# Patient Record
Sex: MALE | Race: ASIAN | NOT HISPANIC OR LATINO
[De-identification: names, ages, dates, MRNs, and addresses within clinical notes are randomized per-mention and may not be internally consistent; named-entity substitution may affect disease eponyms.]

---

## 2020-01-11 ENCOUNTER — TRANSCRIPTION ENCOUNTER (OUTPATIENT)
Age: 55
End: 2020-01-11

## 2024-04-02 ENCOUNTER — INPATIENT (INPATIENT)
Facility: HOSPITAL | Age: 59
LOS: 12 days | Discharge: NURSING, REHAB OR LONG-TERM CARE | DRG: 66 | End: 2024-04-15
Attending: PSYCHIATRY & NEUROLOGY | Admitting: STUDENT IN AN ORGANIZED HEALTH CARE EDUCATION/TRAINING PROGRAM
Payer: COMMERCIAL

## 2024-04-02 VITALS — SYSTOLIC BLOOD PRESSURE: 170 MMHG | WEIGHT: 170.86 LBS | DIASTOLIC BLOOD PRESSURE: 110 MMHG

## 2024-04-02 DIAGNOSIS — I61.9 NONTRAUMATIC INTRACEREBRAL HEMORRHAGE, UNSPECIFIED: ICD-10-CM

## 2024-04-02 LAB
ALBUMIN SERPL ELPH-MCNC: 4.3 G/DL — SIGNIFICANT CHANGE UP (ref 3.3–5)
ALP SERPL-CCNC: 64 U/L — SIGNIFICANT CHANGE UP (ref 40–120)
ALT FLD-CCNC: 26 U/L — SIGNIFICANT CHANGE UP (ref 10–45)
ANION GAP SERPL CALC-SCNC: 16 MMOL/L — SIGNIFICANT CHANGE UP (ref 5–17)
APTT BLD: 28.7 SEC — SIGNIFICANT CHANGE UP (ref 24.5–35.6)
AST SERPL-CCNC: 32 U/L — SIGNIFICANT CHANGE UP (ref 10–40)
BASOPHILS # BLD AUTO: 0.07 K/UL — SIGNIFICANT CHANGE UP (ref 0–0.2)
BASOPHILS NFR BLD AUTO: 0.7 % — SIGNIFICANT CHANGE UP (ref 0–2)
BILIRUB SERPL-MCNC: 0.4 MG/DL — SIGNIFICANT CHANGE UP (ref 0.2–1.2)
BLD GP AB SCN SERPL QL: NEGATIVE — SIGNIFICANT CHANGE UP
BUN SERPL-MCNC: 19 MG/DL — SIGNIFICANT CHANGE UP (ref 7–23)
CALCIUM SERPL-MCNC: 8.6 MG/DL — SIGNIFICANT CHANGE UP (ref 8.4–10.5)
CHLORIDE SERPL-SCNC: 105 MMOL/L — SIGNIFICANT CHANGE UP (ref 96–108)
CO2 SERPL-SCNC: 21 MMOL/L — LOW (ref 22–31)
CREAT SERPL-MCNC: 0.85 MG/DL — SIGNIFICANT CHANGE UP (ref 0.5–1.3)
EGFR: 102 ML/MIN/1.73M2 — SIGNIFICANT CHANGE UP
EOSINOPHIL # BLD AUTO: 0.22 K/UL — SIGNIFICANT CHANGE UP (ref 0–0.5)
EOSINOPHIL NFR BLD AUTO: 2.1 % — SIGNIFICANT CHANGE UP (ref 0–6)
ETHANOL SERPL-MCNC: <10 MG/DL — SIGNIFICANT CHANGE UP (ref 0–10)
GAS PNL BLDV: SIGNIFICANT CHANGE UP
GLUCOSE SERPL-MCNC: 145 MG/DL — HIGH (ref 70–99)
HCT VFR BLD CALC: 41.8 % — SIGNIFICANT CHANGE UP (ref 39–50)
HGB BLD-MCNC: 13.5 G/DL — SIGNIFICANT CHANGE UP (ref 13–17)
IMM GRANULOCYTES NFR BLD AUTO: 0.7 % — SIGNIFICANT CHANGE UP (ref 0–0.9)
INR BLD: 0.97 RATIO — SIGNIFICANT CHANGE UP (ref 0.85–1.18)
LYMPHOCYTES # BLD AUTO: 0.74 K/UL — LOW (ref 1–3.3)
LYMPHOCYTES # BLD AUTO: 7.1 % — LOW (ref 13–44)
MCHC RBC-ENTMCNC: 28.7 PG — SIGNIFICANT CHANGE UP (ref 27–34)
MCHC RBC-ENTMCNC: 32.3 GM/DL — SIGNIFICANT CHANGE UP (ref 32–36)
MCV RBC AUTO: 88.9 FL — SIGNIFICANT CHANGE UP (ref 80–100)
MONOCYTES # BLD AUTO: 0.58 K/UL — SIGNIFICANT CHANGE UP (ref 0–0.9)
MONOCYTES NFR BLD AUTO: 5.6 % — SIGNIFICANT CHANGE UP (ref 2–14)
NEUTROPHILS # BLD AUTO: 8.67 K/UL — HIGH (ref 1.8–7.4)
NEUTROPHILS NFR BLD AUTO: 83.8 % — HIGH (ref 43–77)
NRBC # BLD: 0 /100 WBCS — SIGNIFICANT CHANGE UP (ref 0–0)
PLATELET # BLD AUTO: 205 K/UL — SIGNIFICANT CHANGE UP (ref 150–400)
PLATELET RESPONSE ASPIRIN RESULT: 658 ARU — SIGNIFICANT CHANGE UP
POTASSIUM SERPL-MCNC: 4.3 MMOL/L — SIGNIFICANT CHANGE UP (ref 3.5–5.3)
POTASSIUM SERPL-SCNC: 4.3 MMOL/L — SIGNIFICANT CHANGE UP (ref 3.5–5.3)
PROT SERPL-MCNC: 6.9 G/DL — SIGNIFICANT CHANGE UP (ref 6–8.3)
PROTHROM AB SERPL-ACNC: 10.2 SEC — SIGNIFICANT CHANGE UP (ref 9.5–13)
RBC # BLD: 4.7 M/UL — SIGNIFICANT CHANGE UP (ref 4.2–5.8)
RBC # FLD: 12.9 % — SIGNIFICANT CHANGE UP (ref 10.3–14.5)
RH IG SCN BLD-IMP: POSITIVE — SIGNIFICANT CHANGE UP
SODIUM SERPL-SCNC: 142 MMOL/L — SIGNIFICANT CHANGE UP (ref 135–145)
TROPONIN T, HIGH SENSITIVITY RESULT: 19 NG/L — SIGNIFICANT CHANGE UP (ref 0–51)
WBC # BLD: 10.35 K/UL — SIGNIFICANT CHANGE UP (ref 3.8–10.5)
WBC # FLD AUTO: 10.35 K/UL — SIGNIFICANT CHANGE UP (ref 3.8–10.5)

## 2024-04-02 PROCEDURE — 71045 X-RAY EXAM CHEST 1 VIEW: CPT | Mod: 26

## 2024-04-02 PROCEDURE — 93010 ELECTROCARDIOGRAM REPORT: CPT

## 2024-04-02 PROCEDURE — 70496 CT ANGIOGRAPHY HEAD: CPT | Mod: 26,MC

## 2024-04-02 PROCEDURE — 70498 CT ANGIOGRAPHY NECK: CPT | Mod: 26,MC

## 2024-04-02 PROCEDURE — 70450 CT HEAD/BRAIN W/O DYE: CPT | Mod: 26,MC,59

## 2024-04-02 RX ORDER — SODIUM CHLORIDE 5 G/100ML
1000 INJECTION, SOLUTION INTRAVENOUS
Refills: 0 | Status: DISCONTINUED | OUTPATIENT
Start: 2024-04-02 | End: 2024-04-03

## 2024-04-02 RX ORDER — PROPOFOL 10 MG/ML
50 INJECTION, EMULSION INTRAVENOUS ONCE
Refills: 0 | Status: COMPLETED | OUTPATIENT
Start: 2024-04-02 | End: 2024-04-02

## 2024-04-02 RX ORDER — LEVETIRACETAM 250 MG/1
500 TABLET, FILM COATED ORAL EVERY 12 HOURS
Refills: 0 | Status: DISCONTINUED | OUTPATIENT
Start: 2024-04-02 | End: 2024-04-03

## 2024-04-02 RX ORDER — SODIUM CHLORIDE 9 MG/ML
1000 INJECTION, SOLUTION INTRAVENOUS
Refills: 0 | Status: DISCONTINUED | OUTPATIENT
Start: 2024-04-02 | End: 2024-04-03

## 2024-04-02 RX ORDER — POLYETHYLENE GLYCOL 3350 17 G/17G
17 POWDER, FOR SOLUTION ORAL DAILY
Refills: 0 | Status: DISCONTINUED | OUTPATIENT
Start: 2024-04-02 | End: 2024-04-11

## 2024-04-02 RX ORDER — ONDANSETRON 8 MG/1
4 TABLET, FILM COATED ORAL EVERY 6 HOURS
Refills: 0 | Status: DISCONTINUED | OUTPATIENT
Start: 2024-04-02 | End: 2024-04-15

## 2024-04-02 RX ORDER — SUCCINYLCHOLINE CHLORIDE 100 MG/5ML
80 SYRINGE (ML) INTRAVENOUS ONCE
Refills: 0 | Status: COMPLETED | OUTPATIENT
Start: 2024-04-02 | End: 2024-04-02

## 2024-04-02 RX ORDER — ACETAMINOPHEN 500 MG
650 TABLET ORAL EVERY 6 HOURS
Refills: 0 | Status: DISCONTINUED | OUTPATIENT
Start: 2024-04-02 | End: 2024-04-11

## 2024-04-02 RX ORDER — ETOMIDATE 2 MG/ML
20 INJECTION INTRAVENOUS ONCE
Refills: 0 | Status: COMPLETED | OUTPATIENT
Start: 2024-04-02 | End: 2024-04-02

## 2024-04-02 RX ORDER — DEXMEDETOMIDINE HYDROCHLORIDE IN 0.9% SODIUM CHLORIDE 4 UG/ML
0.2 INJECTION INTRAVENOUS
Qty: 200 | Refills: 0 | Status: DISCONTINUED | OUTPATIENT
Start: 2024-04-02 | End: 2024-04-05

## 2024-04-02 RX ORDER — LEVETIRACETAM 250 MG/1
500 TABLET, FILM COATED ORAL EVERY 12 HOURS
Refills: 0 | Status: DISCONTINUED | OUTPATIENT
Start: 2024-04-02 | End: 2024-04-02

## 2024-04-02 RX ORDER — INSULIN LISPRO 100/ML
VIAL (ML) SUBCUTANEOUS EVERY 6 HOURS
Refills: 0 | Status: DISCONTINUED | OUTPATIENT
Start: 2024-04-02 | End: 2024-04-03

## 2024-04-02 RX ORDER — CHLORHEXIDINE GLUCONATE 213 G/1000ML
15 SOLUTION TOPICAL EVERY 12 HOURS
Refills: 0 | Status: DISCONTINUED | OUTPATIENT
Start: 2024-04-02 | End: 2024-04-03

## 2024-04-02 RX ORDER — FENTANYL CITRATE 50 UG/ML
0.5 INJECTION INTRAVENOUS
Qty: 2500 | Refills: 0 | Status: DISCONTINUED | OUTPATIENT
Start: 2024-04-02 | End: 2024-04-02

## 2024-04-02 RX ORDER — DEXTROSE 50 % IN WATER 50 %
15 SYRINGE (ML) INTRAVENOUS ONCE
Refills: 0 | Status: DISCONTINUED | OUTPATIENT
Start: 2024-04-02 | End: 2024-04-03

## 2024-04-02 RX ORDER — FENTANYL CITRATE 50 UG/ML
100 INJECTION INTRAVENOUS ONCE
Refills: 0 | Status: DISCONTINUED | OUTPATIENT
Start: 2024-04-02 | End: 2024-04-02

## 2024-04-02 RX ORDER — PROPOFOL 10 MG/ML
10 INJECTION, EMULSION INTRAVENOUS
Qty: 500 | Refills: 0 | Status: DISCONTINUED | OUTPATIENT
Start: 2024-04-02 | End: 2024-04-03

## 2024-04-02 RX ORDER — DEXTROSE 50 % IN WATER 50 %
25 SYRINGE (ML) INTRAVENOUS ONCE
Refills: 0 | Status: DISCONTINUED | OUTPATIENT
Start: 2024-04-02 | End: 2024-04-03

## 2024-04-02 RX ORDER — PANTOPRAZOLE SODIUM 20 MG/1
40 TABLET, DELAYED RELEASE ORAL DAILY
Refills: 0 | Status: DISCONTINUED | OUTPATIENT
Start: 2024-04-02 | End: 2024-04-06

## 2024-04-02 RX ORDER — LEVETIRACETAM 250 MG/1
1500 TABLET, FILM COATED ORAL ONCE
Refills: 0 | Status: DISCONTINUED | OUTPATIENT
Start: 2024-04-02 | End: 2024-04-03

## 2024-04-02 RX ORDER — FENTANYL CITRATE 50 UG/ML
0.5 INJECTION INTRAVENOUS
Qty: 5000 | Refills: 0 | Status: DISCONTINUED | OUTPATIENT
Start: 2024-04-02 | End: 2024-04-03

## 2024-04-02 RX ORDER — GLUCAGON INJECTION, SOLUTION 0.5 MG/.1ML
1 INJECTION, SOLUTION SUBCUTANEOUS ONCE
Refills: 0 | Status: DISCONTINUED | OUTPATIENT
Start: 2024-04-02 | End: 2024-04-03

## 2024-04-02 RX ORDER — DEXTROSE 50 % IN WATER 50 %
12.5 SYRINGE (ML) INTRAVENOUS ONCE
Refills: 0 | Status: DISCONTINUED | OUTPATIENT
Start: 2024-04-02 | End: 2024-04-03

## 2024-04-02 RX ORDER — NICARDIPINE HYDROCHLORIDE 30 MG/1
5 CAPSULE, EXTENDED RELEASE ORAL
Qty: 40 | Refills: 0 | Status: DISCONTINUED | OUTPATIENT
Start: 2024-04-02 | End: 2024-04-03

## 2024-04-02 RX ORDER — SENNA PLUS 8.6 MG/1
2 TABLET ORAL AT BEDTIME
Refills: 0 | Status: DISCONTINUED | OUTPATIENT
Start: 2024-04-02 | End: 2024-04-11

## 2024-04-02 RX ADMIN — PROPOFOL 50 MILLIGRAM(S): 10 INJECTION, EMULSION INTRAVENOUS at 22:10

## 2024-04-02 RX ADMIN — FENTANYL CITRATE 1.93 MICROGRAM(S)/KG/HR: 50 INJECTION INTRAVENOUS at 22:25

## 2024-04-02 RX ADMIN — FENTANYL CITRATE 100 MICROGRAM(S): 50 INJECTION INTRAVENOUS at 22:04

## 2024-04-02 RX ADMIN — NICARDIPINE HYDROCHLORIDE 25 MG/HR: 30 CAPSULE, EXTENDED RELEASE ORAL at 22:21

## 2024-04-02 RX ADMIN — FENTANYL CITRATE 1.93 MICROGRAM(S)/KG/HR: 50 INJECTION INTRAVENOUS at 23:10

## 2024-04-02 RX ADMIN — PROPOFOL 4.68 MICROGRAM(S)/KG/MIN: 10 INJECTION, EMULSION INTRAVENOUS at 22:21

## 2024-04-02 RX ADMIN — Medication 80 MILLIGRAM(S): at 21:52

## 2024-04-02 RX ADMIN — ETOMIDATE 20 MILLIGRAM(S): 2 INJECTION INTRAVENOUS at 21:52

## 2024-04-02 RX ADMIN — NICARDIPINE HYDROCHLORIDE 25 MG/HR: 30 CAPSULE, EXTENDED RELEASE ORAL at 23:52

## 2024-04-02 RX ADMIN — PROPOFOL 4.68 MICROGRAM(S)/KG/MIN: 10 INJECTION, EMULSION INTRAVENOUS at 23:10

## 2024-04-02 NOTE — ED ADULT NURSE NOTE - NSFALLHARMRISKINTERV_ED_ALL_ED
Assistance OOB with selected safe patient handling equipment if applicable/Communicate risk of Fall with Harm to all staff, patient, and family/Encourage patient to sit up slowly, dangle for a short time, stand at bedside before walking/Provide visual cue: red socks, yellow wristband, yellow gown, etc/Reinforce activity limits and safety measures with patient and family/Review medications for side effects contributing to fall risk/Toileting schedule using arm’s reach rule for commode and bathroom/Bed in lowest position, wheels locked, appropriate side rails in place/Call bell, personal items and telephone in reach/Instruct patient to call for assistance before getting out of bed/chair/stretcher/Non-slip footwear applied when patient is off stretcher/[place] to call system/Physically safe environment - no spills, clutter or unnecessary equipment/Purposeful Proactive Rounding/Room/bathroom lighting operational, light cord in reach

## 2024-04-02 NOTE — ED PROVIDER NOTE - PHYSICAL EXAMINATION
Review attending attestation, progress notes, & HPI for continued care progress and disposition. GEN: intermittently awake, ill-appearing, responsive to voice/painful stimulus  SKIN: (+) warm/dry, (-) cyanosis, (-) rash  HEAD: (-) scalp swelling, (-) tenderness  EYES: (+) L-sided gaze deviation, (+) tracking across midline occasionally, (+) pupils 2mm sluggishly reactive, equal B/L; (-) conjunctival pallor, (-) scleral icterus  ENMT: (+) moist mucous membranes, (+) airway patent, (-) stridor  NECK: (-) tenderness, (-) stiffness  CV: (+) RRR, (-) murmurs/rubs/gallops  RESP: (+) CTABL, (-) increased WOB, (-) rales, (-) rhonchi, (-) wheezing  ABD: (+) soft, (-) tenderness, (-) guarding  EXT: (-) joint deformities, (-) edema, (-) tenderness, (+) grossly intact ROM, (+) equal pulses in upper & lower extremities  NEURO: responsive to voice/some commands, recognizes name; (+) dense R-sided hemiparesis, (+) 5/5 LUE/LLE, (+) withdraws to pain

## 2024-04-02 NOTE — ED ADULT NURSE NOTE - NS ED NURSE TRANSPORT WITH
transported with ED tech, ED RN, Neuro MD Fontanez, RT/Cardiac Monitor/Defib/ACLS/Rescue Kit/O2/BVM/ventilator

## 2024-04-02 NOTE — ED PROVIDER NOTE - ATTENDING CONTRIBUTION TO CARE
I was the supervising attending. I have independently seen face-to-face and examined the patient. I have reviewed the history and physical and discussed the MDM with the resident, fellow, ARACELIS and/or student. I agree with the assessment and plan as presented unless otherwise documented as follows:    58M hx eczema, gout, presenting after being found down. At baseline AOx3, fully functional. LKW approximately 730pm, went to the bathroom after having dinner w/ family. Wife went to check on him in the bathroom and found him down, able to respond but unable to move right side of body. Stroke notification activated by EMS, patient brought directly to CT. On initial brief exam, noted patient to be aphasic, purposeful movements with L arm in response to name, L-sided gaze deviation, R hemiparesis. On immediate review of CT while patient in the scanner, concern for L-sided IPH, likely L thalamic. Confirmed with patient's wife no hx of HTN, no blood thinners. Brought back to trauma bay, noted HTNsive to 170-180s systolic, nicardipine gtt started for BP control in setting of ICH. Will give Keppra for seizure ppx. Neurosurgery emergently consulted. Findings discussed with patient's wife at bedside. Will require close monitoring for change in neuro exam, interval CTH. May require intubation if active/worsening bleed, declining mental status, unable to tolerate secretions. -Bel Hugo MD (Attending)

## 2024-04-02 NOTE — ED PROVIDER NOTE - PROGRESS NOTE DETAILS
Carlos PGY3: CTH showed bleed. Neurosx aware and will see. BP elevated, nicardipine ordered. Carlos PGY3: pts mentation declining, inc secretion burden, desat to 90% on RA concern for aspiration and mental status in setting of head bleed. Intubated. Admit to NSICU

## 2024-04-02 NOTE — H&P ADULT - NSHPPHYSICALEXAM_GEN_ALL_CORE
Exam prior to intubation: eye opening apraxia, Ox1, PERRL, upgaze and right gaze palsy could cross midline, int FC, L side 5/5, LUE extensor, LLe flaccid Exam prior to intubation:   comfortable in bed  eye opening apraxia, Ox1,   PERRL, upgaze and right gaze palsy could cross midline, int FC,  L side 5/5, LUE extensor, LLe flaccid    Regular rate and rhythm  +oral secretions, normal WOB, clear lungs  abdomen soft, nondistended  scattered excoriations over b/l LEs  LE edema

## 2024-04-02 NOTE — H&P ADULT - ASSESSMENT
Siddhartha Munoz   58M Hx eczema/Gout intermittently takes ASA for pain p/f being found down by wife after hearing thud. No hx HTN. 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 CTA grossly negative. Coags/TEG/ARU pend. ED intubated for inability to manage secretions  Exam prior to intubation: eye opening apraxia, Ox1, PERRL, upgaze and right gaze palsy could cross midline, int FC, L side 5/5, LUE extensor, LLe flaccid  -adm NSCU under Judy  -SBP <140, q1h neurochecks, keppra   -preop for angio to r/o vasc malformation given age/lack of HTN  -4h interval CTH  -if ASA therapeutic will give DDAVP/plts Siddhartha Munoz   58M Hx eczema/Gout intermittently takes ASA for pain p/f being found down by wife after hearing thud. No hx HTN. 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 CTA grossly negative. Coags/TEG/ARU pend. ED intubated for inability to manage secretions  Exam prior to intubation: eye opening apraxia, Ox1, PERRL, upgaze and right gaze palsy could cross midline, int FC, L side 5/5, LUE extensor, LLe flaccid    Neuro:  neurochecks q1h  preop for angio to r/o vasc malformation given age/lack of HTN  keppra 750mg BID for ppx, stop after 7d if no seizure activity  MRI when able  ICP precautions  sedation - transition propofol to precedex, fentanyl PRN for vent synchrony  pain/fever - tylenol PRN  Mobility: ROM in bed    CV:  SBP <140  TTE pending    Pulm:   intubated for airway protection  LTVV  pulmonary toilet  CPAP as tolerated  ABG for vent titration  CXR for ETT placement    GI:  NPO  PPI while intubated  bowel regimen when PO access    Renal:  2% NS for Na goal 140-150  BMP q6h, replete electrolytes PRN  IVF while NPO  monitor UOP    ID:  monitor WBC and fever curve    Endocrine:  goal euglycemia    Heme:  SCDs, holding chemoppx post-bleed    Dispo: NSICU

## 2024-04-02 NOTE — H&P ADULT - HISTORY OF PRESENT ILLNESS
Siddhartha Munoz   58M Hx eczema/Gout intermittently takes ASA for pain p/f being found down by wife after hearing thud. No hx HTN. 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 CTA grossly negative. Coags/TEG/ARU pend. ED intubated for inability to manage secretions. ICH score 2   Exam prior to intubation: eye opening apraxia, Ox1, PERRL, upgaze and right gaze palsy could cross midline, int FC, L side 5/5, LUE extensor, LLe flaccid   58M Hx eczema/Gout intermittently takes ASA for pain, p/f being found down by wife after hearing thud. No hx HTN. 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 CTA grossly negative. Coags/TEG/ARU pend. ED intubated for inability to manage secretions. ICH score 2   Exam prior to intubation: eye opening apraxia, Ox1, PERRL, upgaze and right gaze palsy could cross midline, int FC, L side 5/5, LUE extensor, LLe flaccid

## 2024-04-02 NOTE — ED ADULT NURSE NOTE - OBJECTIVE STATEMENT
pt is a 57yo male BIBEMS complaining of weakness. per pt wife at bedside, pt had an unwitnessed fall around 1930, found down by wife, unknown length of down time, unknown head strike. per pt wife, pt found to be altered, talking low and slow for about 30 minutes before she called the ambulance. On arrival, pt noted to be altered, unable to respond to verbal commands purposefully, pt noted to have R sided weakness, and L gaze deviation. code stroke called, pt brought to CT, FS performed 122 resulted, MD Hugo, MD Gonzalez, MD Fontanez at bedside. following CT scan, pt brought to CC B, pt not protecting airway, decision to intubate made by MD Hugo. pt pre-oxygenated with NRB 15L, nc 6L, MD Gonzalez placed size 7 ET tube at 2156, 22mm at the lip line, colormetric color changed assessed following tube placement, b/l breath sounds auscultated by MD Hugo, confirmatory CXR to be ordered to confirm placement. OG tube placed by MD Gonzalez following successful intubation.

## 2024-04-02 NOTE — H&P ADULT - CRITICAL CARE ATTENDING COMMENT
I have personally provided the above noted minutes of critical care time including review of laboratory values, imaging, interdisciplinary care coordination, and frequent monitoring for decompensation.    Based on my personal evaluation, this patient has a high probability of imminent or life-threatening deterioration due to the presence of: intracranial hemorrhage, cerebral edema, respiratory failure  -  which required my direct attention, intervention, and personal management. Other billable procedures, if performed, are documented separately.

## 2024-04-02 NOTE — ED PROVIDER NOTE - OBJECTIVE STATEMENT
58-year-old male with PMH eczema, gout no other known medical issues presents for evaluation of hemiparesis.  Patient had just finished dinner with family around 730 and went into the bathroom.  Wife heard water running but did not hear him, went into the bathroom after he did not respond and found him on the floor unable to move the right side of his body but awake.  On arrival was a code stroke and taken directly to scanner patient currently aphasic and unable to participate in the ROS.  Per wife had no complaints prior to this, no head strikes or falls that she knows of prior to this, not on any thinners though occasionally takes aspirin for pain control.

## 2024-04-03 ENCOUNTER — TRANSCRIPTION ENCOUNTER (OUTPATIENT)
Age: 59
End: 2024-04-03

## 2024-04-03 ENCOUNTER — RESULT REVIEW (OUTPATIENT)
Age: 59
End: 2024-04-03

## 2024-04-03 LAB
A1C WITH ESTIMATED AVERAGE GLUCOSE RESULT: 5.8 % — HIGH (ref 4–5.6)
A1C WITH ESTIMATED AVERAGE GLUCOSE RESULT: 5.8 % — HIGH (ref 4–5.6)
ALBUMIN SERPL ELPH-MCNC: 3.6 G/DL — SIGNIFICANT CHANGE UP (ref 3.3–5)
ALP SERPL-CCNC: 62 U/L — SIGNIFICANT CHANGE UP (ref 40–120)
ALT FLD-CCNC: 25 U/L — SIGNIFICANT CHANGE UP (ref 10–45)
ANION GAP SERPL CALC-SCNC: 12 MMOL/L — SIGNIFICANT CHANGE UP (ref 5–17)
ANION GAP SERPL CALC-SCNC: 13 MMOL/L — SIGNIFICANT CHANGE UP (ref 5–17)
AST SERPL-CCNC: 31 U/L — SIGNIFICANT CHANGE UP (ref 10–40)
BILIRUB SERPL-MCNC: 0.4 MG/DL — SIGNIFICANT CHANGE UP (ref 0.2–1.2)
BUN SERPL-MCNC: 18 MG/DL — SIGNIFICANT CHANGE UP (ref 7–23)
BUN SERPL-MCNC: 19 MG/DL — SIGNIFICANT CHANGE UP (ref 7–23)
CALCIUM SERPL-MCNC: 7.9 MG/DL — LOW (ref 8.4–10.5)
CALCIUM SERPL-MCNC: 8.3 MG/DL — LOW (ref 8.4–10.5)
CHLORIDE SERPL-SCNC: 106 MMOL/L — SIGNIFICANT CHANGE UP (ref 96–108)
CHLORIDE SERPL-SCNC: 109 MMOL/L — HIGH (ref 96–108)
CHOLEST SERPL-MCNC: 186 MG/DL — SIGNIFICANT CHANGE UP
CHOLEST SERPL-MCNC: 212 MG/DL — HIGH
CO2 SERPL-SCNC: 23 MMOL/L — SIGNIFICANT CHANGE UP (ref 22–31)
CO2 SERPL-SCNC: 25 MMOL/L — SIGNIFICANT CHANGE UP (ref 22–31)
CREAT SERPL-MCNC: 0.72 MG/DL — SIGNIFICANT CHANGE UP (ref 0.5–1.3)
CREAT SERPL-MCNC: 0.78 MG/DL — SIGNIFICANT CHANGE UP (ref 0.5–1.3)
EGFR: 105 ML/MIN/1.73M2 — SIGNIFICANT CHANGE UP
EGFR: 107 ML/MIN/1.73M2 — SIGNIFICANT CHANGE UP
ESTIMATED AVERAGE GLUCOSE: 120 MG/DL — HIGH (ref 68–114)
ESTIMATED AVERAGE GLUCOSE: 120 MG/DL — HIGH (ref 68–114)
GAS PNL BLDA: SIGNIFICANT CHANGE UP
GAS PNL BLDA: SIGNIFICANT CHANGE UP
GLUCOSE BLDC GLUCOMTR-MCNC: 119 MG/DL — HIGH (ref 70–99)
GLUCOSE BLDC GLUCOMTR-MCNC: 86 MG/DL — SIGNIFICANT CHANGE UP (ref 70–99)
GLUCOSE SERPL-MCNC: 110 MG/DL — HIGH (ref 70–99)
GLUCOSE SERPL-MCNC: 97 MG/DL — SIGNIFICANT CHANGE UP (ref 70–99)
HCT VFR BLD CALC: 38.7 % — LOW (ref 39–50)
HDLC SERPL-MCNC: 67 MG/DL — SIGNIFICANT CHANGE UP
HDLC SERPL-MCNC: 77 MG/DL — SIGNIFICANT CHANGE UP
HGB BLD-MCNC: 12.6 G/DL — LOW (ref 13–17)
LACTATE SERPL-SCNC: 1.6 MMOL/L — SIGNIFICANT CHANGE UP (ref 0.5–2)
LIPID PNL WITH DIRECT LDL SERPL: 107 MG/DL — HIGH
LIPID PNL WITH DIRECT LDL SERPL: 123 MG/DL — HIGH
MAGNESIUM SERPL-MCNC: 2.2 MG/DL — SIGNIFICANT CHANGE UP (ref 1.6–2.6)
MAGNESIUM SERPL-MCNC: 2.2 MG/DL — SIGNIFICANT CHANGE UP (ref 1.6–2.6)
MCHC RBC-ENTMCNC: 28.8 PG — SIGNIFICANT CHANGE UP (ref 27–34)
MCHC RBC-ENTMCNC: 32.6 GM/DL — SIGNIFICANT CHANGE UP (ref 32–36)
MCV RBC AUTO: 88.6 FL — SIGNIFICANT CHANGE UP (ref 80–100)
NON HDL CHOLESTEROL: 119 MG/DL — SIGNIFICANT CHANGE UP
NON HDL CHOLESTEROL: 135 MG/DL — HIGH
NRBC # BLD: 0 /100 WBCS — SIGNIFICANT CHANGE UP (ref 0–0)
PHOSPHATE SERPL-MCNC: 3.7 MG/DL — SIGNIFICANT CHANGE UP (ref 2.5–4.5)
PHOSPHATE SERPL-MCNC: 3.7 MG/DL — SIGNIFICANT CHANGE UP (ref 2.5–4.5)
PLATELET # BLD AUTO: 194 K/UL — SIGNIFICANT CHANGE UP (ref 150–400)
PLATELET MAPPING ACTF MAX AMPLITUDE: 9.3 MM — SIGNIFICANT CHANGE UP (ref 2–19)
PLATELET MAPPING ADP MAXIMUM AMPLITUDE: 48.3 MM — SIGNIFICANT CHANGE UP (ref 45–69)
PLATELET MAPPING ADP PERCENT INHIBITION: 20.7 % — HIGH (ref 0–17)
PLATELET MAPPING ARACHIDONIC ACID INHIBITION: 3 % — SIGNIFICANT CHANGE UP (ref 0–11)
PLATELET MAPPING HKH MAXIMUM AMPLITUDE: 58.5 MM — SIGNIFICANT CHANGE UP (ref 53–68)
POTASSIUM SERPL-MCNC: 3.7 MMOL/L — SIGNIFICANT CHANGE UP (ref 3.5–5.3)
POTASSIUM SERPL-MCNC: 3.8 MMOL/L — SIGNIFICANT CHANGE UP (ref 3.5–5.3)
POTASSIUM SERPL-SCNC: 3.7 MMOL/L — SIGNIFICANT CHANGE UP (ref 3.5–5.3)
POTASSIUM SERPL-SCNC: 3.8 MMOL/L — SIGNIFICANT CHANGE UP (ref 3.5–5.3)
PROT SERPL-MCNC: 5.8 G/DL — LOW (ref 6–8.3)
RBC # BLD: 4.37 M/UL — SIGNIFICANT CHANGE UP (ref 4.2–5.8)
RBC # FLD: 12.8 % — SIGNIFICANT CHANGE UP (ref 10.3–14.5)
SODIUM SERPL-SCNC: 143 MMOL/L — SIGNIFICANT CHANGE UP (ref 135–145)
SODIUM SERPL-SCNC: 145 MMOL/L — SIGNIFICANT CHANGE UP (ref 135–145)
T3 SERPL-MCNC: 66 NG/DL — LOW (ref 80–200)
T3 SERPL-MCNC: 80 NG/DL — SIGNIFICANT CHANGE UP (ref 80–200)
T4 AB SER-ACNC: 5.9 UG/DL — SIGNIFICANT CHANGE UP (ref 4.6–12)
T4 AB SER-ACNC: 6.5 UG/DL — SIGNIFICANT CHANGE UP (ref 4.6–12)
T4 FREE SERPL-MCNC: 1.2 NG/DL — SIGNIFICANT CHANGE UP (ref 0.9–1.8)
TRIGL SERPL-MCNC: 64 MG/DL — SIGNIFICANT CHANGE UP
TRIGL SERPL-MCNC: 65 MG/DL — SIGNIFICANT CHANGE UP
TSH SERPL-MCNC: 0.77 UIU/ML — SIGNIFICANT CHANGE UP (ref 0.27–4.2)
TSH SERPL-MCNC: 0.98 UIU/ML — SIGNIFICANT CHANGE UP (ref 0.27–4.2)
WBC # BLD: 8.82 K/UL — SIGNIFICANT CHANGE UP (ref 3.8–10.5)
WBC # FLD AUTO: 8.82 K/UL — SIGNIFICANT CHANGE UP (ref 3.8–10.5)

## 2024-04-03 PROCEDURE — 93970 EXTREMITY STUDY: CPT | Mod: 26

## 2024-04-03 PROCEDURE — 99291 CRITICAL CARE FIRST HOUR: CPT

## 2024-04-03 PROCEDURE — 70450 CT HEAD/BRAIN W/O DYE: CPT | Mod: 26

## 2024-04-03 PROCEDURE — 93306 TTE W/DOPPLER COMPLETE: CPT | Mod: 26

## 2024-04-03 PROCEDURE — 36620 INSERTION CATHETER ARTERY: CPT

## 2024-04-03 PROCEDURE — 99221 1ST HOSP IP/OBS SF/LOW 40: CPT | Mod: 25

## 2024-04-03 RX ORDER — CHLORHEXIDINE GLUCONATE 213 G/1000ML
1 SOLUTION TOPICAL
Refills: 0 | Status: DISCONTINUED | OUTPATIENT
Start: 2024-04-03 | End: 2024-04-15

## 2024-04-03 RX ORDER — LEVETIRACETAM 250 MG/1
750 TABLET, FILM COATED ORAL
Refills: 0 | Status: DISCONTINUED | OUTPATIENT
Start: 2024-04-03 | End: 2024-04-03

## 2024-04-03 RX ORDER — CHLORHEXIDINE GLUCONATE 213 G/1000ML
15 SOLUTION TOPICAL EVERY 12 HOURS
Refills: 0 | Status: DISCONTINUED | OUTPATIENT
Start: 2024-04-03 | End: 2024-04-06

## 2024-04-03 RX ORDER — PROPOFOL 10 MG/ML
10 INJECTION, EMULSION INTRAVENOUS
Qty: 1000 | Refills: 0 | Status: DISCONTINUED | OUTPATIENT
Start: 2024-04-03 | End: 2024-04-04

## 2024-04-03 RX ORDER — VALPROIC ACID (AS SODIUM SALT) 250 MG/5ML
500 SOLUTION, ORAL ORAL EVERY 12 HOURS
Refills: 0 | Status: DISCONTINUED | OUTPATIENT
Start: 2024-04-03 | End: 2024-04-04

## 2024-04-03 RX ORDER — FENTANYL CITRATE 50 UG/ML
0.5 INJECTION INTRAVENOUS
Qty: 5000 | Refills: 0 | Status: DISCONTINUED | OUTPATIENT
Start: 2024-04-03 | End: 2024-04-04

## 2024-04-03 RX ORDER — SODIUM CHLORIDE 9 MG/ML
1000 INJECTION, SOLUTION INTRAVENOUS
Refills: 0 | Status: DISCONTINUED | OUTPATIENT
Start: 2024-04-03 | End: 2024-04-05

## 2024-04-03 RX ORDER — NICARDIPINE HYDROCHLORIDE 30 MG/1
5 CAPSULE, EXTENDED RELEASE ORAL
Qty: 40 | Refills: 0 | Status: DISCONTINUED | OUTPATIENT
Start: 2024-04-03 | End: 2024-04-03

## 2024-04-03 RX ADMIN — CHLORHEXIDINE GLUCONATE 15 MILLILITER(S): 213 SOLUTION TOPICAL at 17:03

## 2024-04-03 RX ADMIN — Medication 55 MILLIGRAM(S): at 17:03

## 2024-04-03 RX ADMIN — LEVETIRACETAM 400 MILLIGRAM(S): 250 TABLET, FILM COATED ORAL at 04:30

## 2024-04-03 RX ADMIN — SODIUM CHLORIDE 50 MILLILITER(S): 5 INJECTION, SOLUTION INTRAVENOUS at 01:58

## 2024-04-03 RX ADMIN — FENTANYL CITRATE 1.93 MICROGRAM(S)/KG/HR: 50 INJECTION INTRAVENOUS at 07:15

## 2024-04-03 RX ADMIN — CHLORHEXIDINE GLUCONATE 1 APPLICATION(S): 213 SOLUTION TOPICAL at 21:01

## 2024-04-03 RX ADMIN — CHLORHEXIDINE GLUCONATE 15 MILLILITER(S): 213 SOLUTION TOPICAL at 06:25

## 2024-04-03 RX ADMIN — Medication 55 MILLIGRAM(S): at 11:36

## 2024-04-03 RX ADMIN — SODIUM CHLORIDE 50 MILLILITER(S): 5 INJECTION, SOLUTION INTRAVENOUS at 07:15

## 2024-04-03 RX ADMIN — PANTOPRAZOLE SODIUM 40 MILLIGRAM(S): 20 TABLET, DELAYED RELEASE ORAL at 12:39

## 2024-04-03 RX ADMIN — SODIUM CHLORIDE 100 MILLILITER(S): 9 INJECTION, SOLUTION INTRAVENOUS at 11:32

## 2024-04-03 RX ADMIN — PROPOFOL 4.63 MICROGRAM(S)/KG/MIN: 10 INJECTION, EMULSION INTRAVENOUS at 07:15

## 2024-04-03 NOTE — PROCEDURE NOTE - NSTOLERANCE_GEN_A_CORE
· Patient with hypotension- now blood pressure elevated   · Continue midodrine- decreased to 2 5 mg tid by nephrology on 5/11  · Losartan was discontinued due to ALEJANDRA   · Metoprolol held due to hypotension, will continue to monitor Patient tolerated procedure well.

## 2024-04-03 NOTE — DISCHARGE NOTE NURSING/CASE MANAGEMENT/SOCIAL WORK - PATIENT PORTAL LINK FT
You can access the FollowMyHealth Patient Portal offered by [facility] by registering at the following website: [email or web address]. By joining [facility]’s FollowMyHealth portal, you will also be able to view your health information using other applications (apps) compatible with our system.

## 2024-04-03 NOTE — CHART NOTE - NSCHARTNOTEFT_GEN_A_CORE
Ultrasonography pupillary assessment     M-mode     OD       OS       INTERPRETATION   Normal pupillary reflexes to light direct and consensual bilaterally.     Gege Rodriguez     Images saved on [email or web address] Ultrasonography pupillary assessment     M-mode     OD   no change of the pupillary diameter to light    OS   0.2mm change of the pupillary diameter to light    INTERPRETATION   Decreased pupillary reflexes to light direct light OS and absent on right.    Gege Rodriguez     Images saved on PACS/[employer]

## 2024-04-03 NOTE — PROGRESS NOTE ADULT - SUBJECTIVE AND OBJECTIVE BOX
Patient seen and examined at bedside.    --Anticoagulation--    T(C): 36.2 (04-02-24 @ 23:00), Max: 36.7 (04-02-24 @ 21:24)  HR: 99 (04-02-24 @ 23:05) (83 - 110)  BP: 153/98 (04-02-24 @ 23:00) (125/83 - 182/120)  RR: 16 (04-02-24 @ 23:00) (15 - 20)  SpO2: 99% (04-02-24 @ 23:05) (94% - 100%)  Wt(kg): --    Exam: Intubated, no EO, +cough/gag/overbreathes, PERRL, LUE spont AG in FC LLE AG, RUE extensor, RLE flaccid

## 2024-04-03 NOTE — CONSULT NOTE ADULT - SUBJECTIVE AND OBJECTIVE BOX
Neurology - Consult Note    -  Spectra: [phone] ([facility]), [phone] ([facility]). For new consults, please page: [phone] ([facility]), [phone] ([facility]).  -    HPI: Patient ROSALEE JOHNSON is a 58y (1965) LEFT handed man who presents to [facility] ED on 4/2/2024, as a CODE STROKE with c/o RIGHT sided weakness.    PMH significant for: eczema, gout    Patient is accompanied by his wife. Wife reports that patient went to the bathroom at about 19:30 today (4/2). Patient was taking a long time in the bathroom. She reportedly heard a thud. Found patient on the floor - unclear if there was head strike. EMS was called. Patient unable to speak on arrival. Not moving his RIGHT side. He does make a motion with his left hand/arm as if to say he wants to write something down. Patient quickly assessed by Vascular Neurology team before being rushed to CT scan. CT scan revealed a LEFT thalamic hemorrhage without intraventricular extension. 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 Neurosurgery consulted immediately when bleed was uncovered on CT scan.    Wife says patient's only medical problems are eczema and gout. Patient has notably edematous legs, which wife says is chronic - but recently worse. Patient reportedly under significant stress x 2 years. Wife says patient sees a PCP and he has NOT been diagnosed with HTN.    When asked about AC/AP - wife thinks patient may take aspirin for pain in his feet. However, she later says she thinks he just takes acetaminophen and ibuprofen. No history of stroke/MI. Walks by himself. Can climb stairs. Is not driving at this time - takes the train to work. Wife denies knowledge of tobacco/alcohol/recreational drug use. Patient lives with his wife and 2 children. Patient works as an .    Patient unable to manage his secretions in the trauma bay. Visible secretions coming out of the mouth and SpO2 dropping. Required urgent suctioning and was subsequently intubated by the ED team.    Review of Systems:  FAITH - d/t mental status, intubation    Allergies:  No Known Allergies      PMHx/PSHx/Family Hx: As above, otherwise see below   Eczema    Gout        Social Hx:  Per HPI    Medications:  MEDICATIONS  (STANDING):  chlorhexidine 0.12% Liquid 15 milliLiter(s) Oral Mucosa every 12 hours  dexMEDEtomidine Infusion 0.2 MICROgram(s)/kG/Hr (3.86 mL/Hr) IV Continuous <Continuous>  dextrose 5%. 1000 milliLiter(s) (50 mL/Hr) IV Continuous <Continuous>  dextrose 5%. 1000 milliLiter(s) (100 mL/Hr) IV Continuous <Continuous>  dextrose 50% Injectable 25 Gram(s) IV Push once  dextrose 50% Injectable 25 Gram(s) IV Push once  dextrose 50% Injectable 12.5 Gram(s) IV Push once  fentaNYL   Infusion... 0.5 MICROgram(s)/kG/Hr (1.93 mL/Hr) IV Continuous <Continuous>  glucagon  Injectable 1 milliGRAM(s) IntraMuscular once  insulin lispro (ADMELOG) corrective regimen sliding scale   SubCutaneous every 6 hours  levETIRAcetam   Injectable 1500 milliGRAM(s) IV Push once  levETIRAcetam   Injectable 500 milliGRAM(s) IV Push every 12 hours  multivitamin 1 Tablet(s) Oral daily  niCARdipine Infusion 5 mG/Hr (25 mL/Hr) IV Continuous <Continuous>  pantoprazole  Injectable 40 milliGRAM(s) IV Push daily  polyethylene glycol 3350 17 Gram(s) Oral daily  propofol Infusion 10 MICROgram(s)/kG/Min (4.63 mL/Hr) IV Continuous <Continuous>  senna 2 Tablet(s) Oral at bedtime  sodium chloride 2% . 1000 milliLiter(s) (50 mL/Hr) IV Continuous <Continuous>    MEDICATIONS  (PRN):  acetaminophen     Tablet .. 650 milliGRAM(s) Oral every 6 hours PRN Temp greater or equal to 38C (100.4F), Mild Pain (1 - 3)  dextrose Oral Gel 15 Gram(s) Oral once PRN Blood Glucose LESS THAN 70 milliGRAM(s)/deciliter  ondansetron Injectable 4 milliGRAM(s) IV Push every 6 hours PRN Nausea and/or Vomiting      Vitals:  T(C): 36.2 (04-02-24 @ 23:00), Max: 36.7 (04-02-24 @ 21:24)  HR: 79 (04-03-24 @ 02:00) (79 - 110)  BP: 102/63 (04-03-24 @ 02:00) (102/63 - 182/120)  RR: 12 (04-03-24 @ 02:00) (12 - 20)  SpO2: 100% (04-03-24 @ 02:00) (94% - 100%)    Physical Examination:  General - Arrives on stretcher, falling off the left side; ill-appearing  Cardiovascular - b/l legs are edematous with evidence of stasis changes  Eyes - Non-injected conjunctivae, anicteric sclerae    Neurologic Exam:  Mental status:  - Awake on arrival, decreased level of consciousness but interacting with examiners (intermittently attends)  - Oriented to: says his name, but doesn't know where he is, or the year  - Speech: initially completely without verbal output - later asking wife, "Where am I?"  - Intermittently follows simple commands ("wiggle your toes")    Cranial nerves - PERRL, appears to BTT b/l, EOM - there is a RIGHT gaze palsy but patient CAN cross midline spontaneously, struggles with upward gaze; there appears to R NLF flattening  Dysarthria: initially without any verbal output, later speaking - unclear if speech is slurred  +Apraxia of eyelid opening    Motor -   LUE at least 3/5  LLE at least 3/5  RUE 0/5  RLE 0/5    Sensation - No grimace to noxious right side    DTRs (R/L)  Deferred d/t focused neurologic exam    Coordination - FAITH    Gait and station - FAITH    Labs:                        13.5   10.35 )-----------( 205      ( 02 Apr 2024 21:53 )             41.8     04-02    142  |  105  |  19  ----------------------------<  145<H>  4.3   |  21<L>  |  0.85    Ca    8.6      02 Apr 2024 21:53    TPro  6.9  /  Alb  4.3  /  TBili  0.4  /  DBili  x   /  AST  32  /  ALT  26  /  AlkPhos  64  04-02    CAPILLARY BLOOD GLUCOSE  122 (03 Apr 2024 01:55)      POCT Blood Glucose.: 119 mg/dL (03 Apr 2024 01:53)    LIVER FUNCTIONS - ( 02 Apr 2024 21:53 )  Alb: 4.3 g/dL / Pro: 6.9 g/dL / ALK PHOS: 64 U/L / ALT: 26 U/L / AST: 32 U/L / GGT: x             PT/INR - ( 02 Apr 2024 21:53 )   PT: 10.2 sec;   INR: 0.97 ratio         PTT - ( 02 Apr 2024 21:53 )  PTT:28.7 sec  CSF:                  Radiology:  CTH:  IMPRESSION:  An acute left thalamic hemorrhage measures approximately 3.2 x 1.7 x 3.9 cm in the sagittal and coronal planes. No evidence of acute cerebral infarction.    CT ANGIOGRAPHY NECK:  1. Cervical carotid systems and vertebral arteries are patent bilaterally   without stenosis.  No hemodynamically significant carotid stenosis using   NASCET criteria.  2.  Short segment dissection flap in the proximal right internal carotid   artery, of unknown chronicity.    CT ANGIOGRAPHY BRAIN:  1.  No major vessel occlusion, stenosis or aneurysm is identified about   the [place].  Normal anatomic variants as discussed above.  2.  The dural venous sinuses are incompletely opacified due to   acquisition during early arterial phase.  3.  No evidence of an arteriovenous malformation.  4.  No evidence of active contrast extravasation into the patient's left   thalamic hematoma.

## 2024-04-03 NOTE — CHART NOTE - NSCHARTNOTEFT_GEN_A_CORE
CAPRINI SCORE [CLOT] Score on Admission for     AGE RELATED RISK FACTORS                                                       MOBILITY RELATED FACTORS  [x] Age 41-60 years                                            (1 Point)                  [ ] Bed rest                                                        (1 Point)  [ ] Age: 61-74 years                                           (2 Points)                 [ ] Plaster cast                                                   (2 Points)  [ ] Age= 75 years                                              (3 Points)                 [ ] Bed bound for more than 72 hours                 (2 Points)    DISEASE RELATED RISK FACTORS                                               GENDER SPECIFIC FACTORS  [ ] Edema in the lower extremities                       (1 Point)                  [ ] Pregnancy                                                     (1 Point)  [ ] Varicose veins                                               (1 Point)                  [ ] Post-partum < 6 weeks                                   (1 Point)             [ ] BMI > 25 Kg/m2                                            (1 Point)                  [ ] Hormonal therapy  or oral contraception          (1 Point)                 [ ] Sepsis (in the previous month)                        (1 Point)            [ ] History of pregnancy complications                 (1 point)  [ ] Pneumonia or serious lung disease                                            [ ] Unexplained or recurrent                     (1 Point)           (in the previous month)                               (1 Point)  [ ] Abnormal pulmonary function test                     (1 Point)                 SURGERY RELATED RISK FACTORS (include planned surgeries)  [ ] Acute myocardial infarction                              (1 Point)                 [ ]  Section                                             (1 Point)  [ ] Congestive heart failure (in the previous month)  (1 Point)         [ ] Minor surgery                                                  (1 Point)   [ ] Inflammatory bowel disease                             (1 Point)                 [ ] Arthroscopic surgery                                        (2 Points)  [ ] Central venous access                                      (2 Points)                 [ ] General surgery lasting more than 45 minutes   (2 Points)       [x] Stroke (in the previous month)                          (5 Points)               [ ] Elective arthroplasty                                         (5 Points)            [ ] current or past malignancy                              (2 Points)                                                                                                       HEMATOLOGY RELATED FACTORS                                                 TRAUMA RELATED RISK FACTORS  [ ] Prior episodes of VTE                                     (3 Points)                [ ] Fracture of the hip, pelvis, or leg                       (5 Points)  [ ] Positive family history for VTE                         (3 Points)             [ ] Acute spinal cord injury (in the previous month)  (5 Points)  [ ] Prothrombin 13781 A                                     (3 Points)                [ ] Paralysis  (less than 1 month)                             (5 Points)  [ ] Factor V Leiden                                             (3 Points)                  [ ] Multiple Trauma within 1 month                        (5 Points)  [ ] Lupus anticoagulants                                     (3 Points)                                                           [ ] Anticardiolipin antibodies                               (3 Points)                                                       [ ] High homocysteine in the blood                      (3 Points)                                             [ ] Other congenital or acquired thrombophilia      (3 Points)                                                [ ] Heparin induced thrombocytopenia                  (3 Points)                                          Total Score [     6     ]    Risk:  Very low 0   Low 1 to 2   Moderate 3 to 4   High =5       VTE Prophylaxis Recommendations:  [x] mechanical pneumatic compression devices                                      [ ] contraindicated: _____________________  [ ] chemo prophylaxis                                                                                  [x] contraindicated _____________________    **** HIGH LIKELIHOOD DVT PRESENT ON ADMISSION  [x] (please order LE dopplers within 24 hours of admission) pt stumbled, pt states felt weak for a moment but no syncope, cp or sob.  pt is eating  pt refusing vital signs  pt refusing medical evaluation such as labs.     PHYSICAL EXAM:      Constitutional: A&Ox4  Respiratory: cta b/l  Cardiovascular: s1 s2 rrr  Gastrointestinal: soft nt  nd + bs no rebound or guarding  Genitourinary: no cva tenderness  Extremities: normal rom, no edema, calf tenderness  Neurological: no focal deficits  Skin: no rash      a/p  1.weakness  -c/w 1 to 1 for safety  -ambulate as tolerated, arise slowly from recumbent position  -encourage po fluids  -monitor pt for return of sx  -get a set of vital signs preferably orthostatic if able  -get a set of labs if pt is agreeable  -pt consult prn  -recall prn

## 2024-04-03 NOTE — CONSULT NOTE ADULT - NSCONSULTADDITIONALINFOA_GEN_ALL_CORE
Mr. JOHNSON is a 58y LH man presenting w/ acute onset right sided weakness with CTH showing L-thalamic IPH with initial SBP 170s then 220, NIHSS 22 requiring bonita gtt and intubation due to depressed mentation and airway protection. No NTK or MT indication due to this is a IPH. Admitted to ICU for BP control and vent management. NO home ACAP to reverse.   Pending MRI w/ w/o C and angio  Most likely etiology concerning HTN while fine vascular malformation or underlying IPH pathologies cannot be fully ruled out    Now manage per NIUC, stroke will follow     Chino Johnson MD PhD  Vascular neurology fellow

## 2024-04-03 NOTE — PROGRESS NOTE ADULT - ASSESSMENT
SBP   Preop for possible angiogram in AM, Wife is currently not consenting for procedure wants to discuss further with family  ARU non-tx  Keppra 500bid (load given)  Repeat CTH @1:15AM

## 2024-04-03 NOTE — PROGRESS NOTE ADULT - SUBJECTIVE AND OBJECTIVE BOX
[facility]       Admission scores:   NIHSS on admission: 26  ICH score: 2  LKN: 4/2/2024, 19:30  pre-MRS: 0    ON: admitted to nsicu     Exam   iintubated, eyes closed no response to noxious or voice, has cough, gag, peerl, LUE AG LLE AG, RUE extensor, RLE plegic     VITALS:   Vital Signs Last 24 Hrs  T(C): 36.5 (03 Apr 2024 07:00), Max: 36.7 (02 Apr 2024 21:24)  T(F): 97.7 (03 Apr 2024 07:00), Max: 98 (02 Apr 2024 21:24)  HR: 60 (03 Apr 2024 10:00) (55 - 110)  BP: 102/63 (03 Apr 2024 07:00) (96/56 - 182/120)  BP(mean): 77 (03 Apr 2024 07:00) (70 - 117)  RR: 12 (03 Apr 2024 10:00) (12 - 20)  SpO2: 100% (03 Apr 2024 10:00) (94% - 100%)    Parameters below as of 03 Apr 2024 07:00  Patient On (Oxygen Delivery Method): ventilator    O2 Concentration (%): 45  CAPILLARY BLOOD GLUCOSE  122 (03 Apr 2024 01:55)      POCT Blood Glucose.: 86 mg/dL (03 Apr 2024 06:26)  POCT Blood Glucose.: 119 mg/dL (03 Apr 2024 01:53)  POCT Blood Glucose.: 122 mg/dL (02 Apr 2024 21:15)    I&O's Summary    02 Apr 2024 07:01  -  03 Apr 2024 07:00  --------------------------------------------------------  IN: 636.1 mL / OUT: 1050 mL / NET: -413.9 mL    03 Apr 2024 07:01  -  03 Apr 2024 10:46  --------------------------------------------------------  IN: 217.2 mL / OUT: 0 mL / NET: 217.2 mL        Respiratory:  Mode: AC/ CMV (Assist Control/ Continuous Mandatory Ventilation)  RR (machine): 12  TV (machine): 450  FiO2: 45  PEEP: 5  ITime: 1  MAP: 9  PIP: 21    ABG - ( 03 Apr 2024 04:46 )  pH, Arterial: 7.34  pH, Blood: x     /  pCO2: 53    /  pO2: 202   / HCO3: 29    / Base Excess: 1.8   /  SaO2: 99.3                LABS:                        12.6   8.82  )-----------( 194      ( 03 Apr 2024 04:46 )             38.7     04-03    145  |  109<H>  |  19  ----------------------------<  97  3.8   |  23  |  0.78        MEDICATION LEVELS:     IVF FLUIDS/MEDICATIONS:   MEDICATIONS  (STANDING):  chlorhexidine 0.12% Liquid 15 milliLiter(s) Oral Mucosa every 12 hours  chlorhexidine 4% Liquid 1 Application(s) Topical <User Schedule>  dexMEDEtomidine Infusion 0.2 MICROgram(s)/kG/Hr (3.86 mL/Hr) IV Continuous <Continuous>  dextrose 5%. 1000 milliLiter(s) (50 mL/Hr) IV Continuous <Continuous>  dextrose 5%. 1000 milliLiter(s) (100 mL/Hr) IV Continuous <Continuous>  dextrose 50% Injectable 25 Gram(s) IV Push once  dextrose 50% Injectable 12.5 Gram(s) IV Push once  dextrose 50% Injectable 25 Gram(s) IV Push once  fentaNYL   Infusion... 0.5 MICROgram(s)/kG/Hr (1.93 mL/Hr) IV Continuous <Continuous>  glucagon  Injectable 1 milliGRAM(s) IntraMuscular once  insulin lispro (ADMELOG) corrective regimen sliding scale   SubCutaneous every 6 hours  levETIRAcetam  IVPB 750 milliGRAM(s) IV Intermittent two times a day  multivitamin 1 Tablet(s) Oral daily  niCARdipine Infusion 5 mG/Hr (25 mL/Hr) IV Continuous <Continuous>  pantoprazole  Injectable 40 milliGRAM(s) IV Push daily  polyethylene glycol 3350 17 Gram(s) Oral daily  propofol Infusion 10 MICROgram(s)/kG/Min (4.63 mL/Hr) IV Continuous <Continuous>  senna 2 Tablet(s) Oral at bedtime  sodium chloride 2% . 1000 milliLiter(s) (50 mL/Hr) IV Continuous <Continuous>    MEDICATIONS  (PRN):  acetaminophen     Tablet .. 650 milliGRAM(s) Oral every 6 hours PRN Temp greater or equal to 38C (100.4F), Mild Pain (1 - 3)  dextrose Oral Gel 15 Gram(s) Oral once PRN Blood Glucose LESS THAN 70 milliGRAM(s)/deciliter  ondansetron Injectable 4 milliGRAM(s) IV Push every 6 hours PRN Nausea and/or Vomiting

## 2024-04-03 NOTE — CONSULT NOTE ADULT - ASSESSMENT
ROSALEE JOHNSON is a 58y LEFT handed man, with PMH significant for: eczema, gout, who presents to [facility] ED on 4/2/2024, as a CODE STROKE with c/o RIGHT sided weakness. CT head disclosed an acute LEFT thalamic hemorrhage without IVH, but with apparent extension down to the midbrain. CTA without finding of AVM, but incidental R ICA dissection. Initial NIHSS 26. Not a tenecteplase candidate given IPH. No thrombectomy d/t no LVO. Urgently evaluated by Neurosurgery. Required intubation in the ED for airway protection given he was unable to manage his secretions and SpO2 began to drop. Wife denies that patient has a history of HTN - chart review reveals a single previous BP measurement - 140/92 (in 1/2020).    ED vitals on arrival notable for: afebrile, HR 84, /110 -> SBP went up >220 on nicardipine gtt, RR 15, SpO2 94% on RA, telemetry NSR. Labs notable for: POCT glucose 122, INR 0.97, Hg 13.5, platelets 205, bicarbonate 21, lactate 2.5, PRA - 658.0. CT imaging disclosed: acute left thalamic hemorrhage - appears to extend into the midbrain, no intraventricular extension, incidental R carotid dissection. Exam notable for RHP, R gaze palsy that can be overcome, patient initially not speaking on arrival - was able to speak to wife intelligibly before intubation d/t inability to protect his airway.     NIHSS on admission: 26  ICH score: 2  LKN: 4/2/2024, 19:30  pre-MRS: 0    Impression: RHP with R gaze palsy d/t L thalamic IPH; mechanism: classic location for hypertensive bleed but patient reportedly w/o history - r/o underlying vascular malformation    Recommendations:  [] STAT Neurosurgery consult, appreciate  [] Stability scan in 4 hours from presentation  [] Admit to NSCU (patient now intubated)  [] Telemetry monitoring  [] Dysphagia screen - cannot be completed d/t intubation  [] SBP goal <140/90 - started on nicardipine gtt in the trauma bay  [] Hold all AC/AP  [] ARU WNL - no need for reversal with desmopressin  [] Will be undergoing angiogram with NSGY in the AM - awaiting wife to consent  [] MRI brain w/wo contrast  [] Neurochecks, vitals q1h  [] Fall, aspiration, seizure precautions  [] PT/OT/SLP/CM  [] Diet: NPO  [] DVT prophylaxis: SCDs only for now d/t IPH  [] Stroke education provided to wife at bedside  [] Smoking cessation education not needed: non-smoker  [] Please document MRS on discharge    NO tenecteplase d/t IPH.  NO thrombectomy d/t no LVO.    Patient/plan d/w Stroke fellow, Dr. Chino Johnson, under the supervision of attending vascular neurologist Dr. Lugo. To be seen by attending in the AM with attestation to follow. Recommendations were relayed directly to ED. Date of service 4/2/2024. Note delayed d/t emergent patient care.

## 2024-04-03 NOTE — PROCEDURE NOTE - PROCEDURE DATE TIME, MLM
[FreeTextEntry1] : I, Taina Smalls, acted solely as a scribe for Dr. Argentina Jones on 09/09/2021  03-Apr-2024 03:20

## 2024-04-03 NOTE — PROGRESS NOTE ADULT - ASSESSMENT
ASSESSMENT   58M Hx eczema/Gout intermittently takes ASA for pain, p/f being found down by wife after hearing thud. No hx HTN. 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 CTA negative. ED intubated.      PLAN     N  NOK refused angio for now   Get MRI/MRA w  Tylenol and oxycodone for pain   prop 40 fent 1 added valproate and stop keppra   Activity: [] OOB as tolerated [] Bedrest [] PT [] OT [] PMNR    CV   -160mmHg  TTE     P   12/450/5/45 get abg      R   Strict I+Os   stop 2%  plasmalyte 100     GI   Diet: get ngtube  Senna miralax  Last BM PTA     ID  afebrile    E  Goal euglycemia (-180)  a1c 5.6    H  SCDs  Chemoppx   LED pending     CODE STATUS: FULL CODE     DISPOSITION: ICU    CRITICAL

## 2024-04-04 LAB
ANION GAP SERPL CALC-SCNC: 12 MMOL/L — SIGNIFICANT CHANGE UP (ref 5–17)
BUN SERPL-MCNC: 19 MG/DL — SIGNIFICANT CHANGE UP (ref 7–23)
CALCIUM SERPL-MCNC: 7.9 MG/DL — LOW (ref 8.4–10.5)
CHLORIDE SERPL-SCNC: 109 MMOL/L — HIGH (ref 96–108)
CO2 SERPL-SCNC: 22 MMOL/L — SIGNIFICANT CHANGE UP (ref 22–31)
CREAT SERPL-MCNC: 0.86 MG/DL — SIGNIFICANT CHANGE UP (ref 0.5–1.3)
EGFR: 102 ML/MIN/1.73M2 — SIGNIFICANT CHANGE UP
GAS PNL BLDA: SIGNIFICANT CHANGE UP
GLUCOSE SERPL-MCNC: 100 MG/DL — HIGH (ref 70–99)
HCT VFR BLD CALC: 37.8 % — LOW (ref 39–50)
HGB BLD-MCNC: 11.9 G/DL — LOW (ref 13–17)
MAGNESIUM SERPL-MCNC: 2.3 MG/DL — SIGNIFICANT CHANGE UP (ref 1.6–2.6)
MCHC RBC-ENTMCNC: 28.7 PG — SIGNIFICANT CHANGE UP (ref 27–34)
MCHC RBC-ENTMCNC: 31.5 GM/DL — LOW (ref 32–36)
MCV RBC AUTO: 91.3 FL — SIGNIFICANT CHANGE UP (ref 80–100)
NRBC # BLD: 0 /100 WBCS — SIGNIFICANT CHANGE UP (ref 0–0)
PHOSPHATE SERPL-MCNC: 3.1 MG/DL — SIGNIFICANT CHANGE UP (ref 2.5–4.5)
PLATELET # BLD AUTO: 165 K/UL — SIGNIFICANT CHANGE UP (ref 150–400)
POTASSIUM SERPL-MCNC: 3.9 MMOL/L — SIGNIFICANT CHANGE UP (ref 3.5–5.3)
POTASSIUM SERPL-SCNC: 3.9 MMOL/L — SIGNIFICANT CHANGE UP (ref 3.5–5.3)
RBC # BLD: 4.14 M/UL — LOW (ref 4.2–5.8)
RBC # FLD: 13.3 % — SIGNIFICANT CHANGE UP (ref 10.3–14.5)
SODIUM SERPL-SCNC: 143 MMOL/L — SIGNIFICANT CHANGE UP (ref 135–145)
WBC # BLD: 9.25 K/UL — SIGNIFICANT CHANGE UP (ref 3.8–10.5)
WBC # FLD AUTO: 9.25 K/UL — SIGNIFICANT CHANGE UP (ref 3.8–10.5)

## 2024-04-04 PROCEDURE — 99291 CRITICAL CARE FIRST HOUR: CPT

## 2024-04-04 PROCEDURE — 71045 X-RAY EXAM CHEST 1 VIEW: CPT | Mod: 26,59

## 2024-04-04 PROCEDURE — 71045 X-RAY EXAM CHEST 1 VIEW: CPT | Mod: 26,77

## 2024-04-04 PROCEDURE — 70450 CT HEAD/BRAIN W/O DYE: CPT | Mod: 26

## 2024-04-04 RX ORDER — VALPROIC ACID (AS SODIUM SALT) 250 MG/5ML
500 SOLUTION, ORAL ORAL
Refills: 0 | Status: DISCONTINUED | OUTPATIENT
Start: 2024-04-04 | End: 2024-04-06

## 2024-04-04 RX ORDER — POTASSIUM CHLORIDE 20 MEQ
10 PACKET (EA) ORAL ONCE
Refills: 0 | Status: COMPLETED | OUTPATIENT
Start: 2024-04-04 | End: 2024-04-04

## 2024-04-04 RX ORDER — VALPROIC ACID (AS SODIUM SALT) 250 MG/5ML
250 SOLUTION, ORAL ORAL THREE TIMES A DAY
Refills: 0 | Status: DISCONTINUED | OUTPATIENT
Start: 2024-04-04 | End: 2024-04-04

## 2024-04-04 RX ORDER — ENOXAPARIN SODIUM 100 MG/ML
40 INJECTION SUBCUTANEOUS
Refills: 0 | Status: DISCONTINUED | OUTPATIENT
Start: 2024-04-04 | End: 2024-04-15

## 2024-04-04 RX ADMIN — PROPOFOL 4.63 MICROGRAM(S)/KG/MIN: 10 INJECTION, EMULSION INTRAVENOUS at 01:04

## 2024-04-04 RX ADMIN — CHLORHEXIDINE GLUCONATE 15 MILLILITER(S): 213 SOLUTION TOPICAL at 05:28

## 2024-04-04 RX ADMIN — Medication 100 MILLIEQUIVALENT(S): at 05:27

## 2024-04-04 RX ADMIN — CHLORHEXIDINE GLUCONATE 15 MILLILITER(S): 213 SOLUTION TOPICAL at 17:24

## 2024-04-04 RX ADMIN — Medication 55 MILLIGRAM(S): at 05:27

## 2024-04-04 RX ADMIN — PANTOPRAZOLE SODIUM 40 MILLIGRAM(S): 20 TABLET, DELAYED RELEASE ORAL at 13:49

## 2024-04-04 RX ADMIN — Medication 1 TABLET(S): at 13:48

## 2024-04-04 RX ADMIN — ENOXAPARIN SODIUM 40 MILLIGRAM(S): 100 INJECTION SUBCUTANEOUS at 20:32

## 2024-04-04 RX ADMIN — POLYETHYLENE GLYCOL 3350 17 GRAM(S): 17 POWDER, FOR SOLUTION ORAL at 13:48

## 2024-04-04 RX ADMIN — SENNA PLUS 2 TABLET(S): 8.6 TABLET ORAL at 21:31

## 2024-04-04 RX ADMIN — Medication 500 MILLIGRAM(S): at 17:24

## 2024-04-04 NOTE — PROGRESS NOTE ADULT - ASSESSMENT
ASSESSMENT:     NEURO: Continue close monitoring for neurologic deterioration,A1C5.8, VTT918 - titrate statin to LDL goal less than 70,MRI Brain w/w/o, angiogram pending Physical therapy/OT/Speech eval/treatment.     ANTITHROMBOTIC THERAPY:     PULMONARY: intubated     CARDIOVASCULAR: TTE:    1. Left ventricular cavity is normal in size. Left ventricular wall thickness is normal. Left ventricular systolic function is normal with an ejection fraction of 69 % by Maharaj's method of disks.   2. Normal systolic function. Tricuspid annular plane systolic excursion (TAPSE) is 2.1 cm (normal >=1.7 cm).   3. No pericardial effusion seen.   4. No prior echocardiogram is available for comparison.   5. Technically difficult image quality.   6. The inferior vena cava is dilated measuring 2.46 cm in diameter, (dilated >2.1cm) with abnormal inspiratory collapse (abnormal <50%) consistent with elevated right atrial pressure (~15, range 10-20mmHg).   7. There is no evidence of a left ventricular thrombus.   8. There is normal LV mass and normal geometry.    TTE, cardiac monitoring                              SBP goal: <140    GASTROINTESTINAL:  dysphagia screen       Diet: NPO    RENAL: BUN/Cr within normal limits, good urine output      Na Goal: Greater than 135     Canales:    HEMATOLOGY: H/H without change, Platelets normal      DVT ppx: SCD    ID: afebrile, no leukocytosis     OTHER: Plan discussed with patient and family at bedside, all questions and concerns addressed.     DISPOSITION: Rehab or home depending on PT eval once stable and workup is complete    CORE MEASURES:        NIHSS on admission: 26  ICH score: 2  LKN: 4/2/2024, 19:30  pre-MRS: 0     Tenecteplase: [] YES [x] NO      LDL: 107     Depression Screen: no     Statin Therapy: yes     Dysphagia Screen: [] PASS [x] FAIL     Smoking [] YES [x] NO [] Smoking cessation and education provided to patient [] Nicotine patch ordered      Afib [] YES [x] NO     Stroke Education [x] YES [] NO    Obtain screening lower extremity venous ultrasound in patients who meet 1 or more of the following criteria as patient is high risk for DVT/PE on admission:   [] History of DVT/PE  []Hypercoagulable states (Factor V Leiden, Cancer, OCP, etc. )  []Prolonged immobility (hemiplegia/hemiparesis/post operative or any other extended immobilization)  [] Transferred from outside facility (Rehab or Long term care)  [] Age </= to 50 ASSESSMENT:     NEURO: Continue close monitoring for neurologic deterioration,A1C5.8, DLC458 - titrate statin to LDL goal less than 70,MRI Brain w/w/o, angiogram pending Physical therapy/OT/Speech eval/treatment.     #Acute IPH in left thalamus with R-ICA dissection, concerning MOA of HTN, no vascular abnormalities seen on DSA.     PULMONARY: intubated     CARDIOVASCULAR: TTE:    1. Left ventricular cavity is normal in size. Left ventricular wall thickness is normal. Left ventricular systolic function is normal with an ejection fraction of 69 % by Maharaj's method of disks.   2. Normal systolic function. Tricuspid annular plane systolic excursion (TAPSE) is 2.1 cm (normal >=1.7 cm).   3. No pericardial effusion seen.   4. No prior echocardiogram is available for comparison.   5. Technically difficult image quality.   6. The inferior vena cava is dilated measuring 2.46 cm in diameter, (dilated >2.1cm) with abnormal inspiratory collapse (abnormal <50%) consistent with elevated right atrial pressure (~15, range 10-20mmHg).   7. There is no evidence of a left ventricular thrombus.   8. There is normal LV mass and normal geometry.    TTE, cardiac monitoring                              SBP goal: <140    GASTROINTESTINAL:  dysphagia screen       Diet: NPO    RENAL: BUN/Cr within normal limits, good urine output      Na Goal: Greater than 135     Canales:    HEMATOLOGY: H/H without change, Platelets normal      DVT ppx: SCD    ID: afebrile, no leukocytosis     OTHER: Plan discussed with patient and family at bedside, all questions and concerns addressed.     DISPOSITION: Rehab or home depending on PT eval once stable and workup is complete    CORE MEASURES:        NIHSS on admission: 26  ICH score: 2  LKN: 4/2/2024, 19:30  pre-MRS: 0     Tenecteplase: [] YES [x] NO      LDL: 107     Depression Screen: no     Statin Therapy: yes     Dysphagia Screen: [] PASS [x] FAIL     Smoking [] YES [x] NO [] Smoking cessation and education provided to patient [] Nicotine patch ordered      Afib [] YES [x] NO     Stroke Education [x] YES [] NO    Obtain screening lower extremity venous ultrasound in patients who meet 1 or more of the following criteria as patient is high risk for DVT/PE on admission:   [] History of DVT/PE  []Hypercoagulable states (Factor V Leiden, Cancer, OCP, etc. )  []Prolonged immobility (hemiplegia/hemiparesis/post operative or any other extended immobilization)  [] Transferred from outside facility (Rehab or Long term care)  [] Age </= to 50

## 2024-04-04 NOTE — DIETITIAN INITIAL EVALUATION ADULT - ADD RECOMMEND
1. Defer advancement of diet to medical team, if within pt/family wishes. If EN feeds warranted, monitor GI tolerance. RD to remain available to adjust EN formulary, volume/rate PRN.  2. Recommend multivitamin (if no medication contraindications) to aid in prevention of micronutrient deficiencies. Consider vitamin C to further aid in wound healing.   3. Monitor wt trends/labs/skin integrity/hydration status/bowel regularity.

## 2024-04-04 NOTE — PROGRESS NOTE ADULT - ASSESSMENT
ASSESSMENT   58M Hx eczema/Gout intermittently takes ASA for pain, p/f being found down by wife after hearing thud. No hx HTN. 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 ED intubated.      PLAN     N  angio tomorrow - MRI/MRA w today   sedation wean propofol, continue valproate   pain fentanyl, tylenol and oxycodone  Activity: [] OOB as tolerated [x] Bedrest [] PT [] OT [] PMNR    CV   -160mmHg  TTE EF 65%, no thrombus     P   12/450/5/45    R   Strict I+Os   plasmalyte 100     GI   Diet: ngtube  Senna miralax  Last BM PTA     ID  afebrile    E  Goal euglycemia (-180)  a1c 5.6    H  SCDs  Chemoppx   LED pending     CODE STATUS: FULL CODE     DISPOSITION: ICU    CRITICAL

## 2024-04-04 NOTE — DIETITIAN INITIAL EVALUATION ADULT - SIGNS/SYMPTOMS
NPO since admission (day 3); no access for oral diet and no OGT/NGT placed thus far per family  s/p L thalamic IPH, multiple pressure injuries (see above)

## 2024-04-04 NOTE — PHYSICAL THERAPY INITIAL EVALUATION ADULT - LEVEL OF INDEPENDENCE: SIT/SUPINE, REHAB EVAL
deferred due to safety concerns, pt very restless/agitated with poor command follow maximum assist (25% patients effort)

## 2024-04-04 NOTE — PHYSICAL THERAPY INITIAL EVALUATION ADULT - PERTINENT HX OF CURRENT PROBLEM, REHAB EVAL
59 yo M Hx eczema/Gout intermittently takes ASA for pain, p/f being found down by wife after hearing thud. No hx HTN. 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 CTA grossly negative. Coags/TEG/ARU pend. ED intubated for inability to manage secretions. ICH score 2   Exam prior to intubation: eye opening apraxia, Ox1, PERRL, upgaze and right gaze palsy could cross midline, int FC, L side 5/5, LUE extensor, LLe flaccid CT Head: Acute intraparenchymal hemorrhage centered in the left thalamus measures approximately 3.3 x 1.5 x 4.2 cm in the sagittal and coronal planes, stable from 04/02/2024 at 9:19 PM

## 2024-04-04 NOTE — DIETITIAN INITIAL EVALUATION ADULT - PERTINENT MEDS FT
MEDICATIONS  (STANDING):  chlorhexidine 0.12% Liquid 15 milliLiter(s) Oral Mucosa every 12 hours  chlorhexidine 4% Liquid 1 Application(s) Topical <User Schedule>  dexMEDEtomidine Infusion 0.2 MICROgram(s)/kG/Hr (3.86 mL/Hr) IV Continuous <Continuous>  enoxaparin Injectable 40 milliGRAM(s) SubCutaneous <User Schedule>  multiple electrolytes Injection Type 1 1000 milliLiter(s) (100 mL/Hr) IV Continuous <Continuous>  multivitamin 1 Tablet(s) Oral daily  pantoprazole  Injectable 40 milliGRAM(s) IV Push daily  polyethylene glycol 3350 17 Gram(s) Oral daily  senna 2 Tablet(s) Oral at bedtime  valproate sodium  IVPB 500 milliGRAM(s) IV Intermittent every 12 hours    MEDICATIONS  (PRN):  acetaminophen     Tablet .. 650 milliGRAM(s) Oral every 6 hours PRN Temp greater or equal to 38C (100.4F), Mild Pain (1 - 3)  ondansetron Injectable 4 milliGRAM(s) IV Push every 6 hours PRN Nausea and/or Vomiting

## 2024-04-04 NOTE — PHYSICAL THERAPY INITIAL EVALUATION ADULT - ORIENTATION, REHAB EVAL
knows name, , month, knew place with choices, unsure of year, unaware of situation/person/time knows name, , month, knew place with choices, unsure of year, unaware of situation/person/place

## 2024-04-04 NOTE — PHYSICAL THERAPY INITIAL EVALUATION ADULT - GENERAL OBSERVATIONS, REHAB EVAL
Pt presents after being found down, now with L basal ganglia & thalamic parenchymal hemorrhage, repeat CTH stable, 4/5 angio negative for vascular malformations, pt self extubated on 4/6. REJI Tan cleared pt to be seen by PT. Pt received semi-supine in bed, restless, VSS, has +ICU monitoring, +VEEG monitoring, +L wrist restraint, +NGT, +condom cath, +RUE PICC, is A&Ox2, hypophonic, confused on time/situation, trying to use LUE & LLE to grab things/move pillows, following 25% simple 1 step commands however very distracted, constantly pulling against wrist restraint, at times grabbing PT arm/hand tightly.

## 2024-04-04 NOTE — PHYSICAL THERAPY INITIAL EVALUATION ADULT - GROSSLY INTACT, SENSORY
LUE & LLE appears intact, pt with difficulty following commands for assessment of R side, also not attending to right side unless cued

## 2024-04-04 NOTE — DIETITIAN INITIAL EVALUATION ADULT - ENTERAL
If EN feeds recommended, consider Jevity 1.5 at GOAL rate 60ml/hr x 24 hrs, + No Carb Prosource TF Free 1x daily (+90kcal, +15g protein). Based on dosing wt 77.1kg, provides 1440ml, 2250kcal (29.2kcak/kg) and 107g protein (1.39g protein/kg).

## 2024-04-04 NOTE — OCCUPATIONAL THERAPY INITIAL EVALUATION ADULT - VISUAL ASSESSMENT: VISUAL NEGLECT
Infusion Nursing Note:  Dimple Oviedo presents today for Cycle 1 Day 1 Gemcitabine, Carboplatin.    Patient seen by provider today: Yes: Dr. Toledo   present during visit today: Not Applicable.    Note: Patient is new to the infusion room today and is receiving gemcitabine, carboplatin for the first time.  Patient oriented to infusion room, location of bathrooms and nutrition stations, and call light.  Verified that patient recieved written gemcitabine, carboplatin information previously. Verbally reviewed gemcitabine, carboplatin teaching, side effects, take-home medications, and follow-up schedule with patient. Patient instructed to call triage with any questions or if he experiences a temperature >100.5, shaking chills, uncontrolled nausea/vomiting/diarrhea, dizziness, shortness of breath, bleeding not relieved with pressure, or with any other concerns.  Instructed patient to call the after hours nurse line or [phone] on nights/weekends/holidays.    She is nervous to start treatment, but feels better after talking with Dr. Toledo today. She is accompanied by her daughter.     Intravenous Access:  Peripheral IV placed by lab.    Treatment Conditions:  Lab Results   Component Value Date    HGB 10.5 12/12/2018     Lab Results   Component Value Date    WBC 7.0 12/12/2018      Lab Results   Component Value Date    ANEU 4.6 12/12/2018     Lab Results   Component Value Date     12/12/2018      Lab Results   Component Value Date     12/12/2018                   Lab Results   Component Value Date    POTASSIUM 4.5 12/12/2018           Lab Results   Component Value Date    MAG 2.1 12/12/2018            Lab Results   Component Value Date    CR 1.16 12/12/2018                   Lab Results   Component Value Date    SHREYA 8.9 12/12/2018                Lab Results   Component Value Date    BILITOTAL 0.3 12/12/2018           Lab Results   Component Value Date    ALBUMIN 3.0 12/12/2018                     Lab Results   Component Value Date    ALT 20 12/12/2018           Lab Results   Component Value Date    AST 20 12/12/2018       Results reviewed, labs MET treatment parameters, ok to proceed with treatment.      Post Infusion Assessment:  Patient tolerated infusion without incident.  Blood return noted pre and post infusion.  Site patent and intact, free from redness, edema or discomfort.  No evidence of extravasations.  Access discontinued per protocol.    Discharge Plan:   Prescription refills given for zofran, compazine.  Discharge instructions reviewed with: Patient and Family.  Patient and/or family verbalized understanding of discharge instructions and all questions answered.  Copy of AVS reviewed with patient and/or family.  Patient will return 12/19/2018 for next infusion appointment.  Patient discharged in stable condition accompanied by: daughter.  Departure Mode: Ambulatory.    Adeline Reid RN                         right

## 2024-04-04 NOTE — DIETITIAN INITIAL EVALUATION ADULT - REASON INDICATOR FOR ASSESSMENT
Pt is a 57 yo M with PMH: eczema, Gout. Found down by wife. No history of HTN. CTH with L thalamic IPH extending to midbrain, no IVH, no hydro. Intubated for inability to manage secretion. Pre-op angio 4/5. Pending MRI.

## 2024-04-04 NOTE — PHYSICAL THERAPY INITIAL EVALUATION ADULT - IMPAIRMENTS FOUND, PT EVAL
aerobic capacity/endurance/arousal, attention, and cognition/decreased midline orientation/fine motor/gait, locomotion, and balance/gross motor/muscle strength/poor safety awareness/ROM

## 2024-04-04 NOTE — DIETITIAN INITIAL EVALUATION ADULT - ETIOLOGY
increased demand for nutrient in the setting of brain injury, wound healing complex clinical course deferring ability of pt to consume/receive optimal energy intake

## 2024-04-04 NOTE — DIETITIAN INITIAL EVALUATION ADULT - ORAL INTAKE PTA/DIET HISTORY
-Spoke with spouse at bedside. Reports pt with good appetite prior to admission; enjoyed most foods including seafood, meat, vegetables, rice. History of Gout noted. Denies food allergies/intolerances. Denies intolerance to chewing/swallowing at baseline. Denies nausea/vomiting/constipation/diarrhea. Believes that pt took multivitamin.

## 2024-04-04 NOTE — OCCUPATIONAL THERAPY INITIAL EVALUATION ADULT - NS ASR FOLLOW COMMAND OT EVAL
75% of the time/able to follow single-step instructions 50% of the time/able to follow single-step instructions

## 2024-04-04 NOTE — PROGRESS NOTE ADULT - ASSESSMENT
ASSESSMENT   58M Hx eczema/Gout intermittently takes ASA for pain, p/f being found down by wife after hearing thud. No hx HTN. 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 CTA negative. ED intubated.      PLAN     N  NOK refused angio for now   Get MRI/MRA w  Tylenol and oxycodone for pain   prop 40 fent 1 added valproate and stop keppra   Activity: [] OOB as tolerated [] Bedrest [] PT [] OT [] PMNR    CV   -160mmHg  TTE     P   16/450/5/40 get abg      R   Strict I+Os   plasmalyte 100    GI   Diet: wife declined an ngtube - will readress tomorrow  Senna miralax  Last BM PTA     ID  afebrile    E  Goal euglycemia (-180)  a1c 5.6    H  SCDs  Chemoppx   LED pending

## 2024-04-04 NOTE — PHYSICAL THERAPY INITIAL EVALUATION ADULT - PLANNED THERAPY INTERVENTIONS, PT EVAL
bed mobility training/gait training/transfer training balance training/bed mobility training/gait training/strengthening/transfer training

## 2024-04-04 NOTE — OCCUPATIONAL THERAPY INITIAL EVALUATION ADULT - PERTINENT HX OF CURRENT PROBLEM, REHAB EVAL
57 yo M Hx eczema/Gout intermittently takes ASA for pain, p/f being found down by wife after hearing thud. No hx HTN. 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 CTA grossly negative. Coags/TEG/ARU pend. ED intubated for inability to manage secretions. ICH score 2   Exam prior to intubation: eye opening apraxia, Ox1, PERRL, upgaze and right gaze palsy could cross midline, int FC, L side 5/5, LUE extensor, LLe flaccid    CT Head: Acute intraparenchymal hemorrhage centered in the left thalamus measures approximately 3.3 x 1.5 x 4.2 cm in the sagittal and coronal planes, stable from 04/02/2024 at 9:19 PM.

## 2024-04-04 NOTE — OCCUPATIONAL THERAPY INITIAL EVALUATION ADULT - LEVEL OF INDEPENDENCE: DRESS LOWER BODY, OT EVAL
maximum assist (25% patients effort) maximum assist (25% patients effort)/dependent (less than 25% patients effort)

## 2024-04-04 NOTE — DIETITIAN INITIAL EVALUATION ADULT - FACTORS AFF FOOD INTAKE
per MD note 4/4, spouse decline NGT. This RD spoke to spouse today re: feeding while intubated. NPO since admission (day 3)./NPO

## 2024-04-04 NOTE — DIETITIAN INITIAL EVALUATION ADULT - NSFNSGIIOFT_GEN_A_CORE
-Height 67" per spouse and [place] HIE.   -Last BM (4/4): x 1. On bowel regimen (senna, Miralax). Prescribed IV Protonix, IV Zofran PRN.   -Propofol infusing at 18.5ml/hr. If to continue x 24 hrs, will provide 488kcal daily --> DISCONTINUED AFTER RD VISIT.   -Plasma-Lyte infusing at 100ml/hr for electrolyte support; pt NPO.

## 2024-04-04 NOTE — OCCUPATIONAL THERAPY INITIAL EVALUATION ADULT - ADDITIONAL COMMENTS
Pt lives in a private home +8 steps to enter and steps inside; pt was independent with all ADLs/transfers with no AE/DME.

## 2024-04-04 NOTE — DIETITIAN INITIAL EVALUATION ADULT - OTHER INFO
Dosing wt (4/2): 169.6 lbs  Spouse unsure of UBW, believes ~150 lbs. Will continue to monitor/trend.

## 2024-04-04 NOTE — PROGRESS NOTE ADULT - SUBJECTIVE AND OBJECTIVE BOX
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     Admission scores:   NIHSS on admission: 26  ICH score: 2  LKN: 4/2/2024, 19:30  pre-MRS: 0    ON: admitted to nsicu     Exam   intubated, on sedation propofol, eyes closed no response to noxious or voice, not following commands, left gaze overcome midline, has cough, gag, peerl, spontaneous LUE AG LLE AG, RUE extensor, RLE plegic     TTE 04/03/24   CONCLUSIONS:   1. Left ventricular cavity is normal in size. Left ventricular wall thickness is normal. Left ventricular systolic function is normal with an ejection fraction of 69 % by Maharaj's method of disks.   2. Normal systolic function. Tricuspid annular plane systolic excursion (TAPSE) is 2.1 cm (normal >=1.7 cm).   3. No pericardial effusion seen.   4. No prior echocardiogram is available for comparison.   5. Technically difficult image quality.   6. The inferior vena cava is dilated measuring 2.46 cm in diameter, (dilated >2.1cm) with abnormal inspiratory collapse (abnormal <50%) consistent with elevated right atrial pressure (~15, range 10-20mmHg).   7. There is no evidence of a left ventricular thrombus.   8. There is normal LV mass and normal geometry.    VITALS:   Vital Signs Last 24 Hrs   T(C): 36.5 (03 Apr 2024 07:00), Max: 36.7 (02 Apr 2024 21:24)  T(F): 97.7 (03 Apr 2024 07:00), Max: 98 (02 Apr 2024 21:24)  HR: 60 (03 Apr 2024 10:00) (55 - 110)  BP: 102/63 (03 Apr 2024 07:00) (96/56 - 182/120)  BP(mean): 77 (03 Apr 2024 07:00) (70 - 117)  RR: 12 (03 Apr 2024 10:00) (12 - 20)  SpO2: 100% (03 Apr 2024 10:00) (94% - 100%)    Parameters below as of 03 Apr 2024 07:00  Patient On (Oxygen Delivery Method): ventilator    O2 Concentration (%): 45  CAPILLARY BLOOD GLUCOSE  122 (03 Apr 2024 01:55)      POCT Blood Glucose.: 86 mg/dL (03 Apr 2024 06:26)  POCT Blood Glucose.: 119 mg/dL (03 Apr 2024 01:53)  POCT Blood Glucose.: 122 mg/dL (02 Apr 2024 21:15)    I&O's Summary    02 Apr 2024 07:01  -  03 Apr 2024 07:00  --------------------------------------------------------  IN: 636.1 mL / OUT: 1050 mL / NET: -413.9 mL    03 Apr 2024 07:01  -  03 Apr 2024 10:46  --------------------------------------------------------  IN: 217.2 mL / OUT: 0 mL / NET: 217.2 mL        Respiratory:  Mode: AC/ CMV (Assist Control/ Continuous Mandatory Ventilation)  RR (machine): 12  TV (machine): 450  FiO2: 45  PEEP: 5  ITime: 1  MAP: 9  PIP: 21    ABG - ( 03 Apr 2024 04:46 )  pH, Arterial: 7.34  pH, Blood: x     /  pCO2: 53    /  pO2: 202   / HCO3: 29    / Base Excess: 1.8   /  SaO2: 99.3                LABS:                        12.6   8.82  )-----------( 194      ( 03 Apr 2024 04:46 )             38.7     04-03    145  |  109<H>  |  19  ----------------------------<  97  3.8   |  23  |  0.78        MEDICATION LEVELS:     IVF FLUIDS/MEDICATIONS:   MEDICATIONS  (STANDING):  chlorhexidine 0.12% Liquid 15 milliLiter(s) Oral Mucosa every 12 hours  chlorhexidine 4% Liquid 1 Application(s) Topical <User Schedule>  dexMEDEtomidine Infusion 0.2 MICROgram(s)/kG/Hr (3.86 mL/Hr) IV Continuous <Continuous>  dextrose 5%. 1000 milliLiter(s) (50 mL/Hr) IV Continuous <Continuous>  dextrose 5%. 1000 milliLiter(s) (100 mL/Hr) IV Continuous <Continuous>  dextrose 50% Injectable 25 Gram(s) IV Push once  dextrose 50% Injectable 12.5 Gram(s) IV Push once  dextrose 50% Injectable 25 Gram(s) IV Push once  fentaNYL   Infusion... 0.5 MICROgram(s)/kG/Hr (1.93 mL/Hr) IV Continuous <Continuous>  glucagon  Injectable 1 milliGRAM(s) IntraMuscular once  insulin lispro (ADMELOG) corrective regimen sliding scale   SubCutaneous every 6 hours  levETIRAcetam  IVPB 750 milliGRAM(s) IV Intermittent two times a day  multivitamin 1 Tablet(s) Oral daily  niCARdipine Infusion 5 mG/Hr (25 mL/Hr) IV Continuous <Continuous>  pantoprazole  Injectable 40 milliGRAM(s) IV Push daily  polyethylene glycol 3350 17 Gram(s) Oral daily  propofol Infusion 10 MICROgram(s)/kG/Min (4.63 mL/Hr) IV Continuous <Continuous>  senna 2 Tablet(s) Oral at bedtime  sodium chloride 2% . 1000 milliLiter(s) (50 mL/Hr) IV Continuous <Continuous>    MEDICATIONS  (PRN):  acetaminophen     Tablet .. 650 milliGRAM(s) Oral every 6 hours PRN Temp greater or equal to 38C (100.4F), Mild Pain (1 - 3)  dextrose Oral Gel 15 Gram(s) Oral once PRN Blood Glucose LESS THAN 70 milliGRAM(s)/deciliter  ondansetron Injectable 4 milliGRAM(s) IV Push every 6 hours PRN Nausea and/or Vomiting

## 2024-04-04 NOTE — DIETITIAN INITIAL EVALUATION ADULT - PHYSCIAL ASSESSMENT
unable to conduct Nutrition Focused Physical Assessment due to intubated and neurological status. No visual signs of body fat or muscle mass depletion.

## 2024-04-04 NOTE — PHARMACOTHERAPY INTERVENTION NOTE - COMMENTS
Spoke with patient's spouse at bedside and he does not take any chronic medications. He takes gout medication PRN. Confirmed NKDA

## 2024-04-04 NOTE — PROGRESS NOTE ADULT - SUBJECTIVE AND OBJECTIVE BOX
HPI 58M Hx eczema/Gout intermittently takes ASA for pain, p/f being found down by wife after hearing thud. No hx HTN. 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 CTA grossly negative. Coags/TEG/ARU pend. ED intubated for inability to manage secretions.     Admission scores:   NIHSS on admission: 26  ICH score: 2  LKN: 4/2/2024, 19:30  pre-MRS: 0    ON: admitted to nsicu     Exam   intubated, eyes closed no response to noxious or voice, has cough, gag, peerl, LUE AG LLE AG, RUE extensor, RLE plegic     T(C): 37.7 (04-03-24 @ 19:00), Max: 37.7 (04-03-24 @ 19:00)  HR: 79 (04-03-24 @ 21:09) (54 - 87)  BP: 102/63 (04-03-24 @ 07:00) (96/56 - 136/84)  BP(mean): 77 (04-03-24 @ 07:00) (70 - 104)  RR: 13 (04-03-24 @ 21:00) (12 - 23)  SpO2: 100% (04-03-24 @ 21:09) (99% - 100%)    acetaminophen     Tablet .. 650 milliGRAM(s) Oral every 6 hours PRN  chlorhexidine 0.12% Liquid 15 milliLiter(s) Oral Mucosa every 12 hours  chlorhexidine 4% Liquid 1 Application(s) Topical <User Schedule>  dexMEDEtomidine Infusion 0.2 MICROgram(s)/kG/Hr IV Continuous <Continuous>  fentaNYL   Infusion... 0.501 MICROgram(s)/kG/Hr IV Continuous <Continuous>  multiple electrolytes Injection Type 1 1000 milliLiter(s) IV Continuous <Continuous>  multivitamin 1 Tablet(s) Oral daily  ondansetron Injectable 4 milliGRAM(s) IV Push every 6 hours PRN  pantoprazole  Injectable 40 milliGRAM(s) IV Push daily  polyethylene glycol 3350 17 Gram(s) Oral daily  propofol Infusion 10 MICROgram(s)/kG/Min IV Continuous <Continuous>  senna 2 Tablet(s) Oral at bedtime  valproate sodium  IVPB 500 milliGRAM(s) IV Intermittent every 12 hours      04-02-24 @ 07:01  -  04-03-24 @ 07:00  --------------------------------------------------------  IN: 636.1 mL / OUT: 1050 mL / NET: -413.9 mL    04-03-24 @ 07:01  -  04-04-24 @ 00:31  --------------------------------------------------------  IN: 1772.7 mL / OUT: 1000 mL / NET: 772.7 mL                            12.6   8.82  )-----------( 194 ( 03 Apr 2024 04:46 )             38.7   04-03    145  |  109<H>  |  19  ----------------------------<  97  3.8   |  23  |  0.78

## 2024-04-04 NOTE — PROGRESS NOTE ADULT - SUBJECTIVE AND OBJECTIVE BOX
THE PATIENT WAS SEEN AND EXAMINED BY ME WITH THE HOUSESTAFF AND STROKE TEAM DURING MORNING ROUNDS.   HPI:  58M Hx eczema/Gout intermittently takes ASA for pain, p/f being found down by wife after hearing thud. No hx HTN. 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 CTA Short segment dissection flap in the proximal right internal carotid artery, of unknown chronicity. Coags/TEG/ARU pend. ED intubated for inability to manage secretions. ICH score 2   Exam prior to intubation: eye opening apraxia, Ox1, PERRL, upgaze and right gaze palsy could cross midline, int FC, L side 5/5, LUE extensor, LLe flaccid   (02 Apr 2024 23:51)      SUBJECTIVE: No events overnight.  No new neurologic complaints.  ROS reported negative unless otherwise noted.    Patient scheduled for angio tomorrow    CTH stable 4/4    acetaminophen     Tablet .. 650 milliGRAM(s) Oral every 6 hours PRN  chlorhexidine 0.12% Liquid 15 milliLiter(s) Oral Mucosa every 12 hours  chlorhexidine 4% Liquid 1 Application(s) Topical <User Schedule>  dexMEDEtomidine Infusion 0.2 MICROgram(s)/kG/Hr IV Continuous <Continuous>  enoxaparin Injectable 40 milliGRAM(s) SubCutaneous <User Schedule>  multiple electrolytes Injection Type 1 1000 milliLiter(s) IV Continuous <Continuous>  multivitamin 1 Tablet(s) Oral daily  ondansetron Injectable 4 milliGRAM(s) IV Push every 6 hours PRN  pantoprazole  Injectable 40 milliGRAM(s) IV Push daily  polyethylene glycol 3350 17 Gram(s) Oral daily  senna 2 Tablet(s) Oral at bedtime  valproic  acid Syrup 500 milliGRAM(s) Oral two times a day      PHYSICAL EXAM:   Vital Signs Last 24 Hrs  T(C): 37.8 (04 Apr 2024 11:00), Max: 37.8 (04 Apr 2024 07:00)  T(F): 100 (04 Apr 2024 11:00), Max: 100 (04 Apr 2024 07:00)  HR: 81 (04 Apr 2024 12:00) (54 - 87)  BP: --  BP(mean): --  RR: 16 (04 Apr 2024 12:00) (12 - 23)  SpO2: 97% (04 Apr 2024 12:00) (97% - 100%)    Parameters below as of 04 Apr 2024 07:00  Patient On (Oxygen Delivery Method): ventilator    O2 Concentration (%): 40        NEUROLOGICAL EXAM:  Mental status: Intubated sedated (off sedation 5 min prior to exam) not following commands  Cranial Nerves:  ***  Motor exam: spontaneously moving LUE/LLE, RUE/RLE 0/5   Sensation: Does not withdraw to R side     LABS:                        11.9   9.25  )-----------( 165      ( 04 Apr 2024 01:57 )             37.8    04-04    143  |  109<H>  |  19  ----------------------------<  100<H>  3.9   |  22  |  0.86    Ca    7.9<L>      04 Apr 2024 01:57  Phos  3.1     04-04  Mg     2.3     04-04    TPro  5.8<L>  /  Alb  3.6  /  TBili  0.4  /  DBili  x   /  AST  31  /  ALT  25  /  AlkPhos  62  04-03  PT/INR - ( 02 Apr 2024 21:53 )   PT: 10.2 sec;   INR: 0.97 ratio         PTT - ( 02 Apr 2024 21:53 )  PTT:28.7 sec      IMAGING: Reviewed by me.   < from: CT Head No Cont (04.04.24 @ 10:46) >    This exam is compared with prior head CT performed on April 3, 2024    Acute parenchymal hemorrhage involving the left thalamus is again seen   with involvement of the left lenticular nucleus and posteriorleft corona   radiata region. This finding measures approximately 3.8 x 2.6 cm and   previously measured approximately 3.7 x 3.1 cm. Surrounding edema is   again seen. No significant shift or herniation is seen.    Evaluation of the osseous with the appropriate window appears unremarkable    The visualized paranasal sinuses appear clear    Orotracheal tube is seen    Both mastoid and middle ear regions appear clear.    IMPRESSION: Acute parenchymal hemorrhage is again seen as described above.    < end of copied text >  < from: CT Head No Cont (04.03.24 @ 01:46) >  FINDINGS:  Acute intraparenchymal hemorrhage centered in the left thalamus extending   through the posterior limb left internal capsule and left corona radiata   and measures approximately 3.3 x 1.5 x 4.2 cm in the sagittal and coronal   planes (602:85 and 601:77); not significantly changed in size from 3.2 x   1.7 x 4 cm on the prior head CT scan, when allowing for differences in   head positioning and slice selection.  Mild surrounding vasogenic edema   and regional mass effect is stable.    There is no brain herniation, hydrocephalus or acute loss of gray   matter-white matter differentiation      The paranasal sinuses and mastoids are grossly clear.  Cerumen partially   opacifies both external auditory canals.    The calvarium, skull base and orbits appear within normal limits.    An endotracheal tube is partially visualized in the oral cavity.    IMPRESSION:  Acute intraparenchymal hemorrhage centered in the left thalamus measures   approximately 3.3 x 1.5 x 4.2 cm in the sagittal and coronal planes,   stable from 04/02/2024 at 9:19 PM.    < end of copied text >  < from: CT Angio Neck Stroke Protocol w/ IV Cont (04.02.24 @ 21:24) >  IMPRESSION:    CT ANGIOGRAPHY NECK:  1. Cervical carotid systems and vertebral arteries are patent bilaterally   without stenosis.  No hemodynamically significant carotid stenosis using   NASCET criteria.  2.  Short segment dissection flap in the proximal right internal carotid   artery, of unknown chronicity.      CT ANGIOGRAPHY BRAIN:  1.  No major vessel occlusion, stenosis or aneurysm is identified about   the [place].  Normal anatomic variants as discussed above.  2.  The dural venous sinuses are incompletely opacified due to   acquisition during early arterial phase.  3.  No evidence of an arteriovenous malformation.  4.  No evidence of active contrast extravasation into the patient's left   thalamic hematoma.    --- End of Report ---      < end of copied text >     THE PATIENT WAS SEEN AND EXAMINED BY ME WITH THE HOUSESTAFF AND STROKE TEAM DURING MORNING ROUNDS.   HPI:  58M Hx eczema/Gout intermittently takes ASA for pain, p/f being found down by wife after hearing thud. No hx HTN. 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 CTA Short segment dissection flap in the proximal right internal carotid artery, of unknown chronicity. Coags/TEG/ARU pend. ED intubated for inability to manage secretions. ICH score 2   Exam prior to intubation: eye opening apraxia, Ox1, PERRL, upgaze and right gaze palsy could cross midline, int FC, L side 5/5, LUE extensor, LLe flaccid   (02 Apr 2024 23:51)      SUBJECTIVE: No events overnight.  No new neurologic complaints.  ROS reported negative unless otherwise noted.    Patient scheduled for angio tomorrow    CTH stable 4/4    acetaminophen     Tablet .. 650 milliGRAM(s) Oral every 6 hours PRN  chlorhexidine 0.12% Liquid 15 milliLiter(s) Oral Mucosa every 12 hours  chlorhexidine 4% Liquid 1 Application(s) Topical <User Schedule>  dexMEDEtomidine Infusion 0.2 MICROgram(s)/kG/Hr IV Continuous <Continuous>  enoxaparin Injectable 40 milliGRAM(s) SubCutaneous <User Schedule>  multiple electrolytes Injection Type 1 1000 milliLiter(s) IV Continuous <Continuous>  multivitamin 1 Tablet(s) Oral daily  ondansetron Injectable 4 milliGRAM(s) IV Push every 6 hours PRN  pantoprazole  Injectable 40 milliGRAM(s) IV Push daily  polyethylene glycol 3350 17 Gram(s) Oral daily  senna 2 Tablet(s) Oral at bedtime  valproic  acid Syrup 500 milliGRAM(s) Oral two times a day      PHYSICAL EXAM:   Vital Signs Last 24 Hrs  T(C): 37.8 (04 Apr 2024 11:00), Max: 37.8 (04 Apr 2024 07:00)  T(F): 100 (04 Apr 2024 11:00), Max: 100 (04 Apr 2024 07:00)  HR: 81 (04 Apr 2024 12:00) (54 - 87)  BP: --  BP(mean): --  RR: 16 (04 Apr 2024 12:00) (12 - 23)  SpO2: 97% (04 Apr 2024 12:00) (97% - 100%)    Parameters below as of 04 Apr 2024 07:00  Patient On (Oxygen Delivery Method): ventilator    O2 Concentration (%): 40        NEUROLOGICAL EXAM:  Mental status: Intubated (off sedation) not following commands  Cranial Nerves:  oculocephalic intact, cough intact, left gaze preference and not crossing midline however limited d/t ms  Motor exam: spontaneously moving LUE/LLE AG, RUE/RLE 0/5   Sensation: Does not withdraw to R side, LUE withdraw appropriately     LABS:                        11.9   9.25  )-----------( 165      ( 04 Apr 2024 01:57 )             37.8    04-04    143  |  109<H>  |  19  ----------------------------<  100<H>  3.9   |  22  |  0.86    Ca    7.9<L>      04 Apr 2024 01:57  Phos  3.1     04-04  Mg     2.3     04-04    TPro  5.8<L>  /  Alb  3.6  /  TBili  0.4  /  DBili  x   /  AST  31  /  ALT  25  /  AlkPhos  62  04-03  PT/INR - ( 02 Apr 2024 21:53 )   PT: 10.2 sec;   INR: 0.97 ratio         PTT - ( 02 Apr 2024 21:53 )  PTT:28.7 sec      IMAGING: Reviewed by me.   < from: CT Head No Cont (04.04.24 @ 10:46) >    This exam is compared with prior head CT performed on April 3, 2024    Acute parenchymal hemorrhage involving the left thalamus is again seen   with involvement of the left lenticular nucleus and posteriorleft corona   radiata region. This finding measures approximately 3.8 x 2.6 cm and   previously measured approximately 3.7 x 3.1 cm. Surrounding edema is   again seen. No significant shift or herniation is seen.    Evaluation of the osseous with the appropriate window appears unremarkable    The visualized paranasal sinuses appear clear    Orotracheal tube is seen    Both mastoid and middle ear regions appear clear.    IMPRESSION: Acute parenchymal hemorrhage is again seen as described above.    < end of copied text >  < from: CT Head No Cont (04.03.24 @ 01:46) >  FINDINGS:  Acute intraparenchymal hemorrhage centered in the left thalamus extending   through the posterior limb left internal capsule and left corona radiata   and measures approximately 3.3 x 1.5 x 4.2 cm in the sagittal and coronal   planes (602:85 and 601:77); not significantly changed in size from 3.2 x   1.7 x 4 cm on the prior head CT scan, when allowing for differences in   head positioning and slice selection.  Mild surrounding vasogenic edema   and regional mass effect is stable.    There is no brain herniation, hydrocephalus or acute loss of gray   matter-white matter differentiation      The paranasal sinuses and mastoids are grossly clear.  Cerumen partially   opacifies both external auditory canals.    The calvarium, skull base and orbits appear within normal limits.    An endotracheal tube is partially visualized in the oral cavity.    IMPRESSION:  Acute intraparenchymal hemorrhage centered in the left thalamus measures   approximately 3.3 x 1.5 x 4.2 cm in the sagittal and coronal planes,   stable from 04/02/2024 at 9:19 PM.    < end of copied text >  < from: CT Angio Neck Stroke Protocol w/ IV Cont (04.02.24 @ 21:24) >  IMPRESSION:    CT ANGIOGRAPHY NECK:  1. Cervical carotid systems and vertebral arteries are patent bilaterally   without stenosis.  No hemodynamically significant carotid stenosis using   NASCET criteria.  2.  Short segment dissection flap in the proximal right internal carotid   artery, of unknown chronicity.      CT ANGIOGRAPHY BRAIN:  1.  No major vessel occlusion, stenosis or aneurysm is identified about   the [place].  Normal anatomic variants as discussed above.  2.  The dural venous sinuses are incompletely opacified due to   acquisition during early arterial phase.  3.  No evidence of an arteriovenous malformation.  4.  No evidence of active contrast extravasation into the patient's left   thalamic hematoma.    --- End of Report ---      < end of copied text >     THE PATIENT WAS SEEN AND EXAMINED BY ME WITH THE HOUSESTAFF AND STROKE TEAM DURING MORNING ROUNDS.   HPI:  58M Hx eczema/Gout intermittently takes ASA for pain, p/f being found down by wife after hearing thud. No hx HTN. 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 CTA Short segment dissection flap in the proximal right internal carotid artery, of unknown chronicity. Coags/TEG/ARU pend. ED intubated for inability to manage secretions. ICH score 2   Exam prior to intubation: eye opening apraxia, Ox1, PERRL, upgaze and right gaze palsy could cross midline, int FC, L side 5/5, LUE extensor, LLe flaccid   (02 Apr 2024 23:51)    SUBJECTIVE: No events overnight.  No new neurologic complaints.  ROS reported negative unless otherwise noted.  Patient scheduled for angio tomorrow  CTH stable 4/4    acetaminophen     Tablet .. 650 milliGRAM(s) Oral every 6 hours PRN  chlorhexidine 0.12% Liquid 15 milliLiter(s) Oral Mucosa every 12 hours  chlorhexidine 4% Liquid 1 Application(s) Topical <User Schedule>  dexMEDEtomidine Infusion 0.2 MICROgram(s)/kG/Hr IV Continuous <Continuous>  enoxaparin Injectable 40 milliGRAM(s) SubCutaneous <User Schedule>  multiple electrolytes Injection Type 1 1000 milliLiter(s) IV Continuous <Continuous>  multivitamin 1 Tablet(s) Oral daily  ondansetron Injectable 4 milliGRAM(s) IV Push every 6 hours PRN  pantoprazole  Injectable 40 milliGRAM(s) IV Push daily  polyethylene glycol 3350 17 Gram(s) Oral daily  senna 2 Tablet(s) Oral at bedtime  valproic  acid Syrup 500 milliGRAM(s) Oral two times a day    PHYSICAL EXAM:   Vital Signs Last 24 Hrs  T(C): 37.8 (04 Apr 2024 11:00), Max: 37.8 (04 Apr 2024 07:00)  T(F): 100 (04 Apr 2024 11:00), Max: 100 (04 Apr 2024 07:00)  HR: 81 (04 Apr 2024 12:00) (54 - 87)  BP: --  BP(mean): --  RR: 16 (04 Apr 2024 12:00) (12 - 23)  SpO2: 97% (04 Apr 2024 12:00) (97% - 100%)    Parameters below as of 04 Apr 2024 07:00  Patient On (Oxygen Delivery Method): ventilator    O2 Concentration (%): 40    NEUROLOGICAL EXAM:  Mental status: Intubated (off sedation) not following commands  Cranial Nerves:  oculocephalic intact, cough intact, left gaze preference and not crossing midline however limited d/t ms  Motor exam: spontaneously moving LUE/LLE AG, RUE/RLE 0/5   Sensation: Does not withdraw to R side, LUE withdraw appropriately     LABS:                        11.9   9.25  )-----------( 165      ( 04 Apr 2024 01:57 )             37.8    04-04    143  |  109<H>  |  19  ----------------------------<  100<H>  3.9   |  22  |  0.86    Ca    7.9<L>      04 Apr 2024 01:57  Phos  3.1     04-04  Mg     2.3     04-04    TPro  5.8<L>  /  Alb  3.6  /  TBili  0.4  /  DBili  x   /  AST  31  /  ALT  25  /  AlkPhos  62  04-03  PT/INR - ( 02 Apr 2024 21:53 )   PT: 10.2 sec;   INR: 0.97 ratio    PTT - ( 02 Apr 2024 21:53 )  PTT:28.7 sec    IMAGING: Reviewed by me.   < from: CT Head No Cont (04.04.24 @ 10:46) >    This exam is compared with prior head CT performed on April 3, 2024    Acute parenchymal hemorrhage involving the left thalamus is again seen   with involvement of the left lenticular nucleus and posteriorleft corona   radiata region. This finding measures approximately 3.8 x 2.6 cm and   previously measured approximately 3.7 x 3.1 cm. Surrounding edema is   again seen. No significant shift or herniation is seen.    Evaluation of the osseous with the appropriate window appears unremarkable    The visualized paranasal sinuses appear clear    Orotracheal tube is seen    Both mastoid and middle ear regions appear clear.    IMPRESSION: Acute parenchymal hemorrhage is again seen as described above.    < end of copied text >  < from: CT Head No Cont (04.03.24 @ 01:46) >  FINDINGS:  Acute intraparenchymal hemorrhage centered in the left thalamus extending   through the posterior limb left internal capsule and left corona radiata   and measures approximately 3.3 x 1.5 x 4.2 cm in the sagittal and coronal   planes (602:85 and 601:77); not significantly changed in size from 3.2 x   1.7 x 4 cm on the prior head CT scan, when allowing for differences in   head positioning and slice selection.  Mild surrounding vasogenic edema   and regional mass effect is stable.    There is no brain herniation, hydrocephalus or acute loss of gray   matter-white matter differentiation      The paranasal sinuses and mastoids are grossly clear.  Cerumen partially   opacifies both external auditory canals.    The calvarium, skull base and orbits appear within normal limits.    An endotracheal tube is partially visualized in the oral cavity.    IMPRESSION:  Acute intraparenchymal hemorrhage centered in the left thalamus measures   approximately 3.3 x 1.5 x 4.2 cm in the sagittal and coronal planes,   stable from 04/02/2024 at 9:19 PM.    < end of copied text >  < from: CT Angio Neck Stroke Protocol w/ IV Cont (04.02.24 @ 21:24) >  IMPRESSION:    CT ANGIOGRAPHY NECK:  1. Cervical carotid systems and vertebral arteries are patent bilaterally   without stenosis.  No hemodynamically significant carotid stenosis using   NASCET criteria.  2.  Short segment dissection flap in the proximal right internal carotid   artery, of unknown chronicity.      CT ANGIOGRAPHY BRAIN:  1.  No major vessel occlusion, stenosis or aneurysm is identified about   the [place].  Normal anatomic variants as discussed above.  2.  The dural venous sinuses are incompletely opacified due to   acquisition during early arterial phase.  3.  No evidence of an arteriovenous malformation.  4.  No evidence of active contrast extravasation into the patient's left   thalamic hematoma.    --- End of Report ---      < end of copied text >

## 2024-04-04 NOTE — DIETITIAN INITIAL EVALUATION ADULT - PERTINENT LABORATORY DATA
04-04    143  |  109<H>  |  19  ----------------------------<  100<H>  3.9   |  22  |  0.86    Ca    7.9<L>      04 Apr 2024 01:57  Phos  3.1     04-04  Mg     2.3     04-04    TPro  5.8<L>  /  Alb  3.6  /  TBili  0.4  /  DBili  x   /  AST  31  /  ALT  25  /  AlkPhos  62  04-03  A1C with Estimated Average Glucose Result: 5.8 % (04-03-24 @ 04:46)  A1C with Estimated Average Glucose Result: 5.8 % (04-03-24 @ 00:12)

## 2024-04-04 NOTE — PROGRESS NOTE ADULT - SUBJECTIVE AND OBJECTIVE BOX
Patient seen and examined at bedside.    --Anticoagulation--    T(C): 37.7 (04-03-24 @ 19:00), Max: 37.7 (04-03-24 @ 19:00)  HR: 79 (04-03-24 @ 21:09) (54 - 87)  BP: 102/63 (04-03-24 @ 07:00) (96/56 - 136/84)  RR: 13 (04-03-24 @ 21:00) (12 - 23)  SpO2: 100% (04-03-24 @ 21:09) (99% - 100%)  Wt(kg): --    Exam: no EO, cough/gag/overbreaths, PERRL, R side 0/5, L side FC w/sedation held

## 2024-04-05 ENCOUNTER — APPOINTMENT (OUTPATIENT)
Dept: NEUROSURGERY | Facility: HOSPITAL | Age: 59
End: 2024-04-05

## 2024-04-05 LAB
ANION GAP SERPL CALC-SCNC: 10 MMOL/L — SIGNIFICANT CHANGE UP (ref 5–17)
ANION GAP SERPL CALC-SCNC: 10 MMOL/L — SIGNIFICANT CHANGE UP (ref 5–17)
ANION GAP SERPL CALC-SCNC: 12 MMOL/L — SIGNIFICANT CHANGE UP (ref 5–17)
APPEARANCE UR: CLEAR — SIGNIFICANT CHANGE UP
APTT BLD: 29.6 SEC — SIGNIFICANT CHANGE UP (ref 24.5–35.6)
BACTERIA # UR AUTO: NEGATIVE /HPF — SIGNIFICANT CHANGE UP
BILIRUB UR-MCNC: NEGATIVE — SIGNIFICANT CHANGE UP
BUN SERPL-MCNC: 11 MG/DL — SIGNIFICANT CHANGE UP (ref 7–23)
BUN SERPL-MCNC: 12 MG/DL — SIGNIFICANT CHANGE UP (ref 7–23)
BUN SERPL-MCNC: 14 MG/DL — SIGNIFICANT CHANGE UP (ref 7–23)
CALCIUM SERPL-MCNC: 8.2 MG/DL — LOW (ref 8.4–10.5)
CALCIUM SERPL-MCNC: 8.3 MG/DL — LOW (ref 8.4–10.5)
CALCIUM SERPL-MCNC: 8.7 MG/DL — SIGNIFICANT CHANGE UP (ref 8.4–10.5)
CAST: 2 /LPF — SIGNIFICANT CHANGE UP (ref 0–4)
CHLORIDE SERPL-SCNC: 109 MMOL/L — HIGH (ref 96–108)
CHLORIDE SERPL-SCNC: 109 MMOL/L — HIGH (ref 96–108)
CHLORIDE SERPL-SCNC: 113 MMOL/L — HIGH (ref 96–108)
CO2 SERPL-SCNC: 23 MMOL/L — SIGNIFICANT CHANGE UP (ref 22–31)
CO2 SERPL-SCNC: 24 MMOL/L — SIGNIFICANT CHANGE UP (ref 22–31)
CO2 SERPL-SCNC: 25 MMOL/L — SIGNIFICANT CHANGE UP (ref 22–31)
COLOR SPEC: YELLOW — SIGNIFICANT CHANGE UP
CREAT SERPL-MCNC: 0.65 MG/DL — SIGNIFICANT CHANGE UP (ref 0.5–1.3)
CREAT SERPL-MCNC: 0.71 MG/DL — SIGNIFICANT CHANGE UP (ref 0.5–1.3)
CREAT SERPL-MCNC: 0.76 MG/DL — SIGNIFICANT CHANGE UP (ref 0.5–1.3)
DIFF PNL FLD: ABNORMAL
EGFR: 105 ML/MIN/1.73M2 — SIGNIFICANT CHANGE UP
EGFR: 108 ML/MIN/1.73M2 — SIGNIFICANT CHANGE UP
EGFR: 111 ML/MIN/1.73M2 — SIGNIFICANT CHANGE UP
GAS PNL BLDA: SIGNIFICANT CHANGE UP
GLUCOSE SERPL-MCNC: 129 MG/DL — HIGH (ref 70–99)
GLUCOSE SERPL-MCNC: 139 MG/DL — HIGH (ref 70–99)
GLUCOSE SERPL-MCNC: 152 MG/DL — HIGH (ref 70–99)
GLUCOSE UR QL: NEGATIVE MG/DL — SIGNIFICANT CHANGE UP
GRAM STN FLD: ABNORMAL
HCT VFR BLD CALC: 37 % — LOW (ref 39–50)
HCT VFR BLD CALC: 39 % — SIGNIFICANT CHANGE UP (ref 39–50)
HGB BLD-MCNC: 12.1 G/DL — LOW (ref 13–17)
HGB BLD-MCNC: 12.4 G/DL — LOW (ref 13–17)
INR BLD: 1.12 RATIO — SIGNIFICANT CHANGE UP (ref 0.85–1.18)
KETONES UR-MCNC: 15 MG/DL
LEUKOCYTE ESTERASE UR-ACNC: NEGATIVE — SIGNIFICANT CHANGE UP
MAGNESIUM SERPL-MCNC: 2.4 MG/DL — SIGNIFICANT CHANGE UP (ref 1.6–2.6)
MAGNESIUM SERPL-MCNC: 2.5 MG/DL — SIGNIFICANT CHANGE UP (ref 1.6–2.6)
MAGNESIUM SERPL-MCNC: 2.5 MG/DL — SIGNIFICANT CHANGE UP (ref 1.6–2.6)
MCHC RBC-ENTMCNC: 28.8 PG — SIGNIFICANT CHANGE UP (ref 27–34)
MCHC RBC-ENTMCNC: 28.9 PG — SIGNIFICANT CHANGE UP (ref 27–34)
MCHC RBC-ENTMCNC: 31.8 GM/DL — LOW (ref 32–36)
MCHC RBC-ENTMCNC: 32.7 GM/DL — SIGNIFICANT CHANGE UP (ref 32–36)
MCV RBC AUTO: 88.5 FL — SIGNIFICANT CHANGE UP (ref 80–100)
MCV RBC AUTO: 90.5 FL — SIGNIFICANT CHANGE UP (ref 80–100)
NITRITE UR-MCNC: NEGATIVE — SIGNIFICANT CHANGE UP
NRBC # BLD: 0 /100 WBCS — SIGNIFICANT CHANGE UP (ref 0–0)
NRBC # BLD: 0 /100 WBCS — SIGNIFICANT CHANGE UP (ref 0–0)
PH UR: 6 — SIGNIFICANT CHANGE UP (ref 5–8)
PHOSPHATE SERPL-MCNC: 2.4 MG/DL — LOW (ref 2.5–4.5)
PHOSPHATE SERPL-MCNC: 2.5 MG/DL — SIGNIFICANT CHANGE UP (ref 2.5–4.5)
PHOSPHATE SERPL-MCNC: 2.7 MG/DL — SIGNIFICANT CHANGE UP (ref 2.5–4.5)
PLATELET # BLD AUTO: 123 K/UL — LOW (ref 150–400)
PLATELET # BLD AUTO: 146 K/UL — LOW (ref 150–400)
POTASSIUM SERPL-MCNC: 3.6 MMOL/L — SIGNIFICANT CHANGE UP (ref 3.5–5.3)
POTASSIUM SERPL-MCNC: 3.8 MMOL/L — SIGNIFICANT CHANGE UP (ref 3.5–5.3)
POTASSIUM SERPL-MCNC: 4.2 MMOL/L — SIGNIFICANT CHANGE UP (ref 3.5–5.3)
POTASSIUM SERPL-SCNC: 3.6 MMOL/L — SIGNIFICANT CHANGE UP (ref 3.5–5.3)
POTASSIUM SERPL-SCNC: 3.8 MMOL/L — SIGNIFICANT CHANGE UP (ref 3.5–5.3)
POTASSIUM SERPL-SCNC: 4.2 MMOL/L — SIGNIFICANT CHANGE UP (ref 3.5–5.3)
PROT UR-MCNC: SIGNIFICANT CHANGE UP MG/DL
PROTHROM AB SERPL-ACNC: 11.7 SEC — SIGNIFICANT CHANGE UP (ref 9.5–13)
RBC # BLD: 4.18 M/UL — LOW (ref 4.2–5.8)
RBC # BLD: 4.31 M/UL — SIGNIFICANT CHANGE UP (ref 4.2–5.8)
RBC # FLD: 12.9 % — SIGNIFICANT CHANGE UP (ref 10.3–14.5)
RBC # FLD: 13 % — SIGNIFICANT CHANGE UP (ref 10.3–14.5)
RBC CASTS # UR COMP ASSIST: 26 /HPF — HIGH (ref 0–4)
SODIUM SERPL-SCNC: 143 MMOL/L — SIGNIFICANT CHANGE UP (ref 135–145)
SODIUM SERPL-SCNC: 144 MMOL/L — SIGNIFICANT CHANGE UP (ref 135–145)
SODIUM SERPL-SCNC: 148 MMOL/L — HIGH (ref 135–145)
SP GR SPEC: 1.02 — SIGNIFICANT CHANGE UP (ref 1–1.03)
SPECIMEN SOURCE: SIGNIFICANT CHANGE UP
SQUAMOUS # UR AUTO: 0 /HPF — SIGNIFICANT CHANGE UP (ref 0–5)
UROBILINOGEN FLD QL: 1 MG/DL — SIGNIFICANT CHANGE UP (ref 0.2–1)
WBC # BLD: 10.8 K/UL — HIGH (ref 3.8–10.5)
WBC # BLD: 9.7 K/UL — SIGNIFICANT CHANGE UP (ref 3.8–10.5)
WBC # FLD AUTO: 10.8 K/UL — HIGH (ref 3.8–10.5)
WBC # FLD AUTO: 9.7 K/UL — SIGNIFICANT CHANGE UP (ref 3.8–10.5)
WBC UR QL: 1 /HPF — SIGNIFICANT CHANGE UP (ref 0–5)

## 2024-04-05 PROCEDURE — 36224 PLACE CATH CAROTD ART: CPT | Mod: 50

## 2024-04-05 PROCEDURE — 70450 CT HEAD/BRAIN W/O DYE: CPT | Mod: 26

## 2024-04-05 PROCEDURE — 71045 X-RAY EXAM CHEST 1 VIEW: CPT | Mod: 26

## 2024-04-05 PROCEDURE — 36227 PLACE CATH XTRNL CAROTID: CPT | Mod: 50

## 2024-04-05 PROCEDURE — 71045 X-RAY EXAM CHEST 1 VIEW: CPT | Mod: 26,77

## 2024-04-05 PROCEDURE — 36226 PLACE CATH VERTEBRAL ART: CPT | Mod: LT

## 2024-04-05 PROCEDURE — 99291 CRITICAL CARE FIRST HOUR: CPT

## 2024-04-05 PROCEDURE — 95720 EEG PHY/QHP EA INCR W/VEEG: CPT

## 2024-04-05 RX ORDER — POTASSIUM CHLORIDE 20 MEQ
40 PACKET (EA) ORAL ONCE
Refills: 0 | Status: COMPLETED | OUTPATIENT
Start: 2024-04-05 | End: 2024-04-06

## 2024-04-05 RX ORDER — DEXMEDETOMIDINE HYDROCHLORIDE IN 0.9% SODIUM CHLORIDE 4 UG/ML
0.4 INJECTION INTRAVENOUS
Qty: 400 | Refills: 0 | Status: DISCONTINUED | OUTPATIENT
Start: 2024-04-05 | End: 2024-04-05

## 2024-04-05 RX ORDER — NICARDIPINE HYDROCHLORIDE 30 MG/1
10 CAPSULE, EXTENDED RELEASE ORAL
Qty: 40 | Refills: 0 | Status: DISCONTINUED | OUTPATIENT
Start: 2024-04-05 | End: 2024-04-05

## 2024-04-05 RX ORDER — SODIUM CHLORIDE 5 G/100ML
1000 INJECTION, SOLUTION INTRAVENOUS
Refills: 0 | Status: DISCONTINUED | OUTPATIENT
Start: 2024-04-05 | End: 2024-04-05

## 2024-04-05 RX ORDER — PROPOFOL 10 MG/ML
10 INJECTION, EMULSION INTRAVENOUS
Qty: 500 | Refills: 0 | Status: DISCONTINUED | OUTPATIENT
Start: 2024-04-05 | End: 2024-04-05

## 2024-04-05 RX ORDER — SODIUM,POTASSIUM PHOSPHATES 278-250MG
1 POWDER IN PACKET (EA) ORAL ONCE
Refills: 0 | Status: COMPLETED | OUTPATIENT
Start: 2024-04-05 | End: 2024-04-06

## 2024-04-05 RX ORDER — DEXMEDETOMIDINE HYDROCHLORIDE IN 0.9% SODIUM CHLORIDE 4 UG/ML
0.4 INJECTION INTRAVENOUS
Qty: 200 | Refills: 0 | Status: DISCONTINUED | OUTPATIENT
Start: 2024-04-05 | End: 2024-04-06

## 2024-04-05 RX ORDER — MANNITOL
75 POWDER (GRAM) MISCELLANEOUS ONCE
Refills: 0 | Status: COMPLETED | OUTPATIENT
Start: 2024-04-05 | End: 2024-04-05

## 2024-04-05 RX ORDER — PROPOFOL 10 MG/ML
75 INJECTION, EMULSION INTRAVENOUS
Qty: 1000 | Refills: 0 | Status: DISCONTINUED | OUTPATIENT
Start: 2024-04-05 | End: 2024-04-05

## 2024-04-05 RX ORDER — FENTANYL CITRATE 50 UG/ML
50 INJECTION INTRAVENOUS ONCE
Refills: 0 | Status: DISCONTINUED | OUTPATIENT
Start: 2024-04-05 | End: 2024-04-05

## 2024-04-05 RX ORDER — PROPOFOL 10 MG/ML
50 INJECTION, EMULSION INTRAVENOUS ONCE
Refills: 0 | Status: COMPLETED | OUTPATIENT
Start: 2024-04-05 | End: 2024-04-05

## 2024-04-05 RX ORDER — LABETALOL HCL 100 MG
20 TABLET ORAL ONCE
Refills: 0 | Status: COMPLETED | OUTPATIENT
Start: 2024-04-05 | End: 2024-04-05

## 2024-04-05 RX ORDER — LABETALOL HCL 100 MG
5 TABLET ORAL ONCE
Refills: 0 | Status: DISCONTINUED | OUTPATIENT
Start: 2024-04-05 | End: 2024-04-05

## 2024-04-05 RX ORDER — LABETALOL HCL 100 MG
10 TABLET ORAL ONCE
Refills: 0 | Status: COMPLETED | OUTPATIENT
Start: 2024-04-05 | End: 2024-04-05

## 2024-04-05 RX ORDER — DEXMEDETOMIDINE HYDROCHLORIDE IN 0.9% SODIUM CHLORIDE 4 UG/ML
0.02 INJECTION INTRAVENOUS
Qty: 200 | Refills: 0 | Status: DISCONTINUED | OUTPATIENT
Start: 2024-04-05 | End: 2024-04-05

## 2024-04-05 RX ORDER — ACETAMINOPHEN 500 MG
1000 TABLET ORAL ONCE
Refills: 0 | Status: COMPLETED | OUTPATIENT
Start: 2024-04-05 | End: 2024-04-05

## 2024-04-05 RX ORDER — SODIUM,POTASSIUM PHOSPHATES 278-250MG
2 POWDER IN PACKET (EA) ORAL ONCE
Refills: 0 | Status: COMPLETED | OUTPATIENT
Start: 2024-04-05 | End: 2024-04-05

## 2024-04-05 RX ORDER — PROPOFOL 10 MG/ML
30 INJECTION, EMULSION INTRAVENOUS
Qty: 500 | Refills: 0 | Status: DISCONTINUED | OUTPATIENT
Start: 2024-04-05 | End: 2024-04-05

## 2024-04-05 RX ORDER — HYDROMORPHONE HYDROCHLORIDE 2 MG/ML
0.2 INJECTION INTRAMUSCULAR; INTRAVENOUS; SUBCUTANEOUS ONCE
Refills: 0 | Status: DISCONTINUED | OUTPATIENT
Start: 2024-04-05 | End: 2024-04-05

## 2024-04-05 RX ORDER — HYDRALAZINE HCL 50 MG
10 TABLET ORAL ONCE
Refills: 0 | Status: COMPLETED | OUTPATIENT
Start: 2024-04-05 | End: 2024-04-05

## 2024-04-05 RX ADMIN — Medication 500 MILLIGRAM(S): at 05:06

## 2024-04-05 RX ADMIN — Medication 500 MILLIGRAM(S): at 18:04

## 2024-04-05 RX ADMIN — NICARDIPINE HYDROCHLORIDE 50 MG/HR: 30 CAPSULE, EXTENDED RELEASE ORAL at 07:30

## 2024-04-05 RX ADMIN — HYDROMORPHONE HYDROCHLORIDE 0.2 MILLIGRAM(S): 2 INJECTION INTRAMUSCULAR; INTRAVENOUS; SUBCUTANEOUS at 06:00

## 2024-04-05 RX ADMIN — PROPOFOL 13.9 MICROGRAM(S)/KG/MIN: 10 INJECTION, EMULSION INTRAVENOUS at 07:09

## 2024-04-05 RX ADMIN — PROPOFOL 50 MILLIGRAM(S): 10 INJECTION, EMULSION INTRAVENOUS at 06:05

## 2024-04-05 RX ADMIN — Medication 400 MILLIGRAM(S): at 06:00

## 2024-04-05 RX ADMIN — Medication 1000 MILLIGRAM(S): at 06:30

## 2024-04-05 RX ADMIN — PROPOFOL 13.9 MICROGRAM(S)/KG/MIN: 10 INJECTION, EMULSION INTRAVENOUS at 07:30

## 2024-04-05 RX ADMIN — SODIUM CHLORIDE 75 MILLILITER(S): 5 INJECTION, SOLUTION INTRAVENOUS at 17:32

## 2024-04-05 RX ADMIN — Medication 10 MILLIGRAM(S): at 06:30

## 2024-04-05 RX ADMIN — PROPOFOL 34.7 MICROGRAM(S)/KG/MIN: 10 INJECTION, EMULSION INTRAVENOUS at 19:42

## 2024-04-05 RX ADMIN — Medication 2 PACKET(S): at 02:41

## 2024-04-05 RX ADMIN — HYDROMORPHONE HYDROCHLORIDE 0.2 MILLIGRAM(S): 2 INJECTION INTRAMUSCULAR; INTRAVENOUS; SUBCUTANEOUS at 06:15

## 2024-04-05 RX ADMIN — Medication 650 MILLIGRAM(S): at 22:10

## 2024-04-05 RX ADMIN — PROPOFOL 34.7 MICROGRAM(S)/KG/MIN: 10 INJECTION, EMULSION INTRAVENOUS at 11:15

## 2024-04-05 RX ADMIN — Medication 1 TABLET(S): at 11:57

## 2024-04-05 RX ADMIN — POLYETHYLENE GLYCOL 3350 17 GRAM(S): 17 POWDER, FOR SOLUTION ORAL at 11:56

## 2024-04-05 RX ADMIN — Medication 400 MILLIGRAM(S): at 01:30

## 2024-04-05 RX ADMIN — DEXMEDETOMIDINE HYDROCHLORIDE IN 0.9% SODIUM CHLORIDE 7.75 MICROGRAM(S)/KG/HR: 4 INJECTION INTRAVENOUS at 20:57

## 2024-04-05 RX ADMIN — SODIUM CHLORIDE 75 MILLILITER(S): 5 INJECTION, SOLUTION INTRAVENOUS at 11:15

## 2024-04-05 RX ADMIN — Medication 10 MILLIGRAM(S): at 06:00

## 2024-04-05 RX ADMIN — Medication 1000 MILLIGRAM(S): at 02:00

## 2024-04-05 RX ADMIN — CHLORHEXIDINE GLUCONATE 1 APPLICATION(S): 213 SOLUTION TOPICAL at 01:32

## 2024-04-05 RX ADMIN — FENTANYL CITRATE 50 MICROGRAM(S): 50 INJECTION INTRAVENOUS at 02:40

## 2024-04-05 RX ADMIN — PROPOFOL 34.7 MICROGRAM(S)/KG/MIN: 10 INJECTION, EMULSION INTRAVENOUS at 17:32

## 2024-04-05 RX ADMIN — PROPOFOL 34.7 MICROGRAM(S)/KG/MIN: 10 INJECTION, EMULSION INTRAVENOUS at 09:00

## 2024-04-05 RX ADMIN — FENTANYL CITRATE 50 MICROGRAM(S): 50 INJECTION INTRAVENOUS at 02:55

## 2024-04-05 RX ADMIN — Medication 650 MILLIGRAM(S): at 22:40

## 2024-04-05 RX ADMIN — CHLORHEXIDINE GLUCONATE 1 APPLICATION(S): 213 SOLUTION TOPICAL at 22:17

## 2024-04-05 RX ADMIN — CHLORHEXIDINE GLUCONATE 15 MILLILITER(S): 213 SOLUTION TOPICAL at 18:04

## 2024-04-05 RX ADMIN — Medication 1500 GRAM(S): at 07:41

## 2024-04-05 RX ADMIN — Medication 20 MILLIGRAM(S): at 06:15

## 2024-04-05 RX ADMIN — PANTOPRAZOLE SODIUM 40 MILLIGRAM(S): 20 TABLET, DELAYED RELEASE ORAL at 11:56

## 2024-04-05 RX ADMIN — ENOXAPARIN SODIUM 40 MILLIGRAM(S): 100 INJECTION SUBCUTANEOUS at 20:24

## 2024-04-05 RX ADMIN — CHLORHEXIDINE GLUCONATE 15 MILLILITER(S): 213 SOLUTION TOPICAL at 05:06

## 2024-04-05 RX ADMIN — SENNA PLUS 2 TABLET(S): 8.6 TABLET ORAL at 22:11

## 2024-04-05 NOTE — CHART NOTE - NSCHARTNOTEFT_GEN_A_CORE
Interventional Neuro- Radiology   Procedure Note      Procedure: Catheter Cerebral Angiography   Pre- Procedure Diagnosis: L thalamic IPH   Post- Procedure Diagnosis:  L thalamic IPH     : Dr. Melva Fernandes  Fellow: Dr. Pepe George  Physician Assistant: Elfego Scherer PA-C  RN: Ana Schrader  Tech: Stevie Keating    Anesthesia: (general anesthesia) Dr. Elfego Segundo    I/Os:  Fluids: 0  Canales: 250 cc  Contrast: 88 cc  Estimated Blood Loss: <10cc    Preliminary Report:  Under general anesthesia, using a 5 Fr short sheath to the right femoral artery examination of left vertebral artery/ left internal carotid artery/ left external carotid artery/ right vertebral artery/ right internal carotid artery/ right external carotid artery via selective cerebral angiography demonstrates no evidence of aneurysm or high-flow vascular lesion. Official dictated report to follow.    Patient tolerated procedure well, vital signs stable, hemodynamically stable, no change in neurological status compared to baseline. Results discussed with neurosurgery. Groin sheath d/c'ed, manual compression held to hemostasis, no active bleeding, no hematoma, Vascade device applied, quick clot and safeguard balloon dressing applied at 10:35 hours. Patient transferred back to his hospital room in [facility] for further care/ monitoring.

## 2024-04-05 NOTE — PROVIDER CONTACT NOTE (CHANGE IN STATUS NOTIFICATION) - RECOMMENDATIONS
Stat CTH, IV Tylenol, IVP Dilaudid, Cooling blanket, IVP Labetalol, Hydralazine, cardene gtt. sedation for agitation and line pulling.

## 2024-04-05 NOTE — PROGRESS NOTE ADULT - SUBJECTIVE AND OBJECTIVE BOX
Patient seen and examined at bedside.    --Anticoagulation--    T(C): 37.7 (04-03-24 @ 19:00), Max: 37.7 (04-03-24 @ 19:00)  HR: 79 (04-03-24 @ 21:09) (54 - 87)  BP: 102/63 (04-03-24 @ 07:00) (96/56 - 136/84)  RR: 13 (04-03-24 @ 21:00) (12 - 23)  SpO2: 100% (04-03-24 @ 21:09) (99% - 100%)  Wt(kg): --    Exam: ADARSH, cough/gag/overbreaths, PERRL, R side 0/5, L side FC w/sedation held

## 2024-04-05 NOTE — PROGRESS NOTE ADULT - ASSESSMENT
ASSESSMENT   58M Hx eczema/Gout intermittently takes ASA for pain, p/f being found down by wife after hearing thud. No hx HTN. 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 ED intubated.      PLAN     N  # IPH: angio today   #ICP crisis/cerebral edema: mannitol 1mg/kg prn, 2% Na goal 145-155   #AMS: vEEG  #sedation wean propofol, continue valproate   #pain fentanyl, tylenol and oxycodone  #Activity: [] OOB as tolerated [x] Bedrest [] PT [] OT [] PMNR    CV   -160mmHg  TTE EF 65%, no thrombus     P   #respiratory failure  continue vent settings   12/450/5/45    R   Strict I+Os       GI   Diet: ngtube, npo   Senna miralax  Last BM PTA     ID  afebrile    E  Goal euglycemia (-180)  a1c 5.6    H  SCDs  Chemoppx held   LED 3/3/24 no DVT     CODE STATUS: FULL CODE     DISPOSITION: ICU    CRITICAL  ASSESSMENT   58M Hx eczema/Gout intermittently takes ASA for pain, p/f being found down by wife after hearing thud. No hx HTN. 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 ED intubated.      PLAN     N  # IPH: angio today: negative  MRI w/wo  Received mannitol 1mg/kg once, 2% Na goal 145-155   #AMS: vEEG: negative so far  #sedation wean propofol, continue valproate   #pain fentanyl, tylenol and oxycodone  #Activity: [] OOB as tolerated [x] Bedrest [] PT [] OT [] PMNR    CV   -160mmHg  TTE EF 65%, no thrombus     P   #respiratory failure  continue vent settings   12/450/5/45    R   Strict I+Os     GI   Diet: ngtube: resume Jevity  Senna miralax  Last BM PTA     ID  Febrile. combi cath gram stain +ve.   Repeat CxR follow up WBC    E  Goal euglycemia (-180)  a1c 5.6    H  SCDs  Chemoppx held   LED 3/3/24 no DVT     CODE STATUS: FULL CODE     DISPOSITION: ICU    CRITICAL

## 2024-04-05 NOTE — PROGRESS NOTE ADULT - ASSESSMENT
ASSESSMENT   58M Hx eczema/Gout intermittently takes ASA for pain, p/f being found down by wife after hearing thud. No hx HTN. 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 ED intubated.      PLAN     N  # IPH: angio today   #ICP crisis: mannitol 1mg/kg prn   #sedation wean propofol, continue valproate   #pain fentanyl, tylenol and oxycodone  Activity: [] OOB as tolerated [x] Bedrest [] PT [] OT [] PMNR    CV   -160mmHg  TTE EF 65%, no thrombus     P   #respiratory failure  continue vent settings   12/450/5/45    R   Strict I+Os   2%     GI   Diet: ngtube, npo   Senna miralax  Last BM PTA     ID  afebrile    E  Goal euglycemia (-180)  a1c 5.6    H  SCDs  Chemoppx held   LED 3/3/24 no DVT     CODE STATUS: FULL CODE     DISPOSITION: ICU    CRITICAL  ASSESSMENT   58M Hx eczema/Gout intermittently takes ASA for pain, p/f being found down by wife after hearing thud. No hx HTN. 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 ED intubated.      PLAN     N  # IPH: angio today   #ICP crisis/cerebral edema: mannitol 1mg/kg prn, 2% Na goal 145-155   #AMS: vEEG  #sedation wean propofol, continue valproate   #pain fentanyl, tylenol and oxycodone  #Activity: [] OOB as tolerated [x] Bedrest [] PT [] OT [] PMNR    CV   -160mmHg  TTE EF 65%, no thrombus     P   #respiratory failure  continue vent settings   12/450/5/45    R   Strict I+Os       GI   Diet: ngtube, npo   Senna miralax  Last BM PTA     ID  afebrile    E  Goal euglycemia (-180)  a1c 5.6    H  SCDs  Chemoppx held   LED 3/3/24 no DVT     CODE STATUS: FULL CODE     DISPOSITION: ICU    CRITICAL

## 2024-04-05 NOTE — PROGRESS NOTE ADULT - SUBJECTIVE AND OBJECTIVE BOX
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     Admission scores:   NIHSS on admission: 26  ICH score: 2  LKN: 4/2/2024, 19:30  pre-MRS: 0    24h events; hypertensive sbp>200, dysynchrony with vent, lost corneal reflexes, UE extensors, received mannitol 1mg/kg, rCTh stable, now at angio  fever     Exam   intubated, on sedation propofol, eyes closed no response to noxious or voice, not following commands, left gaze overcome midline, no corneals has cough, gag, peerl, spontaneous LUE AG LLE AG, RUE extensor, RLE plegic     TTE 04/03/24   CONCLUSIONS:   1. Left ventricular cavity is normal in size. Left ventricular wall thickness is normal. Left ventricular systolic function is normal with an ejection fraction of 69 % by Maharaj's method of disks.   2. Normal systolic function. Tricuspid annular plane systolic excursion (TAPSE) is 2.1 cm (normal >=1.7 cm).   3. No pericardial effusion seen.   4. No prior echocardiogram is available for comparison.   5. Technically difficult image quality.   6. The inferior vena cava is dilated measuring 2.46 cm in diameter, (dilated >2.1cm) with abnormal inspiratory collapse (abnormal <50%) consistent with elevated right atrial pressure (~15, range 10-20mmHg).   7. There is no evidence of a left ventricular thrombus.   8. There is normal LV mass and normal geometry.    VITALS:   Vital Signs Last 24 Hrs  T(C): 38 (05 Apr 2024 09:02), Max: 39.4 (05 Apr 2024 07:00)  T(F): 100.2 (05 Apr 2024 09:00), Max: 102.9 (05 Apr 2024 07:00)  HR: 87 (05 Apr 2024 09:02) (59 - 104)  BP: 102/63 (05 Apr 2024 09:02) (102/63 - 102/63)  BP(mean): 77 (05 Apr 2024 09:02) (77 - 77)  RR: 24 (05 Apr 2024 09:02) (16 - 27)  SpO2: 100% (05 Apr 2024 09:02) (93% - 100%)    Parameters below as of 05 Apr 2024 09:02    O2 Flow (L/min): 15  O2 Concentration (%): 30  CAPILLARY BLOOD GLUCOSE        I&O's Summary    04 Apr 2024 07:01  -  05 Apr 2024 07:00  --------------------------------------------------------  IN: 3090 mL / OUT: 2850 mL / NET: 240 mL    05 Apr 2024 07:01  -  05 Apr 2024 10:17  --------------------------------------------------------  IN: 34.5 mL / OUT: 350 mL / NET: -315.5 mL        Respiratory:  Mode: standby    ABG - ( 05 Apr 2024 01:31 )  pH, Arterial: 7.43  pH, Blood: x     /  pCO2: 40    /  pO2: 106   / HCO3: 26    / Base Excess: 2.0   /  SaO2: 98.9                LABS:                        12.4   9.70  )-----------( 146      ( 05 Apr 2024 01:33 )             39.0     04-05    144  |  109<H>  |  14  ----------------------------<  139<H>  4.2   |  25  |  0.76        MEDICATION LEVELS:     IVF FLUIDS/MEDICATIONS:   MEDICATIONS  (STANDING):  chlorhexidine 0.12% Liquid 15 milliLiter(s) Oral Mucosa every 12 hours  chlorhexidine 4% Liquid 1 Application(s) Topical <User Schedule>  enoxaparin Injectable 40 milliGRAM(s) SubCutaneous <User Schedule>  multivitamin 1 Tablet(s) Oral daily  niCARdipine Infusion 10 mG/Hr (50 mL/Hr) IV Continuous <Continuous>  pantoprazole  Injectable 40 milliGRAM(s) IV Push daily  polyethylene glycol 3350 17 Gram(s) Oral daily  propofol Infusion 75 MICROgram(s)/kG/Min (34.7 mL/Hr) IV Continuous <Continuous>  senna 2 Tablet(s) Oral at bedtime  sodium chloride 2% + sodium acetate 50:50 1000 milliLiter(s) (75 mL/Hr) IV Continuous <Continuous>  valproic  acid Syrup 500 milliGRAM(s) Oral two times a day    MEDICATIONS  (PRN):  acetaminophen     Tablet .. 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     Admission scores:   NIHSS on admission: 26  ICH score: 2  LKN: 4/2/2024, 19:30  pre-MRS: 0    24h events; hypertensive sbp>200, dysynchrony with vent, lost corneal reflexes, UE extensors, received mannitol 1mg/kg, rCTh stable, now at angio  fever     Exam   intubated, on sedation propofol, eyes closed no response to noxious or voice, not following commands, left gaze overcome midline, no corneals has cough, gag, peerl, spontaneous LUE AG LLE AG, RUE extensor, RLE plegic     VITALS:   T(C): 37 (04-05-24 @ 20:00), Max: 39.4 (04-05-24 @ 07:00)  HR: 72 (04-05-24 @ 21:00) (59 - 104)  BP: 102/63 (04-05-24 @ 09:02) (102/63 - 102/63)  BP(mean): 77 (04-05-24 @ 09:02) (77 - 77)  RR: 21 (04-05-24 @ 21:00) (13 - 27)  SpO2: 98% (04-05-24 @ 21:00) (93% - 100%)    acetaminophen     Tablet .. 650 milliGRAM(s) Oral every 6 hours PRN  chlorhexidine 0.12% Liquid 15 milliLiter(s) Oral Mucosa every 12 hours  chlorhexidine 4% Liquid 1 Application(s) Topical <User Schedule>  dexMEDEtomidine Infusion 0.4 MICROgram(s)/kG/Hr IV Continuous <Continuous>  enoxaparin Injectable 40 milliGRAM(s) SubCutaneous <User Schedule>  multivitamin 1 Tablet(s) Oral daily  niCARdipine Infusion 10 mG/Hr IV Continuous <Continuous>  ondansetron Injectable 4 milliGRAM(s) IV Push every 6 hours PRN  pantoprazole  Injectable 40 milliGRAM(s) IV Push daily  polyethylene glycol 3350 17 Gram(s) Oral daily  propofol Infusion 75 MICROgram(s)/kG/Min IV Continuous <Continuous>  senna 2 Tablet(s) Oral at bedtime  sodium chloride 2% + sodium acetate 50:50 1000 milliLiter(s) IV Continuous <Continuous>  valproic  acid Syrup 500 milliGRAM(s) Oral two times a day      04-04-24 @ 07:01  -  04-05-24 @ 07:00  --------------------------------------------------------  IN: 3090 mL / OUT: 2850 mL / NET: 240 mL    04-05-24 @ 07:01  -  04-05-24 @ 21:28  --------------------------------------------------------  IN: 1716.3 mL / OUT: 1290 mL / NET: 426.3 mL                          12.4   9.70  )-----------( 146 ( 05 Apr 2024 01:33 )             39.0   04-05    143  |  109<H>  |  12  ----------------------------<  152<H>  3.8   |  24  |  0.71

## 2024-04-05 NOTE — CHART NOTE - NSCHARTNOTEFT_GEN_A_CORE
Initial 80 minutes of record reviewed.  There are no epileptiform abnormalities noted. Background is continuous and diffusely slow.   Full report to follow after review of completed study tomorrow.     SUAD Delgado.  Attending Physician, [facility]      [facility] EEG Reading Room Ph#: [phone]  Epilepsy Answering Service after 5PM and before 8:30AM: Ph#: [phone]

## 2024-04-05 NOTE — PROVIDER CONTACT NOTE (CHANGE IN STATUS NOTIFICATION) - ACTION/TREATMENT ORDERED:
Pt pending stat CTH, 1g IV Tylenol, 0.2mg IVP Dilaudid, 5cc prop push by NP Duron, prop gtt initiated, 10mg Labetalol IVP x3, 10mg  IVP Hydralazine. Cooling blanket in place.

## 2024-04-05 NOTE — CHART NOTE - NSCHARTNOTEFT_GEN_A_CORE
Interventional Neuro Radiology  Pre-Procedure Note    HPI:  58M Hx eczema/Gout intermittently takes ASA for pain, p/f being found down by wife after hearing thud. No hx HTN. 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 CTA grossly negative. ED intubated for inability to manage secretions.     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    Neuro Exam: intubated and sedated    PAST MEDICAL & SURGICAL HISTORY:  Eczema  Gout    Social History:   Denies tobacco use    FAMILY HISTORY:    No pertinent family history    Allergies: No Known Allergies      Current Medications: acetaminophen     Tablet .. 650 milliGRAM(s) Oral every 6 hours PRN  chlorhexidine 0.12% Liquid 15 milliLiter(s) Oral Mucosa every 12 hours  chlorhexidine 4% Liquid 1 Application(s) Topical <User Schedule>  enoxaparin Injectable 40 milliGRAM(s) SubCutaneous <User Schedule>  multivitamin 1 Tablet(s) Oral daily  niCARdipine Infusion 10 mG/Hr IV Continuous <Continuous>  ondansetron Injectable 4 milliGRAM(s) IV Push every 6 hours PRN  pantoprazole  Injectable 40 milliGRAM(s) IV Push daily  polyethylene glycol 3350 17 Gram(s) Oral daily  propofol Infusion 30 MICROgram(s)/kG/Min IV Continuous <Continuous>  senna 2 Tablet(s) Oral at bedtime  sodium chloride 2% + sodium acetate 50:50 1000 milliLiter(s) IV Continuous <Continuous>  valproic  acid Syrup 500 milliGRAM(s) Oral two times a day      Labs:                         12.4   9.70  )-----------( 146      ( 05 Apr 2024 01:33 )             39.0       04-05    144  |  109<H>  |  14  ----------------------------<  139<H>  4.2   |  25  |  0.76    Ca    8.7      05 Apr 2024 01:33  Phos  2.4     04-05  Mg     2.5     04-05    TPro  5.8<L>  /  Alb  3.6  /  TBili  0.4  /  DBili  x   /  AST  31  /  ALT  25  /  AlkPhos  62  04-03    Activated Partial Thromboplastin Time in AM (04.05.24 @ 01:33)    Activated Partial Thromboplastin Time: 29.6:  Prothrombin Time and INR, Plasma in AM (04.05.24 @ 01:33)    Prothrombin Time, Plasma: 11.7 sec    INR: 1.12:     Blood Bank: 04-05-24  A  --  Positive    Assessment/Plan:   This is a 58y Male  presents with 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 Patient presents to neuro-IR for selective cerebral angiography with possible embolization. Procedure/ risks/ benefits/ goals/ alternatives were explained. Risks include but are not limited to stroke/ vessel injury/ hemorrhage/ groin hematoma. All questions answered. Informed consent obtained and placed in chart.

## 2024-04-05 NOTE — PROVIDER CONTACT NOTE (CHANGE IN STATUS NOTIFICATION) - SITUATION
pt Hypertensive >200, HR >100, RR16, agitated, pt thrashing L side of body, attempting to rip out invasive lines. R corneal reflex absent, L corneal present. Localize LUE, WD LLE, NM RUE/RLE. L pupil 3RB, R pupil 2RS, febrile 39.0.

## 2024-04-05 NOTE — PROGRESS NOTE ADULT - SUBJECTIVE AND OBJECTIVE BOX
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     Admission scores:   NIHSS on admission: 26  ICH score: 2  LKN: 4/2/2024, 19:30  pre-MRS: 0    24h events; hypertensive sbp>200, dysynchrony with vent, lost corneal reflexes, UE extensors, received mannitol 1mg/kg, rCTh stable, now at angio  fever     Exam   intubated, on sedation propofol, eyes closed no response to noxious or voice, not following commands, left gaze overcome midline, no corneals has cough, gag, peerl, spontaneous LUE AG LLE AG, RUE extensor, RLE plegic     TTE 04/03/24   CONCLUSIONS:   1. Left ventricular cavity is normal in size. Left ventricular wall thickness is normal. Left ventricular systolic function is normal with an ejection fraction of 69 % by Maharaj's method of disks.   2. Normal systolic function. Tricuspid annular plane systolic excursion (TAPSE) is 2.1 cm (normal >=1.7 cm).   3. No pericardial effusion seen.   4. No prior echocardiogram is available for comparison.   5. Technically difficult image quality.   6. The inferior vena cava is dilated measuring 2.46 cm in diameter, (dilated >2.1cm) with abnormal inspiratory collapse (abnormal <50%) consistent with elevated right atrial pressure (~15, range 10-20mmHg).   7. There is no evidence of a left ventricular thrombus.   8. There is normal LV mass and normal geometry.    VITALS:   Vital Signs Last 24 Hrs  T(C): 38 (05 Apr 2024 09:02), Max: 39.4 (05 Apr 2024 07:00)  T(F): 100.2 (05 Apr 2024 09:00), Max: 102.9 (05 Apr 2024 07:00)  HR: 87 (05 Apr 2024 09:02) (59 - 104)  BP: 102/63 (05 Apr 2024 09:02) (102/63 - 102/63)  BP(mean): 77 (05 Apr 2024 09:02) (77 - 77)  RR: 24 (05 Apr 2024 09:02) (16 - 27)  SpO2: 100% (05 Apr 2024 09:02) (93% - 100%)    Parameters below as of 05 Apr 2024 09:02    O2 Flow (L/min): 15  O2 Concentration (%): 30  CAPILLARY BLOOD GLUCOSE        I&O's Summary    04 Apr 2024 07:01  -  05 Apr 2024 07:00  --------------------------------------------------------  IN: 3090 mL / OUT: 2850 mL / NET: 240 mL    05 Apr 2024 07:01  -  05 Apr 2024 10:17  --------------------------------------------------------  IN: 34.5 mL / OUT: 350 mL / NET: -315.5 mL        Respiratory:  Mode: standby    ABG - ( 05 Apr 2024 01:31 )  pH, Arterial: 7.43  pH, Blood: x     /  pCO2: 40    /  pO2: 106   / HCO3: 26    / Base Excess: 2.0   /  SaO2: 98.9                LABS:                        12.4   9.70  )-----------( 146      ( 05 Apr 2024 01:33 )             39.0     04-05    144  |  109<H>  |  14  ----------------------------<  139<H>  4.2   |  25  |  0.76        MEDICATION LEVELS:     IVF FLUIDS/MEDICATIONS:   MEDICATIONS  (STANDING):  chlorhexidine 0.12% Liquid 15 milliLiter(s) Oral Mucosa every 12 hours  chlorhexidine 4% Liquid 1 Application(s) Topical <User Schedule>  enoxaparin Injectable 40 milliGRAM(s) SubCutaneous <User Schedule>  multivitamin 1 Tablet(s) Oral daily  niCARdipine Infusion 10 mG/Hr (50 mL/Hr) IV Continuous <Continuous>  pantoprazole  Injectable 40 milliGRAM(s) IV Push daily  polyethylene glycol 3350 17 Gram(s) Oral daily  propofol Infusion 75 MICROgram(s)/kG/Min (34.7 mL/Hr) IV Continuous <Continuous>  senna 2 Tablet(s) Oral at bedtime  sodium chloride 2% + sodium acetate 50:50 1000 milliLiter(s) (75 mL/Hr) IV Continuous <Continuous>  valproic  acid Syrup 500 milliGRAM(s) Oral two times a day    MEDICATIONS  (PRN):  acetaminophen     Tablet .. 650 milliGRAM(s) Oral every 6 hours PRN Temp greater or equal to 38C (100.4F), Mild Pain (1 - 3)  ondansetron Injectable 4 milliGRAM(s) IV Push every 6 hours PRN Nausea and/or Vomiting

## 2024-04-05 NOTE — PROVIDER CONTACT NOTE (CHANGE IN STATUS NOTIFICATION) - ASSESSMENT
pt Hypertensive >200, HR >100, RR16, agitated, pt thrashing L side of body, attempting to rip out invasive lines. R corneal reflex absent, L corneal present. Localize LUE, WD LLE, NM RUE/RLE. L pupil 3RB, R pupil 2RS, febrile 39.0

## 2024-04-05 NOTE — PROGRESS NOTE ADULT - SUBJECTIVE AND OBJECTIVE BOX
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     Admission scores:   NIHSS on admission: 26  ICH score: 2  LKN: 4/2/2024, 19:30  pre-MRS: 0    ON: admitted to nsicu     Exam   intubated, off propofol, eyes closed no response to noxious or voice, not following commands, left gaze overcome midline, has cough, gag, peerl, spontaneous LUE AG LLE AG, RUE extensor, RLE plegic     TTE 04/03/24   CONCLUSIONS:   1. Left ventricular cavity is normal in size. Left ventricular wall thickness is normal. Left ventricular systolic function is normal with an ejection fraction of 69 % by Maharaj's method of disks.   2. Normal systolic function. Tricuspid annular plane systolic excursion (TAPSE) is 2.1 cm (normal >=1.7 cm).   3. No pericardial effusion seen.   4. No prior echocardiogram is available for comparison.   5. Technically difficult image quality.   6. The inferior vena cava is dilated measuring 2.46 cm in diameter, (dilated >2.1cm) with abnormal inspiratory collapse (abnormal <50%) consistent with elevated right atrial pressure (~15, range 10-20mmHg).   7. There is no evidence of a left ventricular thrombus.   8. There is normal LV mass and normal geometry.    VITALS:   T(C): 38.3 (04-05-24 @ 01:00), Max: 38.3 (04-05-24 @ 01:00)  HR: 71 (04-05-24 @ 01:00) (65 - 94)  BP: --  BP(mean): --  RR: 16 (04-05-24 @ 01:00) (16 - 22)  SpO2: 95% (04-05-24 @ 01:00) (95% - 100%)    acetaminophen     Tablet .. 650 milliGRAM(s) Oral every 6 hours PRN  chlorhexidine 0.12% Liquid 15 milliLiter(s) Oral Mucosa every 12 hours  chlorhexidine 4% Liquid 1 Application(s) Topical <User Schedule>  dexMEDEtomidine Infusion 0.2 MICROgram(s)/kG/Hr IV Continuous <Continuous>  enoxaparin Injectable 40 milliGRAM(s) SubCutaneous <User Schedule>  fentaNYL    Injectable 50 MICROGram(s) IV Push once  multiple electrolytes Injection Type 1 1000 milliLiter(s) IV Continuous <Continuous>  multivitamin 1 Tablet(s) Oral daily  ondansetron Injectable 4 milliGRAM(s) IV Push every 6 hours PRN  pantoprazole  Injectable 40 milliGRAM(s) IV Push daily  polyethylene glycol 3350 17 Gram(s) Oral daily  potassium phosphate / sodium phosphate Powder (PHOS-NaK) 2 Packet(s) Oral once  senna 2 Tablet(s) Oral at bedtime  valproic  acid Syrup 500 milliGRAM(s) Oral two times a day        04-03-24 @ 07:01  -  04-04-24 @ 07:00  --------------------------------------------------------  IN: 3007.2 mL / OUT: 1600 mL / NET: 1407.2 mL    04-04-24 @ 07:01  -  04-05-24 @ 02:24  --------------------------------------------------------  IN: 2387.6 mL / OUT: 1150 mL / NET: 1237.6 mL                            12.4   9.70  )-----------( 146      ( 05 Apr 2024 01:33 )             39.0   04-05    144  |  109<H>  |  14  ----------------------------<  139<H>  4.2   |  25  |  0.76

## 2024-04-05 NOTE — PROGRESS NOTE ADULT - ASSESSMENT
ASSESSMENT   58M Hx eczema/Gout intermittently takes ASA for pain, p/f being found down by wife after hearing thud. No hx HTN. 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 ED intubated.      PLAN     N  neurocheck q2  angio tomorrow   continue valproate   pain fentanyl, tylenol and oxycodone  Activity: [] OOB as tolerated [x] Bedrest [] PT [] OT [] PMNR    CV   -160mmHg  TTE EF 65%, no thrombus     P   12/450/5/45    R   Strict I+Os   plasmalyte 100     GI   Diet: ngtube - NPO after midnight  Senna miralax  Last BM PTA     ID  afebrile    E  Goal euglycemia (-180)  a1c 5.6    H  SCDs  Chemoppx   LED pending

## 2024-04-05 NOTE — PRE-ANESTHESIA EVALUATION ADULT - NSATTENDATTESTRD_GEN_ALL_CORE
----- Message from Beata Muro sent at 12/13/2023  3:05 PM CST -----  Type:  Needs Medical Advice    Who Called: pt     Pharmacy name and phone #:  [id number] [place] - [address] AT [facility]      Would the patient rather a call back or a response via [facility]? Call back  Best Call Back Number:  [phone]  Additional Information:  doxepin (SILENOR) 6 mg Tab is out of stock at [facility] per patient. Pt believes if  orders 10mg, pharmacy would be able to fill Rx. If [facility] is still not able to fill, she would like prescription sent to [facility]. Pt req call back to discuss.        The patient has been re-examined and I agree with the above assessment or I updated with my findings.

## 2024-04-06 LAB
-  STAPHYLOCOCCUS EPIDERMIDIS: SIGNIFICANT CHANGE UP
CULTURE RESULTS: ABNORMAL
GAS PNL BLDA: SIGNIFICANT CHANGE UP
GRAM STN FLD: ABNORMAL
METHOD TYPE: SIGNIFICANT CHANGE UP
MRSA PCR RESULT.: SIGNIFICANT CHANGE UP
ORGANISM # SPEC MICROSCOPIC CNT: ABNORMAL
ORGANISM # SPEC MICROSCOPIC CNT: ABNORMAL
S AUREUS DNA NOSE QL NAA+PROBE: SIGNIFICANT CHANGE UP
SPECIMEN SOURCE: SIGNIFICANT CHANGE UP
SPECIMEN SOURCE: SIGNIFICANT CHANGE UP

## 2024-04-06 PROCEDURE — 71045 X-RAY EXAM CHEST 1 VIEW: CPT | Mod: 26

## 2024-04-06 PROCEDURE — 99291 CRITICAL CARE FIRST HOUR: CPT

## 2024-04-06 PROCEDURE — 95720 EEG PHY/QHP EA INCR W/VEEG: CPT

## 2024-04-06 RX ORDER — VALPROIC ACID (AS SODIUM SALT) 250 MG/5ML
500 SOLUTION, ORAL ORAL EVERY 12 HOURS
Refills: 0 | Status: DISCONTINUED | OUTPATIENT
Start: 2024-04-06 | End: 2024-04-09

## 2024-04-06 RX ORDER — AMLODIPINE BESYLATE 2.5 MG/1
10 TABLET ORAL DAILY
Refills: 0 | Status: DISCONTINUED | OUTPATIENT
Start: 2024-04-06 | End: 2024-04-11

## 2024-04-06 RX ORDER — POTASSIUM CHLORIDE 20 MEQ
40 PACKET (EA) ORAL EVERY 4 HOURS
Refills: 0 | Status: DISCONTINUED | OUTPATIENT
Start: 2024-04-06 | End: 2024-04-06

## 2024-04-06 RX ORDER — LABETALOL HCL 100 MG
10 TABLET ORAL EVERY 4 HOURS
Refills: 0 | Status: DISCONTINUED | OUTPATIENT
Start: 2024-04-06 | End: 2024-04-07

## 2024-04-06 RX ORDER — LABETALOL HCL 100 MG
10 TABLET ORAL ONCE
Refills: 0 | Status: DISCONTINUED | OUTPATIENT
Start: 2024-04-06 | End: 2024-04-06

## 2024-04-06 RX ORDER — LABETALOL HCL 100 MG
10 TABLET ORAL ONCE
Refills: 0 | Status: COMPLETED | OUTPATIENT
Start: 2024-04-06 | End: 2024-04-06

## 2024-04-06 RX ORDER — SODIUM CHLORIDE 9 MG/ML
1000 INJECTION INTRAMUSCULAR; INTRAVENOUS; SUBCUTANEOUS
Refills: 0 | Status: DISCONTINUED | OUTPATIENT
Start: 2024-04-06 | End: 2024-04-09

## 2024-04-06 RX ORDER — POTASSIUM PHOSPHATE, MONOBASIC POTASSIUM PHOSPHATE, DIBASIC 236; 224 MG/ML; MG/ML
30 INJECTION, SOLUTION INTRAVENOUS ONCE
Refills: 0 | Status: COMPLETED | OUTPATIENT
Start: 2024-04-06 | End: 2024-04-06

## 2024-04-06 RX ORDER — DEXMEDETOMIDINE HYDROCHLORIDE IN 0.9% SODIUM CHLORIDE 4 UG/ML
0.2 INJECTION INTRAVENOUS
Qty: 200 | Refills: 0 | Status: DISCONTINUED | OUTPATIENT
Start: 2024-04-06 | End: 2024-04-07

## 2024-04-06 RX ADMIN — SODIUM CHLORIDE 50 MILLILITER(S): 9 INJECTION INTRAMUSCULAR; INTRAVENOUS; SUBCUTANEOUS at 17:00

## 2024-04-06 RX ADMIN — PANTOPRAZOLE SODIUM 40 MILLIGRAM(S): 20 TABLET, DELAYED RELEASE ORAL at 13:00

## 2024-04-06 RX ADMIN — Medication 500 MILLIGRAM(S): at 05:35

## 2024-04-06 RX ADMIN — ENOXAPARIN SODIUM 40 MILLIGRAM(S): 100 INJECTION SUBCUTANEOUS at 21:07

## 2024-04-06 RX ADMIN — Medication 40 MILLIEQUIVALENT(S): at 05:35

## 2024-04-06 RX ADMIN — Medication 1 PACKET(S): at 05:35

## 2024-04-06 RX ADMIN — SODIUM CHLORIDE 50 MILLILITER(S): 9 INJECTION INTRAMUSCULAR; INTRAVENOUS; SUBCUTANEOUS at 19:16

## 2024-04-06 RX ADMIN — Medication 10 MILLIGRAM(S): at 12:45

## 2024-04-06 RX ADMIN — Medication 1 TABLET(S): at 13:00

## 2024-04-06 RX ADMIN — POTASSIUM PHOSPHATE, MONOBASIC POTASSIUM PHOSPHATE, DIBASIC 83.33 MILLIMOLE(S): 236; 224 INJECTION, SOLUTION INTRAVENOUS at 13:12

## 2024-04-06 RX ADMIN — CHLORHEXIDINE GLUCONATE 15 MILLILITER(S): 213 SOLUTION TOPICAL at 05:35

## 2024-04-06 RX ADMIN — Medication 40 MILLIEQUIVALENT(S): at 13:05

## 2024-04-06 RX ADMIN — CHLORHEXIDINE GLUCONATE 1 APPLICATION(S): 213 SOLUTION TOPICAL at 21:08

## 2024-04-06 RX ADMIN — AMLODIPINE BESYLATE 10 MILLIGRAM(S): 2.5 TABLET ORAL at 11:25

## 2024-04-06 RX ADMIN — Medication 55 MILLIGRAM(S): at 17:30

## 2024-04-06 NOTE — PROGRESS NOTE ADULT - SUBJECTIVE AND OBJECTIVE BOX
Patient seen and examined at bedside.    --Anticoagulation--    T(C): 37.7 (04-03-24 @ 19:00), Max: 37.7 (04-03-24 @ 19:00)  HR: 79 (04-03-24 @ 21:09) (54 - 87)  BP: 102/63 (04-03-24 @ 07:00) (96/56 - 136/84)  RR: 13 (04-03-24 @ 21:00) (12 - 23)  SpO2: 100% (04-03-24 @ 21:09) (99% - 100%)  Wt(kg): --    Exam: ADARSH, cough/gag/overbreaths, PERRL, R side 0/5, int L side FC w/sedation held

## 2024-04-06 NOTE — EEG REPORT - NS EEG TEXT BOX
ROSALEE CARR [id number]     Study Date: 		04-05-24 1198 - 0800 04-06-24  Duration in hours:  x 17 hr    --------------------------------------------------------------------------------------------------  History:  CC/ HPI Patient is a 58y old  Male who presents with a chief complaint of IPH (06 Apr 2024 01:35)    MEDICATIONS  (STANDING):  chlorhexidine 0.12% Liquid 15 milliLiter(s) Oral Mucosa every 12 hours  chlorhexidine 4% Liquid 1 Application(s) Topical <User Schedule>  dexMEDEtomidine Infusion 0.4 MICROgram(s)/kG/Hr (7.75 mL/Hr) IV Continuous <Continuous>  enoxaparin Injectable 40 milliGRAM(s) SubCutaneous <User Schedule>  multivitamin 1 Tablet(s) Oral daily  pantoprazole  Injectable 40 milliGRAM(s) IV Push daily  polyethylene glycol 3350 17 Gram(s) Oral daily  senna 2 Tablet(s) Oral at bedtime  valproic  acid Syrup 500 milliGRAM(s) Oral two times a day    --------------------------------------------------------------------------------------------------  Study Interpretation:    [Abbreviation Key:  PDR=alpha rhythm/posterior dominant rhythm. A-P=anterior posterior.  Amplitude: ‘very low’:<20; ‘low’:20-49; ‘medium’:; ‘high’:>150uV.  Persistence for periodic/rhythmic patterns (% of epoch) ‘rare’:<1%; ‘occasional’:1-10%; ‘frequent’:10-50%; ‘abundant’:50-90%; ‘continuous’:>90%.  Persistence for sporadic discharges: ‘rare’:<1/hr; ‘occasional’:1/min-1/hr; ‘frequent’:>1/min; ‘abundant’:>1/10 sec.  RPP=rhythmic and periodic patterns; GRDA=generalized rhythmic delta activity; FIRDA=frontal intermittent GRDA; LRDA=lateralized rhythmic delta activity; TIRDA=temporal intermittent rhythmic delta activity;  LPD=PLED=lateralized periodic discharges; GPD=generalized periodic discharges; BIPDs =bilateral independent periodic discharges; Mf=multifocal; SIRPDs=stimulus induced rhythmic, periodic, or ictal appearing discharges; BIRDs=brief potentially ictal rhythmic discharges >4 Hz, lasting .5-10s; PFA (paroxysmal bursts >13 Hz or =8 Hz <10s).  Modifiers: +F=with fast component; +S=with spike component; +R=with rhythmic component.  S-B=burst suppression pattern.  Max=maximal. N1-drowsy; N2-stage II sleep; N3-slow wave sleep. SSS/BETS=small sharp spikes/benign epileptiform transients of sleep. HV=hyperventilation; PS=photic stimulation]    FINDINGS:      Background:  Continuity: continuous  Symmetry: symmetric  PDR absent  Reactivity: present  Voltage: normal (between 20-150uV)  Anterior Posterior Gradient: absent  Other background findings: none  Breach: absent    Background Slowing:  Generalized slowing: diffuse irregular delta and theta activity.  Focal slowing: intermittent left frontotemporal irregular delta/theta activity was present, at times with sharply contoured morphology.    State Changes:   -N2 sleep transients were not recorded.    Sporadic Epileptiform Discharges:    None    Rhythmic and Periodic Patterns (RPPs):  None     Electrographic and Electroclinical seizures:  None    Other Clinical Events:  None    Activation Procedures:   -Hyperventilation was not performed.    -Photic stimulation was not performed.     Artifacts:  Intermittent myogenic and movement artifacts were noted.    ECG:  The heart rate on single channel ECG was predominantly between 70-80 BPM.    EEG Classification / Summary:  Abnormal EEG study  Intermittent focal left frontotemporal slowing, at times with sharply contoured morphology.  Moderate generalized background slowing    -----------------------------------------------------------------------------------------------------    Clinical Impression:  Focal left frontotemporal cerebral dysfunction.  Moderate diffuse/multi-focal cerebral dysfunction, not specific as to etiology.  There were no clear epileptiform abnormalities or seizures recorded.    This is a preliminary report pending attending review and attestation.    Gretel Pavon MD  Fellow, [facility]    -------------------------------------------------------------------------------------------------------  [facility] EEG Reading Room Ph#: [phone]  Epilepsy Answering Service after 5PM and before 8:30AM: Ph#: [phone]   ROSALEE CARR [id number]     Study Date: 		04-05-24 6958 - 0800 04-06-24  Duration in hours:  x 17 hr    --------------------------------------------------------------------------------------------------  History:  CC/ HPI Patient is a 58y old  Male who presents with a chief complaint of IPH (06 Apr 2024 01:35)    MEDICATIONS  (STANDING):  chlorhexidine 0.12% Liquid 15 milliLiter(s) Oral Mucosa every 12 hours  chlorhexidine 4% Liquid 1 Application(s) Topical <User Schedule>  dexMEDEtomidine Infusion 0.4 MICROgram(s)/kG/Hr (7.75 mL/Hr) IV Continuous <Continuous>  enoxaparin Injectable 40 milliGRAM(s) SubCutaneous <User Schedule>  multivitamin 1 Tablet(s) Oral daily  pantoprazole  Injectable 40 milliGRAM(s) IV Push daily  polyethylene glycol 3350 17 Gram(s) Oral daily  senna 2 Tablet(s) Oral at bedtime  valproic  acid Syrup 500 milliGRAM(s) Oral two times a day    --------------------------------------------------------------------------------------------------  Study Interpretation:    [Abbreviation Key:  PDR=alpha rhythm/posterior dominant rhythm. A-P=anterior posterior.  Amplitude: ‘very low’:<20; ‘low’:20-49; ‘medium’:; ‘high’:>150uV.  Persistence for periodic/rhythmic patterns (% of epoch) ‘rare’:<1%; ‘occasional’:1-10%; ‘frequent’:10-50%; ‘abundant’:50-90%; ‘continuous’:>90%.  Persistence for sporadic discharges: ‘rare’:<1/hr; ‘occasional’:1/min-1/hr; ‘frequent’:>1/min; ‘abundant’:>1/10 sec.  RPP=rhythmic and periodic patterns; GRDA=generalized rhythmic delta activity; FIRDA=frontal intermittent GRDA; LRDA=lateralized rhythmic delta activity; TIRDA=temporal intermittent rhythmic delta activity;  LPD=PLED=lateralized periodic discharges; GPD=generalized periodic discharges; BIPDs =bilateral independent periodic discharges; Mf=multifocal; SIRPDs=stimulus induced rhythmic, periodic, or ictal appearing discharges; BIRDs=brief potentially ictal rhythmic discharges >4 Hz, lasting .5-10s; PFA (paroxysmal bursts >13 Hz or =8 Hz <10s).  Modifiers: +F=with fast component; +S=with spike component; +R=with rhythmic component.  S-B=burst suppression pattern.  Max=maximal. N1-drowsy; N2-stage II sleep; N3-slow wave sleep. SSS/BETS=small sharp spikes/benign epileptiform transients of sleep. HV=hyperventilation; PS=photic stimulation]    FINDINGS:      Background:  Continuity: continuous  Symmetry: symmetric  PDR absent  Reactivity: present  Voltage: normal (between 20-150uV)  Anterior Posterior Gradient: absent  Other background findings: none  Breach: absent    Background Slowing:  Generalized slowing: diffuse irregular delta and theta activity.  Focal slowing: intermittent left frontotemporal irregular delta/theta activity was present, at times with sharply contoured morphology.    State Changes:   -N2 sleep transients were not recorded.    Sporadic Epileptiform Discharges:    None    Rhythmic and Periodic Patterns (RPPs):  None     Electrographic and Electroclinical seizures:  None    Other Clinical Events:  None    Activation Procedures:   -Hyperventilation was not performed.    -Photic stimulation was not performed.     Artifacts:  Intermittent myogenic and movement artifacts were noted.    ECG:  The heart rate on single channel ECG was predominantly between 70-80 BPM.    EEG Classification / Summary:  Abnormal EEG study  Intermittent focal left frontotemporal slowing, at times with sharply contoured morphology.  Moderate generalized background slowing    -----------------------------------------------------------------------------------------------------    Clinical Impression:  Focal left frontotemporal cerebral dysfunction.  Moderate diffuse/multi-focal cerebral dysfunction, not specific as to etiology.  There were no clear epileptiform abnormalities or seizures recorded.      Gretel Pavon MD  Fellow, [facility]    Dong Ahumada MD  EEG/Epilepsy Attending     -------------------------------------------------------------------------------------------------------  [facility] EEG Reading Room Ph#: [phone]  Epilepsy Answering Service after 5PM and before 8:30AM: Ph#: [phone]

## 2024-04-06 NOTE — PROGRESS NOTE ADULT - ASSESSMENT
ASSESSMENT   58M Hx eczema/Gout intermittently takes ASA for pain, p/f being found down by wife after hearing thud. No hx HTN. 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 ED intubated.      PLAN     N  # IPH: angio without vascular malformation   #ICP crisis/cerebral edema: mannitol 1mg/kg prn, 2% Na goal 145-155   #AMS: vEEG  #sedation wean propofol, continue valproate   #pain fentanyl, tylenol and oxycodone   #Activity: [] OOB as tolerated [x] Bedrest [] PT [] OT [] PMNR    CV   -160mmHg  TTE EF 65%, no thrombus     P   #respiratory failure  PS otherwise continue vent settings   12/450/5/45    R   Strict I+Os   IVL     GI   Diet: ngtube  Senna miralax  Last BM PTA     ID  afebrile    E  Goal euglycemia (-180)  a1c 5.6    H  SCDs  Chemoppx held   LED 3/3/24 no DVT     CODE STATUS: FULL CODE     DISPOSITION: ICU    CRITICAL

## 2024-04-06 NOTE — PROGRESS NOTE ADULT - SUBJECTIVE AND OBJECTIVE BOX
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     Admission scores:   NIHSS on admission: 26  ICH score: 2  LKN: 4/2/2024, 19:30  pre-MRS: 0    24h events: angio negative for vascular malformations     Exam   intubated, on sedation propofol, eyes closed no response to noxious or voice, not following commands, left gaze overcome midline, no corneals has cough, gag, peerl, spontaneous LUE AG LLE AG, RUE extensor, RLE plegic     TTE 04/03/24   CONCLUSIONS:   1. Left ventricular cavity is normal in size. Left ventricular wall thickness is normal. Left ventricular systolic function is normal with an ejection fraction of 69 % by Maharaj's method of disks.   2. Normal systolic function. Tricuspid annular plane systolic excursion (TAPSE) is 2.1 cm (normal >=1.7 cm).   3. No pericardial effusion seen.   4. No prior echocardiogram is available for comparison.   5. Technically difficult image quality.   6. The inferior vena cava is dilated measuring 2.46 cm in diameter, (dilated >2.1cm) with abnormal inspiratory collapse (abnormal <50%) consistent with elevated right atrial pressure (~15, range 10-20mmHg).   7. There is no evidence of a left ventricular thrombus.   8. There is normal LV mass and normal geometry.    VITALS:   Vital Signs Last 24 Hrs  T(C): 36.5 (06 Apr 2024 07:00), Max: 38 (05 Apr 2024 16:00)  T(F): 97.7 (06 Apr 2024 07:00), Max: 100.4 (05 Apr 2024 16:00)  HR: 62 (06 Apr 2024 10:00) (56 - 80)  BP: --  BP(mean): --  RR: 20 (06 Apr 2024 10:00) (13 - 21)  SpO2: 99% (06 Apr 2024 10:00) (95% - 100%)    Parameters below as of 05 Apr 2024 19:00  Patient On (Oxygen Delivery Method): ventilator      CAPILLARY BLOOD GLUCOSE        I&O's Summary    05 Apr 2024 07:01  -  06 Apr 2024 07:00  --------------------------------------------------------  IN: 2415.8 mL / OUT: 1640 mL / NET: 775.8 mL    06 Apr 2024 07:01  -  06 Apr 2024 10:59  --------------------------------------------------------  IN: 55 mL / OUT: 0 mL / NET: 55 mL        Respiratory:  Mode: CPAP with PS  FiO2: 30  PEEP: 5  PS: 10  MAP: 10  PIP: 15    ABG - ( 05 Apr 2024 01:31 )  pH, Arterial: 7.43  pH, Blood: x     /  pCO2: 40    /  pO2: 106   / HCO3: 26    / Base Excess: 2.0   /  SaO2: 98.9                LABS:                        12.1   10.80 )-----------( 123      ( 05 Apr 2024 22:13 )             37.0     04-05    148<H>  |  113<H>  |  11  ----------------------------<  129<H>  3.6   |  23  |  0.65        MEDICATION LEVELS:     IVF FLUIDS/MEDICATIONS:   MEDICATIONS  (STANDING):  chlorhexidine 0.12% Liquid 15 milliLiter(s) Oral Mucosa every 12 hours  chlorhexidine 4% Liquid 1 Application(s) Topical <User Schedule>  dexMEDEtomidine Infusion 0.4 MICROgram(s)/kG/Hr (7.75 mL/Hr) IV Continuous <Continuous>  enoxaparin Injectable 40 milliGRAM(s) SubCutaneous <User Schedule>  multivitamin 1 Tablet(s) Oral daily  pantoprazole  Injectable 40 milliGRAM(s) IV Push daily  polyethylene glycol 3350 17 Gram(s) Oral daily  senna 2 Tablet(s) Oral at bedtime  valproic  acid Syrup 500 milliGRAM(s) Oral two times a day    MEDICATIONS  (PRN):  acetaminophen     Tablet .. 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     Admission scores:   NIHSS on admission: 26  ICH score: 2  LKN: 4/2/2024, 19:30  pre-MRS: 0    24h events: angio negative for vascular malformations     Exam   intubated, eyes closed opens to voice, follows simple commands, stick out tongue, shows two fingers with left, gaze upward, peerl, spontaneous LUE AG LLE AG, RUE/RLE plegic     TTE 04/03/24   CONCLUSIONS:   1. Left ventricular cavity is normal in size. Left ventricular wall thickness is normal. Left ventricular systolic function is normal with an ejection fraction of 69 % by Maharaj's method of disks.   2. Normal systolic function. Tricuspid annular plane systolic excursion (TAPSE) is 2.1 cm (normal >=1.7 cm).   3. No pericardial effusion seen.   4. No prior echocardiogram is available for comparison.   5. Technically difficult image quality.   6. The inferior vena cava is dilated measuring 2.46 cm in diameter, (dilated >2.1cm) with abnormal inspiratory collapse (abnormal <50%) consistent with elevated right atrial pressure (~15, range 10-20mmHg).   7. There is no evidence of a left ventricular thrombus.   8. There is normal LV mass and normal geometry.    VITALS:   Vital Signs Last 24 Hrs  T(C): 36.5 (06 Apr 2024 07:00), Max: 38 (05 Apr 2024 16:00)  T(F): 97.7 (06 Apr 2024 07:00), Max: 100.4 (05 Apr 2024 16:00)  HR: 62 (06 Apr 2024 10:00) (56 - 80)  BP: --  BP(mean): --  RR: 20 (06 Apr 2024 10:00) (13 - 21)  SpO2: 99% (06 Apr 2024 10:00) (95% - 100%)    Parameters below as of 05 Apr 2024 19:00  Patient On (Oxygen Delivery Method): ventilator      CAPILLARY BLOOD GLUCOSE        I&O's Summary    05 Apr 2024 07:01  -  06 Apr 2024 07:00  --------------------------------------------------------  IN: 2415.8 mL / OUT: 1640 mL / NET: 775.8 mL    06 Apr 2024 07:01  -  06 Apr 2024 10:59  --------------------------------------------------------  IN: 55 mL / OUT: 0 mL / NET: 55 mL        Respiratory:  Mode: CPAP with PS  FiO2: 30  PEEP: 5  PS: 10  MAP: 10  PIP: 15    ABG - ( 05 Apr 2024 01:31 )  pH, Arterial: 7.43  pH, Blood: x     /  pCO2: 40    /  pO2: 106   / HCO3: 26    / Base Excess: 2.0   /  SaO2: 98.9                LABS:                        12.1   10.80 )-----------( 123      ( 05 Apr 2024 22:13 )             37.0     04-05    148<H>  |  113<H>  |  11  ----------------------------<  129<H>  3.6   |  23  |  0.65        MEDICATION LEVELS:     IVF FLUIDS/MEDICATIONS:   MEDICATIONS  (STANDING):  chlorhexidine 0.12% Liquid 15 milliLiter(s) Oral Mucosa every 12 hours  chlorhexidine 4% Liquid 1 Application(s) Topical <User Schedule>  dexMEDEtomidine Infusion 0.4 MICROgram(s)/kG/Hr (7.75 mL/Hr) IV Continuous <Continuous>  enoxaparin Injectable 40 milliGRAM(s) SubCutaneous <User Schedule>  multivitamin 1 Tablet(s) Oral daily  pantoprazole  Injectable 40 milliGRAM(s) IV Push daily  polyethylene glycol 3350 17 Gram(s) Oral daily  senna 2 Tablet(s) Oral at bedtime  valproic  acid Syrup 500 milliGRAM(s) Oral two times a day    MEDICATIONS  (PRN):  acetaminophen     Tablet .. 650 milliGRAM(s) Oral every 6 hours PRN Temp greater or equal to 38C (100.4F), Mild Pain (1 - 3)  ondansetron Injectable 4 milliGRAM(s) IV Push every 6 hours PRN Nausea and/or Vomiting

## 2024-04-07 LAB
-  AMIKACIN: SIGNIFICANT CHANGE UP
-  AMPICILLIN/SULBACTAM: SIGNIFICANT CHANGE UP
-  CEFEPIME: SIGNIFICANT CHANGE UP
-  CEFTAZIDIME: SIGNIFICANT CHANGE UP
-  CIPROFLOXACIN: SIGNIFICANT CHANGE UP
-  GENTAMICIN: SIGNIFICANT CHANGE UP
-  IMIPENEM: SIGNIFICANT CHANGE UP
-  LEVOFLOXACIN: SIGNIFICANT CHANGE UP
-  MEROPENEM: SIGNIFICANT CHANGE UP
-  TOBRAMYCIN: SIGNIFICANT CHANGE UP
-  TRIMETHOPRIM/SULFAMETHOXAZOLE: SIGNIFICANT CHANGE UP
ANION GAP SERPL CALC-SCNC: 10 MMOL/L — SIGNIFICANT CHANGE UP (ref 5–17)
BUN SERPL-MCNC: 11 MG/DL — SIGNIFICANT CHANGE UP (ref 7–23)
CALCIUM SERPL-MCNC: 8.9 MG/DL — SIGNIFICANT CHANGE UP (ref 8.4–10.5)
CHLORIDE SERPL-SCNC: 111 MMOL/L — HIGH (ref 96–108)
CO2 SERPL-SCNC: 25 MMOL/L — SIGNIFICANT CHANGE UP (ref 22–31)
CREAT SERPL-MCNC: 0.6 MG/DL — SIGNIFICANT CHANGE UP (ref 0.5–1.3)
EGFR: 113 ML/MIN/1.73M2 — SIGNIFICANT CHANGE UP
GLUCOSE SERPL-MCNC: 109 MG/DL — HIGH (ref 70–99)
HCT VFR BLD CALC: 37.3 % — LOW (ref 39–50)
HGB BLD-MCNC: 11.9 G/DL — LOW (ref 13–17)
MAGNESIUM SERPL-MCNC: 2.2 MG/DL — SIGNIFICANT CHANGE UP (ref 1.6–2.6)
MCHC RBC-ENTMCNC: 28.8 PG — SIGNIFICANT CHANGE UP (ref 27–34)
MCHC RBC-ENTMCNC: 31.9 GM/DL — LOW (ref 32–36)
MCV RBC AUTO: 90.3 FL — SIGNIFICANT CHANGE UP (ref 80–100)
METHOD TYPE: SIGNIFICANT CHANGE UP
NRBC # BLD: 0 /100 WBCS — SIGNIFICANT CHANGE UP (ref 0–0)
PHOSPHATE SERPL-MCNC: 2.3 MG/DL — LOW (ref 2.5–4.5)
PLATELET # BLD AUTO: 159 K/UL — SIGNIFICANT CHANGE UP (ref 150–400)
POTASSIUM SERPL-MCNC: 4.1 MMOL/L — SIGNIFICANT CHANGE UP (ref 3.5–5.3)
POTASSIUM SERPL-SCNC: 4.1 MMOL/L — SIGNIFICANT CHANGE UP (ref 3.5–5.3)
RBC # BLD: 4.13 M/UL — LOW (ref 4.2–5.8)
RBC # FLD: 13 % — SIGNIFICANT CHANGE UP (ref 10.3–14.5)
SODIUM SERPL-SCNC: 146 MMOL/L — HIGH (ref 135–145)
WBC # BLD: 9.59 K/UL — SIGNIFICANT CHANGE UP (ref 3.8–10.5)
WBC # FLD AUTO: 9.59 K/UL — SIGNIFICANT CHANGE UP (ref 3.8–10.5)

## 2024-04-07 PROCEDURE — 70450 CT HEAD/BRAIN W/O DYE: CPT | Mod: 26

## 2024-04-07 PROCEDURE — 95720 EEG PHY/QHP EA INCR W/VEEG: CPT

## 2024-04-07 PROCEDURE — 71045 X-RAY EXAM CHEST 1 VIEW: CPT | Mod: 26

## 2024-04-07 PROCEDURE — 99221 1ST HOSP IP/OBS SF/LOW 40: CPT

## 2024-04-07 PROCEDURE — 99233 SBSQ HOSP IP/OBS HIGH 50: CPT

## 2024-04-07 RX ORDER — SODIUM,POTASSIUM PHOSPHATES 278-250MG
2 POWDER IN PACKET (EA) ORAL ONCE
Refills: 0 | Status: COMPLETED | OUTPATIENT
Start: 2024-04-07 | End: 2024-04-07

## 2024-04-07 RX ORDER — POTASSIUM PHOSPHATE, MONOBASIC POTASSIUM PHOSPHATE, DIBASIC 236; 224 MG/ML; MG/ML
30 INJECTION, SOLUTION INTRAVENOUS ONCE
Refills: 0 | Status: DISCONTINUED | OUTPATIENT
Start: 2024-04-07 | End: 2024-04-07

## 2024-04-07 RX ORDER — LOSARTAN POTASSIUM 100 MG/1
25 TABLET, FILM COATED ORAL DAILY
Refills: 0 | Status: DISCONTINUED | OUTPATIENT
Start: 2024-04-07 | End: 2024-04-11

## 2024-04-07 RX ORDER — LABETALOL HCL 100 MG
10 TABLET ORAL ONCE
Refills: 0 | Status: COMPLETED | OUTPATIENT
Start: 2024-04-07 | End: 2024-04-07

## 2024-04-07 RX ORDER — FENTANYL CITRATE 50 UG/ML
50 INJECTION INTRAVENOUS ONCE
Refills: 0 | Status: DISCONTINUED | OUTPATIENT
Start: 2024-04-07 | End: 2024-04-07

## 2024-04-07 RX ORDER — QUETIAPINE FUMARATE 200 MG/1
25 TABLET, FILM COATED ORAL EVERY 8 HOURS
Refills: 0 | Status: DISCONTINUED | OUTPATIENT
Start: 2024-04-07 | End: 2024-04-11

## 2024-04-07 RX ORDER — QUETIAPINE FUMARATE 200 MG/1
12.5 TABLET, FILM COATED ORAL EVERY 8 HOURS
Refills: 0 | Status: DISCONTINUED | OUTPATIENT
Start: 2024-04-07 | End: 2024-04-07

## 2024-04-07 RX ORDER — LOSARTAN POTASSIUM 100 MG/1
25 TABLET, FILM COATED ORAL DAILY
Refills: 0 | Status: DISCONTINUED | OUTPATIENT
Start: 2024-04-07 | End: 2024-04-07

## 2024-04-07 RX ADMIN — ENOXAPARIN SODIUM 40 MILLIGRAM(S): 100 INJECTION SUBCUTANEOUS at 21:05

## 2024-04-07 RX ADMIN — Medication 1 TABLET(S): at 14:13

## 2024-04-07 RX ADMIN — FENTANYL CITRATE 50 MICROGRAM(S): 50 INJECTION INTRAVENOUS at 00:33

## 2024-04-07 RX ADMIN — Medication 55 MILLIGRAM(S): at 17:29

## 2024-04-07 RX ADMIN — QUETIAPINE FUMARATE 25 MILLIGRAM(S): 200 TABLET, FILM COATED ORAL at 15:48

## 2024-04-07 RX ADMIN — Medication 10 MILLIGRAM(S): at 23:31

## 2024-04-07 RX ADMIN — SODIUM CHLORIDE 50 MILLILITER(S): 9 INJECTION INTRAMUSCULAR; INTRAVENOUS; SUBCUTANEOUS at 19:19

## 2024-04-07 RX ADMIN — DEXMEDETOMIDINE HYDROCHLORIDE IN 0.9% SODIUM CHLORIDE 3.88 MICROGRAM(S)/KG/HR: 4 INJECTION INTRAVENOUS at 00:00

## 2024-04-07 RX ADMIN — QUETIAPINE FUMARATE 12.5 MILLIGRAM(S): 200 TABLET, FILM COATED ORAL at 14:47

## 2024-04-07 RX ADMIN — CHLORHEXIDINE GLUCONATE 1 APPLICATION(S): 213 SOLUTION TOPICAL at 21:05

## 2024-04-07 RX ADMIN — FENTANYL CITRATE 50 MICROGRAM(S): 50 INJECTION INTRAVENOUS at 01:03

## 2024-04-07 RX ADMIN — Medication 55 MILLIGRAM(S): at 05:07

## 2024-04-07 RX ADMIN — Medication 2 PACKET(S): at 05:11

## 2024-04-07 RX ADMIN — LOSARTAN POTASSIUM 25 MILLIGRAM(S): 100 TABLET, FILM COATED ORAL at 21:05

## 2024-04-07 RX ADMIN — QUETIAPINE FUMARATE 25 MILLIGRAM(S): 200 TABLET, FILM COATED ORAL at 21:05

## 2024-04-07 NOTE — SWALLOW BEDSIDE ASSESSMENT ADULT - H & P REVIEW
ROSALEE CARR is a 58M Hx eczema/Gout intermittently takes ASA for pain, p/f being found down by wife after hearing thud. No hx HTN. 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 CTA grossly negative. Coags/TEG/ARU pend. ED intubated for inability to manage secretions. ICH score 2/yes

## 2024-04-07 NOTE — CONSULT NOTE ADULT - TIME BILLING
occlusion of radial artery  coordination of care Skyri[place] Counseling: I discussed with the patient the risks of risankizumab-rzaa including but not limited to immunosuppression, and serious infections.  The patient understands that monitoring is required including a PPD at baseline and must alert us or the primary physician if symptoms of infection or other concerning signs are noted.

## 2024-04-07 NOTE — SWALLOW BEDSIDE ASSESSMENT ADULT - NS SPL SWALLOW CLINIC TRIAL FT
Limited intervention as mentation appearing to be main barrier, pt not opening eyes or following simple commands other than showing 2 fingers with L hand as asked by the RN. Offered cold/wet cloth to mouth with no response. Offered tsp to mouth with no attempt to strip bolus or otherwise awareness to bolus presented to oral cavity despite max cues from RN, SLP and spouse.

## 2024-04-07 NOTE — SWALLOW BEDSIDE ASSESSMENT ADULT - SWALLOW EVAL: DIAGNOSIS
Patient was found down by wife found with L thalamic IPH extending to midbrain intubated for respiratory failure. Patient was found down by wife found with L thalamic IPH extending to midbrain intubated for respiratory failure. Oropharyngeal swallow profile p/w mentation being main barrier at this time for orientation to feeding task; no oral opening to po trials/tsp, no oral awareness, no attempt to strip bolus from the tsp. Health Care Proxy (HCP)/Living Will

## 2024-04-07 NOTE — ADVANCED PRACTICE NURSE CONSULT - REASON FOR CONSULT
Vascular Access Team    Evaluation for: MIDLINE placement at bedside   Indication: IV access  Requested by name: Manuel Anthony 4/5/24 @ 0813    Allergy to CHG/Heparin/Lidocaine: no     Platelets(>20): 159  INR(<3): 1.12  eGFR(>40): 113  Pending blood cultures: N/A  IR or Nephrology or ID clearance needed: no    Anticoagulants: lovenox  UE DVT: no  Mastectomy: no  Fistula: no    Plan: Bedside midline order evaluated. Will be placed within 24 hrs. Please call VAT RN at [phone] with any questions.  
Bedside MIDLINE ordered for IV access

## 2024-04-07 NOTE — EEG REPORT - NS EEG TEXT BOX
ROSALEE CARR [id number]     Study Date: 		04-06-24 0800 - 0800 04-07-24  Duration in hours:  x 17 hr    --------------------------------------------------------------------------------------------------  History:  CC/ HPI Patient is a 58y old  Male who presents with a chief complaint of IPH (06 Apr 2024 01:35)    MEDICATIONS  (STANDING):  chlorhexidine 0.12% Liquid 15 milliLiter(s) Oral Mucosa every 12 hours  chlorhexidine 4% Liquid 1 Application(s) Topical <User Schedule>  dexMEDEtomidine Infusion 0.4 MICROgram(s)/kG/Hr (7.75 mL/Hr) IV Continuous <Continuous>  enoxaparin Injectable 40 milliGRAM(s) SubCutaneous <User Schedule>  multivitamin 1 Tablet(s) Oral daily  pantoprazole  Injectable 40 milliGRAM(s) IV Push daily  polyethylene glycol 3350 17 Gram(s) Oral daily  senna 2 Tablet(s) Oral at bedtime  valproic  acid Syrup 500 milliGRAM(s) Oral two times a day    --------------------------------------------------------------------------------------------------  Study Interpretation:    [Abbreviation Key:  PDR=alpha rhythm/posterior dominant rhythm. A-P=anterior posterior.  Amplitude: ‘very low’:<20; ‘low’:20-49; ‘medium’:; ‘high’:>150uV.  Persistence for periodic/rhythmic patterns (% of epoch) ‘rare’:<1%; ‘occasional’:1-10%; ‘frequent’:10-50%; ‘abundant’:50-90%; ‘continuous’:>90%.  Persistence for sporadic discharges: ‘rare’:<1/hr; ‘occasional’:1/min-1/hr; ‘frequent’:>1/min; ‘abundant’:>1/10 sec.  RPP=rhythmic and periodic patterns; GRDA=generalized rhythmic delta activity; FIRDA=frontal intermittent GRDA; LRDA=lateralized rhythmic delta activity; TIRDA=temporal intermittent rhythmic delta activity;  LPD=PLED=lateralized periodic discharges; GPD=generalized periodic discharges; BIPDs =bilateral independent periodic discharges; Mf=multifocal; SIRPDs=stimulus induced rhythmic, periodic, or ictal appearing discharges; BIRDs=brief potentially ictal rhythmic discharges >4 Hz, lasting .5-10s; PFA (paroxysmal bursts >13 Hz or =8 Hz <10s).  Modifiers: +F=with fast component; +S=with spike component; +R=with rhythmic component.  S-B=burst suppression pattern.  Max=maximal. N1-drowsy; N2-stage II sleep; N3-slow wave sleep. SSS/BETS=small sharp spikes/benign epileptiform transients of sleep. HV=hyperventilation; PS=photic stimulation]    FINDINGS:      Background:  Continuity: continuous  Symmetry: symmetric  PDR: Well modulated 7-8 Hz PDR present during wakefulness  Reactivity: present  Voltage: normal (between 20-150uV)  Anterior Posterior Gradient: present  Other background findings: none  Breach: absent    Background Slowing:  Generalized slowing: diffuse irregular delta and theta activity.  Focal slowing: nearly continuous left frontotemporal irregulardelta/theta activity was present, at times with sharply contoured morphology.    State Changes:   -N2 sleep transients were not recorded.    Sporadic Epileptiform Discharges:    None    Rhythmic and Periodic Patterns (RPPs):  Intermittent GRDA during sleep, 1.5hz.    Electrographic and Electroclinical seizures:  None    Other Clinical Events:  None    Activation Procedures:   -Hyperventilation was not performed.    -Photic stimulation was not performed.     Artifacts:  Intermittent myogenic and movement artifacts were noted.    ECG:  The heart rate on single channel ECG was predominantly between 70-80 BPM.    EEG Classification / Summary:  Abnormal EEG study  Nearly continuous focal left frontotemporal slowing.  Intermittent GRDA during sleep.  Mild generalized background slowing    -----------------------------------------------------------------------------------------------------    Clinical Impression:  Focal left frontotemporal cerebral dysfunction.  Mild diffuse/multi-focal cerebral dysfunction, not specific as to etiology.  There were no clear epileptiform abnormalities or seizures recorded.    This is a preliminary report pending attending review and attestation.    Gretel Pavon MD  Fellow, [facility]    -------------------------------------------------------------------------------------------------------  [facility] EEG Reading Room Ph#: [phone]  Epilepsy Answering Service after 5PM and before 8:30AM: Ph#: [phone]   ROSALEE CARR [id number]     Study Date: 		04-06-24 0800 - 0800 04-07-24  Duration in hours:  x 17 hr    --------------------------------------------------------------------------------------------------  History:  CC/ HPI Patient is a 58y old  Male who presents with a chief complaint of IPH (06 Apr 2024 01:35)    MEDICATIONS  (STANDING):  chlorhexidine 0.12% Liquid 15 milliLiter(s) Oral Mucosa every 12 hours  chlorhexidine 4% Liquid 1 Application(s) Topical <User Schedule>  dexMEDEtomidine Infusion 0.4 MICROgram(s)/kG/Hr (7.75 mL/Hr) IV Continuous <Continuous>  enoxaparin Injectable 40 milliGRAM(s) SubCutaneous <User Schedule>  multivitamin 1 Tablet(s) Oral daily  pantoprazole  Injectable 40 milliGRAM(s) IV Push daily  polyethylene glycol 3350 17 Gram(s) Oral daily  senna 2 Tablet(s) Oral at bedtime  valproic  acid Syrup 500 milliGRAM(s) Oral two times a day    --------------------------------------------------------------------------------------------------  Study Interpretation:    [Abbreviation Key:  PDR=alpha rhythm/posterior dominant rhythm. A-P=anterior posterior.  Amplitude: ‘very low’:<20; ‘low’:20-49; ‘medium’:; ‘high’:>150uV.  Persistence for periodic/rhythmic patterns (% of epoch) ‘rare’:<1%; ‘occasional’:1-10%; ‘frequent’:10-50%; ‘abundant’:50-90%; ‘continuous’:>90%.  Persistence for sporadic discharges: ‘rare’:<1/hr; ‘occasional’:1/min-1/hr; ‘frequent’:>1/min; ‘abundant’:>1/10 sec.  RPP=rhythmic and periodic patterns; GRDA=generalized rhythmic delta activity; FIRDA=frontal intermittent GRDA; LRDA=lateralized rhythmic delta activity; TIRDA=temporal intermittent rhythmic delta activity;  LPD=PLED=lateralized periodic discharges; GPD=generalized periodic discharges; BIPDs =bilateral independent periodic discharges; Mf=multifocal; SIRPDs=stimulus induced rhythmic, periodic, or ictal appearing discharges; BIRDs=brief potentially ictal rhythmic discharges >4 Hz, lasting .5-10s; PFA (paroxysmal bursts >13 Hz or =8 Hz <10s).  Modifiers: +F=with fast component; +S=with spike component; +R=with rhythmic component.  S-B=burst suppression pattern.  Max=maximal. N1-drowsy; N2-stage II sleep; N3-slow wave sleep. SSS/BETS=small sharp spikes/benign epileptiform transients of sleep. HV=hyperventilation; PS=photic stimulation]    FINDINGS:      Background:  Continuity: continuous  Symmetry: symmetric  PDR: Well modulated 7-8 Hz PDR present during wakefulness  Reactivity: present  Voltage: normal (between 20-150uV)  Anterior Posterior Gradient: present  Other background findings: none  Breach: absent    Background Slowing:  Generalized slowing: intermittent irregular delta and theta activity.  Focal slowing: nearly continuous left frontotemporal irregular delta/theta activity was present, slowing more frequent appearing compared to yesterday.    State Changes:   -N2 sleep transients were not recorded.    Sporadic Epileptiform Discharges:    None    Rhythmic and Periodic Patterns (RPPs):  Intermittent GRDA during sleep, 1.5hz.    Electrographic and Electroclinical seizures:  None    Other Clinical Events:  None    Activation Procedures:   -Hyperventilation was not performed.    -Photic stimulation was not performed.     Artifacts:  Intermittent myogenic and movement artifacts were noted.    ECG:  The heart rate on single channel ECG was predominantly between 70-80 BPM.    EEG Classification / Summary:  Abnormal EEG study  Nearly continuous focal left frontotemporal slowing.  Intermittent GRDA during sleep.  Mild generalized background slowing    -----------------------------------------------------------------------------------------------------    Clinical Impression:  Focal left frontotemporal cerebral dysfunction.  Mild diffuse/multi-focal cerebral dysfunction, not specific as to etiology.  There were no clear epileptiform abnormalities or seizures recorded.    This is a preliminary report pending attending review and attestation.    Gretel Pavon MD  Fellow, [facility]    -------------------------------------------------------------------------------------------------------  [facility] EEG Reading Room Ph#: [phone]  Epilepsy Answering Service after 5PM and before 8:30AM: Ph#: [phone]   ROSALEE CARR [id number]     Study Date: 		04-06-24 0800 - 0800 04-07-24  Duration in hours:  x 24 hr    --------------------------------------------------------------------------------------------------  History:  CC/ HPI Patient is a 58y old  Male who presents with a chief complaint of IPH (06 Apr 2024 01:35)    MEDICATIONS  (STANDING):  chlorhexidine 0.12% Liquid 15 milliLiter(s) Oral Mucosa every 12 hours  chlorhexidine 4% Liquid 1 Application(s) Topical <User Schedule>  dexMEDEtomidine Infusion 0.4 MICROgram(s)/kG/Hr (7.75 mL/Hr) IV Continuous <Continuous>  enoxaparin Injectable 40 milliGRAM(s) SubCutaneous <User Schedule>  multivitamin 1 Tablet(s) Oral daily  pantoprazole  Injectable 40 milliGRAM(s) IV Push daily  polyethylene glycol 3350 17 Gram(s) Oral daily  senna 2 Tablet(s) Oral at bedtime  valproic  acid Syrup 500 milliGRAM(s) Oral two times a day    --------------------------------------------------------------------------------------------------  Study Interpretation:    [Abbreviation Key:  PDR=alpha rhythm/posterior dominant rhythm. A-P=anterior posterior.  Amplitude: ‘very low’:<20; ‘low’:20-49; ‘medium’:; ‘high’:>150uV.  Persistence for periodic/rhythmic patterns (% of epoch) ‘rare’:<1%; ‘occasional’:1-10%; ‘frequent’:10-50%; ‘abundant’:50-90%; ‘continuous’:>90%.  Persistence for sporadic discharges: ‘rare’:<1/hr; ‘occasional’:1/min-1/hr; ‘frequent’:>1/min; ‘abundant’:>1/10 sec.  RPP=rhythmic and periodic patterns; GRDA=generalized rhythmic delta activity; FIRDA=frontal intermittent GRDA; LRDA=lateralized rhythmic delta activity; TIRDA=temporal intermittent rhythmic delta activity;  LPD=PLED=lateralized periodic discharges; GPD=generalized periodic discharges; BIPDs =bilateral independent periodic discharges; Mf=multifocal; SIRPDs=stimulus induced rhythmic, periodic, or ictal appearing discharges; BIRDs=brief potentially ictal rhythmic discharges >4 Hz, lasting .5-10s; PFA (paroxysmal bursts >13 Hz or =8 Hz <10s).  Modifiers: +F=with fast component; +S=with spike component; +R=with rhythmic component.  S-B=burst suppression pattern.  Max=maximal. N1-drowsy; N2-stage II sleep; N3-slow wave sleep. SSS/BETS=small sharp spikes/benign epileptiform transients of sleep. HV=hyperventilation; PS=photic stimulation]    FINDINGS:      Background:  Continuity: continuous  Symmetry: symmetric  PDR: Well modulated 7-8 Hz PDR present during wakefulness  Reactivity: present  Voltage: normal (between 20-150uV)  Anterior Posterior Gradient: present  Other background findings: none  Breach: absent    Background Slowing:  Generalized slowing: intermittent irregular delta and theta activity.  Focal slowing: nearly continuous left frontotemporal irregular delta/theta activity was present, slowing more frequent appearing compared to yesterday.    State Changes:   -N2 sleep transients were not recorded.    Sporadic Epileptiform Discharges:    None    Rhythmic and Periodic Patterns (RPPs):  Intermittent GRDA during sleep, 1.5hz.    Electrographic and Electroclinical seizures:  None    Other Clinical Events:  None    Activation Procedures:   -Hyperventilation was not performed.    -Photic stimulation was not performed.     Artifacts:  Intermittent myogenic and movement artifacts were noted.    ECG:  The heart rate on single channel ECG was predominantly between 70-80 BPM.    EEG Classification / Summary:  Abnormal EEG study  Nearly continuous focal left frontotemporal slowing.  Intermittent GRDA during sleep.  Mild generalized background slowing    -----------------------------------------------------------------------------------------------------    Clinical Impression:  Focal left frontotemporal cerebral dysfunction.  Mild diffuse/multi-focal cerebral dysfunction, not specific as to etiology.  There were no clear epileptiform abnormalities or seizures recorded.      Gretel Pavon MD  Fellow, [facility]    Dong Ahumada MD  EEG/Epilepsy Attending     -------------------------------------------------------------------------------------------------------  [facility] EEG Reading Room Ph#: [phone]  Epilepsy Answering Service after 5PM and before 8:30AM: Ph#: [phone]

## 2024-04-07 NOTE — CONSULT NOTE ADULT - SUBJECTIVE AND OBJECTIVE BOX
VASCULAR SURGERY CONSULT NOTE  --------------------------------------------------------------------------------------------    Patient is a 58y old  Male who presents with a chief complaint of thalamic IPH extending to midbrain 4/2 (07 Apr 2024 04:00)      HPI:   58M Hx eczema/Gout intermittently takes ASA for pain, p/f being found down by wife after hearing thud. No hx HTN. 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 CTA grossly negative. Coags/TEG/ARU pend. ED intubated for inability to manage secretions. POD 2 from cerebral angio via right groin access. In NSCU self extubated yesterday, got RUE PICC today 4/7, found to have cool right hand.  Never had arterial line on right side, right side has been weak since presentation, unable to obtain further subjective hx 2/2 sedation with seroquel.       ROS: 10-system review is otherwise negative except HPI above.      PAST MEDICAL & SURGICAL HISTORY:  Eczema      Gout        FAMILY HISTORY:      SOCIAL HISTORY:      ALLERGIES: No Known Allergies      HOME MEDICATIONS:     CURRENT MEDICATIONS  MEDICATIONS (STANDING): amLODIPine   Tablet 10 milliGRAM(s) Oral daily  enoxaparin Injectable 40 milliGRAM(s) SubCutaneous <User Schedule>  multivitamin 1 Tablet(s) Oral daily  polyethylene glycol 3350 17 Gram(s) Oral daily  QUEtiapine 25 milliGRAM(s) Oral every 8 hours  senna 2 Tablet(s) Oral at bedtime  sodium chloride 0.9%. 1000 milliLiter(s) IV Continuous <Continuous>  valproate sodium  IVPB 500 milliGRAM(s) IV Intermittent every 12 hours    MEDICATIONS (PRN):acetaminophen     Tablet .. 650 milliGRAM(s) Oral every 6 hours PRN Temp greater or equal to 38C (100.4F), Mild Pain (1 - 3)  ondansetron Injectable 4 milliGRAM(s) IV Push every 6 hours PRN Nausea and/or Vomiting    --------------------------------------------------------------------------------------------    Vitals:   T(C): 36.7 (04-07-24 @ 11:00), Max: 37.3 (04-06-24 @ 19:00)  HR: 80 (04-07-24 @ 17:15) (53 - 86)  BP: 147/78 (04-07-24 @ 17:15) (100/61 - 185/81)  RR: 24 (04-07-24 @ 17:15) (10 - 26)  SpO2: 93% (04-07-24 @ 17:15) (91% - 100%)  CAPILLARY BLOOD GLUCOSE    04-06 @ 07:01  -  04-07 @ 07:00  --------------------------------------------------------  IN:    Dexmedetomidine: 11.7 mL    Dexmedetomidine: 71.7 mL    Enteral Tube Flush: 100 mL    IV PiggyBack: 383.2 mL    Jevity 1.2: 385 mL    sodium chloride 0.9%: 650 mL  Total IN: 1601.6 mL    OUT:    Voided (mL): 1350 mL  Total OUT: 1350 mL    Total NET: 251.6 mL      04-07 @ 07:01  -  04-07 @ 18:20  --------------------------------------------------------  IN:    Dexmedetomidine: 7.6 mL    Jevity 1.2: 440 mL    sodium chloride 0.9%: 400 mL  Total IN: 847.6 mL    OUT:  Total OUT: 0 mL    Total NET: 847.6 mL      77.5 (04-05 @ 09:02)    PHYSICAL EXAM:   General: nonverbal sitting in bed eyes closed   Neuro: sedated, moves LUE spontaneously   HEENT: NGT, EEG on   Cardio: RRR   Resp: Good effort, room air   GI/Abd: Soft, NT/ND, no rebound/guarding, no masses palpated  Vascular: right hand cool, brisk cap refill <2s, radial nonpalp no signal, ulnar palp, arch triphasic, brachial palp, plegic and not responding to pain   --------------------------------------------------------------------------------------------    LABS  CBC (04-06 @ 23:50)                              11.9<L>                         9.59    )----------------(  159        --    % Neutrophils, --    % Lymphocytes, ANC: --                                  37.3<L>    BMP (04-06 @ 23:50)             146<H>  |  111<H>  |  11    		Ca++ --      Ca 8.9                ---------------------------------( 109<H>		Mg 2.2                4.1     |  25      |  0.60  			Ph 2.3<L>          ABG (04-06 @ 15:06)     7.41 / 46 / 129<H> / 29<H> / 3.9<H> / 98.9<H>%     Lactate:        --------------------------------------------------------------------------------------------    MICROBIOLOGY  Urinalysis (04-06 @ 23:50):     Color:  / Appearance:  / SG:  / pH:  / Gluc: 109<H> / Ketones:  / Bili:  / Urobili:  / Protein : / Nitrites:  / Leuk.Est:  / RBC:  / WBC:  / Sq Epi:  / Non Sq Epi:  / Bacteria        -> .Blood Blood-Peripheral Culture (04-06 @ 02:03)     NG    NG    No growth at 24 hours    -> Combi-Cath Combi-Cath Culture (04-05 @ 07:54)       Numerous polymorphonuclear leukocytes seen per low power field  Rare Squamous epithelial cells seen per low power field  Few Gram Negative Rods seen per oil power field  Few Gram Positive Rods seen per oil power field  Rare Gram Variable Cocci seenper oil power field<!>    Acinetobacter baumannii/nosocomialis group<!>    Moderate Acinetobacter baumannii/nosocomialis group  Normal Respiratory Danae present<!>    -> .Blood Blood Culture (04-05 @ 01:27)       Growth in aerobic bottle: Gram Positive Cocci in Clusters<!>    Blood Culture PCR<!>    Growth in aerobic bottle: Staphylococcus epidermidis  Isolation of Coagulase negative Staphylococcus from single blood culture  sets may represent  contamination. Contact the Microbiology Department at [phone] if  susceptibility testing is  clinically indicated.  Direct identification is available within approximately 3-5  hours either by Blood Panel Multiplexed PCR or Direct  MALDI-TOF. Details: [email or web address]<!>    -> .Blood Blood Culture (04-05 @ 01:25)     NG    NG    No growth at 48 Hours      --------------------------------------------------------------------------------------------

## 2024-04-07 NOTE — SPEECH LANGUAGE PATHOLOGY EVALUATION - SLP DIAGNOSIS
Limited intervention given current mentation; no verbal output with SLP at this junction, intermittent command following; no other areas able to be addressed

## 2024-04-07 NOTE — SWALLOW BEDSIDE ASSESSMENT ADULT - SLP PERTINENT HISTORY OF CURRENT PROBLEM
Hospital course: 4/5/24: angio negative for vascular malformations 24h events: Patient self extubated on 4/6 and pulled out NG tube

## 2024-04-07 NOTE — SWALLOW BEDSIDE ASSESSMENT ADULT - SLP GENERAL OBSERVATIONS
Encountered in bed, resting, recently consumed sedatives per nursing 2/2 significant restlessness, patient is able to follow commands with some verbal output per nursing and wife when more awake

## 2024-04-07 NOTE — PROGRESS NOTE ADULT - ASSESSMENT
ASSESSMENT   58M Hx eczema/Gout intermittently takes ASA for pain, p/f being found down by wife after hearing thud. No hx HTN. 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 ED intubated.      PLAN     N  # IPH: angio without vascular malformation   #AMS: vEEG 4/6 no seizures   # continue valproate   #pain  tylenol   # precedex   #Activity: [] OOB as tolerated [x] Bedrest [] PT [] OT [] PMNR    CV   -160mmHg  TTE EF 65%, no thrombus   labetalol, amlodipine     P   patient self extubated on 4/6  goal saO2 > 92   encourage I.S.    R   Strict I+Os   NS @ 50     GI   Diet: NPO, patient removed NGT   Senna miralax  Last BM 4/6   zofran     ID  afebrile    E  Goal euglycemia (-180)  a1c 5.6    H  SCDs  SQL 40   LED 3/3/24 no DVT     CODE STATUS: FULL CODE     DISPOSITION: ICU    CRITICAL  ASSESSMENT   58M Hx eczema/Gout intermittently takes ASA for pain, p/f being found down by wife after hearing thud. No hx HTN. 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 ED intubated.      PLAN     N  # IPH: angio without vascular malformation   #AMS: vEEG 4/6 no seizures   # continue valproate, increase to q8  #pain  tylenol   # DC precedex, start Seroquel 25 q8  #Activity: [] OOB as tolerated [] Bedrest [X] PT [] OT [] PMNR    CV   -160mmHg  TTE EF 65%, no thrombus   labetalol, amlodipine    P   patient self extubated on 4/6  goal saO2 > 92   encourage I.S.    R   Strict I+Os   IVL    GI   Diet: NPO, patient removed NGT   Senna miralax  Last BM 4/6   zofran     ID  afebrile    E  Goal euglycemia (-180)  a1c 5.6    H  SCDs  SQL 40   LED 3/3/24 no DVT     CODE STATUS: FULL CODE     DISPOSITION: ICU    CRITICAL

## 2024-04-07 NOTE — ADVANCED PRACTICE NURSE CONSULT - ASSESSMENT
Midline Catheter Insertion Note    Catheter type: 4F  : Bard  Power Injectable: Yes  Ref#: [id number]  Procedure assisted by: Vanessa Lassiter RN    Time out was preformed, confirming the patient's first and last name, date of birth, procedure, and correct site prior to state of procedure.  Patient was placed with HOB 30 degrees. Patient placement site was prepped with chlorhexidine solution, then draped using maximum sterile barrier protection. Using the Bard Site Rite 8, the catheter was placed using the Modified Seldinger Technique. The area was injected with 2 ml of 1% lidocaine. Using the ultrasound, the catheter was introduced. Strict adherence to outline aseptic technique including handwashing, glove and gown, utilizing mask and cap, plus draping the patient with a sterile drape was observed. Upon completion of line placement, the insertion site was covered with a sterile occlusive CHG dressing. Pt tolerated procedure well.   All materials used for catheter insertion, including the intact guide wires, were accounted for at the end of the procedure.    Number of attempts: 1  Complications/Comments: None  Emergency Placement: no    Site: new site  Anatomical Site of insertion: R Basilic  Catheter size/length: 4Fr., 20 cm.  US guided Bard SL Power MIDLINE placed

## 2024-04-07 NOTE — SWALLOW BEDSIDE ASSESSMENT ADULT - COMMENTS
Continued history:   ETT 4/2-4/6  -Patient unable to manage his secretions in the trauma bay. Visible secretions coming out of the mouth and SpO2 dropping. Required urgent suctioning and was subsequently intubated by the ED team.  CT scan revealed a LEFT thalamic hemorrhage without intraventricular extension. 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 Neurosurgery consulted immediately when bleed was uncovered on CT scan.    CT H 4/5: 5mm axial sections of the brain were obtained from base to vertex, without the intravenous administration of contrast material. Images were obtained on a portable CT scanner and compared with 4/4/2024.  The fourth, third and lateral ventricles are normal size and position. There is no change in the left thalamic parenchymal hemorrhage measuring 2.3 cm in AP diameter. There is extension into the left midbrain. There is mild mass effect on the left lateral ventricle. There is no midline shift or hydrocephalus.    Admission scores:   NIHSS on admission: 26  ICH score: 2  LKN: 4/2/2024, 19:30  pre-MRS: 0    Speech & Swallow : no reports in SCM Continued history:   -ETT 4/2-4/6  -Patient unable to manage his secretions in the trauma bay. Visible secretions coming out of the mouth and SpO2 dropping. Required urgent suctioning and was subsequently intubated by the ED team.  CT scan revealed a LEFT thalamic hemorrhage without intraventricular extension. 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 Neurosurgery consulted immediately when bleed was uncovered on CT scan.    CT H 4/5: 5mm axial sections of the brain were obtained from base to vertex, without the intravenous administration of contrast material. Images were obtained on a portable CT scanner and compared with 4/4/2024.  The fourth, third and lateral ventricles are normal size and position. There is no change in the left thalamic parenchymal hemorrhage measuring 2.3 cm in AP diameter. There is extension into the left midbrain. There is mild mass effect on the left lateral ventricle. There is no midline shift or hydrocephalus.    Admission scores:   NIHSS on admission: 26  ICH score: 2  LKN: 4/2/2024, 19:30  pre-MRS: 0    Speech & Swallow : no reports in SCM

## 2024-04-07 NOTE — SPEECH LANGUAGE PATHOLOGY EVALUATION - COMMENTS
Continued history:   -ETT 4/2-4/6  -Patient unable to manage his secretions in the trauma bay. Visible secretions coming out of the mouth and SpO2 dropping. Required urgent suctioning and was subsequently intubated by the ED team.  CT scan revealed a LEFT thalamic hemorrhage without intraventricular extension. 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 Neurosurgery consulted immediately when bleed was uncovered on CT scan.    CT H 4/5: 5mm axial sections of the brain were obtained from base to vertex, without the intravenous administration of contrast material. Images were obtained on a portable CT scanner and compared with 4/4/2024.  The fourth, third and lateral ventricles are normal size and position. There is no change in the left thalamic parenchymal hemorrhage measuring 2.3 cm in AP diameter. There is extension into the left midbrain. There is mild mass effect on the left lateral ventricle. There is no midline shift or hydrocephalus.    Admission scores:   NIHSS on admission: 26  ICH score: 2  LKN: 4/2/2024, 19:30  pre-MRS: 0    Speech & Swallow : no reports in SCM GOAL-Sp-Pt to communicate wants/needs effectively during hospital course so as to be an active participant in his care. Following command x1 ; holding up 2 fingers.   No other commands such as during oromotor evaluation being followed

## 2024-04-07 NOTE — SPEECH LANGUAGE PATHOLOGY EVALUATION - SLP PATIENT PROFILE REVIEW
yes
I personally performed the service described in the documentation recorded by the scribe in my presence, and it accurately and completely records my words and actions.

## 2024-04-07 NOTE — PROGRESS NOTE ADULT - SUBJECTIVE AND OBJECTIVE BOX
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     Admission scores:   NIHSS on admission: 26  ICH score: 2  LKN: 4/2/2024, 19:30  pre-MRS: 0    Hospital course:   4/5/24: angio negative for vascular malformations   24h events:   Patient self extubated on 4/6 and pulled out NG tube     Exam   intubated, eyes opens to noxious, following commands, PERRL, spontaneous LUE 5/5 LLE 5/5, RUE/RLE plegic     TTE 04/03/24   CONCLUSIONS:   1. Left ventricular cavity is normal in size. Left ventricular wall thickness is normal. Left ventricular systolic function is normal with an ejection fraction of 69 % by Maharaj's method of disks.   2. Normal systolic function. Tricuspid annular plane systolic excursion (TAPSE) is 2.1 cm (normal >=1.7 cm).   3. No pericardial effusion seen.   4. No prior echocardiogram is available for comparison.   5. Technically difficult image quality.   6. The inferior vena cava is dilated measuring 2.46 cm in diameter, (dilated >2.1cm) with abnormal inspiratory collapse (abnormal <50%) consistent with elevated right atrial pressure (~15, range 10-20mmHg).   7. There is no evidence of a left ventricular thrombus.   8. There is normal LV mass and normal geometry.    ICU Vital Signs Last 24 Hrs  T(C): 36.8 (07 Apr 2024 03:00), Max: 37.3 (06 Apr 2024 11:00)  T(F): 98.2 (07 Apr 2024 03:00), Max: 99.2 (06 Apr 2024 19:00)  HR: 60 (07 Apr 2024 03:00) (56 - 92)  BP: 126/64 (07 Apr 2024 03:00) (126/64 - 156/72)  BP(mean): 90 (07 Apr 2024 03:00) (90 - 104)  ABP: 81/57 (06 Apr 2024 23:00) (81/57 - 170/83)  ABP(mean): 65 (06 Apr 2024 23:00) (65 - 121)  RR: 21 (07 Apr 2024 03:00) (18 - 24)  SpO2: 99% (07 Apr 2024 03:00) (91% - 100%)    O2 Parameters below as of 07 Apr 2024 03:00  Patient On (Oxygen Delivery Method): nasal cannula  O2 Flow (L/min): 2      I&O's Summary    05 Apr 2024 07:01  -  06 Apr 2024 07:00  --------------------------------------------------------  IN: 2415.8 mL / OUT: 1640 mL / NET: 775.8 mL    06 Apr 2024 07:01  -  07 Apr 2024 04:12  --------------------------------------------------------  IN: 998.7 mL / OUT: 1100 mL / NET: -101.3 mL    Respiratory     Mode: CPAP with PS  FiO2: 30  PEEP: 5  PS: 10  MAP: 10  PIP: 17  ABG - ( 06 Apr 2024 15:06 )  pH, Arterial: 7.41  pH, Blood: x     /  pCO2: 46    /  pO2: 129   / HCO3: 29    / Base Excess: 3.9   /  SaO2: 98.9          LABS:                                   11.9   9.59  )-----------( 159      ( 06 Apr 2024 23:50 )             37.3   04-06    146<H>  |  111<H>  |  11  ----------------------------<  109<H>  4.1   |  25  |  0.60    Ca    8.9      06 Apr 2024 23:50  Phos  2.3     04-06  Mg     2.2     04-06      MEDICATIONS  (STANDING):  amLODIPine   Tablet 10 milliGRAM(s) Oral daily  chlorhexidine 4% Liquid 1 Application(s) Topical <User Schedule>  dexMEDEtomidine Infusion 0.2 MICROgram(s)/kG/Hr (3.88 mL/Hr) IV Continuous <Continuous>  enoxaparin Injectable 40 milliGRAM(s) SubCutaneous <User Schedule>  multivitamin 1 Tablet(s) Oral daily  polyethylene glycol 3350 17 Gram(s) Oral daily  potassium phosphate / sodium phosphate Powder (PHOS-NaK) 2 Packet(s) Oral once  senna 2 Tablet(s) Oral at bedtime  sodium chloride 0.9%. 1000 milliLiter(s) (50 mL/Hr) IV Continuous <Continuous>  valproate sodium  IVPB 500 milliGRAM(s) IV Intermittent every 12 hours    MEDICATIONS  (PRN):  acetaminophen     Tablet .. 650 milliGRAM(s) Oral every 6 hours PRN Temp greater or equal to 38C (100.4F), Mild Pain (1 - 3)  labetalol Injectable 10 milliGRAM(s) IV Push every 4 hours PRN SBP >160, and only if HR >55  ondansetron Injectable 4 milliGRAM(s) IV Push every 6 hours PRN Nausea and/or Vomiting

## 2024-04-07 NOTE — CONSULT NOTE ADULT - ASSESSMENT
58M with IPH, s/p cerebral angiogram via right groin access with acute loss of right radial pulse.     Recs:  - please obtain arterial duplex or CTA or RUE   - remove lines from affected side   - will follow   - elevate, keep hand warm  - D/w fellow on behalf of attending     Jackson Roa MD, PGY3  Vascular Surgery   [id number] 58M with IPH, s/p cerebral angiogram via right groin access with acute loss of right radial pulse.     Recs:  - triphasic palmar arch signal and palpable ulnar artery  - continue care as per primary team  - D/w fellow on behalf of attending     Jackson Roa MD, PGY3  Vascular Surgery   [id number]

## 2024-04-08 LAB
ALBUMIN SERPL ELPH-MCNC: 3.4 G/DL — SIGNIFICANT CHANGE UP (ref 3.3–5)
ALP SERPL-CCNC: 66 U/L — SIGNIFICANT CHANGE UP (ref 40–120)
ALT FLD-CCNC: 20 U/L — SIGNIFICANT CHANGE UP (ref 10–45)
ANION GAP SERPL CALC-SCNC: 14 MMOL/L — SIGNIFICANT CHANGE UP (ref 5–17)
APTT BLD: 26.7 SEC — SIGNIFICANT CHANGE UP (ref 24.5–35.6)
AST SERPL-CCNC: 24 U/L — SIGNIFICANT CHANGE UP (ref 10–40)
BASOPHILS # BLD AUTO: 0.03 K/UL — SIGNIFICANT CHANGE UP (ref 0–0.2)
BASOPHILS # BLD AUTO: 0.03 K/UL — SIGNIFICANT CHANGE UP (ref 0–0.2)
BASOPHILS NFR BLD AUTO: 0.3 % — SIGNIFICANT CHANGE UP (ref 0–2)
BASOPHILS NFR BLD AUTO: 0.3 % — SIGNIFICANT CHANGE UP (ref 0–2)
BILIRUB SERPL-MCNC: 0.7 MG/DL — SIGNIFICANT CHANGE UP (ref 0.2–1.2)
BUN SERPL-MCNC: 13 MG/DL — SIGNIFICANT CHANGE UP (ref 7–23)
CALCIUM SERPL-MCNC: 8.9 MG/DL — SIGNIFICANT CHANGE UP (ref 8.4–10.5)
CHLORIDE SERPL-SCNC: 104 MMOL/L — SIGNIFICANT CHANGE UP (ref 96–108)
CO2 SERPL-SCNC: 23 MMOL/L — SIGNIFICANT CHANGE UP (ref 22–31)
CREAT SERPL-MCNC: 0.65 MG/DL — SIGNIFICANT CHANGE UP (ref 0.5–1.3)
EGFR: 111 ML/MIN/1.73M2 — SIGNIFICANT CHANGE UP
EOSINOPHIL # BLD AUTO: 0.08 K/UL — SIGNIFICANT CHANGE UP (ref 0–0.5)
EOSINOPHIL # BLD AUTO: 0.1 K/UL — SIGNIFICANT CHANGE UP (ref 0–0.5)
EOSINOPHIL NFR BLD AUTO: 0.9 % — SIGNIFICANT CHANGE UP (ref 0–6)
EOSINOPHIL NFR BLD AUTO: 1 % — SIGNIFICANT CHANGE UP (ref 0–6)
GLUCOSE SERPL-MCNC: 107 MG/DL — HIGH (ref 70–99)
HCT VFR BLD CALC: 39.9 % — SIGNIFICANT CHANGE UP (ref 39–50)
HCT VFR BLD CALC: 44.9 % — SIGNIFICANT CHANGE UP (ref 39–50)
HGB BLD-MCNC: 12.9 G/DL — LOW (ref 13–17)
HGB BLD-MCNC: 14.6 G/DL — SIGNIFICANT CHANGE UP (ref 13–17)
IMM GRANULOCYTES NFR BLD AUTO: 0.5 % — SIGNIFICANT CHANGE UP (ref 0–0.9)
IMM GRANULOCYTES NFR BLD AUTO: 1.2 % — HIGH (ref 0–0.9)
INR BLD: 1.08 RATIO — SIGNIFICANT CHANGE UP (ref 0.85–1.18)
LYMPHOCYTES # BLD AUTO: 0.6 K/UL — LOW (ref 1–3.3)
LYMPHOCYTES # BLD AUTO: 0.61 K/UL — LOW (ref 1–3.3)
LYMPHOCYTES # BLD AUTO: 6.1 % — LOW (ref 13–44)
LYMPHOCYTES # BLD AUTO: 6.6 % — LOW (ref 13–44)
MAGNESIUM SERPL-MCNC: 2 MG/DL — SIGNIFICANT CHANGE UP (ref 1.6–2.6)
MCHC RBC-ENTMCNC: 28.3 PG — SIGNIFICANT CHANGE UP (ref 27–34)
MCHC RBC-ENTMCNC: 28.4 PG — SIGNIFICANT CHANGE UP (ref 27–34)
MCHC RBC-ENTMCNC: 32.3 GM/DL — SIGNIFICANT CHANGE UP (ref 32–36)
MCHC RBC-ENTMCNC: 32.5 GM/DL — SIGNIFICANT CHANGE UP (ref 32–36)
MCV RBC AUTO: 87.2 FL — SIGNIFICANT CHANGE UP (ref 80–100)
MCV RBC AUTO: 87.9 FL — SIGNIFICANT CHANGE UP (ref 80–100)
MONOCYTES # BLD AUTO: 0.99 K/UL — HIGH (ref 0–0.9)
MONOCYTES # BLD AUTO: 1.16 K/UL — HIGH (ref 0–0.9)
MONOCYTES NFR BLD AUTO: 10 % — SIGNIFICANT CHANGE UP (ref 2–14)
MONOCYTES NFR BLD AUTO: 12.6 % — SIGNIFICANT CHANGE UP (ref 2–14)
NEUTROPHILS # BLD AUTO: 7.21 K/UL — SIGNIFICANT CHANGE UP (ref 1.8–7.4)
NEUTROPHILS # BLD AUTO: 8.13 K/UL — HIGH (ref 1.8–7.4)
NEUTROPHILS NFR BLD AUTO: 78.4 % — HIGH (ref 43–77)
NEUTROPHILS NFR BLD AUTO: 82.1 % — HIGH (ref 43–77)
NRBC # BLD: 0 /100 WBCS — SIGNIFICANT CHANGE UP (ref 0–0)
NRBC # BLD: 0 /100 WBCS — SIGNIFICANT CHANGE UP (ref 0–0)
PHOSPHATE SERPL-MCNC: 2.8 MG/DL — SIGNIFICANT CHANGE UP (ref 2.5–4.5)
PLATELET # BLD AUTO: 186 K/UL — SIGNIFICANT CHANGE UP (ref 150–400)
PLATELET # BLD AUTO: 196 K/UL — SIGNIFICANT CHANGE UP (ref 150–400)
POTASSIUM SERPL-MCNC: 3.8 MMOL/L — SIGNIFICANT CHANGE UP (ref 3.5–5.3)
POTASSIUM SERPL-SCNC: 3.8 MMOL/L — SIGNIFICANT CHANGE UP (ref 3.5–5.3)
PROT SERPL-MCNC: 6.7 G/DL — SIGNIFICANT CHANGE UP (ref 6–8.3)
PROTHROM AB SERPL-ACNC: 11.9 SEC — SIGNIFICANT CHANGE UP (ref 9.5–13)
RBC # BLD: 4.54 M/UL — SIGNIFICANT CHANGE UP (ref 4.2–5.8)
RBC # BLD: 5.15 M/UL — SIGNIFICANT CHANGE UP (ref 4.2–5.8)
RBC # FLD: 12.6 % — SIGNIFICANT CHANGE UP (ref 10.3–14.5)
RBC # FLD: 12.6 % — SIGNIFICANT CHANGE UP (ref 10.3–14.5)
SODIUM SERPL-SCNC: 141 MMOL/L — SIGNIFICANT CHANGE UP (ref 135–145)
WBC # BLD: 9.2 K/UL — SIGNIFICANT CHANGE UP (ref 3.8–10.5)
WBC # BLD: 9.9 K/UL — SIGNIFICANT CHANGE UP (ref 3.8–10.5)
WBC # FLD AUTO: 9.2 K/UL — SIGNIFICANT CHANGE UP (ref 3.8–10.5)
WBC # FLD AUTO: 9.9 K/UL — SIGNIFICANT CHANGE UP (ref 3.8–10.5)

## 2024-04-08 PROCEDURE — 71045 X-RAY EXAM CHEST 1 VIEW: CPT | Mod: 26

## 2024-04-08 PROCEDURE — 99233 SBSQ HOSP IP/OBS HIGH 50: CPT

## 2024-04-08 PROCEDURE — 93931 UPPER EXTREMITY STUDY: CPT | Mod: 26,RT

## 2024-04-08 PROCEDURE — 95720 EEG PHY/QHP EA INCR W/VEEG: CPT

## 2024-04-08 RX ORDER — MIDAZOLAM HYDROCHLORIDE 1 MG/ML
1 INJECTION, SOLUTION INTRAMUSCULAR; INTRAVENOUS ONCE
Refills: 0 | Status: DISCONTINUED | OUTPATIENT
Start: 2024-04-08 | End: 2024-04-08

## 2024-04-08 RX ORDER — OXYCODONE HYDROCHLORIDE 5 MG/1
5 TABLET ORAL ONCE
Refills: 0 | Status: DISCONTINUED | OUTPATIENT
Start: 2024-04-08 | End: 2024-04-08

## 2024-04-08 RX ORDER — POTASSIUM CHLORIDE 20 MEQ
20 PACKET (EA) ORAL ONCE
Refills: 0 | Status: COMPLETED | OUTPATIENT
Start: 2024-04-08 | End: 2024-04-08

## 2024-04-08 RX ORDER — SODIUM,POTASSIUM PHOSPHATES 278-250MG
1 POWDER IN PACKET (EA) ORAL ONCE
Refills: 0 | Status: COMPLETED | OUTPATIENT
Start: 2024-04-08 | End: 2024-04-08

## 2024-04-08 RX ADMIN — QUETIAPINE FUMARATE 25 MILLIGRAM(S): 200 TABLET, FILM COATED ORAL at 13:21

## 2024-04-08 RX ADMIN — AMLODIPINE BESYLATE 10 MILLIGRAM(S): 2.5 TABLET ORAL at 05:51

## 2024-04-08 RX ADMIN — QUETIAPINE FUMARATE 25 MILLIGRAM(S): 200 TABLET, FILM COATED ORAL at 22:13

## 2024-04-08 RX ADMIN — MIDAZOLAM HYDROCHLORIDE 1 MILLIGRAM(S): 1 INJECTION, SOLUTION INTRAMUSCULAR; INTRAVENOUS at 02:24

## 2024-04-08 RX ADMIN — LOSARTAN POTASSIUM 25 MILLIGRAM(S): 100 TABLET, FILM COATED ORAL at 05:51

## 2024-04-08 RX ADMIN — CHLORHEXIDINE GLUCONATE 1 APPLICATION(S): 213 SOLUTION TOPICAL at 22:16

## 2024-04-08 RX ADMIN — Medication 1 TABLET(S): at 13:21

## 2024-04-08 RX ADMIN — OXYCODONE HYDROCHLORIDE 5 MILLIGRAM(S): 5 TABLET ORAL at 23:30

## 2024-04-08 RX ADMIN — POLYETHYLENE GLYCOL 3350 17 GRAM(S): 17 POWDER, FOR SOLUTION ORAL at 13:20

## 2024-04-08 RX ADMIN — Medication 650 MILLIGRAM(S): at 17:55

## 2024-04-08 RX ADMIN — Medication 20 MILLIEQUIVALENT(S): at 05:51

## 2024-04-08 RX ADMIN — Medication 650 MILLIGRAM(S): at 17:27

## 2024-04-08 RX ADMIN — Medication 55 MILLIGRAM(S): at 17:28

## 2024-04-08 RX ADMIN — Medication 55 MILLIGRAM(S): at 05:51

## 2024-04-08 RX ADMIN — Medication 1 PACKET(S): at 05:52

## 2024-04-08 RX ADMIN — SENNA PLUS 2 TABLET(S): 8.6 TABLET ORAL at 22:13

## 2024-04-08 RX ADMIN — OXYCODONE HYDROCHLORIDE 5 MILLIGRAM(S): 5 TABLET ORAL at 23:00

## 2024-04-08 RX ADMIN — ENOXAPARIN SODIUM 40 MILLIGRAM(S): 100 INJECTION SUBCUTANEOUS at 20:38

## 2024-04-08 RX ADMIN — QUETIAPINE FUMARATE 25 MILLIGRAM(S): 200 TABLET, FILM COATED ORAL at 05:51

## 2024-04-08 NOTE — EEG REPORT - NS EEG TEXT BOX
EEG REPORT:     ROSALEE CARR [id number]     Study Date: 		04-07-24 0800 - 0800 04-08-24  Duration in hours:  x 24 hr    --------------------------------------------------------------------------------------------------  History:  CC/ HPI Patient is a 58y old  Male who presents with a chief complaint of IPH (06 Apr 2024 01:35)    MEDICATIONS  (STANDING):  chlorhexidine 0.12% Liquid 15 milliLiter(s) Oral Mucosa every 12 hours  chlorhexidine 4% Liquid 1 Application(s) Topical <User Schedule>  dexMEDEtomidine Infusion 0.4 MICROgram(s)/kG/Hr (7.75 mL/Hr) IV Continuous <Continuous>  enoxaparin Injectable 40 milliGRAM(s) SubCutaneous <User Schedule>  multivitamin 1 Tablet(s) Oral daily  pantoprazole  Injectable 40 milliGRAM(s) IV Push daily  polyethylene glycol 3350 17 Gram(s) Oral daily  senna 2 Tablet(s) Oral at bedtime  valproic  acid Syrup 500 milliGRAM(s) Oral two times a day    --------------------------------------------------------------------------------------------------  Study Interpretation:    [Abbreviation Key:  PDR=alpha rhythm/posterior dominant rhythm. A-P=anterior posterior.  Amplitude: ‘very low’:<20; ‘low’:20-49; ‘medium’:; ‘high’:>150uV.  Persistence for periodic/rhythmic patterns (% of epoch) ‘rare’:<1%; ‘occasional’:1-10%; ‘frequent’:10-50%; ‘abundant’:50-90%; ‘continuous’:>90%.  Persistence for sporadic discharges: ‘rare’:<1/hr; ‘occasional’:1/min-1/hr; ‘frequent’:>1/min; ‘abundant’:>1/10 sec.  RPP=rhythmic and periodic patterns; GRDA=generalized rhythmic delta activity; FIRDA=frontal intermittent GRDA; LRDA=lateralized rhythmic delta activity; TIRDA=temporal intermittent rhythmic delta activity;  LPD=PLED=lateralized periodic discharges; GPD=generalized periodic discharges; BIPDs =bilateral independent periodic discharges; Mf=multifocal; SIRPDs=stimulus induced rhythmic, periodic, or ictal appearing discharges; BIRDs=brief potentially ictal rhythmic discharges >4 Hz, lasting .5-10s; PFA (paroxysmal bursts >13 Hz or =8 Hz <10s).  Modifiers: +F=with fast component; +S=with spike component; +R=with rhythmic component.  S-B=burst suppression pattern.  Max=maximal. N1-drowsy; N2-stage II sleep; N3-slow wave sleep. SSS/BETS=small sharp spikes/benign epileptiform transients of sleep. HV=hyperventilation; PS=photic stimulation]    FINDINGS:      Background:  Continuity: continuous  Symmetry: symmetric  PDR: poorly modulated 7-8 Hz PDR present during wakefulness slower over left hemisphere  Reactivity: present  Voltage: normal (between 20-150uV)  Anterior Posterior Gradient: absent  Other background findings: none  Breach: absent    Background Slowing:  Generalized slowing: intermittent irregular delta and theta activity.  Focal slowing: nearly continuous left frontotemporal irregular delta/theta activity was present, slowing more frequent appearing compared to yesterday.    State Changes:   -N2 sleep transients were not recorded.    Sporadic Epileptiform Discharges:    None    Rhythmic and Periodic Patterns (RPPs):  Intermittent GRDA during sleep, 1.5hz.    Electrographic and Electroclinical seizures:  None    Other Clinical Events:  None    Activation Procedures:   -Hyperventilation was not performed.    -Photic stimulation was not performed.     Artifacts:  Intermittent myogenic and movement artifacts were noted.    ECG:  The heart rate on single channel ECG was predominantly between 70-80 BPM.    EEG Classification / Summary:  Abnormal EEG study  Nearly continuous focal left frontotemporal slowing.  Intermittent GRDA during sleep.  Mild generalized background slowing    -----------------------------------------------------------------------------------------------------    Clinical Impression:  Focal left frontotemporal cerebral dysfunction.  Mild diffuse/multi-focal cerebral dysfunction, not specific as to etiology.  There were no clear epileptiform abnormalities or seizures recorded.      SUAD Delgado  Attending Physician, [facility]    ------------------------------------  EEG Reading Room: [phone]  On Call Service After Hours: [phone]             EEG REPORT:     ROSALEE CARR [id number]     Study Date: 		04-07-24 0800 - 0800 04-08-24  Duration in hours:  x 24 hr    --------------------------------------------------------------------------------------------------  History:  CC/ HPI Patient is a 58y old  Male who presents with a chief complaint of IPH (06 Apr 2024 01:35)    MEDICATIONS  (STANDING):  chlorhexidine 0.12% Liquid 15 milliLiter(s) Oral Mucosa every 12 hours  chlorhexidine 4% Liquid 1 Application(s) Topical <User Schedule>  dexMEDEtomidine Infusion 0.4 MICROgram(s)/kG/Hr (7.75 mL/Hr) IV Continuous <Continuous>  enoxaparin Injectable 40 milliGRAM(s) SubCutaneous <User Schedule>  multivitamin 1 Tablet(s) Oral daily  pantoprazole  Injectable 40 milliGRAM(s) IV Push daily  polyethylene glycol 3350 17 Gram(s) Oral daily  senna 2 Tablet(s) Oral at bedtime  valproic  acid Syrup 500 milliGRAM(s) Oral two times a day    --------------------------------------------------------------------------------------------------  Study Interpretation:    [Abbreviation Key:  PDR=alpha rhythm/posterior dominant rhythm. A-P=anterior posterior.  Amplitude: ‘very low’:<20; ‘low’:20-49; ‘medium’:; ‘high’:>150uV.  Persistence for periodic/rhythmic patterns (% of epoch) ‘rare’:<1%; ‘occasional’:1-10%; ‘frequent’:10-50%; ‘abundant’:50-90%; ‘continuous’:>90%.  Persistence for sporadic discharges: ‘rare’:<1/hr; ‘occasional’:1/min-1/hr; ‘frequent’:>1/min; ‘abundant’:>1/10 sec.  RPP=rhythmic and periodic patterns; GRDA=generalized rhythmic delta activity; FIRDA=frontal intermittent GRDA; LRDA=lateralized rhythmic delta activity; TIRDA=temporal intermittent rhythmic delta activity;  LPD=PLED=lateralized periodic discharges; GPD=generalized periodic discharges; BIPDs =bilateral independent periodic discharges; Mf=multifocal; SIRPDs=stimulus induced rhythmic, periodic, or ictal appearing discharges; BIRDs=brief potentially ictal rhythmic discharges >4 Hz, lasting .5-10s; PFA (paroxysmal bursts >13 Hz or =8 Hz <10s).  Modifiers: +F=with fast component; +S=with spike component; +R=with rhythmic component.  S-B=burst suppression pattern.  Max=maximal. N1-drowsy; N2-stage II sleep; N3-slow wave sleep. SSS/BETS=small sharp spikes/benign epileptiform transients of sleep. HV=hyperventilation; PS=photic stimulation]    FINDINGS:      Background:  Continuity: continuous  Symmetry: symmetric  PDR: poorly modulated 7-8 Hz PDR present during wakefulness slower over left hemisphere  Reactivity: present  Voltage: normal (between 20-150uV)  Anterior Posterior Gradient: absent  Other background findings: none  Breach: absent    Background Slowing:  Generalized slowing: intermittent irregular delta and theta activity.  Focal slowing: nearly continuous left frontotemporal irregular delta/theta activity was present, slowing more frequent appearing compared to yesterday.    State Changes:   -N2 sleep transients were not recorded.    Sporadic Epileptiform Discharges:    None    Rhythmic and Periodic Patterns (RPPs):  Intermittent GRDA during sleep, 1.5hz.    Electrographic and Electroclinical seizures:  None    Other Clinical Events:  None    Activation Procedures:   -Hyperventilation was not performed.    -Photic stimulation was not performed.     Artifacts:  Intermittent myogenic and movement artifacts were noted.    ECG:  The heart rate on single channel ECG was predominantly between 70-80 BPM.    EEG Classification / Summary:  Abnormal EEG study  Nearly continuous focal left frontotemporal slowing.  Intermittent GRDA during sleep.  Mild generalized background slowing    -----------------------------------------------------------------------------------------------------    Clinical Impression:  Focal left frontotemporal cerebral dysfunction.  Mild diffuse/multi-focal cerebral dysfunction, not specific as to etiology.  There were no clear epileptiform abnormalities or seizures recorded.      In absence of additional concerns, recommend consideration for discontinuation of current EEG study with reconnection in future if warranted.      SUAD Delgado  Attending Physician, [facility]    ------------------------------------  EEG Reading Room: [phone]  On Call Service After Hours: [phone]             EEG REPORT:     ROSALEE CARR [id number]     Study Date: 		04-07-24 0800 - 0800 04-08-24  Duration in hours:  x 24 hr    --------------------------------------------------------------------------------------------------  History:  CC/ HPI Patient is a 58y old  Male who presents with a chief complaint of IPH (06 Apr 2024 01:35)    MEDICATIONS  (STANDING):  chlorhexidine 0.12% Liquid 15 milliLiter(s) Oral Mucosa every 12 hours  chlorhexidine 4% Liquid 1 Application(s) Topical <User Schedule>  dexMEDEtomidine Infusion 0.4 MICROgram(s)/kG/Hr (7.75 mL/Hr) IV Continuous <Continuous>  enoxaparin Injectable 40 milliGRAM(s) SubCutaneous <User Schedule>  multivitamin 1 Tablet(s) Oral daily  pantoprazole  Injectable 40 milliGRAM(s) IV Push daily  polyethylene glycol 3350 17 Gram(s) Oral daily  senna 2 Tablet(s) Oral at bedtime  valproic  acid Syrup 500 milliGRAM(s) Oral two times a day    --------------------------------------------------------------------------------------------------  Study Interpretation:    [Abbreviation Key:  PDR=alpha rhythm/posterior dominant rhythm. A-P=anterior posterior.  Amplitude: ‘very low’:<20; ‘low’:20-49; ‘medium’:; ‘high’:>150uV.  Persistence for periodic/rhythmic patterns (% of epoch) ‘rare’:<1%; ‘occasional’:1-10%; ‘frequent’:10-50%; ‘abundant’:50-90%; ‘continuous’:>90%.  Persistence for sporadic discharges: ‘rare’:<1/hr; ‘occasional’:1/min-1/hr; ‘frequent’:>1/min; ‘abundant’:>1/10 sec.  RPP=rhythmic and periodic patterns; GRDA=generalized rhythmic delta activity; FIRDA=frontal intermittent GRDA; LRDA=lateralized rhythmic delta activity; TIRDA=temporal intermittent rhythmic delta activity;  LPD=PLED=lateralized periodic discharges; GPD=generalized periodic discharges; BIPDs =bilateral independent periodic discharges; Mf=multifocal; SIRPDs=stimulus induced rhythmic, periodic, or ictal appearing discharges; BIRDs=brief potentially ictal rhythmic discharges >4 Hz, lasting .5-10s; PFA (paroxysmal bursts >13 Hz or =8 Hz <10s).  Modifiers: +F=with fast component; +S=with spike component; +R=with rhythmic component.  S-B=burst suppression pattern.  Max=maximal. N1-drowsy; N2-stage II sleep; N3-slow wave sleep. SSS/BETS=small sharp spikes/benign epileptiform transients of sleep. HV=hyperventilation; PS=photic stimulation]    FINDINGS:      Background:  Continuity: continuous  Symmetry: symmetric  PDR: poorly modulated 7-8 Hz PDR present during wakefulness slower over left hemisphere  Reactivity: present  Voltage: normal (between 20-150uV)  Anterior Posterior Gradient: absent  Other background findings: none  Breach: absent    Background Slowing:  Generalized slowing: intermittent irregular delta and theta activity.  Focal slowing: nearly continuous left frontotemporal irregular delta/theta activity was present, slowing more frequent appearing compared to yesterday.    State Changes:   -N2 sleep transients were not recorded.    Sporadic Epileptiform Discharges:    None    Rhythmic and Periodic Patterns (RPPs):  Intermittent GRDA during sleep, 1.5hz.    Electrographic and Electroclinical seizures:  None    Other Clinical Events:  None    Activation Procedures:   -Hyperventilation was not performed.    -Photic stimulation was not performed.     Artifacts:  Intermittent myogenic and movement artifacts were noted.    ECG:  The heart rate on single channel ECG was predominantly between 70-80 BPM.    EEG Classification / Summary:  Abnormal EEG study  Nearly continuous focal left frontotemporal slowing.  Intermittent GRDA during sleep.  Mild generalized background slowing    -----------------------------------------------------------------------------------------------------    Clinical Impression:  Focal left frontotemporal cerebral dysfunction.  Mild diffuse/multi-focal cerebral dysfunction, not specific as to etiology.  There were no clear epileptiform abnormalities or seizures recorded.        SUAD Delgado  Attending Physician, [facility]    ------------------------------------  EEG Reading Room: [phone]  On Call Service After Hours: [phone]

## 2024-04-08 NOTE — CHART NOTE - NSCHARTNOTEFT_GEN_A_CORE
Nutrition Follow Up Note  Patient seen for: follow up   Chart reviewed. Events noted.   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 intubated for respiratory failure."    Source: [] Patient       [x] Medical Record        [x] RN        [] Family at bedside       [x] Other: team during interdisciplinary rounds     -If unable to interview patient: [] Trach/Vent/BiPAP  [x] Disoriented/confused/inappropriate to interview    Diet Order:   Diet, NPO with Tube Feed:   Tube Feeding Modality: Nasogastric  Jevity 1.5 Jayson (JEVITY1.5RTH)  Total Volume for 24 Hours (mL): 1320  Continuous  Until Goal Tube Feed Rate (mL per Hour): 55  Tube Feed Duration (in Hours): 24  Tube Feed Start Time: 09:00 (24)    EN order providing at 100% provision: 1980kcal (~26kcal/kg) and 84g protein (1.08g/kg)   EN Provision x past 5 days per nursing flow sheets on average:  Jevity 1.2 ordered until , Jevity 1.5 then initiated:  ~584kcal/day    - Is current order appropriate/adequate? see below for recommendations    Nutrition-related concerns:  -patient self extubated on   -Multivitamin ordered     GI:  Last BM  per flow sheets.  Bowel Regimen? [x] Yes   [] No    Weights:   Spouse unsure of UBW, believes ~150 lbs (~68kg)   Dosing weight 77.5kg  Daily Weight in k.1 ()  Height 67" per spouse and [facility]    Nutritionally Pertinent MEDICATIONS  (STANDING):  amLODIPine   Tablet  losartan  multivitamin  polyethylene glycol 3350  senna  sodium chloride 0.9%.    Pertinent Labs:  @ 03:49: Na 141, BUN 13, Cr 0.65, <H>, K+ 3.8, Phos 2.8, Mg 2.0, Alk Phos 66, ALT/SGPT 20, AST/SGOT 24, HbA1c --    A1C with Estimated Average Glucose Result: 5.8 % (24 @ 04:46)  A1C with Estimated Average Glucose Result: 5.8 % (24 @ 00:12)    Skin per nursing documentation: left and right heel suspected deep tissue injury, sacral spine suspected deep tissue injury.   Edema per nursing documentation:  trace right arm     Estimated Needs using most recent weight 77.1kg   25-30kcal/kg 1927-2313kcal/day  1.2-1.4g/kg 92-107g protein/day  Defer fluid needs to team     Previous Nutrition Diagnosis: Increased Nutrient Needs, Inadequate Energy Intake  Nutrition Diagnosis is: [x] ongoing  [] resolved [] not applicable     Nutrition Care Plan:  [x] In Progress  [] Achieved  [] Not applicable    New Nutrition Diagnosis: [x] Not applicable    Nutrition Interventions:     Education Provided   [] Yes:  [x] No:     Recommendations:      -Can continue Jevity 1.5 but increase dose to better meet pt nutritional needs. Consider 60ml/hr x 24 hours to provide a total volume of 1440ml, 2160kcal (28kcal/kg) and 92g protein (1.2g/kg). Defer free water flush to team  -Continue Multivitamin to aid in wound healing     Monitoring and Evaluation:   Continue to monitor nutritional intake, tolerance to diet prescription, weights, labs, skin integrity    RD remains available upon request and will follow up per protocol  Dina Long, MS, RD, CDN / Teams Nutrition Follow Up Note  Patient seen for: follow up   Chart reviewed. Events noted.   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 intubated for respiratory failure."    Source: [] Patient       [x] Medical Record        [x] RN        [] Family at bedside       [x] Other: team during interdisciplinary rounds     -If unable to interview patient: [] Trach/Vent/BiPAP  [x] Disoriented/confused/inappropriate to interview    Diet Order:   Diet, NPO with Tube Feed:   Tube Feeding Modality: Nasogastric  Jevity 1.5 Jayson (JEVITY1.5RTH)  Total Volume for 24 Hours (mL): 1320  Continuous  Until Goal Tube Feed Rate (mL per Hour): 55  Tube Feed Duration (in Hours): 24  Tube Feed Start Time: 09:00 (24)    EN order providing at 100% provision: 1980kcal (~26kcal/kg) and 84g protein (1.08g/kg)   EN Provision x past 5 days per nursing flow sheets on average:  Jevity 1.2 ordered until , Jevity 1.5 then initiated:  ~584kcal/day (~30% EER).     - Is current order appropriate/adequate? see below for recommendations    Nutrition-related concerns:  -patient self extubated on   -Multivitamin ordered     GI:  Last BM  per flow sheets.  Bowel Regimen? [x] Yes   [] No    Weights:   Spouse unsure of UBW, believes ~150 lbs (~68kg)   Dosing weight 77.5kg  Daily Weight in k.1 ()  Height 67" per spouse and [facility]    Nutritionally Pertinent MEDICATIONS  (STANDING):  amLODIPine   Tablet  losartan  multivitamin  polyethylene glycol 3350  senna  sodium chloride 0.9%.    Pertinent Labs:  @ 03:49: Na 141, BUN 13, Cr 0.65, <H>, K+ 3.8, Phos 2.8, Mg 2.0, Alk Phos 66, ALT/SGPT 20, AST/SGOT 24, HbA1c --    A1C with Estimated Average Glucose Result: 5.8 % (24 @ 04:46)  A1C with Estimated Average Glucose Result: 5.8 % (04-03-24 @ 00:12)    Skin per nursing documentation: left and right heel suspected deep tissue injury, sacral spine suspected deep tissue injury.   Edema per nursing documentation:  1+ right arm     Estimated Needs using most recent weight 77.1kg   25-30kcal/kg 1927-2313kcal/day  1.2-1.4g/kg 92-107g protein/day  Defer fluid needs to team     Previous Nutrition Diagnosis: Increased Nutrient Needs, Inadequate Energy Intake --> advanced as below  Nutrition Diagnosis is: [x] ongoing  [] resolved [] not applicable     Nutrition Care Plan:  [x] In Progress  [] Achieved  [] Not applicable    New Nutrition Diagnosis: moderate acute malnutrition related to acuteness of illness, not meeting EER as evidenced by 1+ edema as above, </= 50% EER x >/= 5 days.     Nutrition Interventions:     Education Provided   [] Yes:  [x] No:     Recommendations:      -Can continue Jevity 1.5 but increase dose to better meet pt nutritional needs. Consider 60ml/hr x 24 hours to provide a total volume of 1440ml, 2160kcal (28kcal/kg) and 92g protein (1.2g/kg). Defer free water flush to team  -Continue Multivitamin to aid in wound healing   -nutrition risk placed in chart     Monitoring and Evaluation:   Continue to monitor nutritional intake, tolerance to diet prescription, weights, labs, skin integrity    RD remains available upon request and will follow up per protocol  Dina Long, MS, RD, CDN / Teams

## 2024-04-08 NOTE — PROGRESS NOTE ADULT - ASSESSMENT
ASSESSMENT   58M Hx eczema/Gout intermittently takes ASA for pain, p/f being found down by wife after hearing thud. No hx HTN. 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 ED intubated.      PLAN     N  # IPH: angio without vascular malformation   #AMS: vEEG 4/6 no seizures   # continue valproate, increase to q8  #pain  tylenol   # DC precedex, start Seroquel 25 q8  #Activity: [] OOB as tolerated [] Bedrest [X] PT [] OT [] PMNR    CV   -160mmHg  TTE EF 65%, no thrombus   labetalol, amlodipine    P   patient self extubated on 4/6  goal saO2 > 92   encourage I.S.    R   Strict I+Os   IVL    GI   Diet: NPO, patient removed NGT   Senna miralax  Last BM 4/6   zofran     ID  afebrile    E  Goal euglycemia (-180)  a1c 5.6    H  SCDs  SQL 40   LED 3/3/24 no DVT     CODE STATUS: FULL CODE     DISPOSITION: ICU    CRITICAL  ASSESSMENT   58M Hx eczema/Gout intermittently takes ASA for pain, p/f being found down by wife after hearing thud. No hx HTN. 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 ED intubated.      PLAN     N  # IPH: angio without vascular malformation   #AMS: vEEG 4/6 no seizures   # continue valproate, increase to q8  #pain  tylenol   # DC precedex, c/w Seroquel 25 q8  f/u stroke team reccs  #Activity: [] OOB as tolerated [] Bedrest [X] PT [] OT [] PMNR    CV   -160mmHg  TTE EF 65%, no thrombus   labetalol, amlodipine    P   patient self extubated on 4/6  goal saO2 > 92   encourage I.S.    R   Strict I+Os   IVL    GI   Diet: NPO, patient removed NGT   Senna miralax  Last BM 4/6   zofran     ID  afebrile    E  Goal euglycemia (-180)  a1c 5.6    H  SCDs  SQL 40   LED 3/3/24 no DVT   R. radial occlusion f/u with vascualr    CODE STATUS: FULL CODE     DISPOSITION: ICU    CRITICAL

## 2024-04-08 NOTE — PROGRESS NOTE ADULT - SUBJECTIVE AND OBJECTIVE BOX
Vascular Surgery Progress Note     Subjective:  Patient seen and examined.       Vital Signs:  Vital Signs Last 24 Hrs  T(C): 36.9 (08 Apr 2024 07:00), Max: 36.9 (07 Apr 2024 23:00)  T(F): 98.4 (08 Apr 2024 07:00), Max: 98.5 (08 Apr 2024 03:00)  HR: 87 (08 Apr 2024 09:00) (56 - 94)  BP: 150/82 (08 Apr 2024 09:00) (137/65 - 186/88)  BP(mean): 110 (08 Apr 2024 09:00) (92 - 127)  RR: 26 (08 Apr 2024 09:00) (12 - 26)  SpO2: 93% (08 Apr 2024 09:00) (92% - 99%)    Parameters below as of 08 Apr 2024 07:00  Patient On (Oxygen Delivery Method): room air        CAPILLARY BLOOD GLUCOSE          I&O's Detail    07 Apr 2024 07:01  -  08 Apr 2024 07:00  --------------------------------------------------------  IN:    Dexmedetomidine: 7.6 mL    Enteral Tube Flush: 260 mL    IV PiggyBack: 100 mL    Jevity 1.2: 880 mL    sodium chloride 0.9%: 1200 mL  Total IN: 2447.6 mL    OUT:    Blood Loss (mL): 1 mL    Voided (mL): 1200 mL  Total OUT: 1201 mL    Total NET: 1246.6 mL      08 Apr 2024 07:01  -  08 Apr 2024 10:52  --------------------------------------------------------  IN:    sodium chloride 0.9%: 200 mL  Total IN: 200 mL    OUT:  Total OUT: 0 mL    Total NET: 200 mL            Physical Exam:  General: Nonverbal, sedated    Respiratory: Nonlabored respirations  Cardio: pulse present  Abdomen: softly distended, appropriately tender, surgical incisions are c/d/i. NGT/OGT*  Vascular: R hand cool with good cap refill, No R radial pulse/signal, ulnar and brachial pulse, palmar arch signal       Labs:    04-08    141  |  104  |  13  ----------------------------<  107<H>  3.8   |  23  |  0.65    Ca    8.9      08 Apr 2024 03:49  Phos  2.8     04-08  Mg     2.0     04-08    TPro  6.7  /  Alb  3.4  /  TBili  0.7  /  DBili  x   /  AST  24  /  ALT  20  /  AlkPhos  66  04-08    LIVER FUNCTIONS - ( 08 Apr 2024 03:49 )  Alb: 3.4 g/dL / Pro: 6.7 g/dL / ALK PHOS: 66 U/L / ALT: 20 U/L / AST: 24 U/L / GGT: x                                 12.9   9.90  )-----------( 186      ( 08 Apr 2024 03:49 )             39.9     PT/INR - ( 08 Apr 2024 03:49 )   PT: 11.9 sec;   INR: 1.08 ratio         PTT - ( 08 Apr 2024 03:49 )  PTT:26.7 sec

## 2024-04-08 NOTE — PROGRESS NOTE ADULT - ASSESSMENT
58M with IPH, s/p cerebral angiogram via right groin access. Vascular surgery consulted for nonpalpable R radial artery pulse.     Recs:  - f/u duplex results   - diet: NPO/tube feed  - appreciate care per NSICU  - appreciate care per primary team     Vascular Surgery   [phone]  58M with IPH, s/p cerebral angiogram via right groin access. Vascular surgery consulted for nonpalpable R radial artery pulse.     Recs:  - RUE arterial duplex: Right upper extremity arterial vascular disease is not identified  - diet: NPO/tube feed  - appreciate care per NSICU  - appreciate care per primary team   - no vascular surgical intervention indicated at this time, please re-consult with any new issues or concerns    Vascular Surgery   [phone]

## 2024-04-09 LAB
ANION GAP SERPL CALC-SCNC: 15 MMOL/L — SIGNIFICANT CHANGE UP (ref 5–17)
BUN SERPL-MCNC: 14 MG/DL — SIGNIFICANT CHANGE UP (ref 7–23)
CALCIUM SERPL-MCNC: 9.4 MG/DL — SIGNIFICANT CHANGE UP (ref 8.4–10.5)
CHLORIDE SERPL-SCNC: 99 MMOL/L — SIGNIFICANT CHANGE UP (ref 96–108)
CO2 SERPL-SCNC: 24 MMOL/L — SIGNIFICANT CHANGE UP (ref 22–31)
CREAT SERPL-MCNC: 0.64 MG/DL — SIGNIFICANT CHANGE UP (ref 0.5–1.3)
EGFR: 111 ML/MIN/1.73M2 — SIGNIFICANT CHANGE UP
GLUCOSE SERPL-MCNC: 130 MG/DL — HIGH (ref 70–99)
MAGNESIUM SERPL-MCNC: 2.3 MG/DL — SIGNIFICANT CHANGE UP (ref 1.6–2.6)
PHOSPHATE SERPL-MCNC: 3.4 MG/DL — SIGNIFICANT CHANGE UP (ref 2.5–4.5)
POTASSIUM SERPL-MCNC: 4 MMOL/L — SIGNIFICANT CHANGE UP (ref 3.5–5.3)
POTASSIUM SERPL-SCNC: 4 MMOL/L — SIGNIFICANT CHANGE UP (ref 3.5–5.3)
SODIUM SERPL-SCNC: 138 MMOL/L — SIGNIFICANT CHANGE UP (ref 135–145)

## 2024-04-09 PROCEDURE — 99233 SBSQ HOSP IP/OBS HIGH 50: CPT

## 2024-04-09 PROCEDURE — 95718 EEG PHYS/QHP 2-12 HR W/VEEG: CPT

## 2024-04-09 PROCEDURE — 73620 X-RAY EXAM OF FOOT: CPT | Mod: 26,LT

## 2024-04-09 PROCEDURE — 93970 EXTREMITY STUDY: CPT | Mod: 26

## 2024-04-09 RX ORDER — TRAMADOL HYDROCHLORIDE 50 MG/1
25 TABLET ORAL EVERY 4 HOURS
Refills: 0 | Status: DISCONTINUED | OUTPATIENT
Start: 2024-04-09 | End: 2024-04-11

## 2024-04-09 RX ORDER — SODIUM CHLORIDE 9 MG/ML
1000 INJECTION INTRAMUSCULAR; INTRAVENOUS; SUBCUTANEOUS
Refills: 0 | Status: DISCONTINUED | OUTPATIENT
Start: 2024-04-09 | End: 2024-04-15

## 2024-04-09 RX ORDER — TRAMADOL HYDROCHLORIDE 50 MG/1
50 TABLET ORAL EVERY 4 HOURS
Refills: 0 | Status: DISCONTINUED | OUTPATIENT
Start: 2024-04-09 | End: 2024-04-11

## 2024-04-09 RX ORDER — LABETALOL HCL 100 MG
5 TABLET ORAL ONCE
Refills: 0 | Status: COMPLETED | OUTPATIENT
Start: 2024-04-09 | End: 2024-04-09

## 2024-04-09 RX ORDER — VALPROIC ACID (AS SODIUM SALT) 250 MG/5ML
500 SOLUTION, ORAL ORAL EVERY 12 HOURS
Refills: 0 | Status: DISCONTINUED | OUTPATIENT
Start: 2024-04-09 | End: 2024-04-10

## 2024-04-09 RX ORDER — ACETAMINOPHEN 500 MG
1000 TABLET ORAL ONCE
Refills: 0 | Status: COMPLETED | OUTPATIENT
Start: 2024-04-09 | End: 2024-04-09

## 2024-04-09 RX ADMIN — Medication 55 MILLIGRAM(S): at 06:09

## 2024-04-09 RX ADMIN — QUETIAPINE FUMARATE 25 MILLIGRAM(S): 200 TABLET, FILM COATED ORAL at 13:35

## 2024-04-09 RX ADMIN — TRAMADOL HYDROCHLORIDE 25 MILLIGRAM(S): 50 TABLET ORAL at 03:30

## 2024-04-09 RX ADMIN — TRAMADOL HYDROCHLORIDE 50 MILLIGRAM(S): 50 TABLET ORAL at 13:35

## 2024-04-09 RX ADMIN — ENOXAPARIN SODIUM 40 MILLIGRAM(S): 100 INJECTION SUBCUTANEOUS at 20:27

## 2024-04-09 RX ADMIN — TRAMADOL HYDROCHLORIDE 50 MILLIGRAM(S): 50 TABLET ORAL at 14:35

## 2024-04-09 RX ADMIN — TRAMADOL HYDROCHLORIDE 25 MILLIGRAM(S): 50 TABLET ORAL at 03:00

## 2024-04-09 RX ADMIN — Medication 400 MILLIGRAM(S): at 17:00

## 2024-04-09 RX ADMIN — QUETIAPINE FUMARATE 25 MILLIGRAM(S): 200 TABLET, FILM COATED ORAL at 21:23

## 2024-04-09 RX ADMIN — SENNA PLUS 2 TABLET(S): 8.6 TABLET ORAL at 21:23

## 2024-04-09 RX ADMIN — SODIUM CHLORIDE 50 MILLILITER(S): 9 INJECTION INTRAMUSCULAR; INTRAVENOUS; SUBCUTANEOUS at 00:02

## 2024-04-09 RX ADMIN — CHLORHEXIDINE GLUCONATE 1 APPLICATION(S): 213 SOLUTION TOPICAL at 21:27

## 2024-04-09 RX ADMIN — POLYETHYLENE GLYCOL 3350 17 GRAM(S): 17 POWDER, FOR SOLUTION ORAL at 11:42

## 2024-04-09 RX ADMIN — QUETIAPINE FUMARATE 25 MILLIGRAM(S): 200 TABLET, FILM COATED ORAL at 05:22

## 2024-04-09 RX ADMIN — AMLODIPINE BESYLATE 10 MILLIGRAM(S): 2.5 TABLET ORAL at 05:22

## 2024-04-09 RX ADMIN — SODIUM CHLORIDE 50 MILLILITER(S): 9 INJECTION INTRAMUSCULAR; INTRAVENOUS; SUBCUTANEOUS at 11:42

## 2024-04-09 RX ADMIN — Medication 1000 MILLIGRAM(S): at 17:30

## 2024-04-09 RX ADMIN — Medication 5 MILLIGRAM(S): at 04:15

## 2024-04-09 RX ADMIN — LOSARTAN POTASSIUM 25 MILLIGRAM(S): 100 TABLET, FILM COATED ORAL at 06:10

## 2024-04-09 RX ADMIN — Medication 1 TABLET(S): at 11:42

## 2024-04-09 RX ADMIN — Medication 500 MILLIGRAM(S): at 17:08

## 2024-04-09 NOTE — PROGRESS NOTE ADULT - THIS PATIENT HAS THE FOLLOWING CONDITION(S)/DIAGNOSES ON THIS ADMISSION:
Cerebral Edema
Encephalopathy/Cerebral Edema
Cerebral Edema
Encephalopathy/Cerebral Edema
Cerebral Edema
Cerebral Edema
Encephalopathy/Cerebral Edema
Cerebral Edema
Cerebral Edema
Encephalopathy/Cerebral Edema
Cerebral Edema

## 2024-04-09 NOTE — PROGRESS NOTE ADULT - SUBJECTIVE AND OBJECTIVE BOX
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     Admission scores:   NIHSS on admission: 26  ICH score: 2  LKN: 4/2/2024, 19:30  pre-MRS: 0    Hospital course:   4/5/24: angio negative for vascular malformations   24h events:   Patient self extubated on 4/6 and pulled out NG tube     Exam   intubated, eyes opens to noxious, following commands, PERRL, spontaneous LUE 5/5 LLE 5/5, RUE/RLE plegic     TTE 04/03/24   CONCLUSIONS:   1. Left ventricular cavity is normal in size. Left ventricular wall thickness is normal. Left ventricular systolic function is normal with an ejection fraction of 69 % by Maharaj's method of disks.   2. Normal systolic function. Tricuspid annular plane systolic excursion (TAPSE) is 2.1 cm (normal >=1.7 cm).   3. No pericardial effusion seen.   4. No prior echocardiogram is available for comparison.   5. Technically difficult image quality.   6. The inferior vena cava is dilated measuring 2.46 cm in diameter, (dilated >2.1cm) with abnormal inspiratory collapse (abnormal <50%) consistent with elevated right atrial pressure (~15, range 10-20mmHg).   7. There is no evidence of a left ventricular thrombus.   8. There is normal LV mass and normal geometry.    ICU Vital Signs Last 24 Hrs  T(C): 36.8 (07 Apr 2024 03:00), Max: 37.3 (06 Apr 2024 11:00)  T(F): 98.2 (07 Apr 2024 03:00), Max: 99.2 (06 Apr 2024 19:00)  HR: 60 (07 Apr 2024 03:00) (56 - 92)  BP: 126/64 (07 Apr 2024 03:00) (126/64 - 156/72)  BP(mean): 90 (07 Apr 2024 03:00) (90 - 104)  ABP: 81/57 (06 Apr 2024 23:00) (81/57 - 170/83)  ABP(mean): 65 (06 Apr 2024 23:00) (65 - 121)  RR: 21 (07 Apr 2024 03:00) (18 - 24)  SpO2: 99% (07 Apr 2024 03:00) (91% - 100%)    O2 Parameters below as of 07 Apr 2024 03:00  Patient On (Oxygen Delivery Method): nasal cannula  O2 Flow (L/min): 2      I&O's Summary    05 Apr 2024 07:01  -  06 Apr 2024 07:00  --------------------------------------------------------  IN: 2415.8 mL / OUT: 1640 mL / NET: 775.8 mL    06 Apr 2024 07:01  -  07 Apr 2024 04:12  --------------------------------------------------------  IN: 998.7 mL / OUT: 1100 mL / NET: -101.3 mL    Respiratory     Mode: CPAP with PS  FiO2: 30  PEEP: 5  PS: 10  MAP: 10  PIP: 17  ABG - ( 06 Apr 2024 15:06 )  pH, Arterial: 7.41  pH, Blood: x     /  pCO2: 46    /  pO2: 129   / HCO3: 29    / Base Excess: 3.9   /  SaO2: 98.9          LABS:                                   11.9   9.59  )-----------( 159      ( 06 Apr 2024 23:50 )             37.3   04-06    146<H>  |  111<H>  |  11  ----------------------------<  109<H>  4.1   |  25  |  0.60    Ca    8.9      06 Apr 2024 23:50  Phos  2.3     04-06  Mg     2.2     04-06      MEDICATIONS  (STANDING):  amLODIPine   Tablet 10 milliGRAM(s) Oral daily  chlorhexidine 4% Liquid 1 Application(s) Topical <User Schedule>  dexMEDEtomidine Infusion 0.2 MICROgram(s)/kG/Hr (3.88 mL/Hr) IV Continuous <Continuous>  enoxaparin Injectable 40 milliGRAM(s) SubCutaneous <User Schedule>  multivitamin 1 Tablet(s) Oral daily  polyethylene glycol 3350 17 Gram(s) Oral daily  potassium phosphate / sodium phosphate Powder (PHOS-NaK) 2 Packet(s) Oral once  senna 2 Tablet(s) Oral at bedtime  sodium chloride 0.9%. 1000 milliLiter(s) (50 mL/Hr) IV Continuous <Continuous>  valproate sodium  IVPB 500 milliGRAM(s) IV Intermittent every 12 hours    MEDICATIONS  (PRN):  acetaminophen     Tablet .. 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     Admission scores:   NIHSS on admission: 26  ICH score: 2  LKN: 4/2/2024, 19:30  pre-MRS: 0    Hospital course:   4/5/24: angio negative for vascular malformations   24h events:   Patient self extubated on 4/6 and pulled out NG tube     Exam   intubated, eyes opens to noxious, following commands, PERRL, spontaneous LUE 5/5 LLE 5/5, RUE/RLE plegic     ICU Vital Signs Last 24 Hrs  T(C): 36.8 (07 Apr 2024 03:00), Max: 37.3 (06 Apr 2024 11:00)  T(F): 98.2 (07 Apr 2024 03:00), Max: 99.2 (06 Apr 2024 19:00)  HR: 60 (07 Apr 2024 03:00) (56 - 92)  BP: 126/64 (07 Apr 2024 03:00) (126/64 - 156/72)  BP(mean): 90 (07 Apr 2024 03:00) (90 - 104)  ABP: 81/57 (06 Apr 2024 23:00) (81/57 - 170/83)  ABP(mean): 65 (06 Apr 2024 23:00) (65 - 121)  RR: 21 (07 Apr 2024 03:00) (18 - 24)  SpO2: 99% (07 Apr 2024 03:00) (91% - 100%)    O2 Parameters below as of 07 Apr 2024 03:00  Patient On (Oxygen Delivery Method): nasal cannula  O2 Flow (L/min): 2      I&O's Summary    05 Apr 2024 07:01  -  06 Apr 2024 07:00  --------------------------------------------------------  IN: 2415.8 mL / OUT: 1640 mL / NET: 775.8 mL    06 Apr 2024 07:01  -  07 Apr 2024 04:12  --------------------------------------------------------  IN: 998.7 mL / OUT: 1100 mL / NET: -101.3 mL    Respiratory     Mode: CPAP with PS  FiO2: 30  PEEP: 5  PS: 10  MAP: 10  PIP: 17  ABG - ( 06 Apr 2024 15:06 )  pH, Arterial: 7.41  pH, Blood: x     /  pCO2: 46    /  pO2: 129   / HCO3: 29    / Base Excess: 3.9   /  SaO2: 98.9          LABS:                                   11.9   9.59  )-----------( 159      ( 06 Apr 2024 23:50 )             37.3   04-06    146<H>  |  111<H>  |  11  ----------------------------<  109<H>  4.1   |  25  |  0.60    Ca    8.9      06 Apr 2024 23:50  Phos  2.3     04-06  Mg     2.2     04-06      MEDICATIONS  (STANDING):  amLODIPine   Tablet 10 milliGRAM(s) Oral daily  chlorhexidine 4% Liquid 1 Application(s) Topical <User Schedule>  dexMEDEtomidine Infusion 0.2 MICROgram(s)/kG/Hr (3.88 mL/Hr) IV Continuous <Continuous>  enoxaparin Injectable 40 milliGRAM(s) SubCutaneous <User Schedule>  multivitamin 1 Tablet(s) Oral daily  polyethylene glycol 3350 17 Gram(s) Oral daily  potassium phosphate / sodium phosphate Powder (PHOS-NaK) 2 Packet(s) Oral once  senna 2 Tablet(s) Oral at bedtime  sodium chloride 0.9%. 1000 milliLiter(s) (50 mL/Hr) IV Continuous <Continuous>  valproate sodium  IVPB 500 milliGRAM(s) IV Intermittent every 12 hours    MEDICATIONS  (PRN):  acetaminophen     Tablet .. 650 milliGRAM(s) Oral every 6 hours PRN Temp greater or equal to 38C (100.4F), Mild Pain (1 - 3)  labetalol Injectable 10 milliGRAM(s) IV Push every 4 hours PRN SBP >160, and only if HR >55  ondansetron Injectable 4 milliGRAM(s) IV Push every 6 hours PRN Nausea and/or Vomiting

## 2024-04-09 NOTE — PROGRESS NOTE ADULT - ASSESSMENT
ASSESSMENT   58M Hx eczema/Gout intermittently takes ASA for pain, p/f being found down by wife after hearing thud. No hx HTN. 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 ED intubated.      PLAN     N  # IPH: angio without vascular malformation   #AMS: vEEG 4/6 no seizures   # continue valproate, increase to q8  #pain  tylenol   # DC precedex, c/w Seroquel 25 q8  f/u stroke team reccs  #Activity: [] OOB as tolerated [] Bedrest [X] PT [] OT [] PMNR    CV   -160mmHg  TTE EF 65%, no thrombus   labetalol, amlodipine    P   patient self extubated on 4/6  goal saO2 > 92   encourage I.S.    R   Strict I+Os   IVL    GI   Diet: NPO, patient removed NGT   Senna miralax  Last BM 4/6   zofran     ID  afebrile    E  Goal euglycemia (-180)  a1c 5.6    H  SCDs  SQL 40   LED 3/3/24 no DVT   R. radial occlusion f/u with vascualr    CODE STATUS: FULL CODE     DISPOSITION: ICU    CRITICAL  ASSESSMENT   58M Hx eczema/Gout intermittently takes ASA for pain, p/f being found down by wife after hearing thud. No hx HTN. 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 ED intubated.      PLAN     N  Neuro Q2, Vitals Q@  IPH: angio without vascular malformation   AMS: vEEG 4/6 no seizures  will D/C today.  continue valproate 500 Q12  pain  tylenol   c/w Seroquel 25 q8  f/u stroke team reccs  #Activity: [] OOB as tolerated [] Bedrest [X] PT [] OT [] PMNR    CV   -160mmHg  TTE EF 65%, no thrombus   labetalol, amlodipine    P   patient self extubated on 4/6  goal saO2 > 92   encourage I.S.    R   Strict I+Os   IVL    GI   Diet: NGT in place  Senna miralax  Last BM 4/6   zofran     ID  afebrile    E  Goal euglycemia (-180)  a1c 5.6    H  SCDs  SQL 40   LED 3/3/24 no DVT   R. radial artery dopplers detectable from vascular team    CODE STATUS: FULL CODE     DISPOSITION: ICU    CRITICAL

## 2024-04-09 NOTE — EEG REPORT - NS EEG TEXT BOX
EEG Report [Charted Location: [address]] [Authored: 08-Apr-2024 09:46]- for Visit: [id number], Complete, Revised, Signed in Full, Available to Patient    TECH INFORMATION:    History:  This is a 58 year old Male patient with a history of language regression (e.g. Landau-Kleffner).    EEG REPORT:    EEG Report:  · EEG Report	      EEG REPORT:     ROSALEE CARR [id number]     Study Date: 		04-07-24 0800 - 0800 04-08-24  Duration in hours:  x 24 hr    --------------------------------------------------------------------------------------------------  History:  CC/ HPI Patient is a 58y old  Male who presents with a chief complaint of IPH (06 Apr 2024 01:35)    MEDICATIONS  (STANDING):  chlorhexidine 0.12% Liquid 15 milliLiter(s) Oral Mucosa every 12 hours  chlorhexidine 4% Liquid 1 Application(s) Topical <User Schedule>  dexMEDEtomidine Infusion 0.4 MICROgram(s)/kG/Hr (7.75 mL/Hr) IV Continuous <Continuous>  enoxaparin Injectable 40 milliGRAM(s) SubCutaneous <User Schedule>  multivitamin 1 Tablet(s) Oral daily  pantoprazole  Injectable 40 milliGRAM(s) IV Push daily  polyethylene glycol 3350 17 Gram(s) Oral daily  senna 2 Tablet(s) Oral at bedtime  valproic  acid Syrup 500 milliGRAM(s) Oral two times a day    --------------------------------------------------------------------------------------------------  Study Interpretation:    [Abbreviation Key:  PDR=alpha rhythm/posterior dominant rhythm. A-P=anterior posterior.  Amplitude: ‘very low’:<20; ‘low’:20-49; ‘medium’:; ‘high’:>150uV.  Persistence for periodic/rhythmic patterns (% of epoch) ‘rare’:<1%; ‘occasional’:1-10%; ‘frequent’:10-50%; ‘abundant’:50-90%; ‘continuous’:>90%.  Persistence for sporadic discharges: ‘rare’:<1/hr; ‘occasional’:1/min-1/hr; ‘frequent’:>1/min; ‘abundant’:>1/10 sec.  RPP=rhythmic and periodic patterns; GRDA=generalized rhythmic delta activity; FIRDA=frontal intermittent GRDA; LRDA=lateralized rhythmic delta activity; TIRDA=temporal intermittent rhythmic delta activity;  LPD=PLED=lateralized periodic discharges; GPD=generalized periodic discharges; BIPDs =bilateral independent periodic discharges; Mf=multifocal; SIRPDs=stimulus induced rhythmic, periodic, or ictal appearing discharges; BIRDs=brief potentially ictal rhythmic discharges >4 Hz, lasting .5-10s; PFA (paroxysmal bursts >13 Hz or =8 Hz <10s).  Modifiers: +F=with fast component; +S=with spike component; +R=with rhythmic component.  S-B=burst suppression pattern.  Max=maximal. N1-drowsy; N2-stage II sleep; N3-slow wave sleep. SSS/BETS=small sharp spikes/benign epileptiform transients of sleep. HV=hyperventilation; PS=photic stimulation]    FINDINGS:      Background:  Continuity: continuous  Symmetry: symmetric  PDR: poorly modulated 8-9 Hz PDR present during wakefulness slower over left hemisphere  Reactivity: present  Voltage: normal (between 20-150uV)  Anterior Posterior Gradient: absent  Other background findings: none  Breach: absent    Background Slowing:  Generalized slowing: none was present.  Focal slowing: intermittent left frontotemporal irregular delta/theta activity was present.    State Changes:   -N2 sleep transients were not recorded.    Sporadic Epileptiform Discharges:    None    Rhythmic and Periodic Patterns (RPPs):  None    Electrographic and Electroclinical seizures:  None    Other Clinical Events:  None    Activation Procedures:   -Hyperventilation was not performed.    -Photic stimulation was not performed.     Artifacts:  Intermittent myogenic and movement artifacts were noted.    ECG:  The heart rate on single channel ECG was predominantly between 70-80 BPM.    EEG Classification / Summary:  Abnormal EEG study  Nearly continuous focal left frontotemporal slowing.      -----------------------------------------------------------------------------------------------------    Clinical Impression:  Focal left frontotemporal cerebral dysfunction.  There were no epileptiform abnormalities or seizures recorded.      In absence of additional concerns, recommend consideration for discontinuation of current EEG study with reconnection in future if warranted.      SUAD Delgado  Attending Physician, [facility]    ------------------------------------  EEG Reading Room: [phone]  On Call Service After Hours: [phone]                        EEG Report [Charted Location: [address]] [Authored: 08-Apr-2024 09:46]- for Visit: [id number], Complete, Revised, Signed in Full, Available to Patient    TECH INFORMATION:    History:  This is a 58 year old Male patient with a history of language regression (e.g. Landau-Kleffner).    EEG REPORT:    EEG Report:  · EEG Report	      EEG REPORT:     ROSALEE CARR [id number]     Study Date: 		04-07-24 0800 - 1129 04-08-24  Duration in hours:  x 27 hr 29 m    --------------------------------------------------------------------------------------------------  History:  CC/ HPI Patient is a 58y old  Male who presents with a chief complaint of IPH (06 Apr 2024 01:35)    MEDICATIONS  (STANDING):  chlorhexidine 0.12% Liquid 15 milliLiter(s) Oral Mucosa every 12 hours  chlorhexidine 4% Liquid 1 Application(s) Topical <User Schedule>  dexMEDEtomidine Infusion 0.4 MICROgram(s)/kG/Hr (7.75 mL/Hr) IV Continuous <Continuous>  enoxaparin Injectable 40 milliGRAM(s) SubCutaneous <User Schedule>  multivitamin 1 Tablet(s) Oral daily  pantoprazole  Injectable 40 milliGRAM(s) IV Push daily  polyethylene glycol 3350 17 Gram(s) Oral daily  senna 2 Tablet(s) Oral at bedtime  valproic  acid Syrup 500 milliGRAM(s) Oral two times a day    --------------------------------------------------------------------------------------------------  Study Interpretation:    [Abbreviation Key:  PDR=alpha rhythm/posterior dominant rhythm. A-P=anterior posterior.  Amplitude: ‘very low’:<20; ‘low’:20-49; ‘medium’:; ‘high’:>150uV.  Persistence for periodic/rhythmic patterns (% of epoch) ‘rare’:<1%; ‘occasional’:1-10%; ‘frequent’:10-50%; ‘abundant’:50-90%; ‘continuous’:>90%.  Persistence for sporadic discharges: ‘rare’:<1/hr; ‘occasional’:1/min-1/hr; ‘frequent’:>1/min; ‘abundant’:>1/10 sec.  RPP=rhythmic and periodic patterns; GRDA=generalized rhythmic delta activity; FIRDA=frontal intermittent GRDA; LRDA=lateralized rhythmic delta activity; TIRDA=temporal intermittent rhythmic delta activity;  LPD=PLED=lateralized periodic discharges; GPD=generalized periodic discharges; BIPDs =bilateral independent periodic discharges; Mf=multifocal; SIRPDs=stimulus induced rhythmic, periodic, or ictal appearing discharges; BIRDs=brief potentially ictal rhythmic discharges >4 Hz, lasting .5-10s; PFA (paroxysmal bursts >13 Hz or =8 Hz <10s).  Modifiers: +F=with fast component; +S=with spike component; +R=with rhythmic component.  S-B=burst suppression pattern.  Max=maximal. N1-drowsy; N2-stage II sleep; N3-slow wave sleep. SSS/BETS=small sharp spikes/benign epileptiform transients of sleep. HV=hyperventilation; PS=photic stimulation]    FINDINGS:      Background:  Continuity: continuous  Symmetry: symmetric  PDR: poorly modulated 8-9 Hz PDR present during wakefulness slower over left hemisphere  Reactivity: present  Voltage: normal (between 20-150uV)  Anterior Posterior Gradient: absent  Other background findings: none  Breach: absent    Background Slowing:  Generalized slowing: none was present.  Focal slowing: intermittent left frontotemporal irregular delta/theta activity was present.    State Changes:   -N2 sleep transients were not recorded.    Sporadic Epileptiform Discharges:    None    Rhythmic and Periodic Patterns (RPPs):  None    Electrographic and Electroclinical seizures:  None    Other Clinical Events:  None    Activation Procedures:   -Hyperventilation was not performed.    -Photic stimulation was not performed.     Artifacts:  Intermittent myogenic and movement artifacts were noted.    ECG:  The heart rate on single channel ECG was predominantly between 70-80 BPM.    EEG Classification / Summary:  Abnormal EEG study  Nearly continuous focal left frontotemporal slowing.      -----------------------------------------------------------------------------------------------------    Clinical Impression:  Focal left frontotemporal cerebral dysfunction.  There were no epileptiform abnormalities or seizures recorded.      In absence of additional concerns, recommend consideration for discontinuation of current EEG study with reconnection in future if warranted.      SUAD Delgado  Attending Physician, [facility]    ------------------------------------  EEG Reading Room: [phone]  On Call Service After Hours: [phone]

## 2024-04-09 NOTE — CHART NOTE - NSCHARTNOTEFT_GEN_A_CORE
Spoke with MD Thomas Young [id number] about patient care plan and goals. Patient will be under Stroke Neurology care.

## 2024-04-10 LAB
-  PIPERACILLIN/TAZOBACTAM: SIGNIFICANT CHANGE UP
ALBUMIN SERPL ELPH-MCNC: 3.3 G/DL — SIGNIFICANT CHANGE UP (ref 3.3–5)
ALP SERPL-CCNC: 69 U/L — SIGNIFICANT CHANGE UP (ref 40–120)
ALT FLD-CCNC: 27 U/L — SIGNIFICANT CHANGE UP (ref 10–45)
ANION GAP SERPL CALC-SCNC: 10 MMOL/L — SIGNIFICANT CHANGE UP (ref 5–17)
ANION GAP SERPL CALC-SCNC: 10 MMOL/L — SIGNIFICANT CHANGE UP (ref 5–17)
ANION GAP SERPL CALC-SCNC: 14 MMOL/L — SIGNIFICANT CHANGE UP (ref 5–17)
APPEARANCE UR: CLEAR — SIGNIFICANT CHANGE UP
AST SERPL-CCNC: 29 U/L — SIGNIFICANT CHANGE UP (ref 10–40)
BACTERIA # UR AUTO: ABNORMAL /HPF
BASOPHILS # BLD AUTO: 0 K/UL — SIGNIFICANT CHANGE UP (ref 0–0.2)
BASOPHILS NFR BLD AUTO: 0 % — SIGNIFICANT CHANGE UP (ref 0–2)
BILIRUB SERPL-MCNC: 0.6 MG/DL — SIGNIFICANT CHANGE UP (ref 0.2–1.2)
BILIRUB UR-MCNC: ABNORMAL
BUN SERPL-MCNC: 18 MG/DL — SIGNIFICANT CHANGE UP (ref 7–23)
BUN SERPL-MCNC: 18 MG/DL — SIGNIFICANT CHANGE UP (ref 7–23)
BUN SERPL-MCNC: 19 MG/DL — SIGNIFICANT CHANGE UP (ref 7–23)
CALCIUM SERPL-MCNC: 8.7 MG/DL — SIGNIFICANT CHANGE UP (ref 8.4–10.5)
CALCIUM SERPL-MCNC: 8.8 MG/DL — SIGNIFICANT CHANGE UP (ref 8.4–10.5)
CALCIUM SERPL-MCNC: 9.4 MG/DL — SIGNIFICANT CHANGE UP (ref 8.4–10.5)
CAST: 1 /LPF — SIGNIFICANT CHANGE UP (ref 0–4)
CHLORIDE SERPL-SCNC: 101 MMOL/L — SIGNIFICANT CHANGE UP (ref 96–108)
CHLORIDE SERPL-SCNC: 105 MMOL/L — SIGNIFICANT CHANGE UP (ref 96–108)
CHLORIDE SERPL-SCNC: 106 MMOL/L — SIGNIFICANT CHANGE UP (ref 96–108)
CO2 SERPL-SCNC: 24 MMOL/L — SIGNIFICANT CHANGE UP (ref 22–31)
CO2 SERPL-SCNC: 26 MMOL/L — SIGNIFICANT CHANGE UP (ref 22–31)
CO2 SERPL-SCNC: 26 MMOL/L — SIGNIFICANT CHANGE UP (ref 22–31)
COLOR SPEC: SIGNIFICANT CHANGE UP
CREAT SERPL-MCNC: 0.71 MG/DL — SIGNIFICANT CHANGE UP (ref 0.5–1.3)
CREAT SERPL-MCNC: 0.72 MG/DL — SIGNIFICANT CHANGE UP (ref 0.5–1.3)
CREAT SERPL-MCNC: 0.76 MG/DL — SIGNIFICANT CHANGE UP (ref 0.5–1.3)
CULTURE RESULTS: ABNORMAL
CULTURE RESULTS: SIGNIFICANT CHANGE UP
DIFF PNL FLD: ABNORMAL
EGFR: 105 ML/MIN/1.73M2 — SIGNIFICANT CHANGE UP
EGFR: 107 ML/MIN/1.73M2 — SIGNIFICANT CHANGE UP
EGFR: 108 ML/MIN/1.73M2 — SIGNIFICANT CHANGE UP
EOSINOPHIL # BLD AUTO: 0 K/UL — SIGNIFICANT CHANGE UP (ref 0–0.5)
EOSINOPHIL NFR BLD AUTO: 0 % — SIGNIFICANT CHANGE UP (ref 0–6)
GLUCOSE BLDC GLUCOMTR-MCNC: 110 MG/DL — HIGH (ref 70–99)
GLUCOSE BLDC GLUCOMTR-MCNC: 124 MG/DL — HIGH (ref 70–99)
GLUCOSE BLDC GLUCOMTR-MCNC: 150 MG/DL — HIGH (ref 70–99)
GLUCOSE BLDC GLUCOMTR-MCNC: 164 MG/DL — HIGH (ref 70–99)
GLUCOSE BLDC GLUCOMTR-MCNC: 97 MG/DL — SIGNIFICANT CHANGE UP (ref 70–99)
GLUCOSE SERPL-MCNC: 102 MG/DL — HIGH (ref 70–99)
GLUCOSE SERPL-MCNC: 168 MG/DL — HIGH (ref 70–99)
GLUCOSE SERPL-MCNC: 171 MG/DL — HIGH (ref 70–99)
GLUCOSE UR QL: NEGATIVE MG/DL — SIGNIFICANT CHANGE UP
HCT VFR BLD CALC: 40.1 % — SIGNIFICANT CHANGE UP (ref 39–50)
HCT VFR BLD CALC: 40.2 % — SIGNIFICANT CHANGE UP (ref 39–50)
HCT VFR BLD CALC: 46.2 % — SIGNIFICANT CHANGE UP (ref 39–50)
HGB BLD-MCNC: 13.2 G/DL — SIGNIFICANT CHANGE UP (ref 13–17)
HGB BLD-MCNC: 13.2 G/DL — SIGNIFICANT CHANGE UP (ref 13–17)
HGB BLD-MCNC: 14.9 G/DL — SIGNIFICANT CHANGE UP (ref 13–17)
KETONES UR-MCNC: 15 MG/DL
LACTATE BLDV-MCNC: 0.9 MMOL/L — SIGNIFICANT CHANGE UP (ref 0.5–2)
LEUKOCYTE ESTERASE UR-ACNC: ABNORMAL
LYMPHOCYTES # BLD AUTO: 0.88 K/UL — LOW (ref 1–3.3)
LYMPHOCYTES # BLD AUTO: 6.7 % — LOW (ref 13–44)
MAGNESIUM SERPL-MCNC: 2.3 MG/DL — SIGNIFICANT CHANGE UP (ref 1.6–2.6)
MAGNESIUM SERPL-MCNC: 2.3 MG/DL — SIGNIFICANT CHANGE UP (ref 1.6–2.6)
MANUAL SMEAR VERIFICATION: SIGNIFICANT CHANGE UP
MCHC RBC-ENTMCNC: 28.6 PG — SIGNIFICANT CHANGE UP (ref 27–34)
MCHC RBC-ENTMCNC: 28.7 PG — SIGNIFICANT CHANGE UP (ref 27–34)
MCHC RBC-ENTMCNC: 28.7 PG — SIGNIFICANT CHANGE UP (ref 27–34)
MCHC RBC-ENTMCNC: 32.3 GM/DL — SIGNIFICANT CHANGE UP (ref 32–36)
MCHC RBC-ENTMCNC: 32.8 GM/DL — SIGNIFICANT CHANGE UP (ref 32–36)
MCHC RBC-ENTMCNC: 32.9 GM/DL — SIGNIFICANT CHANGE UP (ref 32–36)
MCV RBC AUTO: 87 FL — SIGNIFICANT CHANGE UP (ref 80–100)
MCV RBC AUTO: 87.2 FL — SIGNIFICANT CHANGE UP (ref 80–100)
MCV RBC AUTO: 88.8 FL — SIGNIFICANT CHANGE UP (ref 80–100)
METAMYELOCYTES # FLD: 1 % — HIGH (ref 0–0)
METHOD TYPE: SIGNIFICANT CHANGE UP
MONOCYTES # BLD AUTO: 1.63 K/UL — HIGH (ref 0–0.9)
MONOCYTES NFR BLD AUTO: 12.4 % — SIGNIFICANT CHANGE UP (ref 2–14)
NEUTROPHILS # BLD AUTO: 10.36 K/UL — HIGH (ref 1.8–7.4)
NEUTROPHILS NFR BLD AUTO: 77.1 % — HIGH (ref 43–77)
NEUTS BAND # BLD: 1.9 % — SIGNIFICANT CHANGE UP (ref 0–8)
NITRITE UR-MCNC: POSITIVE
NRBC # BLD: 0 /100 WBCS — SIGNIFICANT CHANGE UP (ref 0–0)
NRBC # BLD: 0 /100 WBCS — SIGNIFICANT CHANGE UP (ref 0–0)
ORGANISM # SPEC MICROSCOPIC CNT: ABNORMAL
PH UR: 7 — SIGNIFICANT CHANGE UP (ref 5–8)
PHOSPHATE SERPL-MCNC: 2.7 MG/DL — SIGNIFICANT CHANGE UP (ref 2.5–4.5)
PHOSPHATE SERPL-MCNC: 2.9 MG/DL — SIGNIFICANT CHANGE UP (ref 2.5–4.5)
PLAT MORPH BLD: NORMAL — SIGNIFICANT CHANGE UP
PLATELET # BLD AUTO: 244 K/UL — SIGNIFICANT CHANGE UP (ref 150–400)
PLATELET # BLD AUTO: 276 K/UL — SIGNIFICANT CHANGE UP (ref 150–400)
PLATELET # BLD AUTO: 282 K/UL — SIGNIFICANT CHANGE UP (ref 150–400)
POTASSIUM SERPL-MCNC: 3.9 MMOL/L — SIGNIFICANT CHANGE UP (ref 3.5–5.3)
POTASSIUM SERPL-MCNC: 3.9 MMOL/L — SIGNIFICANT CHANGE UP (ref 3.5–5.3)
POTASSIUM SERPL-MCNC: 4.3 MMOL/L — SIGNIFICANT CHANGE UP (ref 3.5–5.3)
POTASSIUM SERPL-SCNC: 3.9 MMOL/L — SIGNIFICANT CHANGE UP (ref 3.5–5.3)
POTASSIUM SERPL-SCNC: 3.9 MMOL/L — SIGNIFICANT CHANGE UP (ref 3.5–5.3)
POTASSIUM SERPL-SCNC: 4.3 MMOL/L — SIGNIFICANT CHANGE UP (ref 3.5–5.3)
PROCALCITONIN SERPL-MCNC: 0.29 NG/ML — HIGH (ref 0.02–0.1)
PROT SERPL-MCNC: 6.8 G/DL — SIGNIFICANT CHANGE UP (ref 6–8.3)
PROT UR-MCNC: 30 MG/DL
RBC # BLD: 4.6 M/UL — SIGNIFICANT CHANGE UP (ref 4.2–5.8)
RBC # BLD: 4.62 M/UL — SIGNIFICANT CHANGE UP (ref 4.2–5.8)
RBC # BLD: 5.2 M/UL — SIGNIFICANT CHANGE UP (ref 4.2–5.8)
RBC # FLD: 12.8 % — SIGNIFICANT CHANGE UP (ref 10.3–14.5)
RBC # FLD: 13 % — SIGNIFICANT CHANGE UP (ref 10.3–14.5)
RBC # FLD: 13.1 % — SIGNIFICANT CHANGE UP (ref 10.3–14.5)
RBC BLD AUTO: SIGNIFICANT CHANGE UP
RBC CASTS # UR COMP ASSIST: 34 /HPF — HIGH (ref 0–4)
SODIUM SERPL-SCNC: 139 MMOL/L — SIGNIFICANT CHANGE UP (ref 135–145)
SODIUM SERPL-SCNC: 141 MMOL/L — SIGNIFICANT CHANGE UP (ref 135–145)
SODIUM SERPL-SCNC: 142 MMOL/L — SIGNIFICANT CHANGE UP (ref 135–145)
SP GR SPEC: 1.02 — SIGNIFICANT CHANGE UP (ref 1–1.03)
SPECIMEN SOURCE: SIGNIFICANT CHANGE UP
SPECIMEN SOURCE: SIGNIFICANT CHANGE UP
SQUAMOUS # UR AUTO: 1 /HPF — SIGNIFICANT CHANGE UP (ref 0–5)
URATE SERPL-MCNC: 3.8 MG/DL — SIGNIFICANT CHANGE UP (ref 3.4–8.8)
UROBILINOGEN FLD QL: >=8 MG/DL (ref 0.2–1)
VARIANT LYMPHS # BLD: 0.9 % — SIGNIFICANT CHANGE UP (ref 0–6)
WBC # BLD: 11.02 K/UL — HIGH (ref 3.8–10.5)
WBC # BLD: 13.11 K/UL — HIGH (ref 3.8–10.5)
WBC # BLD: 13.25 K/UL — HIGH (ref 3.8–10.5)
WBC # FLD AUTO: 11.02 K/UL — HIGH (ref 3.8–10.5)
WBC # FLD AUTO: 13.11 K/UL — HIGH (ref 3.8–10.5)
WBC # FLD AUTO: 13.25 K/UL — HIGH (ref 3.8–10.5)
WBC UR QL: 1 /HPF — SIGNIFICANT CHANGE UP (ref 0–5)

## 2024-04-10 PROCEDURE — 71045 X-RAY EXAM CHEST 1 VIEW: CPT | Mod: 26

## 2024-04-10 PROCEDURE — 99233 SBSQ HOSP IP/OBS HIGH 50: CPT

## 2024-04-10 PROCEDURE — 70553 MRI BRAIN STEM W/O & W/DYE: CPT | Mod: 26

## 2024-04-10 RX ORDER — INSULIN LISPRO 100/ML
VIAL (ML) SUBCUTANEOUS EVERY 6 HOURS
Refills: 0 | Status: DISCONTINUED | OUTPATIENT
Start: 2024-04-10 | End: 2024-04-11

## 2024-04-10 RX ORDER — COLCHICINE 0.6 MG
0.6 TABLET ORAL
Refills: 0 | Status: DISCONTINUED | OUTPATIENT
Start: 2024-04-10 | End: 2024-04-10

## 2024-04-10 RX ORDER — CEFTRIAXONE 500 MG/1
1000 INJECTION, POWDER, FOR SOLUTION INTRAMUSCULAR; INTRAVENOUS EVERY 24 HOURS
Refills: 0 | Status: DISCONTINUED | OUTPATIENT
Start: 2024-04-10 | End: 2024-04-12

## 2024-04-10 RX ORDER — SODIUM CHLORIDE 9 MG/ML
1000 INJECTION, SOLUTION INTRAVENOUS
Refills: 0 | Status: DISCONTINUED | OUTPATIENT
Start: 2024-04-10 | End: 2024-04-15

## 2024-04-10 RX ORDER — DEXTROSE 50 % IN WATER 50 %
25 SYRINGE (ML) INTRAVENOUS ONCE
Refills: 0 | Status: DISCONTINUED | OUTPATIENT
Start: 2024-04-10 | End: 2024-04-15

## 2024-04-10 RX ORDER — GLUCAGON INJECTION, SOLUTION 0.5 MG/.1ML
1 INJECTION, SOLUTION SUBCUTANEOUS ONCE
Refills: 0 | Status: DISCONTINUED | OUTPATIENT
Start: 2024-04-10 | End: 2024-04-15

## 2024-04-10 RX ORDER — COLCHICINE 0.6 MG
0.6 TABLET ORAL
Refills: 0 | DISCHARGE

## 2024-04-10 RX ORDER — INDOMETHACIN 50 MG
50 CAPSULE ORAL
Refills: 0 | Status: DISCONTINUED | OUTPATIENT
Start: 2024-04-10 | End: 2024-04-11

## 2024-04-10 RX ORDER — DEXTROSE 50 % IN WATER 50 %
15 SYRINGE (ML) INTRAVENOUS ONCE
Refills: 0 | Status: DISCONTINUED | OUTPATIENT
Start: 2024-04-10 | End: 2024-04-15

## 2024-04-10 RX ORDER — DEXTROSE 10 % IN WATER 10 %
125 INTRAVENOUS SOLUTION INTRAVENOUS ONCE
Refills: 0 | Status: DISCONTINUED | OUTPATIENT
Start: 2024-04-10 | End: 2024-04-15

## 2024-04-10 RX ORDER — DEXTROSE 50 % IN WATER 50 %
12.5 SYRINGE (ML) INTRAVENOUS ONCE
Refills: 0 | Status: DISCONTINUED | OUTPATIENT
Start: 2024-04-10 | End: 2024-04-15

## 2024-04-10 RX ORDER — COLCHICINE 0.6 MG
0.6 TABLET ORAL
Refills: 0 | Status: DISCONTINUED | OUTPATIENT
Start: 2024-04-10 | End: 2024-04-11

## 2024-04-10 RX ADMIN — LOSARTAN POTASSIUM 25 MILLIGRAM(S): 100 TABLET, FILM COATED ORAL at 06:22

## 2024-04-10 RX ADMIN — Medication 1: at 23:27

## 2024-04-10 RX ADMIN — Medication 50 MILLIGRAM(S): at 21:19

## 2024-04-10 RX ADMIN — CHLORHEXIDINE GLUCONATE 1 APPLICATION(S): 213 SOLUTION TOPICAL at 21:20

## 2024-04-10 RX ADMIN — CEFTRIAXONE 100 MILLIGRAM(S): 500 INJECTION, POWDER, FOR SOLUTION INTRAMUSCULAR; INTRAVENOUS at 21:19

## 2024-04-10 RX ADMIN — AMLODIPINE BESYLATE 10 MILLIGRAM(S): 2.5 TABLET ORAL at 05:09

## 2024-04-10 RX ADMIN — Medication 0.6 MILLIGRAM(S): at 06:22

## 2024-04-10 RX ADMIN — POLYETHYLENE GLYCOL 3350 17 GRAM(S): 17 POWDER, FOR SOLUTION ORAL at 12:08

## 2024-04-10 RX ADMIN — QUETIAPINE FUMARATE 25 MILLIGRAM(S): 200 TABLET, FILM COATED ORAL at 21:19

## 2024-04-10 RX ADMIN — Medication 0.6 MILLIGRAM(S): at 18:05

## 2024-04-10 RX ADMIN — SENNA PLUS 2 TABLET(S): 8.6 TABLET ORAL at 21:20

## 2024-04-10 RX ADMIN — Medication 500 MILLIGRAM(S): at 05:09

## 2024-04-10 RX ADMIN — QUETIAPINE FUMARATE 25 MILLIGRAM(S): 200 TABLET, FILM COATED ORAL at 05:09

## 2024-04-10 RX ADMIN — Medication 650 MILLIGRAM(S): at 16:44

## 2024-04-10 RX ADMIN — SODIUM CHLORIDE 50 MILLILITER(S): 9 INJECTION INTRAMUSCULAR; INTRAVENOUS; SUBCUTANEOUS at 22:09

## 2024-04-10 RX ADMIN — SODIUM CHLORIDE 50 MILLILITER(S): 9 INJECTION INTRAMUSCULAR; INTRAVENOUS; SUBCUTANEOUS at 00:20

## 2024-04-10 RX ADMIN — Medication 50 MILLIGRAM(S): at 22:19

## 2024-04-10 RX ADMIN — Medication 650 MILLIGRAM(S): at 17:14

## 2024-04-10 RX ADMIN — SODIUM CHLORIDE 50 MILLILITER(S): 9 INJECTION INTRAMUSCULAR; INTRAVENOUS; SUBCUTANEOUS at 08:43

## 2024-04-10 RX ADMIN — ENOXAPARIN SODIUM 40 MILLIGRAM(S): 100 INJECTION SUBCUTANEOUS at 21:19

## 2024-04-10 RX ADMIN — Medication 1 TABLET(S): at 12:08

## 2024-04-10 NOTE — PROGRESS NOTE ADULT - ASSESSMENT
Impression: Subjectively improving (although still R hemiplegia on examination) R sensorimotor deficit with mild R gaze palsy, moderate dysarthria, and possible subcortical aphasia (given semantic paraphasic errors), due to L thalamic IPH, likely i/s/o hypertension (although no prior diagnosis). Angiogram negative for underlying vascular malformation.    Plan:  [ ] Pending MRI Brain w/wo IV contrast  [ ] d/c VPA (greater than 7 days after initial presentation, and subcortical location, EEG negative, no need for further seizure ppx at this time)  [ ] c/w Seroquel 25mg q8  [ ] q2 neurochecks and vital signs  [ ] c/w Losartan 25mg + Amlodipine 10mg for HTN (goal normotension)  [ ] c/w senna and miralax for bowel regimen  [ ] For potential gout flare, pt currently on Colchicine 0.6mg BID. Will resume home Indomethacin 50mg BID. Pt's allopurinol 100mg bid was not given previously this admission, which could have contributed to current flare. Will consult rheumatology regarding management of Gout, including when to resume allopurinol.  Diet: NG Tube feeds  DVT ppx: Lovenox 40  Dispo: PT recommending acute rehab, will f/u with CM/SW.    Case seen on rounds by stroke attending Dr. Libman. Recommendations not finalized until attested by attending   Impression: Subjectively improving (although still R hemiplegia on examination) R sensorimotor deficit with mild R gaze palsy, moderate dysarthria, and possible subcortical aphasia (given semantic paraphasic errors), due to L thalamic IPH, likely i/s/o hypertension (although no prior diagnosis). Angiogram negative for underlying vascular malformation.    Plan:  [ ] Pt found to be febrile to 102 on 4/10 PM, tachycardic as well. Ordered BCx, UCx, CXR, RVP, will f/u infectious workup, monitoring off abx at this time.  [ ] Pending MRI Brain w/wo IV contrast  [ ] d/c VPA (greater than 7 days after initial presentation, and subcortical location, EEG negative, no need for further seizure ppx at this time)  [ ] c/w Seroquel 25mg q8  [ ] q2 neurochecks and vital signs  [ ] c/w Losartan 25mg + Amlodipine 10mg for HTN (goal normotension)  [ ] c/w senna and miralax for bowel regimen  [ ] For potential gout flare, pt currently on Colchicine 0.6mg BID. Will resume home Indomethacin 50mg BID. Pt's allopurinol 100mg bid was not given previously this admission, which could have contributed to current flare. Will consult rheumatology regarding management of Gout, including when to resume allopurinol.  Diet: NG Tube feeds  DVT ppx: Lovenox 40  Dispo: PT recommending acute rehab, will f/u with CM/SW.    Case seen on rounds by stroke attending Dr. Libman. Recommendations not finalized until attested by attending   Impression: Subjectively improving (although still R hemiplegia on examination) R sensorimotor deficit with mild R gaze palsy, moderate dysarthria, and possible subtle subcortical aphasia (given possible rare semantic paraphasic errors), due to L thalamic IPH, likely i/s/o hypertension (although no prior diagnosis). Angiogram negative for underlying vascular malformation.    Plan:  [ ] Pt found to be febrile to 102 on 4/10 PM, tachycardic as well. Ordered BCx, UCx, CXR, RVP, will f/u infectious workup, monitoring off abx at this time.  [ ] Pending MRI Brain w/wo IV contrast  [ ] d/c VPA (greater than 7 days after initial presentation, and subcortical location of ICH, EEG negative, no need for further seizure ppx at this time)  [ ] c/w Seroquel 25mg q8  [ ] q2 neurochecks and vital signs  [ ] c/w Losartan 25mg + Amlodipine 10mg for HTN (goal normotension)  [ ] c/w senna and miralax for bowel regimen  [ ] For potential gout flare, pt currently on Colchicine 0.6mg BID. Will resume home Indomethacin 50mg BID. Pt's allopurinol 100mg bid was not given previously this admission, which could have contributed to current flare. Will consult rheumatology regarding management of Gout, including when to resume allopurinol.  Diet: NG Tube feeds  DVT ppx: Lovenox 40  Dispo: PT recommending acute rehab, will f/u with CM/SW.    Case seen on rounds by stroke attending Dr. Libman. Recommendations not finalized until attested by attending

## 2024-04-10 NOTE — PROVIDER CONTACT NOTE (SEPSIS SCREENING) - SEPSIS CRITERIA TO COINCIDE WITH MEWS
Heart Rate greater than 90/Temperature less than 96.8 or greater than 100.4/Acute Altered Mental Status

## 2024-04-10 NOTE — PROGRESS NOTE ADULT - SUBJECTIVE AND OBJECTIVE BOX
Neurology Progress Note    SUBJECTIVE/OBJECTIVE/INTERVAL EVENTS: Pt reports mild subjective improvement on R side. pt also endorsing significant pain in L knee and L foot, which pt feels could be a flare of his known gout.      MEDICATIONS  (STANDING):  amLODIPine   Tablet 10 milliGRAM(s) Oral daily  chlorhexidine 4% Liquid 1 Application(s) Topical <User Schedule>  colchicine 0.6 milliGRAM(s) Oral two times a day  dextrose 10% Bolus 125 milliLiter(s) IV Bolus once  dextrose 5%. 1000 milliLiter(s) (100 mL/Hr) IV Continuous <Continuous>  dextrose 5%. 1000 milliLiter(s) (50 mL/Hr) IV Continuous <Continuous>  dextrose 50% Injectable 25 Gram(s) IV Push once  dextrose 50% Injectable 12.5 Gram(s) IV Push once  enoxaparin Injectable 40 milliGRAM(s) SubCutaneous <User Schedule>  glucagon  Injectable 1 milliGRAM(s) IntraMuscular once  insulin lispro (ADMELOG) corrective regimen sliding scale   SubCutaneous every 6 hours  losartan 25 milliGRAM(s) Oral daily  multivitamin 1 Tablet(s) Oral daily  polyethylene glycol 3350 17 Gram(s) Oral daily  QUEtiapine 25 milliGRAM(s) Oral every 8 hours  senna 2 Tablet(s) Oral at bedtime  sodium chloride 0.9%. 1000 milliLiter(s) (50 mL/Hr) IV Continuous <Continuous>  valproic  acid Syrup 500 milliGRAM(s) Oral every 12 hours    MEDICATIONS  (PRN):  acetaminophen     Tablet .. 650 milliGRAM(s) Oral every 6 hours PRN Temp greater or equal to 38C (100.4F), Mild Pain (1 - 3)  dextrose Oral Gel 15 Gram(s) Oral once PRN Blood Glucose LESS THAN 70 milliGRAM(s)/deciliter  ondansetron Injectable 4 milliGRAM(s) IV Push every 6 hours PRN Nausea and/or Vomiting  traMADol 50 milliGRAM(s) Oral every 4 hours PRN Severe Pain (7 - 10)  traMADol 25 milliGRAM(s) Oral every 4 hours PRN Moderate Pain (4 - 6)      VITALS & EXAMINATION:  Vital Signs Last 24 Hrs  T(C): 38.9 (10 Apr 2024 16:30), Max: 38.9 (10 Apr 2024 16:30)  T(F): 102 (10 Apr 2024 16:30), Max: 102 (10 Apr 2024 16:30)  HR: 122 (10 Apr 2024 17:00) (94 - 122)  BP: 146/72 (10 Apr 2024 17:00) (109/61 - 156/90)  BP(mean): 98 (10 Apr 2024 17:00) (81 - 115)  RR: 28 (10 Apr 2024 17:00) (18 - 30)  SpO2: 96% (10 Apr 2024 17:00) (91% - 99%)    Parameters below as of 10 Apr 2024 15:00  Patient On (Oxygen Delivery Method): nasal cannula  O2 Flow (L/min): 2    Neuro exam:  MS: Awake, alert, oriented to self, month, not year, unclear if oriented to location (stated different things, like bathroom and hospital, unclear if disorientation vs semantic paraphasic errors), follows simple commands, speech fluent but with moderate dysarthria  CN: pupils equal, mild R gaze palsy, mild R facial droop  Motor: 5/5 LUE strength, LLE strength antigravity but pain limited, no RUE or RLE movement appreciated.   Sensory: Moderately decreased sensation to LT in RUE and RLE.    LABORATORY:  CBC                       14.9   11.02 )-----------( 244      ( 10 Apr 2024 05:05 )             46.2     Chem 04-10    139  |  101  |  19  ----------------------------<  102<H>  4.3   |  24  |  0.76    Ca    9.4      10 Apr 2024 05:05  Phos  2.9     04-10  Mg     2.3     04-10      LFTs   Coagulopathy   Lipid Panel 04-03 Chol 186 LDL -- HDL 67 Trig 64, 04-03 Chol 212<H> LDL -- HDL 77 Trig 65  A1c   Cardiac enzymes     U/A Urinalysis Basic - ( 10 Apr 2024 05:05 )    Color: x / Appearance: x / SG: x / pH: x  Gluc: 102 mg/dL / Ketone: x  / Bili: x / Urobili: x   Blood: x / Protein: x / Nitrite: x   Leuk Esterase: x / RBC: x / WBC x   Sq Epi: x / Non Sq Epi: x / Bacteria: x      CSF  Immunological  Other    STUDIES & IMAGING: (EEG, CT, MR, U/S, TTE/MIGUEL): Neurology Progress Note    SUBJECTIVE/OBJECTIVE/INTERVAL EVENTS: Pt reports mild subjective improvement on R side. pt also endorsing significant pain in L knee and L foot, which pt feels could be a flare of his known gout.      MEDICATIONS  (STANDING):  amLODIPine   Tablet 10 milliGRAM(s) Oral daily  chlorhexidine 4% Liquid 1 Application(s) Topical <User Schedule>  colchicine 0.6 milliGRAM(s) Oral two times a day  dextrose 10% Bolus 125 milliLiter(s) IV Bolus once  dextrose 5%. 1000 milliLiter(s) (100 mL/Hr) IV Continuous <Continuous>  dextrose 5%. 1000 milliLiter(s) (50 mL/Hr) IV Continuous <Continuous>  dextrose 50% Injectable 25 Gram(s) IV Push once  dextrose 50% Injectable 12.5 Gram(s) IV Push once  enoxaparin Injectable 40 milliGRAM(s) SubCutaneous <User Schedule>  glucagon  Injectable 1 milliGRAM(s) IntraMuscular once  insulin lispro (ADMELOG) corrective regimen sliding scale   SubCutaneous every 6 hours  losartan 25 milliGRAM(s) Oral daily  multivitamin 1 Tablet(s) Oral daily  polyethylene glycol 3350 17 Gram(s) Oral daily  QUEtiapine 25 milliGRAM(s) Oral every 8 hours  senna 2 Tablet(s) Oral at bedtime  sodium chloride 0.9%. 1000 milliLiter(s) (50 mL/Hr) IV Continuous <Continuous>  valproic  acid Syrup 500 milliGRAM(s) Oral every 12 hours    MEDICATIONS  (PRN):  acetaminophen     Tablet .. 650 milliGRAM(s) Oral every 6 hours PRN Temp greater or equal to 38C (100.4F), Mild Pain (1 - 3)  dextrose Oral Gel 15 Gram(s) Oral once PRN Blood Glucose LESS THAN 70 milliGRAM(s)/deciliter  ondansetron Injectable 4 milliGRAM(s) IV Push every 6 hours PRN Nausea and/or Vomiting  traMADol 50 milliGRAM(s) Oral every 4 hours PRN Severe Pain (7 - 10)  traMADol 25 milliGRAM(s) Oral every 4 hours PRN Moderate Pain (4 - 6)      VITALS & EXAMINATION:  Vital Signs Last 24 Hrs  T(C): 38.9 (10 Apr 2024 16:30), Max: 38.9 (10 Apr 2024 16:30)  T(F): 102 (10 Apr 2024 16:30), Max: 102 (10 Apr 2024 16:30)  HR: 122 (10 Apr 2024 17:00) (94 - 122)  BP: 146/72 (10 Apr 2024 17:00) (109/61 - 156/90)  BP(mean): 98 (10 Apr 2024 17:00) (81 - 115)  RR: 28 (10 Apr 2024 17:00) (18 - 30)  SpO2: 96% (10 Apr 2024 17:00) (91% - 99%)    Parameters below as of 10 Apr 2024 15:00  Patient On (Oxygen Delivery Method): nasal cannula  O2 Flow (L/min): 2    Neuro exam:  MS: Awake, alert, oriented to self, month, not year, unclear if oriented to location (stated different things, like bathroom and hospital, unclear if disorientation vs semantic paraphasic errors), follows simple commands, speech fluent but with moderate dysarthria  CN: pupils equal, mild R gaze palsy, mild R facial droop  Motor: 5/5 LUE strength, LLE strength antigravity but pain limited, no RUE or RLE movement .   Sensory: Moderately decreased sensation to LT in RUE and RLE.    LABORATORY:  CBC                       14.9   11.02 )-----------( 244      ( 10 Apr 2024 05:05 )             46.2     Chem 04-10    139  |  101  |  19  ----------------------------<  102<H>  4.3   |  24  |  0.76    Ca    9.4      10 Apr 2024 05:05  Phos  2.9     04-10  Mg     2.3     04-10      LFTs   Coagulopathy   Lipid Panel 04-03 Chol 186 LDL -- HDL 67 Trig 64, 04-03 Chol 212<H> LDL -- HDL 77 Trig 65  A1c   Cardiac enzymes     U/A Urinalysis Basic - ( 10 Apr 2024 05:05 )    Color: x / Appearance: x / SG: x / pH: x  Gluc: 102 mg/dL / Ketone: x  / Bili: x / Urobili: x   Blood: x / Protein: x / Nitrite: x   Leuk Esterase: x / RBC: x / WBC x   Sq Epi: x / Non Sq Epi: x / Bacteria: x      CSF  Immunological  Other    STUDIES & IMAGING: (EEG, CT, MR, U/S, TTE/MIGUEL):

## 2024-04-11 PROBLEM — Z00.00 ENCOUNTER FOR PREVENTIVE HEALTH EXAMINATION: Status: ACTIVE | Noted: 2024-04-11

## 2024-04-11 LAB
ANION GAP SERPL CALC-SCNC: 12 MMOL/L — SIGNIFICANT CHANGE UP (ref 5–17)
BASOPHILS # BLD AUTO: 0 K/UL — SIGNIFICANT CHANGE UP (ref 0–0.2)
BASOPHILS NFR BLD AUTO: 0 % — SIGNIFICANT CHANGE UP (ref 0–2)
BUN SERPL-MCNC: 26 MG/DL — HIGH (ref 7–23)
CALCIUM SERPL-MCNC: 8.8 MG/DL — SIGNIFICANT CHANGE UP (ref 8.4–10.5)
CHLORIDE SERPL-SCNC: 107 MMOL/L — SIGNIFICANT CHANGE UP (ref 96–108)
CO2 SERPL-SCNC: 24 MMOL/L — SIGNIFICANT CHANGE UP (ref 22–31)
CREAT SERPL-MCNC: 0.79 MG/DL — SIGNIFICANT CHANGE UP (ref 0.5–1.3)
CULTURE RESULTS: NO GROWTH — SIGNIFICANT CHANGE UP
CULTURE RESULTS: SIGNIFICANT CHANGE UP
CULTURE RESULTS: SIGNIFICANT CHANGE UP
EGFR: 104 ML/MIN/1.73M2 — SIGNIFICANT CHANGE UP
EOSINOPHIL # BLD AUTO: 0.36 K/UL — SIGNIFICANT CHANGE UP (ref 0–0.5)
EOSINOPHIL NFR BLD AUTO: 3 % — SIGNIFICANT CHANGE UP (ref 0–6)
FLUAV AG NPH QL: SIGNIFICANT CHANGE UP
FLUBV AG NPH QL: SIGNIFICANT CHANGE UP
GLUCOSE BLDC GLUCOMTR-MCNC: 104 MG/DL — HIGH (ref 70–99)
GLUCOSE BLDC GLUCOMTR-MCNC: 115 MG/DL — HIGH (ref 70–99)
GLUCOSE BLDC GLUCOMTR-MCNC: 122 MG/DL — HIGH (ref 70–99)
GLUCOSE SERPL-MCNC: 99 MG/DL — SIGNIFICANT CHANGE UP (ref 70–99)
HCT VFR BLD CALC: 40.1 % — SIGNIFICANT CHANGE UP (ref 39–50)
HGB BLD-MCNC: 12.5 G/DL — LOW (ref 13–17)
LYMPHOCYTES # BLD AUTO: 1.31 K/UL — SIGNIFICANT CHANGE UP (ref 1–3.3)
LYMPHOCYTES # BLD AUTO: 11 % — LOW (ref 13–44)
MAGNESIUM SERPL-MCNC: 2.5 MG/DL — SIGNIFICANT CHANGE UP (ref 1.6–2.6)
MANUAL SMEAR VERIFICATION: SIGNIFICANT CHANGE UP
MCHC RBC-ENTMCNC: 27.9 PG — SIGNIFICANT CHANGE UP (ref 27–34)
MCHC RBC-ENTMCNC: 31.2 GM/DL — LOW (ref 32–36)
MCV RBC AUTO: 89.5 FL — SIGNIFICANT CHANGE UP (ref 80–100)
MONOCYTES # BLD AUTO: 1.43 K/UL — HIGH (ref 0–0.9)
MONOCYTES NFR BLD AUTO: 12 % — SIGNIFICANT CHANGE UP (ref 2–14)
NEUTROPHILS # BLD AUTO: 8.79 K/UL — HIGH (ref 1.8–7.4)
NEUTROPHILS NFR BLD AUTO: 70 % — SIGNIFICANT CHANGE UP (ref 43–77)
NEUTS BAND # BLD: 4 % — SIGNIFICANT CHANGE UP (ref 0–8)
NRBC # BLD: 0 /100 WBCS — SIGNIFICANT CHANGE UP (ref 0–0)
PHOSPHATE SERPL-MCNC: 3.5 MG/DL — SIGNIFICANT CHANGE UP (ref 2.5–4.5)
PLAT MORPH BLD: NORMAL — SIGNIFICANT CHANGE UP
PLATELET # BLD AUTO: 286 K/UL — SIGNIFICANT CHANGE UP (ref 150–400)
POTASSIUM SERPL-MCNC: 4.1 MMOL/L — SIGNIFICANT CHANGE UP (ref 3.5–5.3)
POTASSIUM SERPL-SCNC: 4.1 MMOL/L — SIGNIFICANT CHANGE UP (ref 3.5–5.3)
RBC # BLD: 4.48 M/UL — SIGNIFICANT CHANGE UP (ref 4.2–5.8)
RBC # FLD: 13 % — SIGNIFICANT CHANGE UP (ref 10.3–14.5)
RBC BLD AUTO: NORMAL — SIGNIFICANT CHANGE UP
RSV RNA NPH QL NAA+NON-PROBE: SIGNIFICANT CHANGE UP
SARS-COV-2 RNA SPEC QL NAA+PROBE: SIGNIFICANT CHANGE UP
SODIUM SERPL-SCNC: 143 MMOL/L — SIGNIFICANT CHANGE UP (ref 135–145)
SPECIMEN SOURCE: SIGNIFICANT CHANGE UP
WBC # BLD: 11.88 K/UL — HIGH (ref 3.8–10.5)
WBC # FLD AUTO: 11.88 K/UL — HIGH (ref 3.8–10.5)

## 2024-04-11 PROCEDURE — 99222 1ST HOSP IP/OBS MODERATE 55: CPT

## 2024-04-11 PROCEDURE — 99233 SBSQ HOSP IP/OBS HIGH 50: CPT

## 2024-04-11 PROCEDURE — 74230 X-RAY XM SWLNG FUNCJ C+: CPT | Mod: 26

## 2024-04-11 PROCEDURE — 99222 1ST HOSP IP/OBS MODERATE 55: CPT | Mod: GC

## 2024-04-11 RX ORDER — LOSARTAN POTASSIUM 100 MG/1
25 TABLET, FILM COATED ORAL DAILY
Refills: 0 | Status: DISCONTINUED | OUTPATIENT
Start: 2024-04-11 | End: 2024-04-15

## 2024-04-11 RX ORDER — AMLODIPINE BESYLATE 2.5 MG/1
10 TABLET ORAL DAILY
Refills: 0 | Status: DISCONTINUED | OUTPATIENT
Start: 2024-04-11 | End: 2024-04-15

## 2024-04-11 RX ORDER — PANTOPRAZOLE SODIUM 20 MG/1
40 TABLET, DELAYED RELEASE ORAL DAILY
Refills: 0 | Status: DISCONTINUED | OUTPATIENT
Start: 2024-04-11 | End: 2024-04-15

## 2024-04-11 RX ORDER — QUETIAPINE FUMARATE 200 MG/1
25 TABLET, FILM COATED ORAL EVERY 8 HOURS
Refills: 0 | Status: DISCONTINUED | OUTPATIENT
Start: 2024-04-11 | End: 2024-04-15

## 2024-04-11 RX ORDER — COLCHICINE 0.6 MG
0.6 TABLET ORAL
Refills: 0 | Status: COMPLETED | OUTPATIENT
Start: 2024-04-11 | End: 2024-04-14

## 2024-04-11 RX ORDER — ACETAMINOPHEN 500 MG
650 TABLET ORAL EVERY 6 HOURS
Refills: 0 | Status: DISCONTINUED | OUTPATIENT
Start: 2024-04-11 | End: 2024-04-15

## 2024-04-11 RX ORDER — POLYETHYLENE GLYCOL 3350 17 G/17G
17 POWDER, FOR SOLUTION ORAL DAILY
Refills: 0 | Status: DISCONTINUED | OUTPATIENT
Start: 2024-04-11 | End: 2024-04-15

## 2024-04-11 RX ORDER — TRAMADOL HYDROCHLORIDE 50 MG/1
25 TABLET ORAL EVERY 4 HOURS
Refills: 0 | Status: DISCONTINUED | OUTPATIENT
Start: 2024-04-11 | End: 2024-04-15

## 2024-04-11 RX ORDER — TRAMADOL HYDROCHLORIDE 50 MG/1
50 TABLET ORAL EVERY 4 HOURS
Refills: 0 | Status: DISCONTINUED | OUTPATIENT
Start: 2024-04-11 | End: 2024-04-15

## 2024-04-11 RX ORDER — SENNA PLUS 8.6 MG/1
2 TABLET ORAL AT BEDTIME
Refills: 0 | Status: DISCONTINUED | OUTPATIENT
Start: 2024-04-11 | End: 2024-04-15

## 2024-04-11 RX ORDER — INDOMETHACIN 50 MG
50 CAPSULE ORAL EVERY 12 HOURS
Refills: 0 | Status: DISCONTINUED | OUTPATIENT
Start: 2024-04-11 | End: 2024-04-11

## 2024-04-11 RX ORDER — PANTOPRAZOLE SODIUM 20 MG/1
40 TABLET, DELAYED RELEASE ORAL DAILY
Refills: 0 | Status: DISCONTINUED | OUTPATIENT
Start: 2024-04-11 | End: 2024-04-11

## 2024-04-11 RX ORDER — INDOMETHACIN 50 MG
50 CAPSULE ORAL
Refills: 0 | Status: DISCONTINUED | OUTPATIENT
Start: 2024-04-11 | End: 2024-04-11

## 2024-04-11 RX ORDER — PANTOPRAZOLE SODIUM 20 MG/1
40 TABLET, DELAYED RELEASE ORAL
Refills: 0 | Status: DISCONTINUED | OUTPATIENT
Start: 2024-04-11 | End: 2024-04-11

## 2024-04-11 RX ADMIN — SENNA PLUS 2 TABLET(S): 8.6 TABLET ORAL at 21:55

## 2024-04-11 RX ADMIN — LOSARTAN POTASSIUM 25 MILLIGRAM(S): 100 TABLET, FILM COATED ORAL at 05:30

## 2024-04-11 RX ADMIN — Medication 0.6 MILLIGRAM(S): at 05:30

## 2024-04-11 RX ADMIN — CEFTRIAXONE 100 MILLIGRAM(S): 500 INJECTION, POWDER, FOR SOLUTION INTRAMUSCULAR; INTRAVENOUS at 21:55

## 2024-04-11 RX ADMIN — ENOXAPARIN SODIUM 40 MILLIGRAM(S): 100 INJECTION SUBCUTANEOUS at 21:55

## 2024-04-11 RX ADMIN — POLYETHYLENE GLYCOL 3350 17 GRAM(S): 17 POWDER, FOR SOLUTION ORAL at 12:10

## 2024-04-11 RX ADMIN — CHLORHEXIDINE GLUCONATE 1 APPLICATION(S): 213 SOLUTION TOPICAL at 22:58

## 2024-04-11 RX ADMIN — QUETIAPINE FUMARATE 25 MILLIGRAM(S): 200 TABLET, FILM COATED ORAL at 13:19

## 2024-04-11 RX ADMIN — QUETIAPINE FUMARATE 25 MILLIGRAM(S): 200 TABLET, FILM COATED ORAL at 05:30

## 2024-04-11 RX ADMIN — AMLODIPINE BESYLATE 10 MILLIGRAM(S): 2.5 TABLET ORAL at 05:46

## 2024-04-11 RX ADMIN — Medication 1 TABLET(S): at 12:10

## 2024-04-11 RX ADMIN — QUETIAPINE FUMARATE 25 MILLIGRAM(S): 200 TABLET, FILM COATED ORAL at 21:55

## 2024-04-11 RX ADMIN — Medication 0.6 MILLIGRAM(S): at 17:08

## 2024-04-11 NOTE — SWALLOW VFSS/MBS ASSESSMENT ADULT - CONSISTENCIES ADMINISTERED
MODERATELY THICK LIQUIDS CUP and straw - serial sips as pt not able to follow directives for single sips; mildly thick liquids straw- serial sips, thin liquids tsp and thin liquids straw puree tsp, regular solids/cookie bite,

## 2024-04-11 NOTE — CHART NOTE - NSCHARTNOTEFT_GEN_A_CORE
CAPTIVA CONSENT and SCREENING     73-year-old male, with a past history of hypertension, hyperlipidemia, diabetes mellitus, presented on 4/8/2024 with a history of left upper and lower extremity weakness.    MR head revealed acute infarction in left ICA and left V4 vertebral artery vascular distribution. CTA head showed severe stenosis of left V4 (Ds 1.14 mm, Dn 3.91 mm; 71% by WASID criteria).   We discussed potential enrollment in CAPTIVA trial with the patient.     Inclusion and exclusion criteria were reviewed. Informed consent was reviewed in detail by Jose Angel barron MD. Patient has an acute ischemic stroke in the territory supplied by the severely stenotic V4 vertebral artery. The stenosis is attributable to atherosclerotic disease based on his vascular risk factors and imaging features. Patient does not have a history of atrial fibrillation or evidence for cardioembolism, and EKG was performed prior to consenting and medical history was reviewed. Clinical team discussed with his primary care provider who confirmed that there is no known history of heart disease or atrial fibrillation. He has no history of intracranial hemorrhage, or major surgery. MRS and NIHSS were done prior to and after randomization, and patient can swallow the pills as per current protocol. Patient understands the need for follow-up trial visits. Stephen GRIGGS-INDU Munoz MD will consent the patient.     Chino Munoz MD PhD  Vascular neurology fellow

## 2024-04-11 NOTE — PROGRESS NOTE ADULT - SUBJECTIVE AND OBJECTIVE BOX
THE PATIENT WAS SEEN AND EXAMINED BY ME WITH THE HOUSESTAFF AND STROKE TEAM DURING MORNING ROUNDS.   HPI:  58M Hx eczema/Gout intermittently takes ASA for pain, p/f being found down by wife after hearing thud. No hx HTN. 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 CTA grossly negative. Coags/TEG/ARU pend. ED intubated for inability to manage secretions. ICH score 2   Exam prior to intubation: eye opening apraxia, Ox1, PERRL, upgaze and right gaze palsy could cross midline, int FC, L side 5/5, LUE extensor, LLe flaccid   (02 Apr 2024 23:51)      SUBJECTIVE: No events overnight.  No new neurologic complaints.  ROS reported negative unless otherwise noted.    acetaminophen     Tablet .. 650 milliGRAM(s) Oral every 6 hours PRN  amLODIPine   Tablet 10 milliGRAM(s) Oral daily  cefTRIAXone   IVPB 1000 milliGRAM(s) IV Intermittent every 24 hours  chlorhexidine 4% Liquid 1 Application(s) Topical <User Schedule>  colchicine 0.6 milliGRAM(s) Oral two times a day  dextrose 10% Bolus 125 milliLiter(s) IV Bolus once  dextrose 5%. 1000 milliLiter(s) IV Continuous <Continuous>  dextrose 5%. 1000 milliLiter(s) IV Continuous <Continuous>  dextrose 50% Injectable 25 Gram(s) IV Push once  dextrose 50% Injectable 12.5 Gram(s) IV Push once  dextrose Oral Gel 15 Gram(s) Oral once PRN  enoxaparin Injectable 40 milliGRAM(s) SubCutaneous <User Schedule>  glucagon  Injectable 1 milliGRAM(s) IntraMuscular once  indomethacin Suppository 50 milliGRAM(s) Rectal every 12 hours  insulin lispro (ADMELOG) corrective regimen sliding scale   SubCutaneous every 6 hours  losartan 25 milliGRAM(s) Oral daily  multivitamin 1 Tablet(s) Oral daily  ondansetron Injectable 4 milliGRAM(s) IV Push every 6 hours PRN  polyethylene glycol 3350 17 Gram(s) Oral daily  QUEtiapine 25 milliGRAM(s) Oral every 8 hours  senna 2 Tablet(s) Oral at bedtime  sodium chloride 0.9%. 1000 milliLiter(s) IV Continuous <Continuous>  traMADol 25 milliGRAM(s) Oral every 4 hours PRN  traMADol 50 milliGRAM(s) Oral every 4 hours PRN      PHYSICAL EXAM:   Vital Signs Last 24 Hrs  T(C): 36.5 (11 Apr 2024 08:12), Max: 38.9 (10 Apr 2024 16:30)  T(F): 97.7 (11 Apr 2024 08:12), Max: 102 (10 Apr 2024 16:30)  HR: 88 (11 Apr 2024 08:12) (55 - 122)  BP: 123/78 (11 Apr 2024 08:12) (110/72 - 156/90)  BP(mean): 80 (11 Apr 2024 00:00) (80 - 115)  RR: 18 (11 Apr 2024 08:12) (8 - 30)  SpO2: 95% (11 Apr 2024 08:12) (91% - 100%)    Parameters below as of 11 Apr 2024 08:12  Patient On (Oxygen Delivery Method): nasal cannula  O2 Flow (L/min): 2      General: No acute distress  HEENT: EOM intact, visual fields full  Abdomen: Soft, nontender, nondistended   Extremities: No edema    Neuro exam:  MS: Awake, alert, oriented to self, month, not year, unclear if oriented to location (stated different things, like bathroom and hospital, unclear if disorientation vs semantic paraphasic errors), follows simple commands, speech fluent but with moderate dysarthria  CN: pupils equal, mild R gaze palsy, mild R facial droop  Motor: 5/5 LUE strength, LLE strength antigravity but pain limited, no RUE or RLE movement .   Sensory: Moderately decreased sensation to LT in RUE and RLE.      LABS:                        12.5   11.88 )-----------( 286      ( 11 Apr 2024 06:36 )             40.1    04-11    143  |  107  |  26<H>  ----------------------------<  99  4.1   |  24  |  0.79    Ca    8.8      11 Apr 2024 06:35  Phos  3.5     04-11  Mg     2.5     04-11    TPro  6.8  /  Alb  3.3  /  TBili  0.6  /  DBili  x   /  AST  29  /  ALT  27  /  AlkPhos  69  04-10        IMAGING: Reviewed by me.      CT Head No Cont (04.07.24 @ 10:25)     IMPRESSION: No change in left basal ganglia and thalamic parenchymal hemorrhage compared with 4/5/2024.      CT Angio Brain Stroke Protocol  w/ IV Cont (04.02.24 @ 21:24) >  IMPRESSION:    CT ANGIOGRAPHY NECK:  1. Cervical carotid systems and vertebral arteries are patent bilaterally   without stenosis.  No hemodynamically significant carotid stenosis using   NASCET criteria.  2.  Short segment dissection flap in the proximal right internal carotid   artery, of unknown chronicity.      CT ANGIOGRAPHY BRAIN:  1.  No major vessel occlusion, stenosis or aneurysm is identified about   the [place].  Normal anatomic variants as discussed above.  2.  The dural venous sinuses are incompletely opacified due to   acquisition during early arterial phase.  3.  No evidence of an arteriovenous malformation.  4.  No evidence of active contrast extravasation into the patient's left   thalamic hematoma.    CT Head No Cont (04.03.24 @ 01:46) >  IMPRESSION:Acute intraparenchymal hemorrhage centered in the left thalamus measures approximately 3.3 x 1.5 x 4.2 cm in the sagittal and coronal planes, stable from 04/02/2024 at 9:19 PM.

## 2024-04-11 NOTE — SWALLOW VFSS/MBS ASSESSMENT ADULT - DIAGNOSTIC IMPRESSIONS
Mr. Mishra is p/w an oropharyngeal dysphagia in presence of medical course, as listed above. Swallow profile marked by reduced bolus control, prolonged a-p transport and manipulation, latency in the swallow trigger, reduced laryngeal closure leading to bolus navigation towards airway prior to and during the swallow trigger most often with less viscous liquids and serial/sequential sips. Penetration appreciated with lesser viscous liquids and eventual aspiration captured with thin liquids, with noting that the patient was INSENSATE to aspirated materials and penetrated materials. Patient was unable to follow simple commands such as 'one sip at a time' despite max reinforcement and patient repeating instruction back to SLP.     At this time, conservative management encouraged given above swallow profile, current impulsiveness and mentation with plan for repeat objective following optimization. He will require a repeat MBS for advancement of liquids. May therapeutically advance solids bedside with SLP pending impulsivity and clinical picture.

## 2024-04-11 NOTE — SWALLOW VFSS/MBS ASSESSMENT ADULT - ROSENBEK'S PENETRATION ASPIRATION SCALE
(1) no aspiration, contrast does not enter airway mild straw = 3 , moderately thick liquids straw and cup =2 , thin liquids straw =8, thin liquids tsp=5

## 2024-04-11 NOTE — CHART NOTE - NSCHARTNOTESELECT_GEN_ALL_CORE
NEURO IR/Event Note
Nutrition Services
UPA
EEG Prelim
NEURO IR/Event Note
Speech & Swallow
[place] Consent/Event Note
Transfer in Care/Event Note
VTE Risk Assessment/Event Note

## 2024-04-11 NOTE — CONSULT NOTE ADULT - SUBJECTIVE AND OBJECTIVE BOX
HPI: Patient is a 58 year old male with a PMHx of Eczema, Gout who was reportedly intermittently taking ASA for pain, who presented to [facility] on 4/2/2024 for being found down by his wife. Per neurology, patient without history of HTN. Patient was found to have a L thalamic IPH extending to the midbrain with no IVH or hydro, with modest mass effect. CTA grossly negative. Patient was intubated in the ER for inability to manage secretions. Internal Medicine has been consulted on Mr. Mishra's care for medical management.        REVIEW OF SYSTEMS:  Limited ROS      MEDICATIONS:  MEDICATIONS  (STANDING):  amLODIPine   Tablet 10 milliGRAM(s) Oral daily  cefTRIAXone   IVPB 1000 milliGRAM(s) IV Intermittent every 24 hours  chlorhexidine 4% Liquid 1 Application(s) Topical <User Schedule>  colchicine 0.6 milliGRAM(s) Oral two times a day  dextrose 10% Bolus 125 milliLiter(s) IV Bolus once  dextrose 5%. 1000 milliLiter(s) (100 mL/Hr) IV Continuous <Continuous>  dextrose 5%. 1000 milliLiter(s) (50 mL/Hr) IV Continuous <Continuous>  dextrose 50% Injectable 25 Gram(s) IV Push once  dextrose 50% Injectable 12.5 Gram(s) IV Push once  enoxaparin Injectable 40 milliGRAM(s) SubCutaneous <User Schedule>  glucagon  Injectable 1 milliGRAM(s) IntraMuscular once  indomethacin Suppository 50 milliGRAM(s) Rectal every 12 hours  insulin lispro (ADMELOG) corrective regimen sliding scale   SubCutaneous every 6 hours  losartan 25 milliGRAM(s) Oral daily  multivitamin 1 Tablet(s) Oral daily  polyethylene glycol 3350 17 Gram(s) Oral daily  QUEtiapine 25 milliGRAM(s) Oral every 8 hours  senna 2 Tablet(s) Oral at bedtime  sodium chloride 0.9%. 1000 milliLiter(s) (50 mL/Hr) IV Continuous <Continuous>      PHYSICAL EXAM:  T(C): 36.5 (04-11-24 @ 08:12), Max: 38.9 (04-10-24 @ 16:30)  HR: 88 (04-11-24 @ 08:12) (55 - 122)  BP: 123/78 (04-11-24 @ 08:12) (110/72 - 147/88)  RR: 18 (04-11-24 @ 08:12) (8 - 30)  SpO2: 95% (04-11-24 @ 08:12) (91% - 100%)  Wt(kg): --  I&O's Summary    10 Apr 2024 07:01  -  11 Apr 2024 07:00  --------------------------------------------------------  IN: 1320 mL / OUT: 1250 mL / NET: 70 mL          Appearance: Awake, weak appearing male, HOB elevated 	  HEENT: Eyes are open; NGT   Lymphatic: No lymphadenopathy grossly   Cardiovascular: Normal    Respiratory: normal effort; NC   Gastrointestinal:  Soft   Skin: No rashes grossly   Psychiatry:  Calm   Musculoskeletal: LE Edema; Rt sided weakness       04-10-24 @ 07:01  -  04-11-24 @ 07:00  --------------------------------------------------------  IN: 1320 mL / OUT: 1250 mL / NET: 70 mL                              12.5   11.88 )-----------( 286      ( 11 Apr 2024 06:36 )             40.1               04-11    143  |  107  |  26<H>  ----------------------------<  99  4.1   |  24  |  0.79    Ca    8.8      11 Apr 2024 06:35  Phos  3.5     04-11  Mg     2.5     04-11    TPro  6.8  /  Alb  3.3  /  TBili  0.6  /  DBili  x   /  AST  29  /  ALT  27  /  AlkPhos  69  04-10                       Urinalysis Basic - ( 11 Apr 2024 06:35 )    Color: x / Appearance: x / SG: x / pH: x  Gluc: 99 mg/dL / Ketone: x  / Bili: x / Urobili: x   Blood: x / Protein: x / Nitrite: x   Leuk Esterase: x / RBC: x / WBC x   Sq Epi: x / Non Sq Epi: x / Bacteria: x          Culture - Blood (04.06.24 @ 02:03)   Specimen Source: .Blood Blood-Venous  Culture Results: No growth at 5 days    Culture - Blood (04.06.24 @ 02:03)   Specimen Source: .Blood Blood-Peripheral  Culture Results: No growth at 5 days    Culture - Blood (04.05.24 @ 01:27)   Gram Stain:   Growth in aerobic bottle: Gram Positive Cocci in Clusters  - Staphylococcus epidermidis: Detec  Specimen Source: .Blood Blood  Organism: Blood Culture PCR  Culture Results:   Growth in aerobic bottle: Staphylococcus epidermidis     < from: CT Brain Stroke Protocol (04.02.24 @ 21:24) >  IMPRESSION:  An acute left thalamic hemorrhage measures approximately 3.2 x 1.7 x 3.9   cm in the sagittal and coronal planes.  No evidence of acute cerebral infarction.  Dr. Santos MD radiology discussed the findings with Dr. Cherise MD on   4/2/2024 at9:30pm.  Read back verification was obtained.    < end of copied text >      < from: CT Angio Neck Stroke Protocol w/ IV Cont (04.02.24 @ 21:24) >  IMPRESSION:    CT ANGIOGRAPHY NECK:  1. Cervical carotid systems and vertebral arteries are patent bilaterally   without stenosis.  No hemodynamically significant carotid stenosis using   NASCET criteria.  2.  Short segment dissection flap in the proximal right internal carotid   artery, of unknown chronicity.      CT ANGIOGRAPHY BRAIN:  1.  No major vessel occlusion, stenosis or aneurysm is identified about   the [place].  Normal anatomic variants as discussed above.  2.  The dural venous sinuses are incompletely opacified due to   acquisition during early arterial phase.  3.  No evidence of an arteriovenous malformation.  4.  No evidence of active contrast extravasation into the patient's left   thalamic hematoma.    < end of copied text >      < from: CT Head No Cont (04.07.24 @ 10:25) >    IMPRESSION: No change in left basal ganglia and thalamic parenchymal   hemorrhage compared with 4/5/2024.    < end of copied text >        < from: VA Duplex Lower Ext Vein Scan, Bilat (04.09.24 @ 16:01) >  IMPRESSION:  No evidence of deep venous thrombosis in either lower extremity.      < end of copied text >    < from: VA Duplex Upper Extrem Arterial Limited, Right (04.08.24 @ 12:27) >  Impression: Right upper extremity arterial vascular disease is not   identified.    < end of copied text >      < from: TTE W or WO Ultrasound Enhancing Agent (04.03.24 @ 10:08) >  CONCLUSIONS:      1. Left ventricular cavity is normal in size. Left ventricular wall thickness is normal. Left ventricular systolic function is normal with an ejection fraction of 69 % by Maharaj's method of disks.   2. Normal systolic function. Tricuspid annular plane systolic excursion (TAPSE) is 2.1 cm (normal >=1.7 cm).   3. No pericardial effusion seen.   4. No prior echocardiogram is available for comparison.   5. Technically difficult image quality.   6. The inferior vena cava is dilated measuring 2.46 cm in diameter, (dilated >2.1cm) with abnormal inspiratory collapse (abnormal <50%) consistent with elevated right atrial pressure (~15, range 10-20mmHg).   7. There is no evidence of a left ventricular thrombus.   8. There is normal LV mass and normal geometry.    < end of copied text >

## 2024-04-11 NOTE — SWALLOW VFSS/MBS ASSESSMENT ADULT - PHARYNGEAL PHASE COMMENTS
Pharyngeal phase c/b latency in the swallow trigger to the level of the valleculae with consistent spillover to the pyriform sinuses. Base of tongue/BoT retraction and pharyngeal constriction were reduced and resulted in reduced pharyngeal bolus propulsion. Hyolaryngeal elevation and excursion were reduced. Adequate epiglottic inversion. Adequate relaxation of UES. Penetration appearing to be occurring prior to and during deglutition; most often from bolus in pyriform sinuses prior to deglutition with bolus navigating towards airway with inconsistent shallow to deep penetration most often with less viscous liquids in serial amounts. Eventual aspiration with thin liquids, patient insensate to aspirated material; cue to cough with limited effectiveness at ejecting material. Noted latent cough response 2-3 minutes post trial. Thin liquids via tsp appeared to navigated onto the vocal cords, insensate.     To note, high shoulder girdle and motion artifact obscured viewing throughout this study. Pharyngeal phase c/b latency in the swallow trigger to the level of the valleculae with consistent spillover to the pyriform sinuses. Base of tongue/BoT retraction and pharyngeal constriction were reduced and resulted in reduced pharyngeal bolus propulsion. Trace-mild to mild-moderate vallecular residue present, marked by increase with advancement in solid textures. Partially cleared with dry repeat swallow.  Hyolaryngeal elevation and excursion were reduced. Adequate epiglottic inversion. Adequate relaxation of UES. No penetration or aspiration were visualized. Can not r/o eventual penetration or aspiration with more advanced solids given level of residue, required liquid wash to partially-fully clear.     To note, high shoulder girdle and motion artifact obscured viewing throughout this study.

## 2024-04-11 NOTE — CONSULT NOTE ADULT - ATTENDING COMMENTS
58 year old man with non palpable right radial artery pulse  likely occluded  can obtain arterial duplex to confirm  however, no concern for ischemia to the hand, triphasic palmar arch signal  pt with poor prognosis overall  continue care as per primary team
I reviewed available diagnostic studies, and reviewed images personally. I agree with resident & fellow 's history, exam, orders placed, and plan of care. Thirty minutes of critical care time was spent in caring for this patient who has concern of sudden and clinically significant deterioration requiring a high level of physician preparedness, management of brain supporting interventions, and frequent physician assessments.    Medical issues needing to be addressed include: Nontraumatic intracerebral hemorrhage in thalamus, HTN, R hemiparesis, AMS. BP management per NSCU. CTA w/ finding of ICA flap (possible dissection), but no underlying vascular abnormality. Pt going for angiogram. Still suspect primary hypertensive hemorrhage at this time, and CTA fiding is incidental (or may be related to trauma wife reported). WIll follow up. BP & respiratory management per NSCU, pt still intubated, wean to extubate when ok by ICU team.
fellows note addended as above    Dr. Xiao Thomas  Attending Physician  Division of Rheumatology, [facility]  [employer]  [email or web address]

## 2024-04-11 NOTE — CONSULT NOTE ADULT - ASSESSMENT
Patient is a 58 year old male with a PMHx of Eczema, Gout who was reportedly intermittently taking ASA for pain, who presented to [facility] on 4/2/2024 for being found down by his wife. Per neurology, patient without history of HTN. Patient was found to have a L thalamic IPH extending to the midbrain with no IVH or hydro, with modest mass effect. CTA grossly negative. Patient was intubated in the ER for inability to manage secretions. Internal Medicine has been consulted on Mr. Mishra's care for medical management.        IPH   - 4/2 CT H w/ acute L IPH  - 4/2 CTA H/N w/ Short segment dissection flap of the proximal R ICA  - 4/7 CT H w/ no change in the L basal ganglia and thalamic parenchymal hemorrhage   - MR head pending   - Likely 2/2 HTN as per Stroke neurology   - Neuro checks as per protocol  - Monitor on tele   - PT / OT/ S+S  - Fall, Seizure, Aspiration precautions   - NSICU care appreciated  - Per Stroke Neurology     R/O Sepsis --> Likely 2/2 UTI   - Pt febrile, tachycardic, with leukocytosis   - 4/5 BCx 1 of 2 w/ Staph epi, 4/6 BCx2 negative   - F/u 4/10 UCx and BCx 2 --> In lab  - Likely UTI in view of grossly + UA   - Lactate negative, RSV/Flu/Covid negative, Procal 0.29   - On Rocephin for ABX   - Antipyretics PRN  - Continue to monitor and trend CBC, temp curve, VS   - If recurrently febrile while on ABX, suggest CT C/A/P infectious work up     Dysphagia   - W/ NGT  - Aspiration precautions; Oral care  - GI eval appreciated; F/u recs --> Tentative plan for EGD/ PEG on Monday if fails repeat S+S     Hypoxia  - S/P NSICU, S/P Intubation and now self extubated 4/6  - Incentive spirometer   - Monitor O2 saturation; Supplement to maintain > 90%     Gout, LE Edema   - 4/9 Duplex negative for DVT  - Home allopurinol was held on admission. Indomethacin resumed per Rheum   - On Colchicine 0.6 PO BID  - Per Rheumatology     Pre-DM  - A1C of 5.8  - On Sliding scale  - Monitor glucose levels and adjust as tolerated    HTN   - On Losartan 25 Mg and Norvasc 10   - TTE w/ EF of 69%, no evidence of LV thrombus   - Monitor BP and adjust as tolerated    PPX  - On Lovenox 40   - Start PPI       Discussed with Attending    Patient is a 58 year old male with a PMHx of Eczema, Gout who was reportedly intermittently taking ASA for pain, who presented to [facility] on 4/2/2024 for being found down by his wife. Per neurology, patient without history of HTN. Patient was found to have a L thalamic IPH extending to the midbrain with no IVH or hydro, with modest mass effect. CTA grossly negative. Patient was intubated in the ER for inability to manage secretions. Internal Medicine has been consulted on Mr. Mishra's care for medical management.        IPH   - 4/2 CT H w/ acute L IPH  - 4/2 CTA H/N w/ Short segment dissection flap of the proximal R ICA  - 4/7 CT H w/ no change in the L basal ganglia and thalamic parenchymal hemorrhage   - MR head pending   - Likely 2/2 HTN as per Stroke neurology;  Consider ILR placement to rule out occult arrythmia   - Neuro checks as per protocol  - Monitor on tele   - PT / OT/ S+S  - Fall, Seizure, Aspiration precautions   - NSICU care appreciated  - Per Stroke Neurology     R/O Sepsis --> Likely 2/2 UTI   - Pt febrile, tachycardic, with leukocytosis   - 4/5 BCx 1 of 2 w/ Staph epi, 4/6 BCx2 negative   - F/u 4/10 UCx and BCx 2 --> In lab  - Likely UTI in view of grossly + UA   - Lactate negative, RSV/Flu/Covid negative, Procal 0.29   - On Rocephin for ABX   - Antipyretics PRN  - Continue to monitor and trend CBC, temp curve, VS   - If recurrently febrile while on ABX, suggest CT C/A/P infectious work up     Dysphagia   - W/ NGT  - Aspiration precautions; Oral care  - GI eval appreciated; F/u recs --> Tentative plan for EGD/ PEG on Monday if fails repeat S+S     Hypoxia  - S/P NSICU, S/P Intubation and now self extubated 4/6  - Incentive spirometer   - Monitor O2 saturation; Supplement to maintain > 90%     Gout, LE Edema   - 4/9 Duplex negative for DVT  - Home allopurinol was held on admission. Indomethacin resumed per Rheum   - On Colchicine 0.6 PO BID  - Per Rheumatology     Pre-DM  - A1C of 5.8  - On Sliding scale  - Monitor glucose levels and adjust as tolerated    HTN   - On Losartan 25 Mg and Norvasc 10   - TTE w/ EF of 69%, no evidence of LV thrombus   - Monitor BP and adjust as tolerated    PPX  - On Lovenox 40   - Start PPI       Discussed with Attending

## 2024-04-11 NOTE — PROGRESS NOTE ADULT - ASSESSMENT
ASSESSMENT: 58M Hx eczema/Gout intermittently takes ASA for pain, p/f being found down by wife after hearing thud. No hx HTN. 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 CTA grossly negative.    Impression: Subjectively improving (although still R hemiplegia on examination) R sensorimotor deficit with mild R gaze palsy, moderate dysarthria, and possible subtle subcortical aphasia (given possible rare semantic paraphasic errors), due to L thalamic IPH, likely i/s/o hypertension (although no prior diagnosis). Angiogram negative for underlying vascular malformation.    NEURO: Continue close monitoring for neurologic deterioration, goal BP<140/90, Pending MRI Brain W/w/o, CTH, CTA Head w/o and Neck w/contrast results above. Physical therapy/OT/Speech eval/treatment. D/c VPA (greater than 7 days after initial presentation, and subcortical location of ICH, EEG negative, no need for further seizure ppx at this time)    ANTITHROMBOTIC THERAPY: none due to thalamic IPH    PULMONARY: CXR clear, protecting airway, saturating well     CARDIOVASCULAR: check TTE, cardiac monitoring. c/w Losartan 25mg + Amlodipine 10mg for HTN (goal normotension)                              SBP goal: <140    GASTROINTESTINAL:  dysphagia screen failed.  c/w senna and miralax for bowel regimen     Diet: NPO with jevity 1.5 with goal rate 60ml/hr    RENAL: BUN/Cr stable, good urine output      Na Goal: Greater than 135     Canales:    HEMATOLOGY: H/H stable, Platelets normal      DVT ppx: Heparin s.c [] LMWH [x]     ID: Pt found to be febrile to 102 on 4/10 PM, tachycardic as well. Ordered BCx, UCx, CXR, RVP, UA+,ceftriaxone started    OTHER: For potential gout flare, pt currently on Colchicine 0.6mg BID. Will resume home Indomethacin 50mg BID. Pt's allopurinol 100mg bid was not given previously this admission, which could have contributed to current flare. Will consult rheumatology regarding management of Gout, including when to resume allopurinol.    DISPOSITION:  PT recommending acute rehab, will f/u with CM/SW.      CORE MEASURES:        Admission NIHSS:      Tenectaplase: [] YES [x] NO      LDL/HDL: 107/67     Depression Screen: no      Statin Therapy: No     Dysphagia Screen: [] PASS [X] FAIL     Smoking [] YES [X] NO      Afib [] YES [X] NO     Stroke Education [X] YES [] NO    Obtain screening lower extremity venous ultrasound in patients who meet 1 or more of the following criteria as patient is high risk for DVT/PE on admission:   [] History of DVT/PE  []Hypercoagulable states (Factor V Leiden, Cancer, OCP, etc. )  []Prolonged immobility (hemiplegia/hemiparesis/post operative or any other extended immobilization)  [] Transferred from outside facility (Rehab or Long term care)  [] Age </= to 50         ASSESSMENT: 58M Hx eczema/Gout intermittently takes ASA for pain, p/f being found down by wife after hearing thud. No hx HTN. 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 CTA grossly negative.    Impression: Subjectively improving (although still R hemiplegia on examination) R sensorimotor deficit with mild R gaze palsy, moderate dysarthria, and possible subtle subcortical aphasia (given possible rare semantic paraphasic errors), due to L thalamic IPH, likely i/s/o hypertension (although no prior diagnosis). Angiogram negative for underlying vascular malformation.    NEURO: Continue close monitoring for neurologic deterioration, goal BP<140/90, Pending MRI Brain W/w/o, CTH, CTA Head w/o and Neck w/contrast results above. Physical therapy/OT/Speech eval/treatment. D/c VPA (greater than 7 days after initial presentation, and subcortical location of ICH, EEG negative, no need for further seizure ppx at this time)    ANTITHROMBOTIC THERAPY: none due to thalamic IPH, Patient will potentially be enrolled in CAPTIV[employer] trial    PULMONARY: CXR clear, protecting airway, saturating well     CARDIOVASCULAR: check TTE, cardiac monitoring. c/w Losartan 25mg + Amlodipine 10mg for HTN (goal normotension).                               SBP goal: <140    GASTROINTESTINAL:  dysphagia screen failed.  c/w senna and miralax for bowel regimen. MBS today, tentative plan for EGD/PEG on Monday if fails repeat swallow eval      Diet: NPO with jevity 1.5 with goal rate 60ml/hr    RENAL: BUN/Cr stable, good urine output      Na Goal: Greater than 135     Canales:    HEMATOLOGY: H/H stable, Platelets normal      DVT ppx: Heparin s.c [] LMWH [x]     Rheumatology: appreciated recommendations: -C/w colchicine at current dose, hold indomethacin and allopurinol  -Check HLA [id number] given higher prevalence in  population, though lower suspicion of allopurinol hypersensitivity given absence of acute rash  -Radiographs of right foot, bilateral knees    ID: Pt found to be febrile to 102 on 4/10 PM, tachycardic as well. Ordered BCx, UCx, CXR, RVP, UA+,ceftriaxone started, medicine team following    OTHER: For potential gout flare, pt currently on Colchicine 0.6mg BID. Will resume home Indomethacin 50mg BID. Pt's allopurinol 100mg bid was not given previously this admission, which could have contributed to current flare. Will consult rheumatology regarding management of Gout, including when to resume allopurinol.    DISPOSITION:  PT recommending acute rehab, will f/u with CM/SW.      CORE MEASURES:        Admission NIHSS:      Tenectaplase: [] YES [x] NO      LDL/HDL: 107/67     Depression Screen: no      Statin Therapy: No     Dysphagia Screen: [] PASS [X] FAIL     Smoking [] YES [X] NO      Afib [] YES [X] NO     Stroke Education [X] YES [] NO    Obtain screening lower extremity venous ultrasound in patients who meet 1 or more of the following criteria as patient is high risk for DVT/PE on admission:   [] History of DVT/PE  []Hypercoagulable states (Factor V Leiden, Cancer, OCP, etc. )  []Prolonged immobility (hemiplegia/hemiparesis/post operative or any other extended immobilization)  [] Transferred from outside facility (Rehab or Long term care)  [] Age </= to 50         ASSESSMENT: 58M Hx eczema/Gout intermittently takes ASA for pain, p/f being found down by wife after hearing thud. No hx HTN. 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 CTA grossly negative.    Impression: Subjectively improving (although still R hemiplegia on examination) R sensorimotor deficit with mild R gaze palsy, moderate dysarthria, and possible subtle subcortical aphasia (given possible rare semantic paraphasic errors), due to L thalamic IPH, likely i/s/o hypertension (although no prior diagnosis). Angiogram negative for underlying vascular malformation.    NEURO: Continue close monitoring for neurologic deterioration, goal BP<140/90, Pending MRI Brain W/w/o, CTH, CTA Head w/o and Neck w/contrast results above. Physical therapy/OT/Speech eval/treatment. D/c VPA (greater than 7 days after initial presentation, and subcortical location of ICH, EEG negative, no need for further seizure ppx at this time)    ANTITHROMBOTIC THERAPY: none due to thalamic IPH    PULMONARY: CXR clear, protecting airway, saturating well     CARDIOVASCULAR: check TTE, cardiac monitoring. c/w Losartan 25mg + Amlodipine 10mg for HTN (goal normotension).                               SBP goal: <140    GASTROINTESTINAL:  dysphagia screen failed.  c/w senna and miralax for bowel regimen. MBS today, tentative plan for EGD/PEG on Monday if fails repeat swallow eval      Diet: NPO with jevity 1.5 with goal rate 60ml/hr    RENAL: BUN/Cr stable, good urine output      Na Goal: Greater than 135     Canales:    HEMATOLOGY: H/H stable, Platelets normal      DVT ppx: Heparin s.c [] LMWH [x]     Rheumatology: appreciated recommendations: -C/w colchicine at current dose, hold indomethacin and allopurinol  -Check HLA [id number] given higher prevalence in  population, though lower suspicion of allopurinol hypersensitivity given absence of acute rash  -Radiographs of right foot, bilateral knees    ID: Pt found to be febrile to 102 on 4/10 PM, tachycardic as well. Ordered BCx, UCx, CXR, RVP, UA+,ceftriaxone started, medicine team following    OTHER: For potential gout flare, pt currently on Colchicine 0.6mg BID. Will resume home Indomethacin 50mg BID. Pt's allopurinol 100mg bid was not given previously this admission, which could have contributed to current flare. Will consult rheumatology regarding management of Gout, including when to resume allopurinol.    DISPOSITION:  PT recommending acute rehab, will f/u with CM/SW.      CORE MEASURES:        Admission NIHSS:      Tenectaplase: [] YES [x] NO      LDL/HDL: 107/67     Depression Screen: no      Statin Therapy: No     Dysphagia Screen: [] PASS [X] FAIL     Smoking [] YES [X] NO      Afib [] YES [X] NO     Stroke Education [X] YES [] NO    Obtain screening lower extremity venous ultrasound in patients who meet 1 or more of the following criteria as patient is high risk for DVT/PE on admission:   [] History of DVT/PE  []Hypercoagulable states (Factor V Leiden, Cancer, OCP, etc. )  []Prolonged immobility (hemiplegia/hemiparesis/post operative or any other extended immobilization)  [] Transferred from outside facility (Rehab or Long term care)  [] Age </= to 50

## 2024-04-11 NOTE — CONSULT NOTE ADULT - SUBJECTIVE AND OBJECTIVE BOX
HPI:  58M Hx eczema/Gout intermittently takes ASA for pain, p/f being found down by wife after hearing thud. No hx HTN. 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 CTA grossly negative. Coags/TEG/ARU pend. ED intubated for inability to manage secretions. ICH score 2   Exam prior to intubation: eye opening apraxia, Ox1, PERRL, upgaze and right gaze palsy could cross midline, int FC, L side 5/5, LUE extensor, LLe flaccid   (02 Apr 2024 23:51)    Patient was admitted on 4/2, seen today.     REVIEW OF SYSTEMS  Denies chest pain, SOB, N/V, F/C, abdominal pain     VITALS  T(C): 36.5 (04-11-24 @ 08:12), Max: 38.9 (04-10-24 @ 16:30)  HR: 88 (04-11-24 @ 08:12) (55 - 122)  BP: 123/78 (04-11-24 @ 08:12) (110/72 - 147/88)  RR: 18 (04-11-24 @ 08:12) (8 - 30)  SpO2: 95% (04-11-24 @ 08:12) (91% - 100%)  Wt(kg): --    PAST MEDICAL & SURGICAL HISTORY  Eczema    Gout        FUNCTIONAL HISTORY  Lives with wife, he is an , 8 [address]  Independent AMB and ADLS PTA     CURRENT FUNCTIONAL STATUS  PT/OT  4/10  following 50% commands  max assist x 2 for all functional mobility  limited by left ankle pain     RECENT LABS/IMAGING  CBC Full  -  ( 11 Apr 2024 06:36 )  WBC Count : 11.88 K/uL  RBC Count : 4.48 M/uL  Hemoglobin : 12.5 g/dL  Hematocrit : 40.1 %  Platelet Count - Automated : 286 K/uL  Mean Cell Volume : 89.5 fl  Mean Cell Hemoglobin : 27.9 pg  Mean Cell Hemoglobin Concentration : 31.2 gm/dL  Auto Neutrophil # : 8.79 K/uL  Auto Lymphocyte # : 1.31 K/uL  Auto Monocyte # : 1.43 K/uL  Auto Eosinophil # : 0.36 K/uL  Auto Basophil # : 0.00 K/uL  Auto Neutrophil % : 70.0 %  Auto Lymphocyte % : 11.0 %  Auto Monocyte % : 12.0 %  Auto Eosinophil % : 3.0 %  Auto Basophil % : 0.0 %    04-11    143  |  107  |  26<H>  ----------------------------<  99  4.1   |  24  |  0.79    Ca    8.8      11 Apr 2024 06:35  Phos  3.5     04-11  Mg     2.5     04-11    TPro  6.8  /  Alb  3.3  /  TBili  0.6  /  DBili  x   /  AST  29  /  ALT  27  /  AlkPhos  69  04-10    Urinalysis Basic - ( 11 Apr 2024 06:35 )    Color: x / Appearance: x / SG: x / pH: x  Gluc: 99 mg/dL / Ketone: x  / Bili: x / Urobili: x   Blood: x / Protein: x / Nitrite: x   Leuk Esterase: x / RBC: x / WBC x   Sq Epi: x / Non Sq Epi: x / Bacteria: x    < from: CT Head No Cont (04.07.24 @ 10:25) >    IMPRESSION: No change in left basal ganglia and thalamic parenchymal   hemorrhage compared with 4/5/2024.      < end of copied text >      ALLERGIES  No Known Allergies      MEDICATIONS   acetaminophen     Tablet .. 650 milliGRAM(s) Oral every 6 hours PRN  amLODIPine   Tablet 10 milliGRAM(s) Oral daily  cefTRIAXone   IVPB 1000 milliGRAM(s) IV Intermittent every 24 hours  chlorhexidine 4% Liquid 1 Application(s) Topical <User Schedule>  colchicine 0.6 milliGRAM(s) Oral two times a day  dextrose 10% Bolus 125 milliLiter(s) IV Bolus once  dextrose 5%. 1000 milliLiter(s) IV Continuous <Continuous>  dextrose 5%. 1000 milliLiter(s) IV Continuous <Continuous>  dextrose 50% Injectable 12.5 Gram(s) IV Push once  dextrose 50% Injectable 25 Gram(s) IV Push once  dextrose Oral Gel 15 Gram(s) Oral once PRN  enoxaparin Injectable 40 milliGRAM(s) SubCutaneous <User Schedule>  glucagon  Injectable 1 milliGRAM(s) IntraMuscular once  indomethacin Suppository 50 milliGRAM(s) Rectal every 12 hours  insulin lispro (ADMELOG) corrective regimen sliding scale   SubCutaneous every 6 hours  losartan 25 milliGRAM(s) Oral daily  multivitamin 1 Tablet(s) Oral daily  ondansetron Injectable 4 milliGRAM(s) IV Push every 6 hours PRN  polyethylene glycol 3350 17 Gram(s) Oral daily  QUEtiapine 25 milliGRAM(s) Oral every 8 hours  senna 2 Tablet(s) Oral at bedtime  sodium chloride 0.9%. 1000 milliLiter(s) IV Continuous <Continuous>  traMADol 25 milliGRAM(s) Oral every 4 hours PRN  traMADol 50 milliGRAM(s) Oral every 4 hours PRN      ----------------------------------------------------------------------------------------  PHYSICAL EXAM  Constitutional - NAD, Comfortable, in bed  HEENT - NCAT, EOMI NGT  Neck - Supple, No limited ROM  Chest - Breathing comfortably on room air   Cardiovascular - S1S2   Abdomen - Soft   Extremities - No C/C/E, No calf tenderness   Neurologic Exam -    follows commands            right side neglect      Cognitive - Awake, Alert, AAO to self, place: hospital, month, year     Communication - Fluent, + dysarthria   CN right facial weakness      Motor -right hemiplegia      Sensory -decreased right      Psychiatric - Mood stable, Affect WNL  ----------------------------------------------------------------------------------------  ASSESSMENT/PLAN  58yMale h/o eczema, gout with functional deficits after IPH   CTH with left thalamic IPH to midbrain  dysphagia, NGT, NPO, GI consulted   gout colchicine, indomethacin  fever, ceftriaxone   Pain - Tylenol tramadol   DVT PPX - SCDs lovenox   Rehab - Will continue to follow for ongoing rehab needs and recommendations.    Recommend ACUTE inpatient rehabilitation for the functional deficits consisting of 3 hours of therapy/day & x 2-4 weeks, 24 hour RN/daily PMR physician for comorbid medical management. Patient will be able to tolerate 3 hours a day.   d/w

## 2024-04-11 NOTE — SWALLOW VFSS/MBS ASSESSMENT ADULT - NS SWALLOW VFSS REC ASPIR MON
oral hygiene/position upright (90Y)/cough/fever/pneumonia/throat clearing/upper respiratory infection

## 2024-04-11 NOTE — SWALLOW VFSS/MBS ASSESSMENT ADULT - FEEDING ASSISTANCE
encourage slow rate of po intake, given weakness will require assistance/frequent cues/help required

## 2024-04-11 NOTE — SWALLOW VFSS/MBS ASSESSMENT ADULT - BEHAVIORAL MODIFICATIONS
-alternating liquids and solids   -Patient with limited ability to follow directives, even one step commands for single sips which patient repeated to clinician.+IMPUSLIVITY

## 2024-04-11 NOTE — CONSULT NOTE ADULT - SUBJECTIVE AND OBJECTIVE BOX
***consult has been received. note is in progress and incomplete without attending attestation***    Jm Lozada MD, PGY-2  Rheumatology  Reachable on TEAMS    ROSALEE CARR  [id number]    HISTORY OF PRESENT ILLNESS: 58M with a PMHx of gout, admitted from after being found down, code stroke activated, found to have L IPH from presumed HTN. He states he is doing well today, except extreme thirst. He reports monthly gout flares, and he practices avoidance and takes his gout prophylaxis and ULT consisting of colchicine, indomethacin, and allopurinol. He states his most recent flare started       Rheum ROS    Endorses joint pain, swelling, redness.     Denies fevers/chills/weight loss/night sweats/alopecia/sinus disease/asthma history/oral ulcers/vision changes/Raynauds/VTE/sicca symptoms/urinary changes/edema/SOB/jaw claudication/rash/photosensitivity/morning stiffness      MEDICATIONS  (STANDING):  amLODIPine   Tablet 10 milliGRAM(s) Oral daily  cefTRIAXone   IVPB 1000 milliGRAM(s) IV Intermittent every 24 hours  chlorhexidine 4% Liquid 1 Application(s) Topical <User Schedule>  colchicine 0.6 milliGRAM(s) Oral two times a day  dextrose 10% Bolus 125 milliLiter(s) IV Bolus once  dextrose 5%. 1000 milliLiter(s) (100 mL/Hr) IV Continuous <Continuous>  dextrose 5%. 1000 milliLiter(s) (50 mL/Hr) IV Continuous <Continuous>  dextrose 50% Injectable 25 Gram(s) IV Push once  dextrose 50% Injectable 12.5 Gram(s) IV Push once  enoxaparin Injectable 40 milliGRAM(s) SubCutaneous <User Schedule>  glucagon  Injectable 1 milliGRAM(s) IntraMuscular once  indomethacin Suppository 50 milliGRAM(s) Rectal every 12 hours  insulin lispro (ADMELOG) corrective regimen sliding scale   SubCutaneous every 6 hours  losartan 25 milliGRAM(s) Oral daily  multivitamin 1 Tablet(s) Oral daily  polyethylene glycol 3350 17 Gram(s) Oral daily  QUEtiapine 25 milliGRAM(s) Oral every 8 hours  senna 2 Tablet(s) Oral at bedtime  sodium chloride 0.9%. 1000 milliLiter(s) (50 mL/Hr) IV Continuous <Continuous>    MEDICATIONS  (PRN):  acetaminophen     Tablet .. 650 milliGRAM(s) Oral every 6 hours PRN Temp greater or equal to 38C (100.4F), Mild Pain (1 - 3)  dextrose Oral Gel 15 Gram(s) Oral once PRN Blood Glucose LESS THAN 70 milliGRAM(s)/deciliter  ondansetron Injectable 4 milliGRAM(s) IV Push every 6 hours PRN Nausea and/or Vomiting  traMADol 25 milliGRAM(s) Oral every 4 hours PRN Moderate Pain (4 - 6)  traMADol 50 milliGRAM(s) Oral every 4 hours PRN Severe Pain (7 - 10)      Allergies    No Known Allergies    Intolerances        PERTINENT MEDICATION HISTORY:    SOCIAL HISTORY:  OCCUPATION:  TRAVEL HISTORY:    FAMILY HISTORY:      Vital Signs Last 24 Hrs  T(C): 36.5 (11 Apr 2024 08:12), Max: 38.9 (10 Apr 2024 16:30)  T(F): 97.7 (11 Apr 2024 08:12), Max: 102 (10 Apr 2024 16:30)  HR: 88 (11 Apr 2024 08:12) (55 - 122)  BP: 123/78 (11 Apr 2024 08:12) (110/72 - 147/88)  BP(mean): 80 (11 Apr 2024 00:00) (80 - 114)  RR: 18 (11 Apr 2024 08:12) (8 - 30)  SpO2: 95% (11 Apr 2024 08:12) (91% - 100%)    Parameters below as of 11 Apr 2024 08:12  Patient On (Oxygen Delivery Method): nasal cannula  O2 Flow (L/min): 2      Physical Exam:  General: No apparent distress  HEENT: EOMI, MMM  CVS: +S1/S2, RRR, no murmurs/rubs/gallops  Resp: CTA b/l. No crackles/wheezing  GI: Soft, NT/ND +BS  MSK: no swelling/warmth/erythema of the joints of the UE/LE  Neuro: AAOx3  Skin: no visible rashes    LABS:                        12.5   11.88 )-----------( 286      ( 11 Apr 2024 06:36 )             40.1     04-11    143  |  107  |  26<H>  ----------------------------<  99  4.1   |  24  |  0.79    Ca    8.8      11 Apr 2024 06:35  Phos  3.5     04-11  Mg     2.5     04-11    TPro  6.8  /  Alb  3.3  /  TBili  0.6  /  DBili  x   /  AST  29  /  ALT  27  /  AlkPhos  69  04-10      Urinalysis Basic - ( 11 Apr 2024 06:35 )    Color: x / Appearance: x / SG: x / pH: x  Gluc: 99 mg/dL / Ketone: x  / Bili: x / Urobili: x   Blood: x / Protein: x / Nitrite: x   Leuk Esterase: x / RBC: x / WBC x   Sq Epi: x / Non Sq Epi: x / Bacteria: x            Rheumatology Work Up              RADIOLOGY & ADDITIONAL STUDIES:     ***consult has been received. note is in progress and incomplete without attending attestation***    Jm Lozada MD, PGY-2  Rheumatology  Reachable on TEAMS    ROSALEE CARR  [id number]    HISTORY OF PRESENT ILLNESS: 58M with a PMHx of gout, admitted from after being found down, code stroke activated, found to have L IPH from presumed HTN. He states he is doing well today, except extreme thirst. He reports monthly gout flares, and he practices avoidance and takes his gout prophylaxis and ULT consisting of colchicine, indomethacin, and allopurinol. States he has received intra-articular injections to his foot in the past. He states his most recent flare started about a week ago, reporting symptoms in his left first MTP, less so in his lateral foot, as well as his right MTP and ankle. Denies any pain in his bilateral knees or any other joints. He states his pain has improved overall but is still moderate-severe at time.      Rheum ROS    Endorses joint pain, swelling, redness.     Denies fevers/chills/weight loss/night sweats/alopecia/sinus disease/asthma history/oral ulcers/vision changes/Raynauds/VTE/sicca symptoms/urinary changes/edema/SOB/jaw claudication/rash/photosensitivity/morning stiffness      MEDICATIONS  (STANDING):  amLODIPine   Tablet 10 milliGRAM(s) Oral daily  cefTRIAXone   IVPB 1000 milliGRAM(s) IV Intermittent every 24 hours  chlorhexidine 4% Liquid 1 Application(s) Topical <User Schedule>  colchicine 0.6 milliGRAM(s) Oral two times a day  dextrose 10% Bolus 125 milliLiter(s) IV Bolus once  dextrose 5%. 1000 milliLiter(s) (100 mL/Hr) IV Continuous <Continuous>  dextrose 5%. 1000 milliLiter(s) (50 mL/Hr) IV Continuous <Continuous>  dextrose 50% Injectable 25 Gram(s) IV Push once  dextrose 50% Injectable 12.5 Gram(s) IV Push once  enoxaparin Injectable 40 milliGRAM(s) SubCutaneous <User Schedule>  glucagon  Injectable 1 milliGRAM(s) IntraMuscular once  indomethacin Suppository 50 milliGRAM(s) Rectal every 12 hours  insulin lispro (ADMELOG) corrective regimen sliding scale   SubCutaneous every 6 hours  losartan 25 milliGRAM(s) Oral daily  multivitamin 1 Tablet(s) Oral daily  polyethylene glycol 3350 17 Gram(s) Oral daily  QUEtiapine 25 milliGRAM(s) Oral every 8 hours  senna 2 Tablet(s) Oral at bedtime  sodium chloride 0.9%. 1000 milliLiter(s) (50 mL/Hr) IV Continuous <Continuous>    MEDICATIONS  (PRN):  acetaminophen     Tablet .. 650 milliGRAM(s) Oral every 6 hours PRN Temp greater or equal to 38C (100.4F), Mild Pain (1 - 3)  dextrose Oral Gel 15 Gram(s) Oral once PRN Blood Glucose LESS THAN 70 milliGRAM(s)/deciliter  ondansetron Injectable 4 milliGRAM(s) IV Push every 6 hours PRN Nausea and/or Vomiting  traMADol 25 milliGRAM(s) Oral every 4 hours PRN Moderate Pain (4 - 6)  traMADol 50 milliGRAM(s) Oral every 4 hours PRN Severe Pain (7 - 10)      Allergies    No Known Allergies    Intolerances        PERTINENT MEDICATION HISTORY:    SOCIAL HISTORY: No toxic habits, lives at home with his wife and teenaged children  OCCUPATION:   FAMILY HISTORY: DM in father      Vital Signs Last 24 Hrs  T(C): 36.5 (11 Apr 2024 08:12), Max: 38.9 (10 Apr 2024 16:30)  T(F): 97.7 (11 Apr 2024 08:12), Max: 102 (10 Apr 2024 16:30)  HR: 88 (11 Apr 2024 08:12) (55 - 122)  BP: 123/78 (11 Apr 2024 08:12) (110/72 - 147/88)  BP(mean): 80 (11 Apr 2024 00:00) (80 - 114)  RR: 18 (11 Apr 2024 08:12) (8 - 30)  SpO2: 95% (11 Apr 2024 08:12) (91% - 100%)    Parameters below as of 11 Apr 2024 08:12  Patient On (Oxygen Delivery Method): nasal cannula  O2 Flow (L/min): 2      Physical Exam:  General: No apparent distress  HEENT: EOMI, MMM  CVS: +S1/S2, RRR, no murmurs/rubs/gallops  Resp: CTA b/l. No crackles/wheezing  GI: Soft, NT/ND +BS  MSK: UE normal; LE with left foot mildly tender, swollen, warm, and erythematous over MTP, also remainder of digits at the bases with redness, left ankle and knee without pain, redness or swelling; right MTP and ankle with mild TTP, minimal erythema, swelling, warmth, with right knee unremarkable  Neuro: Mild dysarthria, right hemiparesis.  Skin: no visible rashes    LABS:                        12.5   11.88 )-----------( 286      ( 11 Apr 2024 06:36 )             40.1     04-11    143  |  107  |  26<H>  ----------------------------<  99  4.1   |  24  |  0.79    Ca    8.8      11 Apr 2024 06:35  Phos  3.5     04-11  Mg     2.5     04-11    TPro  6.8  /  Alb  3.3  /  TBili  0.6  /  DBili  x   /  AST  29  /  ALT  27  /  AlkPhos  69  04-10      Urinalysis Basic - ( 11 Apr 2024 06:35 )    Color: x / Appearance: x / SG: x / pH: x  Gluc: 99 mg/dL / Ketone: x  / Bili: x / Urobili: x   Blood: x / Protein: x / Nitrite: x   Leuk Esterase: x / RBC: x / WBC x   Sq Epi: x / Non Sq Epi: x / Bacteria: x    Uric Acid: 3.8 mg/dL        Rheumatology Work Up              RADIOLOGY & ADDITIONAL STUDIES:     ***consult has been received. note is in progress and incomplete without attending attestation***    Jm Lozada MD, PGY-2  Rheumatology  Reachable on TEAMS    ROSALEE CARR  [id number]    HISTORY OF PRESENT ILLNESS: 58M with a PMHx of gout, admitted from after being found down, code stroke activated, found to have L IPH from presumed HTN. He states he is doing well today, except extreme thirst. He reports monthly gout flares, and he practices avoidance and takes his gout prophylaxis and ULT consisting of colchicine, indomethacin, and allopurinol. States he has received intra-articular injections to his foot in the past. He states his most recent flare started about a week ago, reporting symptoms in his left first MTP, less so in his lateral foot, as well as his right MTP and ankle. Denies any pain in his bilateral knees or any other joints. He states his pain has improved overall but is still moderate-severe at time.      Rheum ROS    Endorses joint pain, swelling, redness.     Denies fevers/chills/weight loss/night sweats/alopecia/sinus disease/asthma history/oral ulcers/vision changes/Raynauds/VTE/sicca symptoms/urinary changes/edema/SOB/jaw claudication/rash/photosensitivity/morning stiffness      MEDICATIONS  (STANDING):  amLODIPine   Tablet 10 milliGRAM(s) Oral daily  cefTRIAXone   IVPB 1000 milliGRAM(s) IV Intermittent every 24 hours  chlorhexidine 4% Liquid 1 Application(s) Topical <User Schedule>  colchicine 0.6 milliGRAM(s) Oral two times a day  dextrose 10% Bolus 125 milliLiter(s) IV Bolus once  dextrose 5%. 1000 milliLiter(s) (100 mL/Hr) IV Continuous <Continuous>  dextrose 5%. 1000 milliLiter(s) (50 mL/Hr) IV Continuous <Continuous>  dextrose 50% Injectable 25 Gram(s) IV Push once  dextrose 50% Injectable 12.5 Gram(s) IV Push once  enoxaparin Injectable 40 milliGRAM(s) SubCutaneous <User Schedule>  glucagon  Injectable 1 milliGRAM(s) IntraMuscular once  indomethacin Suppository 50 milliGRAM(s) Rectal every 12 hours  insulin lispro (ADMELOG) corrective regimen sliding scale   SubCutaneous every 6 hours  losartan 25 milliGRAM(s) Oral daily  multivitamin 1 Tablet(s) Oral daily  polyethylene glycol 3350 17 Gram(s) Oral daily  QUEtiapine 25 milliGRAM(s) Oral every 8 hours  senna 2 Tablet(s) Oral at bedtime  sodium chloride 0.9%. 1000 milliLiter(s) (50 mL/Hr) IV Continuous <Continuous>    MEDICATIONS  (PRN):  acetaminophen     Tablet .. 650 milliGRAM(s) Oral every 6 hours PRN Temp greater or equal to 38C (100.4F), Mild Pain (1 - 3)  dextrose Oral Gel 15 Gram(s) Oral once PRN Blood Glucose LESS THAN 70 milliGRAM(s)/deciliter  ondansetron Injectable 4 milliGRAM(s) IV Push every 6 hours PRN Nausea and/or Vomiting  traMADol 25 milliGRAM(s) Oral every 4 hours PRN Moderate Pain (4 - 6)  traMADol 50 milliGRAM(s) Oral every 4 hours PRN Severe Pain (7 - 10)      Allergies    No Known Allergies    Intolerances        PERTINENT MEDICATION HISTORY:    SOCIAL HISTORY: No toxic habits, lives at home with his wife and teenaged children  OCCUPATION:   FAMILY HISTORY: DM in father      Vital Signs Last 24 Hrs  T(C): 36.5 (11 Apr 2024 08:12), Max: 38.9 (10 Apr 2024 16:30)  T(F): 97.7 (11 Apr 2024 08:12), Max: 102 (10 Apr 2024 16:30)  HR: 88 (11 Apr 2024 08:12) (55 - 122)  BP: 123/78 (11 Apr 2024 08:12) (110/72 - 147/88)  BP(mean): 80 (11 Apr 2024 00:00) (80 - 114)  RR: 18 (11 Apr 2024 08:12) (8 - 30)  SpO2: 95% (11 Apr 2024 08:12) (91% - 100%)    Parameters below as of 11 Apr 2024 08:12  Patient On (Oxygen Delivery Method): nasal cannula  O2 Flow (L/min): 2      Physical Exam:  General: No apparent distress  HEENT: EOMI, MMM  CVS: +S1/S2, RRR, no murmurs/rubs/gallops  Resp: CTA b/l. No crackles/wheezing  GI: Soft, NT/ND +BS  MSK: UE normal; LE with left foot mildly tender, swollen, warm, and erythematous over MTP, also remainder of digits at the bases with redness, left ankle and knee without pain, redness or swelling; right MTP and ankle with mild TTP, minimal erythema, swelling, warmth, with right knee unremarkable  Neuro: Mild dysarthria, right hemiplegia.  Skin: no visible rashes    LABS:                        12.5   11.88 )-----------( 286      ( 11 Apr 2024 06:36 )             40.1     04-11    143  |  107  |  26<H>  ----------------------------<  99  4.1   |  24  |  0.79    Ca    8.8      11 Apr 2024 06:35  Phos  3.5     04-11  Mg     2.5     04-11    TPro  6.8  /  Alb  3.3  /  TBili  0.6  /  DBili  x   /  AST  29  /  ALT  27  /  AlkPhos  69  04-10      Urinalysis Basic - ( 11 Apr 2024 06:35 )    Color: x / Appearance: x / SG: x / pH: x  Gluc: 99 mg/dL / Ketone: x  / Bili: x / Urobili: x   Blood: x / Protein: x / Nitrite: x   Leuk Esterase: x / RBC: x / WBC x   Sq Epi: x / Non Sq Epi: x / Bacteria: x    Uric Acid: 3.8 mg/dL        Rheumatology Work Up              RADIOLOGY & ADDITIONAL STUDIES:     ***consult has been received. note is in progress and incomplete without attending attestation***    Jm Lozada MD, PGY-2  Rheumatology  Reachable on TEAMS    ROSALEE CARR  [id number]    HISTORY OF PRESENT ILLNESS: (Pt is fair historian, at times changing answers) 58M with a PMHx of gout, admitted from after being found down, code stroke activated, found to have L IPH from presumed HTN. He states he is doing well today, except extreme thirst. He reports monthly gout flares after initial diagnosis 3 months ago, and he practices avoidance and takes his gout prophylaxis and ULT consisting of colchicine, indomethacin, and allopurinol. States he has received intra-articular injections to his foot in the past. He states his most recent flare started about a week ago, reporting symptoms in his left first MTP, less so in his lateral foot, as well as his right MTP and ankle. Denies any pain in his bilateral knees or any other joints. He states his pain has improved overall but is still moderate-severe at time.      Rheum ROS    Endorses joint pain, swelling, redness.     Denies fevers/chills/weight loss/night sweats/alopecia/sinus disease/asthma history/oral ulcers/vision changes/Raynauds/VTE/sicca symptoms/urinary changes/edema/SOB/jaw claudication/rash/photosensitivity/morning stiffness      MEDICATIONS  (STANDING):  amLODIPine   Tablet 10 milliGRAM(s) Oral daily  cefTRIAXone   IVPB 1000 milliGRAM(s) IV Intermittent every 24 hours  chlorhexidine 4% Liquid 1 Application(s) Topical <User Schedule>  colchicine 0.6 milliGRAM(s) Oral two times a day  dextrose 10% Bolus 125 milliLiter(s) IV Bolus once  dextrose 5%. 1000 milliLiter(s) (100 mL/Hr) IV Continuous <Continuous>  dextrose 5%. 1000 milliLiter(s) (50 mL/Hr) IV Continuous <Continuous>  dextrose 50% Injectable 25 Gram(s) IV Push once  dextrose 50% Injectable 12.5 Gram(s) IV Push once  enoxaparin Injectable 40 milliGRAM(s) SubCutaneous <User Schedule>  glucagon  Injectable 1 milliGRAM(s) IntraMuscular once  indomethacin Suppository 50 milliGRAM(s) Rectal every 12 hours  insulin lispro (ADMELOG) corrective regimen sliding scale   SubCutaneous every 6 hours  losartan 25 milliGRAM(s) Oral daily  multivitamin 1 Tablet(s) Oral daily  polyethylene glycol 3350 17 Gram(s) Oral daily  QUEtiapine 25 milliGRAM(s) Oral every 8 hours  senna 2 Tablet(s) Oral at bedtime  sodium chloride 0.9%. 1000 milliLiter(s) (50 mL/Hr) IV Continuous <Continuous>    MEDICATIONS  (PRN):  acetaminophen     Tablet .. 650 milliGRAM(s) Oral every 6 hours PRN Temp greater or equal to 38C (100.4F), Mild Pain (1 - 3)  dextrose Oral Gel 15 Gram(s) Oral once PRN Blood Glucose LESS THAN 70 milliGRAM(s)/deciliter  ondansetron Injectable 4 milliGRAM(s) IV Push every 6 hours PRN Nausea and/or Vomiting  traMADol 25 milliGRAM(s) Oral every 4 hours PRN Moderate Pain (4 - 6)  traMADol 50 milliGRAM(s) Oral every 4 hours PRN Severe Pain (7 - 10)      Allergies    No Known Allergies    Intolerances        PERTINENT MEDICATION HISTORY:    SOCIAL HISTORY: No toxic habits, lives at home with his wife and teenaged children  OCCUPATION:   FAMILY HISTORY: DM in father      Vital Signs Last 24 Hrs  T(C): 36.5 (11 Apr 2024 08:12), Max: 38.9 (10 Apr 2024 16:30)  T(F): 97.7 (11 Apr 2024 08:12), Max: 102 (10 Apr 2024 16:30)  HR: 88 (11 Apr 2024 08:12) (55 - 122)  BP: 123/78 (11 Apr 2024 08:12) (110/72 - 147/88)  BP(mean): 80 (11 Apr 2024 00:00) (80 - 114)  RR: 18 (11 Apr 2024 08:12) (8 - 30)  SpO2: 95% (11 Apr 2024 08:12) (91% - 100%)    Parameters below as of 11 Apr 2024 08:12  Patient On (Oxygen Delivery Method): nasal cannula  O2 Flow (L/min): 2      Physical Exam:  General: No apparent distress  HEENT: EOMI, MMM  CVS: +S1/S2, RRR, no murmurs/rubs/gallops  Resp: CTA b/l. No crackles/wheezing  GI: Soft, NT/ND +BS  MSK: UE normal; LE with left foot mildly tender, swollen, warm, and erythematous over MTP, also remainder of digits at the bases with redness, left ankle and knee without pain, redness or swelling; right MTP and ankle with mild TTP, minimal erythema, swelling, warmth, with right knee unremarkable  Neuro: Mild dysarthria, right hemiplegia.  Skin: no visible rashes    LABS:                        12.5   11.88 )-----------( 286      ( 11 Apr 2024 06:36 )             40.1     04-11    143  |  107  |  26<H>  ----------------------------<  99  4.1   |  24  |  0.79    Ca    8.8      11 Apr 2024 06:35  Phos  3.5     04-11  Mg     2.5     04-11    TPro  6.8  /  Alb  3.3  /  TBili  0.6  /  DBili  x   /  AST  29  /  ALT  27  /  AlkPhos  69  04-10      Urinalysis Basic - ( 11 Apr 2024 06:35 )    Color: x / Appearance: x / SG: x / pH: x  Gluc: 99 mg/dL / Ketone: x  / Bili: x / Urobili: x   Blood: x / Protein: x / Nitrite: x   Leuk Esterase: x / RBC: x / WBC x   Sq Epi: x / Non Sq Epi: x / Bacteria: x    Uric Acid: 3.8 mg/dL        Rheumatology Work Up              RADIOLOGY & ADDITIONAL STUDIES:     ***consult has been received. note is in progress and incomplete without attending attestation***    Jm Lozada MD, PGY-2  Rheumatology  Reachable on TEAMS    ROSALEE CARR  [id number]    HISTORY OF PRESENT ILLNESS: (Pt is fair historian, at times changing answers) 58M with a PMHx of gout, admitted from home after being found down, code stroke activated, found to have L IPH from presumed HTN. He states he is doing well today, except extreme thirst. He reports monthly gout flares after initial diagnosis 3 months ago, and he practices avoidance and takes his gout prophylaxis and ULT consisting of colchicine, indomethacin, and allopurinol. States he has received intra-articular injections to his foot in the past. He states his most recent flare started about a week ago, reporting symptoms in his left first MTP, less so in his lateral foot, as well as his right MTP and ankle. Denies any pain in his bilateral knees or any other joints. He states his pain has improved overall but is still moderate-severe at time.      Rheum ROS    Endorses joint pain, swelling, redness.     Denies fevers/chills/weight loss/night sweats/alopecia/sinus disease/asthma history/oral ulcers/vision changes/Raynauds/VTE/sicca symptoms/urinary changes/edema/SOB/jaw claudication/rash/photosensitivity/morning stiffness      MEDICATIONS  (STANDING):  amLODIPine   Tablet 10 milliGRAM(s) Oral daily  cefTRIAXone   IVPB 1000 milliGRAM(s) IV Intermittent every 24 hours  chlorhexidine 4% Liquid 1 Application(s) Topical <User Schedule>  colchicine 0.6 milliGRAM(s) Oral two times a day  dextrose 10% Bolus 125 milliLiter(s) IV Bolus once  dextrose 5%. 1000 milliLiter(s) (100 mL/Hr) IV Continuous <Continuous>  dextrose 5%. 1000 milliLiter(s) (50 mL/Hr) IV Continuous <Continuous>  dextrose 50% Injectable 25 Gram(s) IV Push once  dextrose 50% Injectable 12.5 Gram(s) IV Push once  enoxaparin Injectable 40 milliGRAM(s) SubCutaneous <User Schedule>  glucagon  Injectable 1 milliGRAM(s) IntraMuscular once  indomethacin Suppository 50 milliGRAM(s) Rectal every 12 hours  insulin lispro (ADMELOG) corrective regimen sliding scale   SubCutaneous every 6 hours  losartan 25 milliGRAM(s) Oral daily  multivitamin 1 Tablet(s) Oral daily  polyethylene glycol 3350 17 Gram(s) Oral daily  QUEtiapine 25 milliGRAM(s) Oral every 8 hours  senna 2 Tablet(s) Oral at bedtime  sodium chloride 0.9%. 1000 milliLiter(s) (50 mL/Hr) IV Continuous <Continuous>    MEDICATIONS  (PRN):  acetaminophen     Tablet .. 650 milliGRAM(s) Oral every 6 hours PRN Temp greater or equal to 38C (100.4F), Mild Pain (1 - 3)  dextrose Oral Gel 15 Gram(s) Oral once PRN Blood Glucose LESS THAN 70 milliGRAM(s)/deciliter  ondansetron Injectable 4 milliGRAM(s) IV Push every 6 hours PRN Nausea and/or Vomiting  traMADol 25 milliGRAM(s) Oral every 4 hours PRN Moderate Pain (4 - 6)  traMADol 50 milliGRAM(s) Oral every 4 hours PRN Severe Pain (7 - 10)      Allergies    No Known Allergies    Intolerances        PERTINENT MEDICATION HISTORY:    SOCIAL HISTORY: No toxic habits, lives at home with his wife and teenaged children  OCCUPATION:   FAMILY HISTORY: DM in father      Vital Signs Last 24 Hrs  T(C): 36.5 (11 Apr 2024 08:12), Max: 38.9 (10 Apr 2024 16:30)  T(F): 97.7 (11 Apr 2024 08:12), Max: 102 (10 Apr 2024 16:30)  HR: 88 (11 Apr 2024 08:12) (55 - 122)  BP: 123/78 (11 Apr 2024 08:12) (110/72 - 147/88)  BP(mean): 80 (11 Apr 2024 00:00) (80 - 114)  RR: 18 (11 Apr 2024 08:12) (8 - 30)  SpO2: 95% (11 Apr 2024 08:12) (91% - 100%)    Parameters below as of 11 Apr 2024 08:12  Patient On (Oxygen Delivery Method): nasal cannula  O2 Flow (L/min): 2      Physical Exam:  General: No apparent distress  HEENT: EOMI, MMM  CVS: +S1/S2, RRR, no murmurs/rubs/gallops  Resp: CTA b/l. No crackles/wheezing  GI: Soft, NT/ND +BS  MSK: UE normal; LE with left foot mildly tender, swollen, warm, and erythematous over MTP, also remainder of digits at the bases with redness, left ankle and knee without pain, redness or swelling; right MTP and ankle with mild TTP, minimal erythema, swelling, warmth, with right knee unremarkable  Neuro: Mild dysarthria, right hemiplegia.  Skin: no visible rashes    LABS:                        12.5   11.88 )-----------( 286      ( 11 Apr 2024 06:36 )             40.1     04-11    143  |  107  |  26<H>  ----------------------------<  99  4.1   |  24  |  0.79    Ca    8.8      11 Apr 2024 06:35  Phos  3.5     04-11  Mg     2.5     04-11    TPro  6.8  /  Alb  3.3  /  TBili  0.6  /  DBili  x   /  AST  29  /  ALT  27  /  AlkPhos  69  04-10      Urinalysis Basic - ( 11 Apr 2024 06:35 )    Color: x / Appearance: x / SG: x / pH: x  Gluc: 99 mg/dL / Ketone: x  / Bili: x / Urobili: x   Blood: x / Protein: x / Nitrite: x   Leuk Esterase: x / RBC: x / WBC x   Sq Epi: x / Non Sq Epi: x / Bacteria: x    Uric Acid: 3.8 mg/dL        Rheumatology Work Up              RADIOLOGY & ADDITIONAL STUDIES:     ***consult has been received. note is in progress and incomplete without attending attestation***    Jm Lozada MD, PGY-2  Rheumatology  Reachable on TEAMS    ROSALEE CARR  [id number]    HISTORY OF PRESENT ILLNESS: (Pt is fair historian, at times changing answers) 58M with a PMHx of gout, admitted from home after being found down, code stroke activated, found to have L IPH from presumed HTN. He states he is doing well today, except extreme thirst. He reports monthly gout flares after initial diagnosis 3 months ago, and he practices avoidance and takes his gout prophylaxis and ULT consisting of colchicine, indomethacin, and allopurinol. States he has received intra-articular injections to his foot in the past. He states his most recent flare started about a week ago, reporting symptoms in his left first MTP, less so in his lateral foot, as well as his right MTP and ankle. Denies any pain in his bilateral knees or any other joints. He states his pain has improved overall but is still moderate-severe at time.      Rheum ROS    Endorses joint pain, swelling, redness.     Denies fevers/chills/weight loss/night sweats/alopecia/sinus disease/asthma history/oral ulcers/vision changes/Raynauds/VTE/sicca symptoms/urinary changes/edema/SOB/jaw claudication/rash/photosensitivity/morning stiffness      MEDICATIONS  (STANDING):  amLODIPine   Tablet 10 milliGRAM(s) Oral daily  cefTRIAXone   IVPB 1000 milliGRAM(s) IV Intermittent every 24 hours  chlorhexidine 4% Liquid 1 Application(s) Topical <User Schedule>  colchicine 0.6 milliGRAM(s) Oral two times a day  dextrose 10% Bolus 125 milliLiter(s) IV Bolus once  dextrose 5%. 1000 milliLiter(s) (100 mL/Hr) IV Continuous <Continuous>  dextrose 5%. 1000 milliLiter(s) (50 mL/Hr) IV Continuous <Continuous>  dextrose 50% Injectable 25 Gram(s) IV Push once  dextrose 50% Injectable 12.5 Gram(s) IV Push once  enoxaparin Injectable 40 milliGRAM(s) SubCutaneous <User Schedule>  glucagon  Injectable 1 milliGRAM(s) IntraMuscular once  indomethacin Suppository 50 milliGRAM(s) Rectal every 12 hours  insulin lispro (ADMELOG) corrective regimen sliding scale   SubCutaneous every 6 hours  losartan 25 milliGRAM(s) Oral daily  multivitamin 1 Tablet(s) Oral daily  polyethylene glycol 3350 17 Gram(s) Oral daily  QUEtiapine 25 milliGRAM(s) Oral every 8 hours  senna 2 Tablet(s) Oral at bedtime  sodium chloride 0.9%. 1000 milliLiter(s) (50 mL/Hr) IV Continuous <Continuous>    MEDICATIONS  (PRN):  acetaminophen     Tablet .. 650 milliGRAM(s) Oral every 6 hours PRN Temp greater or equal to 38C (100.4F), Mild Pain (1 - 3)  dextrose Oral Gel 15 Gram(s) Oral once PRN Blood Glucose LESS THAN 70 milliGRAM(s)/deciliter  ondansetron Injectable 4 milliGRAM(s) IV Push every 6 hours PRN Nausea and/or Vomiting  traMADol 25 milliGRAM(s) Oral every 4 hours PRN Moderate Pain (4 - 6)  traMADol 50 milliGRAM(s) Oral every 4 hours PRN Severe Pain (7 - 10)      Allergies    No Known Allergies    Intolerances        PERTINENT MEDICATION HISTORY:    SOCIAL HISTORY: No toxic habits, lives at home with his wife and teenaged children  OCCUPATION:   FAMILY HISTORY: DM in father      Vital Signs Last 24 Hrs  T(C): 36.5 (11 Apr 2024 08:12), Max: 38.9 (10 Apr 2024 16:30)  T(F): 97.7 (11 Apr 2024 08:12), Max: 102 (10 Apr 2024 16:30)  HR: 88 (11 Apr 2024 08:12) (55 - 122)  BP: 123/78 (11 Apr 2024 08:12) (110/72 - 147/88)  BP(mean): 80 (11 Apr 2024 00:00) (80 - 114)  RR: 18 (11 Apr 2024 08:12) (8 - 30)  SpO2: 95% (11 Apr 2024 08:12) (91% - 100%)    Parameters below as of 11 Apr 2024 08:12  Patient On (Oxygen Delivery Method): nasal cannula  O2 Flow (L/min): 2      Physical Exam:  General: No apparent distress  HEENT: EOMI, MMM  CVS: +S1/S2, RRR, no murmurs/rubs/gallops  Resp: CTA b/l. No crackles/wheezing  GI: Soft, NT/ND +BS  MSK: UE notable for diffuse swelling of the right hand; LE with left foot mildly tender, swollen, warm, and erythematous over MTP, also remainder of digits at the bases with redness, left ankle and knee without pain, redness or swelling; right MTP and ankle with mild TTP, minimal erythema, swelling, warmth, with right knee unremarkable; Left knee with moderate effusion though minimally TTP  Neuro: Mild dysarthria, right hemiplegia.  Skin: no visible rashes    LABS:                        12.5   11.88 )-----------( 286      ( 11 Apr 2024 06:36 )             40.1     04-11    143  |  107  |  26<H>  ----------------------------<  99  4.1   |  24  |  0.79    Ca    8.8      11 Apr 2024 06:35  Phos  3.5     04-11  Mg     2.5     04-11    TPro  6.8  /  Alb  3.3  /  TBili  0.6  /  DBili  x   /  AST  29  /  ALT  27  /  AlkPhos  69  04-10      Urinalysis Basic - ( 11 Apr 2024 06:35 )    Color: x / Appearance: x / SG: x / pH: x  Gluc: 99 mg/dL / Ketone: x  / Bili: x / Urobili: x   Blood: x / Protein: x / Nitrite: x   Leuk Esterase: x / RBC: x / WBC x   Sq Epi: x / Non Sq Epi: x / Bacteria: x    Uric Acid: 3.8 mg/dL        Rheumatology Work Up              RADIOLOGY & ADDITIONAL STUDIES:         Jm Lozada MD, PGY-2  Rheumatology  Reachable on TEAMS    ROSALEE CARR  [id number]    HISTORY OF PRESENT ILLNESS: (Pt is fair historian, at times changing answers) 58M with a PMHx of gout, admitted from home after being found down, code stroke activated, found to have L IPH from presumed HTN. He states he is doing well today, except extreme thirst. He reports monthly gout flares after initial diagnosis 3 months ago, and he practices avoidance and takes his gout prophylaxis and ULT consisting of colchicine, indomethacin, and allopurinol. States he has received intra-articular injections to his foot in the past. He states his most recent flare started about a week ago, reporting symptoms in his left first MTP, less so in his lateral foot, as well as his right MTP and ankle. Denies any pain in his bilateral knees or any other joints. He states his pain has improved overall but is still moderate-severe at time.      Rheum ROS    Endorses joint pain, swelling, redness.     Denies fevers/chills/weight loss/night sweats/alopecia/sinus disease/asthma history/oral ulcers/vision changes/Raynauds/VTE/sicca symptoms/urinary changes/edema/SOB/jaw claudication/rash/photosensitivity/morning stiffness      MEDICATIONS  (STANDING):  amLODIPine   Tablet 10 milliGRAM(s) Oral daily  cefTRIAXone   IVPB 1000 milliGRAM(s) IV Intermittent every 24 hours  chlorhexidine 4% Liquid 1 Application(s) Topical <User Schedule>  colchicine 0.6 milliGRAM(s) Oral two times a day  dextrose 10% Bolus 125 milliLiter(s) IV Bolus once  dextrose 5%. 1000 milliLiter(s) (100 mL/Hr) IV Continuous <Continuous>  dextrose 5%. 1000 milliLiter(s) (50 mL/Hr) IV Continuous <Continuous>  dextrose 50% Injectable 25 Gram(s) IV Push once  dextrose 50% Injectable 12.5 Gram(s) IV Push once  enoxaparin Injectable 40 milliGRAM(s) SubCutaneous <User Schedule>  glucagon  Injectable 1 milliGRAM(s) IntraMuscular once  indomethacin Suppository 50 milliGRAM(s) Rectal every 12 hours  insulin lispro (ADMELOG) corrective regimen sliding scale   SubCutaneous every 6 hours  losartan 25 milliGRAM(s) Oral daily  multivitamin 1 Tablet(s) Oral daily  polyethylene glycol 3350 17 Gram(s) Oral daily  QUEtiapine 25 milliGRAM(s) Oral every 8 hours  senna 2 Tablet(s) Oral at bedtime  sodium chloride 0.9%. 1000 milliLiter(s) (50 mL/Hr) IV Continuous <Continuous>    MEDICATIONS  (PRN):  acetaminophen     Tablet .. 650 milliGRAM(s) Oral every 6 hours PRN Temp greater or equal to 38C (100.4F), Mild Pain (1 - 3)  dextrose Oral Gel 15 Gram(s) Oral once PRN Blood Glucose LESS THAN 70 milliGRAM(s)/deciliter  ondansetron Injectable 4 milliGRAM(s) IV Push every 6 hours PRN Nausea and/or Vomiting  traMADol 25 milliGRAM(s) Oral every 4 hours PRN Moderate Pain (4 - 6)  traMADol 50 milliGRAM(s) Oral every 4 hours PRN Severe Pain (7 - 10)      Allergies    No Known Allergies    Intolerances        PERTINENT MEDICATION HISTORY:    SOCIAL HISTORY: No toxic habits, lives at home with his wife and teenaged children  OCCUPATION:   FAMILY HISTORY: DM in father      Vital Signs Last 24 Hrs  T(C): 36.5 (11 Apr 2024 08:12), Max: 38.9 (10 Apr 2024 16:30)  T(F): 97.7 (11 Apr 2024 08:12), Max: 102 (10 Apr 2024 16:30)  HR: 88 (11 Apr 2024 08:12) (55 - 122)  BP: 123/78 (11 Apr 2024 08:12) (110/72 - 147/88)  BP(mean): 80 (11 Apr 2024 00:00) (80 - 114)  RR: 18 (11 Apr 2024 08:12) (8 - 30)  SpO2: 95% (11 Apr 2024 08:12) (91% - 100%)    Parameters below as of 11 Apr 2024 08:12  Patient On (Oxygen Delivery Method): nasal cannula  O2 Flow (L/min): 2      Physical Exam:  General: No apparent distress  HEENT: EOMI, MMM  CVS: +S1/S2, RRR, no murmurs/rubs/gallops  Resp: CTA b/l. No crackles/wheezing  GI: Soft, NT/ND +BS  MSK: UE notable for diffuse swelling of the right hand; LE with left foot mildly tender, swollen, warm, and erythematous over 1st MTP, 3rd MTP, left knee large cool effusion without TTP and full ROM.   right MTP and ankle with mild TTP, minimal erythema, swelling, warmth  Neuro: Mild dysarthria, right hemiplegia.  Skin: no visible rashes. No tophi    LABS:                        12.5   11.88 )-----------( 286      ( 11 Apr 2024 06:36 )             40.1     04-11    143  |  107  |  26<H>  ----------------------------<  99  4.1   |  24  |  0.79    Ca    8.8      11 Apr 2024 06:35  Phos  3.5     04-11  Mg     2.5     04-11    TPro  6.8  /  Alb  3.3  /  TBili  0.6  /  DBili  x   /  AST  29  /  ALT  27  /  AlkPhos  69  04-10      Urinalysis Basic - ( 11 Apr 2024 06:35 )    Color: x / Appearance: x / SG: x / pH: x  Gluc: 99 mg/dL / Ketone: x  / Bili: x / Urobili: x   Blood: x / Protein: x / Nitrite: x   Leuk Esterase: x / RBC: x / WBC x   Sq Epi: x / Non Sq Epi: x / Bacteria: x    Uric Acid: 3.8 mg/dL                  RADIOLOGY & ADDITIONAL STUDIES:

## 2024-04-11 NOTE — CONSULT NOTE ADULT - CONSULT REQUESTED DATE/TIME
02-Apr-2024 21:10
07-Apr-2024 18:20
11-Apr-2024 11:23
11-Apr-2024 09:00
11-Apr-2024 11:08
11-Apr-2024 11:10

## 2024-04-11 NOTE — CHART NOTE - NSCHARTNOTEFT_GEN_A_CORE
ROSALEE CARR is a 58M Hx eczema/Gout intermittently takes ASA for pain, p/f being found down by wife after hearing thud. No hx HTN.   -CTH w/L thalamic IPH extending to midbrain no IVH no hydro modest mass effect.   -Coags/TEG/ARU pend. ED intubated for inability to manage secretions. ICH score 2  -Hospital course: 4/5/24: angio negative for vascular malformations 24h events: Patient self extubated on 4/6 and pulled out NG tube  -ETT 4/2-4/6  -CT H 4/5: 5mm axial sections of the brain were obtained from base to vertex, without the intravenous administration of contrast material. Images were obtained on a portable CT scanner and compared with 4/4/2024. The fourth, third and lateral ventricles are normal size and position. There is no change in the left thalamic parenchymal hemorrhage measuring 2.3 cm in AP diameter. There is extension into the left midbrain. There is mild mass effect on the left lateral ventricle. There is no midline shift or hydrocephalus.    Speech & Swallow : no reports in SCM      TODAY, seen for re-evaluation of swallow profile, as patient now on 4 bustamante from ICU with improvement in mentation. Patient holding a conversation and following discourse. Oriented to person, place, date, and rationale for hospitalization. Speech intelligible. Voice clear/ functional. +NGT in situ R nares. Patient adamantly expressing that he wants to eat/drink. Offered conservative textures of moderately thick liquids, mildly thick liquids and puree thick solids via tsp; x4 ounces total. Oral phase marked by adequate orientation to feeding task, able to strip the bolus from the tsp, adequate a-p transport and manipulation, no anterolateral loss; pharyngeal phase marked by suspected latency in the swallow trigger however NO overt s/sx of aspiration with all trials however can not r/o silent aspiration in presence of location of IPH and IVH as listed above. Purposeful proactive rounding reinforced and 5 Ps addressed. Pt left in no distress.     Impression; patient with marked improvement in mentation compared to ICU; concern for silent aspiration given location as described above. Oropharyngeal dysphagia appearing mild however objective testing warranted to guide intervention to determine swallow profile.     RECOMMENDATIONS;  -NPO, NGT in the interim, plan for MBS  -aspiration precautions   -oral care   -sp and sw tx during course, anticipated next level of care     Ana Castaneda MS CCC-SLP Pref[employer] teams   extension [phone]     d/w NP Sherri and RN Juan/Pippa    GOAL- Pt to tolerate recommended textures during course w/ no s/sx of aspiration Pt/family/caregiver will demonstrate understanding and carryover of dysphagia management (safe swallow guidelines, dysphagia diet). ROSALEE CARR is a 58M Hx eczema/Gout intermittently takes ASA for pain, p/f being found down by wife after hearing thud. No hx HTN.   -CTH w/L thalamic IPH extending to midbrain no IVH no hydro modest mass effect.   -Coags/TEG/ARU pend. ED intubated for inability to manage secretions. ICH score 2  -Hospital course: 4/5/24: angio negative for vascular malformations 24h events: Patient self extubated on 4/6 and pulled out NG tube  -ETT 4/2-4/6  -CT H 4/5: 5mm axial sections of the brain were obtained from base to vertex, without the intravenous administration of contrast material. Images were obtained on a portable CT scanner and compared with 4/4/2024. The fourth, third and lateral ventricles are normal size and position. There is no change in the left thalamic parenchymal hemorrhage measuring 2.3 cm in AP diameter. There is extension into the left midbrain. There is mild mass effect on the left lateral ventricle. There is no midline shift or hydrocephalus.    Speech & Swallow : no reports in SCM      TODAY, seen for re-evaluation of swallow profile, as patient now on 4 bustamante from ICU with improvement in mentation. Patient holding a conversation and following discourse. Oriented to person, place, date, and rationale for hospitalization. Speech intelligible. Voice clear/ functional. +NGT in situ R nares. Patient adamantly expressing that he wants to eat/drink. Offered conservative textures of moderately thick liquids, mildly thick liquids and puree thick solids via tsp; x4 ounces total. Oral phase marked by adequate orientation to feeding task, able to strip the bolus from the tsp, adequate a-p transport and manipulation, no anterolateral loss; pharyngeal phase marked by suspected latency in the swallow trigger however NO overt s/sx of aspiration with all trials however can not r/o silent aspiration in presence of location of IPH and IVH as listed above. Purposeful proactive rounding reinforced and 5 Ps addressed. Pt left in no distress.     Of note, patient holding discourse which is markedly improved from prior intervention date. Following simple commands for oromotor and ID of body parts as well as items in environment. Able to provide wants/needs effectively. Noted with frequent request for date/time/ALBA, approx 5 times with SLP with self corrections appreciated. Speech clear, no dysarthria. Voice normal. [place] responses adequate. Problem solving and more abstract/complex commands/questions with notable deficits. +R sided deficits with leaning to L side in bed.     Impression; patient with marked improvement in mentation compared to ICU; concern for silent aspiration given location as described above. Oropharyngeal dysphagia appearing mild however objective testing warranted to guide intervention to determine swallow profile.     RECOMMENDATIONS;  -NPO, NGT in the interim, plan for MBS  -aspiration precautions   -oral care   -sp and sw tx during course, anticipated next level of care     Ana Castaneda MS CCC-SLP Prefer teams   extension [phone]     d/w NP Sherri and RN Juan/Pippa    GOAL- Pt to tolerate recommended textures during course w/ no s/sx of aspiration Pt/family/caregiver will demonstrate understanding and carryover of dysphagia management (safe swallow guidelines, dysphagia diet).

## 2024-04-11 NOTE — SWALLOW VFSS/MBS ASSESSMENT ADULT - COMMENTS
Continued history:   -ETT 4/2-4/6  -Patient unable to manage his secretions in the trauma bay. Visible secretions coming out of the mouth and SpO2 dropping. Required urgent suctioning and was subsequently intubated by the ED team.  CT scan revealed a LEFT thalamic hemorrhage without intraventricular extension. 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 Neurosurgery consulted immediately when bleed was uncovered on CT scan.    CT H 4/5: 5mm axial sections of the brain were obtained from base to vertex, without the intravenous administration of contrast material. Images were obtained on a portable CT scanner and compared with 4/4/2024.  The fourth, third and lateral ventricles are normal size and position. There is no change in the left thalamic parenchymal hemorrhage measuring 2.3 cm in AP diameter. There is extension into the left midbrain. There is mild mass effect on the left lateral ventricle. There is no midline shift or hydrocephalus.    Admission scores:   NIHSS on admission: 26  ICH score: 2  LKN: 4/2/2024, 19:30  pre-MRS: 0    Speech & Swallow : no reports in SCM - seen this admission for clinical swallow evaluation and language evaluation; see reports for more information. Re-evaluation completed bedside given improvement in mentation however concern for silent aspiration therefore objective testing completed.

## 2024-04-11 NOTE — SWALLOW VFSS/MBS ASSESSMENT ADULT - RECOMMENDED CONSISTENCY
minced and moist solids   honey thick liquids/moderately thick liquids via cup, tsp or straw     medication crushed if large and applicable

## 2024-04-11 NOTE — SWALLOW VFSS/MBS ASSESSMENT ADULT - ORAL PHASE
Delayed oral transit time/Reduced anterior - posterior transport/Residue in oral cavity/Uncontrolled bolus / spillover in susie-pharynx/Uncontrolled bolus / spillover in hypopharynx Uncontrolled bolus / spillover in susie-pharynx/Uncontrolled bolus / spillover in hypopharynx

## 2024-04-11 NOTE — CONSULT NOTE ADULT - SUBJECTIVE AND OBJECTIVE BOX
Chief Complaint:  Patient is a 58y old  Male who presents with a chief complaint of IPH (11 Apr 2024 08:42)      Date of service: 04-11-24 @ 10:46    HPI:    The patient is a 58M Hx eczema and Gout, admitted with [facility] extending to [facility]. GI consulted for dysphagia.       Allergies:  No Known Allergies      Home Medications:    Hospital Medications:  acetaminophen     Tablet .. 650 milliGRAM(s) Oral every 6 hours PRN  amLODIPine   Tablet 10 milliGRAM(s) Oral daily  cefTRIAXone   IVPB 1000 milliGRAM(s) IV Intermittent every 24 hours  chlorhexidine 4% Liquid 1 Application(s) Topical <User Schedule>  colchicine 0.6 milliGRAM(s) Oral two times a day  dextrose 10% Bolus 125 milliLiter(s) IV Bolus once  dextrose 5%. 1000 milliLiter(s) IV Continuous <Continuous>  dextrose 5%. 1000 milliLiter(s) IV Continuous <Continuous>  dextrose 50% Injectable 25 Gram(s) IV Push once  dextrose 50% Injectable 12.5 Gram(s) IV Push once  dextrose Oral Gel 15 Gram(s) Oral once PRN  enoxaparin Injectable 40 milliGRAM(s) SubCutaneous <User Schedule>  glucagon  Injectable 1 milliGRAM(s) IntraMuscular once  indomethacin Suppository 50 milliGRAM(s) Rectal every 12 hours  insulin lispro (ADMELOG) corrective regimen sliding scale   SubCutaneous every 6 hours  losartan 25 milliGRAM(s) Oral daily  multivitamin 1 Tablet(s) Oral daily  ondansetron Injectable 4 milliGRAM(s) IV Push every 6 hours PRN  polyethylene glycol 3350 17 Gram(s) Oral daily  QUEtiapine 25 milliGRAM(s) Oral every 8 hours  senna 2 Tablet(s) Oral at bedtime  sodium chloride 0.9%. 1000 milliLiter(s) IV Continuous <Continuous>  traMADol 25 milliGRAM(s) Oral every 4 hours PRN  traMADol 50 milliGRAM(s) Oral every 4 hours PRN      PMHX/PSHX:  Eczema    Gout        Family history:      Social History:   Denies ethanol use.  Denies illicit drug use.    ROS:     unable to obtain       PHYSICAL EXAM:     GENERAL:  Appears stated age, well-groomed, well-nourished, no distress  HEENT:  NC/AT,  conjunctivae anicteric, clear and pink,   NECK: supple, trachea midline  CHEST:  Full & symmetric excursion, no increased effort, breath sounds clear  HEART:  Regular rhythm, no JVD  ABDOMEN:  Soft, non-tender, non-distended, normoactive bowel sounds,  no masses , no hepatosplenomegaly  EXTREMITIES:  no cyanosis,clubbing or edema  SKIN:  No rash, erythema, or, ecchymoses, no jaundice  NEURO:  Alert, non-focal, no asterixis  PSYCH: Appropriate affect, oriented to place and time  RECTAL: Deferred      Vital Signs:  Vital Signs Last 24 Hrs  T(C): 36.5 (11 Apr 2024 08:12), Max: 38.9 (10 Apr 2024 16:30)  T(F): 97.7 (11 Apr 2024 08:12), Max: 102 (10 Apr 2024 16:30)  HR: 88 (11 Apr 2024 08:12) (55 - 122)  BP: 123/78 (11 Apr 2024 08:12) (110/72 - 147/88)  BP(mean): 80 (11 Apr 2024 00:00) (80 - 114)  RR: 18 (11 Apr 2024 08:12) (8 - 30)  SpO2: 95% (11 Apr 2024 08:12) (91% - 100%)    Parameters below as of 11 Apr 2024 08:12  Patient On (Oxygen Delivery Method): nasal cannula  O2 Flow (L/min): 2    Daily     Daily     LABS: Labs personally reviewed by me:                        12.5   11.88 )-----------( 286      ( 11 Apr 2024 06:36 )             40.1     04-11    143  |  107  |  26<H>  ----------------------------<  99  4.1   |  24  |  0.79    Ca    8.8      11 Apr 2024 06:35  Phos  3.5     04-11  Mg     2.5     04-11    TPro  6.8  /  Alb  3.3  /  TBili  0.6  /  DBili  x   /  AST  29  /  ALT  27  /  AlkPhos  69  04-10    LIVER FUNCTIONS - ( 10 Apr 2024 20:43 )  Alb: 3.3 g/dL / Pro: 6.8 g/dL / ALK PHOS: 69 U/L / ALT: 27 U/L / AST: 29 U/L / GGT: x             Urinalysis Basic - ( 11 Apr 2024 06:35 )    Color: x / Appearance: x / SG: x / pH: x  Gluc: 99 mg/dL / Ketone: x  / Bili: x / Urobili: x   Blood: x / Protein: x / Nitrite: x   Leuk Esterase: x / RBC: x / WBC x   Sq Epi: x / Non Sq Epi: x / Bacteria: x          Imaging personally reviewed by me:           Chief Complaint:  Patient is a 58y old  Male who presents with a chief complaint of IPH (11 Apr 2024 08:42)      Date of service: 04-11-24 @ 10:46    HPI:    The patient is a 58M Hx eczema and Gout, admitted with [facility] extending to [facility]. GI consulted for dysphagia. Pt offers no complaints of abdominal pain, n/v, constipation or bleeding symptoms.        Allergies:  No Known Allergies      Home Medications:    Hospital Medications:  acetaminophen     Tablet .. 650 milliGRAM(s) Oral every 6 hours PRN  amLODIPine   Tablet 10 milliGRAM(s) Oral daily  cefTRIAXone   IVPB 1000 milliGRAM(s) IV Intermittent every 24 hours  chlorhexidine 4% Liquid 1 Application(s) Topical <User Schedule>  colchicine 0.6 milliGRAM(s) Oral two times a day  dextrose 10% Bolus 125 milliLiter(s) IV Bolus once  dextrose 5%. 1000 milliLiter(s) IV Continuous <Continuous>  dextrose 5%. 1000 milliLiter(s) IV Continuous <Continuous>  dextrose 50% Injectable 25 Gram(s) IV Push once  dextrose 50% Injectable 12.5 Gram(s) IV Push once  dextrose Oral Gel 15 Gram(s) Oral once PRN  enoxaparin Injectable 40 milliGRAM(s) SubCutaneous <User Schedule>  glucagon  Injectable 1 milliGRAM(s) IntraMuscular once  indomethacin Suppository 50 milliGRAM(s) Rectal every 12 hours  insulin lispro (ADMELOG) corrective regimen sliding scale   SubCutaneous every 6 hours  losartan 25 milliGRAM(s) Oral daily  multivitamin 1 Tablet(s) Oral daily  ondansetron Injectable 4 milliGRAM(s) IV Push every 6 hours PRN  polyethylene glycol 3350 17 Gram(s) Oral daily  QUEtiapine 25 milliGRAM(s) Oral every 8 hours  senna 2 Tablet(s) Oral at bedtime  sodium chloride 0.9%. 1000 milliLiter(s) IV Continuous <Continuous>  traMADol 25 milliGRAM(s) Oral every 4 hours PRN  traMADol 50 milliGRAM(s) Oral every 4 hours PRN      PMHX/PSHX:  Eczema    Gout        Family history:      Social History:   Denies ethanol use.  Denies illicit drug use.    ROS:     unable to obtain       PHYSICAL EXAM:     GENERAL:  Appears stated age, well-groomed, well-nourished, no distress  HEENT:  NC/AT,  conjunctivae anicteric, clear and pink,   NECK: supple, trachea midline  CHEST:  Full & symmetric excursion, no increased effort, breath sounds clear  HEART:  Regular rhythm, no JVD  ABDOMEN:  Soft, non-tender, non-distended, normoactive bowel sounds,  no masses , no hepatosplenomegaly  EXTREMITIES:  no cyanosis,clubbing or edema  SKIN:  No rash, erythema, or, ecchymoses, no jaundice  NEURO:  Alert, non-focal, no asterixis  PSYCH: Appropriate affect, oriented to place and time  RECTAL: Deferred      Vital Signs:  Vital Signs Last 24 Hrs  T(C): 36.5 (11 Apr 2024 08:12), Max: 38.9 (10 Apr 2024 16:30)  T(F): 97.7 (11 Apr 2024 08:12), Max: 102 (10 Apr 2024 16:30)  HR: 88 (11 Apr 2024 08:12) (55 - 122)  BP: 123/78 (11 Apr 2024 08:12) (110/72 - 147/88)  BP(mean): 80 (11 Apr 2024 00:00) (80 - 114)  RR: 18 (11 Apr 2024 08:12) (8 - 30)  SpO2: 95% (11 Apr 2024 08:12) (91% - 100%)    Parameters below as of 11 Apr 2024 08:12  Patient On (Oxygen Delivery Method): nasal cannula  O2 Flow (L/min): 2    Daily     Daily     LABS: Labs personally reviewed by me:                        12.5   11.88 )-----------( 286      ( 11 Apr 2024 06:36 )             40.1     04-11    143  |  107  |  26<H>  ----------------------------<  99  4.1   |  24  |  0.79    Ca    8.8      11 Apr 2024 06:35  Phos  3.5     04-11  Mg     2.5     04-11    TPro  6.8  /  Alb  3.3  /  TBili  0.6  /  DBili  x   /  AST  29  /  ALT  27  /  AlkPhos  69  04-10    LIVER FUNCTIONS - ( 10 Apr 2024 20:43 )  Alb: 3.3 g/dL / Pro: 6.8 g/dL / ALK PHOS: 69 U/L / ALT: 27 U/L / AST: 29 U/L / GGT: x             Urinalysis Basic - ( 11 Apr 2024 06:35 )    Color: x / Appearance: x / SG: x / pH: x  Gluc: 99 mg/dL / Ketone: x  / Bili: x / Urobili: x   Blood: x / Protein: x / Nitrite: x   Leuk Esterase: x / RBC: x / WBC x   Sq Epi: x / Non Sq Epi: x / Bacteria: x          Imaging personally reviewed by me:

## 2024-04-12 ENCOUNTER — NON-APPOINTMENT (OUTPATIENT)
Age: 59
End: 2024-04-12

## 2024-04-12 PROCEDURE — 73560 X-RAY EXAM OF KNEE 1 OR 2: CPT | Mod: 26,LT,RT

## 2024-04-12 PROCEDURE — 99233 SBSQ HOSP IP/OBS HIGH 50: CPT

## 2024-04-12 PROCEDURE — 99231 SBSQ HOSP IP/OBS SF/LOW 25: CPT | Mod: GC

## 2024-04-12 PROCEDURE — 73620 X-RAY EXAM OF FOOT: CPT | Mod: 26,RT

## 2024-04-12 RX ORDER — COLCHICINE 0.6 MG
0.6 TABLET ORAL DAILY
Refills: 0 | Status: DISCONTINUED | OUTPATIENT
Start: 2024-04-15 | End: 2024-04-15

## 2024-04-12 RX ORDER — LANOLIN ALCOHOL/MO/W.PET/CERES
5 CREAM (GRAM) TOPICAL ONCE
Refills: 0 | Status: COMPLETED | OUTPATIENT
Start: 2024-04-12 | End: 2024-04-12

## 2024-04-12 RX ADMIN — QUETIAPINE FUMARATE 25 MILLIGRAM(S): 200 TABLET, FILM COATED ORAL at 05:14

## 2024-04-12 RX ADMIN — TRAMADOL HYDROCHLORIDE 50 MILLIGRAM(S): 50 TABLET ORAL at 21:24

## 2024-04-12 RX ADMIN — Medication 0.6 MILLIGRAM(S): at 17:21

## 2024-04-12 RX ADMIN — Medication 0.6 MILLIGRAM(S): at 05:14

## 2024-04-12 RX ADMIN — AMLODIPINE BESYLATE 10 MILLIGRAM(S): 2.5 TABLET ORAL at 05:14

## 2024-04-12 RX ADMIN — TRAMADOL HYDROCHLORIDE 50 MILLIGRAM(S): 50 TABLET ORAL at 21:54

## 2024-04-12 RX ADMIN — ENOXAPARIN SODIUM 40 MILLIGRAM(S): 100 INJECTION SUBCUTANEOUS at 21:25

## 2024-04-12 RX ADMIN — Medication 1 TABLET(S): at 12:14

## 2024-04-12 RX ADMIN — QUETIAPINE FUMARATE 25 MILLIGRAM(S): 200 TABLET, FILM COATED ORAL at 15:06

## 2024-04-12 RX ADMIN — TRAMADOL HYDROCHLORIDE 50 MILLIGRAM(S): 50 TABLET ORAL at 10:45

## 2024-04-12 RX ADMIN — SENNA PLUS 2 TABLET(S): 8.6 TABLET ORAL at 21:25

## 2024-04-12 RX ADMIN — PANTOPRAZOLE SODIUM 40 MILLIGRAM(S): 20 TABLET, DELAYED RELEASE ORAL at 12:13

## 2024-04-12 RX ADMIN — QUETIAPINE FUMARATE 25 MILLIGRAM(S): 200 TABLET, FILM COATED ORAL at 21:24

## 2024-04-12 RX ADMIN — TRAMADOL HYDROCHLORIDE 50 MILLIGRAM(S): 50 TABLET ORAL at 09:23

## 2024-04-12 RX ADMIN — CHLORHEXIDINE GLUCONATE 1 APPLICATION(S): 213 SOLUTION TOPICAL at 21:30

## 2024-04-12 RX ADMIN — LOSARTAN POTASSIUM 25 MILLIGRAM(S): 100 TABLET, FILM COATED ORAL at 05:14

## 2024-04-12 NOTE — PROGRESS NOTE ADULT - SUBJECTIVE AND OBJECTIVE BOX
Name of Patient : ROSALEE CARR  MRN: [id number]  Date of visit: 04-12-24       Subjective: Patient seen and examined. No new events except as noted.       MEDICATIONS:  MEDICATIONS  (STANDING):  amLODIPine   Tablet 10 milliGRAM(s) Oral daily  chlorhexidine 4% Liquid 1 Application(s) Topical <User Schedule>  colchicine 0.6 milliGRAM(s) Oral two times a day  dextrose 10% Bolus 125 milliLiter(s) IV Bolus once  dextrose 5%. 1000 milliLiter(s) (100 mL/Hr) IV Continuous <Continuous>  dextrose 5%. 1000 milliLiter(s) (50 mL/Hr) IV Continuous <Continuous>  dextrose 50% Injectable 12.5 Gram(s) IV Push once  dextrose 50% Injectable 25 Gram(s) IV Push once  enoxaparin Injectable 40 milliGRAM(s) SubCutaneous <User Schedule>  glucagon  Injectable 1 milliGRAM(s) IntraMuscular once  losartan 25 milliGRAM(s) Oral daily  multivitamin 1 Tablet(s) Oral daily  pantoprazole   Suspension 40 milliGRAM(s) Oral daily  polyethylene glycol 3350 17 Gram(s) Oral daily  QUEtiapine 25 milliGRAM(s) Oral every 8 hours  senna 2 Tablet(s) Oral at bedtime  sodium chloride 0.9%. 1000 milliLiter(s) (50 mL/Hr) IV Continuous <Continuous>      PHYSICAL EXAM:  T(C): 36.8 (04-12-24 @ 20:37), Max: 37.2 (04-12-24 @ 09:00)  HR: 89 (04-12-24 @ 20:37) (89 - 94)  BP: 143/87 (04-12-24 @ 20:37) (123/84 - 154/89)  RR: 18 (04-12-24 @ 20:37) (18 - 18)  SpO2: 96% (04-12-24 @ 20:37) (93% - 96%)  Wt(kg): --  I&O's Summary    11 Apr 2024 07:01  -  12 Apr 2024 07:00  --------------------------------------------------------  IN: 917 mL / OUT: 1300 mL / NET: -383 mL    12 Apr 2024 07:01  -  12 Apr 2024 23:09  --------------------------------------------------------  IN: 0 mL / OUT: 600 mL / NET: -600 mL          Appearance: Normal	  HEENT:  PERRLA   Lymphatic: No lymphadenopathy   Cardiovascular: Normal S1 S2, no JVD  Respiratory: normal effort , clear  Gastrointestinal:  Soft, Non-tender  Skin: No rashes,  warm to touch  Psychiatry:  Mood & affect appropriate  Musculuskeletal: No edema    recent labs, Imaging and EKGs personally reviewed     04-11-24 @ 07:01  -  04-12-24 @ 07:00  --------------------------------------------------------  IN: 917 mL / OUT: 1300 mL / NET: -383 mL    04-12-24 @ 07:01  -  04-12-24 @ 23:09  --------------------------------------------------------  IN: 0 mL / OUT: 600 mL / NET: -600 mL                          12.5   11.88 )-----------( 286      ( 11 Apr 2024 06:36 )             40.1               04-11    143  |  107  |  26<H>  ----------------------------<  99  4.1   |  24  |  0.79    Ca    8.8      11 Apr 2024 06:35  Phos  3.5     04-11  Mg     2.5     04-11                         Urinalysis Basic - ( 11 Apr 2024 06:35 )    Color: x / Appearance: x / SG: x / pH: x  Gluc: 99 mg/dL / Ketone: x  / Bili: x / Urobili: x   Blood: x / Protein: x / Nitrite: x   Leuk Esterase: x / RBC: x / WBC x   Sq Epi: x / Non Sq Epi: x / Bacteria: x

## 2024-04-12 NOTE — PROGRESS NOTE ADULT - ASSESSMENT
58M Hx eczema/Gout intermittently takes ASA for pain, p/f being found down by wife after hearing thud. No hx HTN. 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 CTA grossly negative.    Impression: Subjectively improving (although still R hemiplegia on examination) R sensorimotor deficit with mild R gaze palsy, moderate dysarthria, and possible subtle subcortical aphasia (given possible rare semantic paraphasic errors), due to L thalamic IPH, likely i/s/o hypertension (although no prior diagnosis). Angiogram negative for underlying vascular malformation.    NEURO: Continue close monitoring for neurologic deterioration, goal BP<140/90, Pending MRI Brain W/w/o, CTH, CTA Head w/o and Neck w/contrast results above. Physical therapy/OT/Speech eval/treatment. D/c VPA (greater than 7 days after initial presentation, and subcortical location of ICH, EEG negative, no need for further seizure ppx at this time)    ANTITHROMBOTIC THERAPY: none due to thalamic IPH    PULMONARY: CXR clear, protecting airway, saturating well     CARDIOVASCULAR:  TTE: Ef 69%,  There is no evidence of a left ventricular thrombus. There is normal LV mass and normal geometry.   , cardiac monitoring. c/w Losartan 25mg + Amlodipine 10mg for HTN (goal normotension).                               SBP goal: <140    GASTROINTESTINAL:  dysphagia screen failed.  c/w senna and miralax for bowel regimen. s/p MBS and placed on diet     Diet: minced and moist    RENAL: BUN/Cr stable, good urine output      Na Goal: Greater than 135     Canales: N    HEMATOLOGY: H/H stable, Platelets normal      DVT ppx: Heparin s.c [] LMWH [x]       ID: Pt found to be febrile to 102 on 4/10 PM, tachycardic as well. Ordered BCx, UCx, CXR, RVP, UA+,ceftriaxone started, medicine team following    OTHER: Rheumatology: appreciated recommendations: -C/w colchicine at current dose, hold indomethacin and allopurinol  -Check HLA [id number] given higher prevalence in  population, though lower suspicion of allopurinol hypersensitivity given absence of acute rash  -Radiographs of right foot, bilateral knees    DISPOSITION:  PT recommending acute rehab, will f/u with CM/SW.      CORE MEASURES:        Admission NIHSS:      Tenectaplase: [] YES [x] NO      LDL/HDL: 107/67     Depression Screen: no      Statin Therapy: No     Dysphagia Screen: [] PASS [X] FAIL     Smoking [] YES [X] NO      Afib [] YES [X] NO     Stroke Education [X] YES [] NO    Obtain screening lower extremity venous ultrasound in patients who meet 1 or more of the following criteria as patient is high risk for DVT/PE on admission:   [] History of DVT/PE  []Hypercoagulable states (Factor V Leiden, Cancer, OCP, etc. )  []Prolonged immobility (hemiplegia/hemiparesis/post operative or any other extended immobilization)  [] Transferred from outside facility (Rehab or Long term care)  [] Age </= to 50

## 2024-04-12 NOTE — PROGRESS NOTE ADULT - ASSESSMENT
Patient is a 58 year old male with a PMHx of Eczema, Gout who was reportedly intermittently taking ASA for pain, who presented to [facility] on 4/2/2024 for being found down by his wife. Per neurology, patient without history of HTN. Patient was found to have a L thalamic IPH extending to the midbrain with no IVH or hydro, with modest mass effect. CTA grossly negative. Patient was intubated in the ER for inability to manage secretions. Internal Medicine has been consulted on Mr. Mishra's care for medical management.        IPH   - 4/2 CT H w/ acute L IPH  - 4/2 CTA H/N w/ Short segment dissection flap of the proximal R ICA  - 4/7 CT H w/ no change in the L basal ganglia and thalamic parenchymal hemorrhage   - MR head pending   - Likely 2/2 HTN as per Stroke neurology;  Consider ILR placement to rule out occult arrythmia   - Neuro checks as per protocol  - Monitor on tele   - PT / OT/ S+S  - Fall, Seizure, Aspiration precautions   - NSICU care appreciated  - Per Stroke Neurology     R/O Sepsis --> Likely 2/2 UTI   - Pt febrile, tachycardic, with leukocytosis   - 4/5 BCx 1 of 2 w/ Staph epi, 4/6 BCx2 negative   - F/u 4/10 UCx and BCx 2 --> In lab  - Likely UTI in view of grossly + UA , negative urine Cx  - Lactate negative, RSV/Flu/Covid negative, Procal 0.29   - On Rocephin for ABX   - Antipyretics PRN  - Continue to monitor and trend CBC, temp curve, VS   - If recurrently febrile while on ABX, suggest CT C/A/P infectious work up     Dysphagia   - W/ NGT  - Aspiration precautions; Oral care  - GI eval appreciated; F/u recs --> Tentative plan for EGD/ PEG on Monday if fails repeat S+S     Hypoxia  - S/P NSICU, S/P Intubation and now self extubated 4/6  - Incentive spirometer   - Monitor O2 saturation; Supplement to maintain > 90%     Gout, LE Edema   - 4/9 Duplex negative for DVT  - Home allopurinol was held on admission. Indomethacin resumed per Rheum   - On Colchicine 0.6 PO BID  - Per Rheumatology     Pre-DM  - A1C of 5.8  - On Sliding scale  - Monitor glucose levels and adjust as tolerated    HTN   - On Losartan 25 Mg and Norvasc 10   - TTE w/ EF of 69%, no evidence of LV thrombus   - Monitor BP and adjust as tolerated    PPX  - On Lovenox 40   - Start PPI

## 2024-04-12 NOTE — PROGRESS NOTE ADULT - SUBJECTIVE AND OBJECTIVE BOX
THE PATIENT WAS SEEN AND EXAMINED BY ME WITH THE HOUSESTAFF AND STROKE TEAM DURING MORNING ROUNDS.   HPI:  58M Hx eczema/Gout intermittently takes ASA for pain, p/f being found down by wife after hearing thud. No hx HTN. 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 CTA grossly negative. Coags/TEG/ARU pend. ED intubated for inability to manage secretions. ICH score 2   Exam prior to intubation: eye opening apraxia, Ox1, PERRL, upgaze and right gaze palsy could cross midline, int FC, L side 5/5, LUE extensor, LLe flaccid        SUBJECTIVE: No events overnight.  No new neurologic complaints.      acetaminophen     Tablet .. 650 milliGRAM(s) Oral every 6 hours PRN  amLODIPine   Tablet 10 milliGRAM(s) Oral daily  cefTRIAXone   IVPB 1000 milliGRAM(s) IV Intermittent every 24 hours  chlorhexidine 4% Liquid 1 Application(s) Topical <User Schedule>  colchicine 0.6 milliGRAM(s) Oral two times a day  dextrose 10% Bolus 125 milliLiter(s) IV Bolus once  dextrose 5%. 1000 milliLiter(s) IV Continuous <Continuous>  dextrose 5%. 1000 milliLiter(s) IV Continuous <Continuous>  dextrose 50% Injectable 12.5 Gram(s) IV Push once  dextrose 50% Injectable 25 Gram(s) IV Push once  dextrose Oral Gel 15 Gram(s) Oral once PRN  enoxaparin Injectable 40 milliGRAM(s) SubCutaneous <User Schedule>  glucagon  Injectable 1 milliGRAM(s) IntraMuscular once  losartan 25 milliGRAM(s) Oral daily  multivitamin 1 Tablet(s) Oral daily  ondansetron Injectable 4 milliGRAM(s) IV Push every 6 hours PRN  pantoprazole   Suspension 40 milliGRAM(s) Oral daily  polyethylene glycol 3350 17 Gram(s) Oral daily  QUEtiapine 25 milliGRAM(s) Oral every 8 hours  senna 2 Tablet(s) Oral at bedtime  sodium chloride 0.9%. 1000 milliLiter(s) IV Continuous <Continuous>  traMADol 25 milliGRAM(s) Oral every 4 hours PRN  traMADol 50 milliGRAM(s) Oral every 4 hours PRN      PHYSICAL EXAM:   Vital Signs Last 24 Hrs  T(C): 36.7 (12 Apr 2024 04:52), Max: 36.8 (11 Apr 2024 16:31)  T(F): 98.1 (12 Apr 2024 04:52), Max: 98.2 (11 Apr 2024 16:31)  HR: 90 (12 Apr 2024 04:52) (89 - 95)  BP: 134/89 (12 Apr 2024 04:52) (102/68 - 154/89)  RR: 18 (12 Apr 2024 04:52) (18 - 18)  SpO2: 94% (12 Apr 2024 04:52) (92% - 96%)    Parameters below as of 12 Apr 2024 04:52  Patient On (Oxygen Delivery Method): room air        General: No acute distress  HEENT: EOM intact, visual fields full  Abdomen: Soft, nontender, nondistended   Extremities: No edema    Neuro exam:  MS: Awake, alert, oriented to self, month, not year, unclear if oriented to location (stated different things, like bathroom and hospital, unclear if disorientation vs semantic paraphasic errors), follows simple commands, speech fluent but with moderate dysarthria  CN: pupils equal, mild R gaze palsy, mild R facial droop  Motor: 5/5 LUE strength, LLE strength antigravity but pain limited, no RUE or RLE movement .   Sensory: Moderately decreased sensation to LT in RUE and RLE.    LABS:                        12.5   11.88 )-----------( 286      ( 11 Apr 2024 06:36 )             40.1    04-11    143  |  107  |  26<H>  ----------------------------<  99  4.1   |  24  |  0.79    Ca    8.8      11 Apr 2024 06:35  Phos  3.5     04-11  Mg     2.5     04-11    TPro  6.8  /  Alb  3.3  /  TBili  0.6  /  DBili  x   /  AST  29  /  ALT  27  /  AlkPhos  69  04-10        IMAGING: Reviewed by me.   MRI HEAD 04/10/24: Ongoing expected evolution of left thalamocapsular hemorrhage measuring   up to 4.2 cm. No underlying mass lesion or vascular malformation is   identified.    -Chronic microhemorrhages in the right thalamus and basal ganglia,   compatible with background of chronic hypertensive microangiopathy.    -Small acute infarcts in the right caudate tail and periatrial white   matter.     CT Head No Cont (04.07.24 @ 10:25)     IMPRESSION: No change in left basal ganglia and thalamic parenchymal hemorrhage compared with 4/5/2024.      CT Angio Brain Stroke Protocol  w/ IV Cont (04.02.24 @ 21:24)  IMPRESSION:    CT ANGIOGRAPHY NECK:  1. Cervical carotid systems and vertebral arteries are patent bilaterally   without stenosis.  No hemodynamically significant carotid stenosis using   NASCET criteria.  2.  Short segment dissection flap in the proximal right internal carotid   artery, of unknown chronicity.      CT ANGIOGRAPHY BRAIN:  1.  No major vessel occlusion, stenosis or aneurysm is identified about   the [place].  Normal anatomic variants as discussed above.  2.  The dural venous sinuses are incompletely opacified due to   acquisition during early arterial phase.  3.  No evidence of an arteriovenous malformation.  4.  No evidence of active contrast extravasation into the patient's left   thalamic hematoma.    CT Head No Cont (04.03.24 @ 01:46)   IMPRESSION:Acute intraparenchymal hemorrhage centered in the left thalamus measures approximately 3.3 x 1.5 x 4.2 cm in the sagittal and coronal planes, stable from 04/02/2024 at 9:19 PM.

## 2024-04-12 NOTE — PROGRESS NOTE ADULT - SUBJECTIVE AND OBJECTIVE BOX
ROSALEE LILLY  [id number]    INTERVAL HPI/OVERNIGHT EVENTS:  No acute overnight events, patient seen and examined at bedside. Reports significant improvement in lower extremity pain today.    PMHx/PSHx/FamHx/SocHx reviewed and no significant changes    REVIEW OF SYSTEMS   - reviewed with patient and  negative other than as above or previously documented.     MEDICATIONS  (STANDING):  amLODIPine   Tablet 10 milliGRAM(s) Oral daily  chlorhexidine 4% Liquid 1 Application(s) Topical <User Schedule>  colchicine 0.6 milliGRAM(s) Oral two times a day  dextrose 10% Bolus 125 milliLiter(s) IV Bolus once  dextrose 5%. 1000 milliLiter(s) (100 mL/Hr) IV Continuous <Continuous>  dextrose 5%. 1000 milliLiter(s) (50 mL/Hr) IV Continuous <Continuous>  dextrose 50% Injectable 25 Gram(s) IV Push once  dextrose 50% Injectable 12.5 Gram(s) IV Push once  enoxaparin Injectable 40 milliGRAM(s) SubCutaneous <User Schedule>  glucagon  Injectable 1 milliGRAM(s) IntraMuscular once  losartan 25 milliGRAM(s) Oral daily  multivitamin 1 Tablet(s) Oral daily  pantoprazole   Suspension 40 milliGRAM(s) Oral daily  polyethylene glycol 3350 17 Gram(s) Oral daily  QUEtiapine 25 milliGRAM(s) Oral every 8 hours  senna 2 Tablet(s) Oral at bedtime  sodium chloride 0.9%. 1000 milliLiter(s) (50 mL/Hr) IV Continuous <Continuous>    MEDICATIONS  (PRN):  acetaminophen     Tablet .. 650 milliGRAM(s) Oral every 6 hours PRN Temp greater or equal to 38C (100.4F), Mild Pain (1 - 3)  dextrose Oral Gel 15 Gram(s) Oral once PRN Blood Glucose LESS THAN 70 milliGRAM(s)/deciliter  ondansetron Injectable 4 milliGRAM(s) IV Push every 6 hours PRN Nausea and/or Vomiting  traMADol 25 milliGRAM(s) Oral every 4 hours PRN Moderate Pain (4 - 6)  traMADol 50 milliGRAM(s) Oral every 4 hours PRN Severe Pain (7 - 10)      Allergies    No Known Allergies    Intolerances          Vital Signs Last 24 Hrs  T(C): 37.1 (12 Apr 2024 13:00), Max: 37.2 (12 Apr 2024 09:00)  T(F): 98.7 (12 Apr 2024 13:00), Max: 99 (12 Apr 2024 09:00)  HR: 93 (12 Apr 2024 13:00) (89 - 95)  BP: 123/84 (12 Apr 2024 13:00) (123/84 - 154/89)  BP(mean): --  RR: 18 (12 Apr 2024 13:00) (18 - 18)  SpO2: 93% (12 Apr 2024 13:00) (92% - 94%)    Parameters below as of 12 Apr 2024 13:00  Patient On (Oxygen Delivery Method): room air      Physical Exam:  General: No apparent distress  HEENT: EOMI, MMM  CVS: +S1/S2, RRR, no murmurs/rubs/gallops  Resp: CTA b/l. No crackles/wheezing  GI: Soft, NT/ND +BS  MSK: UE notable for diffuse swelling of the right hand; LE with left foot mildly tender, less swelling and erythema compared to prior over 1st MTP, 3rd MTP; left knee large cool effusion without TTP and full ROM. Right MTP and ankle with mild TTP, minimal erythema, swelling, warmth  Neuro: Mild dysarthria, right hemiplegia.  Skin: no visible rashes. No tophi      LABS:                        12.5   11.88 )-----------( 286      ( 11 Apr 2024 06:36 )             40.1     04-11    143  |  107  |  26<H>  ----------------------------<  99  4.1   |  24  |  0.79    Ca    8.8      11 Apr 2024 06:35  Phos  3.5     04-11  Mg     2.5     04-11    TPro  6.8  /  Alb  3.3  /  TBili  0.6  /  DBili  x   /  AST  29  /  ALT  27  /  AlkPhos  69  04-10      Urinalysis Basic - ( 11 Apr 2024 06:35 )    Color: x / Appearance: x / SG: x / pH: x  Gluc: 99 mg/dL / Ketone: x  / Bili: x / Urobili: x   Blood: x / Protein: x / Nitrite: x   Leuk Esterase: x / RBC: x / WBC x   Sq Epi: x / Non Sq Epi: x / Bacteria: x          RADIOLOGY & ADDITIONAL TESTS:  Right foot 4/12/24  IMPRESSION:  No fractures or dislocations.    Tarsometatarsal alignment maintained without evidence for a Lisfranc   injury.    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   Preservedremaining visualized joint spaces and no joint margin erosions.    Bipartite appearing lateral hallux sesamoid. Small calcaneal   enthesophytes.    No lytic or blastic lesions.    Bilateral knee 4/12/24  IMPRESSION:  Bilateral knee joint effusions.    No fractures or dislocations.    Small bilateral superior and inferior patellar and peripheral lateral   tibial plateau articular margin osteophytes otherwise maintained   appearing joint spaces and no joint margin erosions.    2 adjacent small nodular soft tissue calcifications adjacent to proximal   posterior left tibial metaphysis.

## 2024-04-12 NOTE — PROGRESS NOTE ADULT - ASSESSMENT
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 GI consulted for dysphagia.     1. IPH   per neurology     2. Oropharyngeal dysphagia   now cleared for dysphagia diet   no indication for PEG at this time     3. UTI   on ceftriaxone     4. Gout        I had a prolonged conversation with the patient regarding the hospital course, differential diagnosis, results of diagnostic tests this far, and therapeutic modalities available. Plan of care discussed with the patient after the evaluation. Patient expresses a clear understanding of the plan of care.       Dion King D.O.  Gastroenterology and Hepatology  [address] [place] [address], [address]  [place], [place]  Office: [phone]

## 2024-04-12 NOTE — PROGRESS NOTE ADULT - ATTENDING COMMENTS
I reviewed available diagnostic studies, and reviewed images personally. I agree with resident & fellow 's history, exam, orders placed, and plan of care. Thirty minutes of critical care time was spent in caring for this patient who has concern of sudden and clinically significant deterioration requiring a high level of physician preparedness, management of brain supporting interventions, and frequent physician assessments.    Medical issues needing to be addressed include: Nontraumatic intracerebral hemorrhage in thalamus, HTN, R hemiparesis, AMS. BP management per NSCU. CTA w/ finding of ICA flap (possible dissection), but no underlying vascular abnormality at the region of bleed. Wife reports a fall to the ground at the time of event, possibly traumatic vs chronic. Holding off on antithrombotics, angiogram tomorrow will further characterize. Still suspect primary hypertensive hemorrhage at this time. BP & respiratory management per NSCU, pt still intubated, planning for MRI and angio first then wean to extubate per ICU. Case discussed with wife at bedside, all questions answered.
as above    Dr. Xiao Thomas  Attending Physician  Division of Rheumatology, [facility]  Reachable on [employer] Teams  [email or web address]
Agree with above.
58-year-old man admitted with left thalamic hemorrhage status post intubation for airway control. Agitated overnight.     Intubated, +cough/gag, follows on the left arm, spontaneous on the left, plegic on the right    Switch levetiracetam to VPA for behavior; no seizures and deep ICH so no clear indication for seizure prophylaxis  Attempt to wean off propofol and transition to dexmedetomidine if agitation allows  LE edema - LE dopplers  MRI/A brain to r/o underling lesion/cavernoma  Awaiting angio if family amenable to r/o vascular malformation  Wean vent as tolerated    At risk of death/neuro decline due to: ICH, acute resp failure
58 year old male with left thalamic intraparenchymal hemorrhage. No intraventricular hemorrhage or hydrocephalus    No acute neurosurgical intervention indicated    Recommend diagnostic cerebral angiogram to rule out vascular malformation
Agree with above.

## 2024-04-12 NOTE — PROGRESS NOTE ADULT - SUBJECTIVE AND OBJECTIVE BOX
Chief Complaint:  Patient is a 58y old  Male who presents with a chief complaint of IPH (12 Apr 2024 08:30)      Date of service 04-12-24 @ 09:51      Interval Events:   tolerating dysphagia diet      Hospital Medications:  acetaminophen     Tablet .. 650 milliGRAM(s) Oral every 6 hours PRN  amLODIPine   Tablet 10 milliGRAM(s) Oral daily  cefTRIAXone   IVPB 1000 milliGRAM(s) IV Intermittent every 24 hours  chlorhexidine 4% Liquid 1 Application(s) Topical <User Schedule>  colchicine 0.6 milliGRAM(s) Oral two times a day  dextrose 10% Bolus 125 milliLiter(s) IV Bolus once  dextrose 5%. 1000 milliLiter(s) IV Continuous <Continuous>  dextrose 5%. 1000 milliLiter(s) IV Continuous <Continuous>  dextrose 50% Injectable 25 Gram(s) IV Push once  dextrose 50% Injectable 12.5 Gram(s) IV Push once  dextrose Oral Gel 15 Gram(s) Oral once PRN  enoxaparin Injectable 40 milliGRAM(s) SubCutaneous <User Schedule>  glucagon  Injectable 1 milliGRAM(s) IntraMuscular once  losartan 25 milliGRAM(s) Oral daily  multivitamin 1 Tablet(s) Oral daily  ondansetron Injectable 4 milliGRAM(s) IV Push every 6 hours PRN  pantoprazole   Suspension 40 milliGRAM(s) Oral daily  polyethylene glycol 3350 17 Gram(s) Oral daily  QUEtiapine 25 milliGRAM(s) Oral every 8 hours  senna 2 Tablet(s) Oral at bedtime  sodium chloride 0.9%. 1000 milliLiter(s) IV Continuous <Continuous>  traMADol 25 milliGRAM(s) Oral every 4 hours PRN  traMADol 50 milliGRAM(s) Oral every 4 hours PRN        Review of Systems:  General:  No wt loss, fevers, chills, night sweats, fatigue,   Eyes:  Good vision, no reported pain  ENT:  No sore throat, pain, runny nose, dysphagia  CV:  No pain, palpitations, hypo/hypertension  Resp:  No dyspnea, cough, tachypnea, wheezing  GI:  See HPI  :  No pain, bleeding, incontinence, nocturia  Muscle:  No pain, weakness  Neuro:  No weakness, tingling, memory problems  Psych:  No fatigue, insomnia, mood problems, depression  Endocrine:  No polyuria, polydipsia, cold/heat intolerance  Heme:  No petechiae, ecchymosis, easy bruisability  Integumentary:  No rash, edema    PHYSICAL EXAM:   Vital Signs:  Vital Signs Last 24 Hrs  T(C): 37.2 (12 Apr 2024 09:00), Max: 37.2 (12 Apr 2024 09:00)  T(F): 99 (12 Apr 2024 09:00), Max: 99 (12 Apr 2024 09:00)  HR: 90 (12 Apr 2024 09:00) (89 - 95)  BP: 144/89 (12 Apr 2024 09:00) (102/68 - 154/89)  BP(mean): --  RR: 18 (12 Apr 2024 09:00) (18 - 18)  SpO2: 93% (12 Apr 2024 09:00) (92% - 96%)    Parameters below as of 12 Apr 2024 09:00  Patient On (Oxygen Delivery Method): room air      Daily     Daily       PHYSICAL EXAM:     GENERAL:  Appears stated age, well-groomed, well-nourished, no distress  HEENT:  NC/AT,  conjunctivae anicteric, clear and pink,   NECK: supple, trachea midline  CHEST:  Full & symmetric excursion, no increased effort, breath sounds clear  HEART:  Regular rhythm, no JVD  ABDOMEN:  Soft, non-tender, non-distended, normoactive bowel sounds,  no masses , no hepatosplenomegaly  EXTREMITIES:  no cyanosis,clubbing or edema  SKIN:  No rash, erythema, or, ecchymoses, no jaundice  NEURO:  Alert, non-focal, no asterixis  PSYCH: Appropriate affect, oriented to place and time  RECTAL: Deferred      LABS Personally reviewed by me:                        12.5   11.88 )-----------( 286      ( 11 Apr 2024 06:36 )             40.1       04-11    143  |  107  |  26<H>  ----------------------------<  99  4.1   |  24  |  0.79    Ca    8.8      11 Apr 2024 06:35  Phos  3.5     04-11  Mg     2.5     04-11    TPro  6.8  /  Alb  3.3  /  TBili  0.6  /  DBili  x   /  AST  29  /  ALT  27  /  AlkPhos  69  04-10    LIVER FUNCTIONS - ( 10 Apr 2024 20:43 )  Alb: 3.3 g/dL / Pro: 6.8 g/dL / ALK PHOS: 69 U/L / ALT: 27 U/L / AST: 29 U/L / GGT: x             Urinalysis Basic - ( 11 Apr 2024 06:35 )    Color: x / Appearance: x / SG: x / pH: x  Gluc: 99 mg/dL / Ketone: x  / Bili: x / Urobili: x   Blood: x / Protein: x / Nitrite: x   Leuk Esterase: x / RBC: x / WBC x   Sq Epi: x / Non Sq Epi: x / Bacteria: x                              12.5   11.88 )-----------( 286      ( 11 Apr 2024 06:36 )             40.1                         13.2   13.11 )-----------( 276      ( 10 Apr 2024 20:43 )             40.2                         14.9   11.02 )-----------( 244      ( 10 Apr 2024 05:05 )             46.2       Imaging personally reviewed by me:

## 2024-04-12 NOTE — PROGRESS NOTE ADULT - ASSESSMENT
58M with a PMHx of gout, admitted from after being found down, code stroke activated, found to have L IPH from presumed HTN. Rheumatology consulted for evaluation of acute gout.     #Gout, recurrent; improving  Reports symptoms for the past week to multiple joints similar to reported prior attacks  Uric Acid level initially 3.8    Recommendations:  -C/w colchicine 0.6mg bid for total 5d course through 4/15/24 then transition to daily 0.6 mg dosing, PRN ice packs, hold indomethacin and allopurinol. Indomethacin is not recommended in the context of hypertension and recent bleed.  -F/u HLA [id number] given higher prevalence in  population, though lower suspicion of allopurinol hypersensitivity given that he tolerated allopurinol in the past  -Patient will follow-up with Dr. Thomas as an outpatient to assess resolution of flare and long-term gout management including urate lowering therapy.     Rheumatology to sign off at this time. Please call back with any additional concerns as needed.

## 2024-04-13 LAB
ANION GAP SERPL CALC-SCNC: 14 MMOL/L — SIGNIFICANT CHANGE UP (ref 5–17)
BASOPHILS # BLD AUTO: 0.13 K/UL — SIGNIFICANT CHANGE UP (ref 0–0.2)
BASOPHILS NFR BLD AUTO: 1.5 % — SIGNIFICANT CHANGE UP (ref 0–2)
BUN SERPL-MCNC: 17 MG/DL — SIGNIFICANT CHANGE UP (ref 7–23)
CALCIUM SERPL-MCNC: 9 MG/DL — SIGNIFICANT CHANGE UP (ref 8.4–10.5)
CHLORIDE SERPL-SCNC: 103 MMOL/L — SIGNIFICANT CHANGE UP (ref 96–108)
CO2 SERPL-SCNC: 22 MMOL/L — SIGNIFICANT CHANGE UP (ref 22–31)
CREAT SERPL-MCNC: 0.72 MG/DL — SIGNIFICANT CHANGE UP (ref 0.5–1.3)
EGFR: 107 ML/MIN/1.73M2 — SIGNIFICANT CHANGE UP
EOSINOPHIL # BLD AUTO: 0.4 K/UL — SIGNIFICANT CHANGE UP (ref 0–0.5)
EOSINOPHIL NFR BLD AUTO: 4.7 % — SIGNIFICANT CHANGE UP (ref 0–6)
GLUCOSE SERPL-MCNC: 103 MG/DL — HIGH (ref 70–99)
HCT VFR BLD CALC: 40.4 % — SIGNIFICANT CHANGE UP (ref 39–50)
HGB BLD-MCNC: 12.6 G/DL — LOW (ref 13–17)
IMM GRANULOCYTES NFR BLD AUTO: 5.6 % — HIGH (ref 0–0.9)
LYMPHOCYTES # BLD AUTO: 0.73 K/UL — LOW (ref 1–3.3)
LYMPHOCYTES # BLD AUTO: 8.5 % — LOW (ref 13–44)
MCHC RBC-ENTMCNC: 27.8 PG — SIGNIFICANT CHANGE UP (ref 27–34)
MCHC RBC-ENTMCNC: 31.2 GM/DL — LOW (ref 32–36)
MCV RBC AUTO: 89.2 FL — SIGNIFICANT CHANGE UP (ref 80–100)
MONOCYTES # BLD AUTO: 1.11 K/UL — HIGH (ref 0–0.9)
MONOCYTES NFR BLD AUTO: 12.9 % — SIGNIFICANT CHANGE UP (ref 2–14)
NEUTROPHILS # BLD AUTO: 5.75 K/UL — SIGNIFICANT CHANGE UP (ref 1.8–7.4)
NEUTROPHILS NFR BLD AUTO: 66.8 % — SIGNIFICANT CHANGE UP (ref 43–77)
NRBC # BLD: 0 /100 WBCS — SIGNIFICANT CHANGE UP (ref 0–0)
PLATELET # BLD AUTO: 330 K/UL — SIGNIFICANT CHANGE UP (ref 150–400)
POTASSIUM SERPL-MCNC: 3.7 MMOL/L — SIGNIFICANT CHANGE UP (ref 3.5–5.3)
POTASSIUM SERPL-SCNC: 3.7 MMOL/L — SIGNIFICANT CHANGE UP (ref 3.5–5.3)
RBC # BLD: 4.53 M/UL — SIGNIFICANT CHANGE UP (ref 4.2–5.8)
RBC # FLD: 12.5 % — SIGNIFICANT CHANGE UP (ref 10.3–14.5)
SODIUM SERPL-SCNC: 139 MMOL/L — SIGNIFICANT CHANGE UP (ref 135–145)
WBC # BLD: 8.6 K/UL — SIGNIFICANT CHANGE UP (ref 3.8–10.5)
WBC # FLD AUTO: 8.6 K/UL — SIGNIFICANT CHANGE UP (ref 3.8–10.5)

## 2024-04-13 PROCEDURE — 99233 SBSQ HOSP IP/OBS HIGH 50: CPT

## 2024-04-13 RX ADMIN — Medication 650 MILLIGRAM(S): at 21:26

## 2024-04-13 RX ADMIN — Medication 1 TABLET(S): at 10:44

## 2024-04-13 RX ADMIN — SODIUM CHLORIDE 50 MILLILITER(S): 9 INJECTION INTRAMUSCULAR; INTRAVENOUS; SUBCUTANEOUS at 05:25

## 2024-04-13 RX ADMIN — QUETIAPINE FUMARATE 25 MILLIGRAM(S): 200 TABLET, FILM COATED ORAL at 21:25

## 2024-04-13 RX ADMIN — Medication 650 MILLIGRAM(S): at 21:56

## 2024-04-13 RX ADMIN — PANTOPRAZOLE SODIUM 40 MILLIGRAM(S): 20 TABLET, DELAYED RELEASE ORAL at 10:44

## 2024-04-13 RX ADMIN — CHLORHEXIDINE GLUCONATE 1 APPLICATION(S): 213 SOLUTION TOPICAL at 21:26

## 2024-04-13 RX ADMIN — POLYETHYLENE GLYCOL 3350 17 GRAM(S): 17 POWDER, FOR SOLUTION ORAL at 10:44

## 2024-04-13 RX ADMIN — ENOXAPARIN SODIUM 40 MILLIGRAM(S): 100 INJECTION SUBCUTANEOUS at 21:25

## 2024-04-13 RX ADMIN — QUETIAPINE FUMARATE 25 MILLIGRAM(S): 200 TABLET, FILM COATED ORAL at 05:25

## 2024-04-13 RX ADMIN — Medication 0.6 MILLIGRAM(S): at 16:39

## 2024-04-13 RX ADMIN — QUETIAPINE FUMARATE 25 MILLIGRAM(S): 200 TABLET, FILM COATED ORAL at 14:34

## 2024-04-13 RX ADMIN — LOSARTAN POTASSIUM 25 MILLIGRAM(S): 100 TABLET, FILM COATED ORAL at 05:25

## 2024-04-13 RX ADMIN — SENNA PLUS 2 TABLET(S): 8.6 TABLET ORAL at 21:25

## 2024-04-13 RX ADMIN — AMLODIPINE BESYLATE 10 MILLIGRAM(S): 2.5 TABLET ORAL at 05:25

## 2024-04-13 RX ADMIN — Medication 0.6 MILLIGRAM(S): at 05:25

## 2024-04-13 NOTE — PROGRESS NOTE ADULT - ASSESSMENT
58M Hx eczema/Gout intermittently takes ASA for pain, p/f being found down by wife after hearing thud. No hx HTN. 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 CTA grossly negative.    Impression: Subjectively improving (although still R hemiplegia on examination) R sensorimotor deficit with mild R gaze palsy, moderate dysarthria, and possible subtle subcortical aphasia (given possible rare semantic paraphasic errors), due to L thalamic IPH, likely i/s/o hypertension (although no prior diagnosis). Angiogram negative for underlying vascular malformation.    NEURO: Neurologic exam overall stable from yesterday, improved since admission. Continue close monitoring for neurologic deterioration. Goal BP<140/90. MRI Brain W/wo, CTH, CTA Head w/o and Neck w/contrast results as above. Physical therapy/OT/Speech eval recommend AR. Discontinued VPA (greater than 7 days after initial presentation, and subcortical location of ICH, EEG negative, no need for further seizure ppx at this time)    ANTITHROMBOTIC THERAPY: none due to thalamic IPH    PULMONARY: CXR (4/10/24) clear, protecting airway, saturating well     CARDIOVASCULAR:  TTE: Ef 69%,  There is no evidence of a left ventricular thrombus. There is normal LV mass and normal geometry. continue cardiac monitoring. c/w Losartan 25mg + Amlodipine 10mg for HTN (goal normotension).                               SBP goal: <140    GASTROINTESTINAL:  dysphagia screen initially failed.  c/w senna and miralax for bowel regimen. s/p MBS on 4/11/24 and placed on diet, tolerating well.     Diet: minced and moist    RENAL: BUN/Cr stable, good urine output      Na Goal: Greater than 135     Canales: N    HEMATOLOGY: H/H stable anemia, Platelets normal (330)     DVT ppx:  LMWH       ID: Pt found to be febrile to 102 on 4/10 PM, tachycardic as well. Ordered BCx, UCx neg, CXR neg, RVP neg, UA+, s/p ceftriaxone, medicine team following    OTHER: Rheumatology: appreciated recommendations: -C/w colchicine at current dose, hold indomethacin and allopurinol  -Check HLA [id number] given higher prevalence in  population, though lower suspicion of allopurinol hypersensitivity given absence of acute rash  -Radiographs of B/L knees (b/l knee joint effusions, no fx or dislocation, Small bilateral superior and inferior patellar and peripheral lateral tibial plateau articular margin osteophytes, 2 adjacent small nodular soft tissue calcifications adjacent to proximal posterior left tibial metaphysis) and Right foot (no fx or dislocation or lesions, 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 changes, bipartite appearing lateral hallux sesamoid)      DISPOSITION:  PT recommending acute rehab once stable and work up complete       CORE MEASURES:        Admission NIHSS:      Tenectaplase: [] YES [x] NO      LDL/HDL: 107/67     Depression Screen: no      Statin Therapy: No     Dysphagia Screen: [] PASS [X] FAIL     Smoking [] YES [X] NO      Afib [] YES [X] NO     Stroke Education [X] YES [] NO    Obtain screening lower extremity venous ultrasound in patients who meet 1 or more of the following criteria as patient is high risk for DVT/PE on admission:   [] History of DVT/PE  []Hypercoagulable states (Factor V Leiden, Cancer, OCP, etc. )  []Prolonged immobility (hemiplegia/hemiparesis/post operative or any other extended immobilization)  [] Transferred from outside facility (Rehab or Long term care)  [] Age </= to 50   58M Hx eczema/Gout intermittently takes ASA for pain, p/f being found down by wife after hearing thud. No hx HTN. 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 CTA grossly negative.    Impression: Subjectively improving (although still R hemiplegia on examination) R sensorimotor deficit with mild R gaze palsy, moderate dysarthria, and possible subtle subcortical aphasia (given possible rare semantic paraphasic errors), due to L thalamic IPH, likely i/s/o hypertension (although no prior diagnosis). Angiogram negative for underlying vascular malformation.    NEURO: Neurologic exam stable from yesterday, improved since admission. Continue close monitoring for neurologic deterioration. Goal BP<140/90. MRI Brain W/wo, CTH, CTA Head w/o and Neck w/contrast results as above. Physical therapy/OT/Speech eval recommend AR. Discontinued VPA (greater than 7 days after initial presentation, and subcortical location of ICH, EEG negative, no need for further seizure ppx at this time)    ANTITHROMBOTIC THERAPY: none due to thalamic IPH    PULMONARY: CXR (4/10/24) clear, protecting airway, saturating well     CARDIOVASCULAR:  TTE: Ef 69%,  There is no evidence of a left ventricular thrombus. There is normal LV mass and normal geometry. continue cardiac monitoring. c/w Losartan 25mg + Amlodipine 10mg for HTN (goal normotension).                               SBP goal: <140    GASTROINTESTINAL:  dysphagia screen initially failed.  c/w senna and miralax for bowel regimen. s/p MBS on 4/11/24 and placed on diet, tolerating well.     Diet: minced and moist    RENAL: BUN/Cr stable, good urine output      Na Goal: Greater than 135     Canales: N    HEMATOLOGY: H/H stable anemia, Platelets normal (330)     DVT ppx:  LMWH       ID: Pt found to be febrile to 102 on 4/10 PM, tachycardic as well. Ordered BCx, UCx neg, CXR neg, RVP neg, UA+, s/p ceftriaxone, medicine team following    OTHER: Rheumatology: appreciated recommendations: -C/w colchicine at current dose, hold indomethacin and allopurinol  -Check HLA [id number] given higher prevalence in  population, though lower suspicion of allopurinol hypersensitivity given absence of acute rash  -Radiographs of B/L knees (b/l knee joint effusions, no fx or dislocation, Small bilateral superior and inferior patellar and peripheral lateral tibial plateau articular margin osteophytes, 2 adjacent small nodular soft tissue calcifications adjacent to proximal posterior left tibial metaphysis) and Right foot (no fx or dislocation or lesions, 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 changes, bipartite appearing lateral hallux sesamoid)      DISPOSITION:  PT recommending acute rehab once stable and work up complete       CORE MEASURES:        Admission NIHSS:      Tenectaplase: [] YES [x] NO      LDL/HDL: 107/67     Depression Screen: no      Statin Therapy: No     Dysphagia Screen: [] PASS [X] FAIL     Smoking [] YES [X] NO      Afib [] YES [X] NO     Stroke Education [X] YES [] NO    Obtain screening lower extremity venous ultrasound in patients who meet 1 or more of the following criteria as patient is high risk for DVT/PE on admission:   [] History of DVT/PE  []Hypercoagulable states (Factor V Leiden, Cancer, OCP, etc. )  []Prolonged immobility (hemiplegia/hemiparesis/post operative or any other extended immobilization)  [] Transferred from outside facility (Rehab or Long term care)  [] Age </= to 50   58M Hx eczema/Gout intermittently takes ASA for pain, p/f being found down by wife after hearing thud. No hx HTN. 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 CTA grossly negative.    Impression: Subjectively improving (although still R hemiplegia on examination) R sensorimotor deficit with mild R gaze palsy, moderate dysarthria, and possible subtle subcortical aphasia (given possible rare semantic paraphasic errors), due to L thalamic IPH, likely i/s/o hypertension (although no prior diagnosis). Angiogram negative for underlying vascular malformation.    NEURO: Neurologic exam stable from yesterday, improved since admission. Continue close monitoring for neurologic deterioration. Goal BP<140/90. MRI Brain W/wo, CTH, CTA Head w/o and Neck w/contrast results as above. Physical therapy/OT/Speech eval recommend AR. Discontinued VPA (greater than 7 days after initial presentation, and subcortical location of ICH, EEG negative, no need for further seizure ppx at this time)    ANTITHROMBOTIC THERAPY: none due to thalamic IPH    PULMONARY: CXR (4/10/24) clear, protecting airway, saturating well     CARDIOVASCULAR:  TTE: Ef 69%,  There is no evidence of a left ventricular thrombus. There is normal LV mass and normal geometry. continue cardiac monitoring. c/w Losartan 25mg + Amlodipine 10mg for HTN (goal normotension).                               SBP goal: <140    GASTROINTESTINAL:  dysphagia screen initially failed.  c/w senna and miralax for bowel regimen. s/p MBS on 4/11/24 and placed on diet, tolerating well.     Diet: minced and moist    RENAL: BUN/Cr stable, good urine output      Na Goal: Greater than 135     Canales: N    HEMATOLOGY: H/H stable anemia, Platelets normal (330). B/L UE arterial doppler (4/8/24) negative for arterial vascular disease. B/L LE doppler (4/9/24) neg for DVT.      DVT ppx:  LMWH       ID: Pt found to be febrile to 102 on 4/10 PM, tachycardic as well. Ordered BCx NGTD, UCx neg, CXR neg, RVP neg, UA+, s/p ceftriaxone, medicine team following    OTHER: Rheumatology: appreciated recommendations: -C/w colchicine at current dose, hold indomethacin and allopurinol  -Check HLA [id number] given higher prevalence in  population, though lower suspicion of allopurinol hypersensitivity given absence of acute rash  -Radiographs of B/L knees (b/l knee joint effusions, no fx or dislocation, Small bilateral superior and inferior patellar and peripheral lateral tibial plateau articular margin osteophytes, 2 adjacent small nodular soft tissue calcifications adjacent to proximal posterior left tibial metaphysis) and Right foot (no fx or dislocation or lesions, 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 changes, bipartite appearing lateral hallux sesamoid)      DISPOSITION:  PT recommending acute rehab once stable and work up complete       CORE MEASURES:        Admission NIHSS:      Tenectaplase: [] YES [x] NO      LDL/HDL: 107/67     Depression Screen: no      Statin Therapy: No     Dysphagia Screen: [] PASS [X] FAIL     Smoking [] YES [X] NO      Afib [] YES [X] NO     Stroke Education [X] YES [] NO    Obtain screening lower extremity venous ultrasound in patients who meet 1 or more of the following criteria as patient is high risk for DVT/PE on admission:   [] History of DVT/PE  []Hypercoagulable states (Factor V Leiden, Cancer, OCP, etc. )  []Prolonged immobility (hemiplegia/hemiparesis/post operative or any other extended immobilization)  [] Transferred from outside facility (Rehab or Long term care)  [] Age </= to 50

## 2024-04-13 NOTE — PROGRESS NOTE ADULT - SUBJECTIVE AND OBJECTIVE BOX
Chief Complaint:  Patient is a 58y old  Male who presents with a chief complaint of IP (12 Apr 2024 08:30)      Date of service 04-13-24      Interval Events:     no events overnight        acetaminophen     Tablet .. 650 milliGRAM(s) Oral every 6 hours PRN  amLODIPine   Tablet 10 milliGRAM(s) Oral daily  chlorhexidine 4% Liquid 1 Application(s) Topical <User Schedule>  colchicine 0.6 milliGRAM(s) Oral two times a day  dextrose 10% Bolus 125 milliLiter(s) IV Bolus once  dextrose 5%. 1000 milliLiter(s) IV Continuous <Continuous>  dextrose 5%. 1000 milliLiter(s) IV Continuous <Continuous>  dextrose 50% Injectable 25 Gram(s) IV Push once  dextrose 50% Injectable 12.5 Gram(s) IV Push once  dextrose Oral Gel 15 Gram(s) Oral once PRN  enoxaparin Injectable 40 milliGRAM(s) SubCutaneous <User Schedule>  glucagon  Injectable 1 milliGRAM(s) IntraMuscular once  losartan 25 milliGRAM(s) Oral daily  multivitamin 1 Tablet(s) Oral daily  ondansetron Injectable 4 milliGRAM(s) IV Push every 6 hours PRN  pantoprazole   Suspension 40 milliGRAM(s) Oral daily  polyethylene glycol 3350 17 Gram(s) Oral daily  QUEtiapine 25 milliGRAM(s) Oral every 8 hours  senna 2 Tablet(s) Oral at bedtime  sodium chloride 0.9%. 1000 milliLiter(s) IV Continuous <Continuous>  traMADol 25 milliGRAM(s) Oral every 4 hours PRN  traMADol 50 milliGRAM(s) Oral every 4 hours PRN                            12.6   8.60  )-----------( 330      ( 13 Apr 2024 05:50 )             40.4       Hemoglobin: 12.6 g/dL (04-13 @ 05:50)  Hemoglobin: 12.5 g/dL (04-11 @ 06:36)  Hemoglobin: 13.2 g/dL (04-10 @ 20:43)  Hemoglobin: 13.2 g/dL (04-10 @ 20:43)  Hemoglobin: 14.9 g/dL (04-10 @ 05:05)      04-13    139  |  103  |  17  ----------------------------<  103<H>  3.7   |  22  |  0.72    Ca    9.0      13 Apr 2024 05:50      Creatinine Trend: 0.72<--, 0.79<--, 0.72<--, 0.76<--, 0.64<--, 0.65<--    COAGS:           T(C): 37.1 (04-13-24 @ 08:22), Max: 37.1 (04-12-24 @ 13:00)  HR: 88 (04-13-24 @ 08:22) (88 - 94)  BP: 139/85 (04-13-24 @ 08:22) (123/84 - 150/94)  RR: 18 (04-13-24 @ 08:22) (18 - 19)  SpO2: 96% (04-13-24 @ 08:22) (93% - 96%)  Wt(kg): --    I&O's Summary    12 Apr 2024 07:01  -  13 Apr 2024 07:00  --------------------------------------------------------  IN: 668 mL / OUT: 925 mL / NET: -257 mL        Review of Systems:  General:  No wt loss, fevers, chills, night sweats, fatigue,   Eyes:  Good vision, no reported pain  ENT:  No sore throat, pain, runny nose, dysphagia  CV:  No pain, palpitations, hypo/hypertension  Resp:  No dyspnea, cough, tachypnea, wheezing  GI:  See HPI  :  No pain, bleeding, incontinence, nocturia  Muscle:  No pain, weakness  Neuro:  No weakness, tingling, memory problems  Psych:  No fatigue, insomnia, mood problems, depression  Endocrine:  No polyuria, polydipsia, cold/heat intolerance  Heme:  No petechiae, ecchymosis, easy bruisability  Integumentary:  No rash, edema       PHYSICAL EXAM:     GENERAL:  Appears stated age, well-groomed, well-nourished, no distress  HEENT:  NC/AT,  conjunctivae anicteric, clear and pink,   NECK: supple, trachea midline  CHEST:  Full & symmetric excursion, no increased effort, breath sounds clear  HEART:  Regular rhythm, no JVD  ABDOMEN:  Soft, non-tender, non-distended, normoactive bowel sounds,  no masses , no hepatosplenomegaly  EXTREMITIES:  no cyanosis,clubbing or edema  SKIN:  No rash, erythema, or, ecchymoses, no jaundice  NEURO:  Alert, non-focal, no asterixis  PSYCH: Appropriate affect, oriented to place and time  RECTAL: Deferred

## 2024-04-13 NOTE — PROGRESS NOTE ADULT - SUBJECTIVE AND OBJECTIVE BOX
Name of Patient : ROSALEE CARR  MRN: [id number]  Date of visit: 04-13-24 @ 18:27      Subjective: Patient seen and examined. No new events except as noted.     REVIEW OF SYSTEMS:    CONSTITUTIONAL: No weakness, fevers or chills  EYES/ENT: No visual changes;  No vertigo or throat pain   NECK: No pain or stiffness  RESPIRATORY: No cough, wheezing, hemoptysis; No shortness of breath  CARDIOVASCULAR: No chest pain or palpitations  GASTROINTESTINAL: No abdominal or epigastric pain. No nausea, vomiting, or hematemesis; No diarrhea or constipation. No melena or hematochezia.  GENITOURINARY: No dysuria, frequency or hematuria  NEUROLOGICAL: No numbness or weakness  SKIN: No itching, burning, rashes, or lesions   All other review of systems is negative unless indicated above.    MEDICATIONS:  MEDICATIONS  (STANDING):  amLODIPine   Tablet 10 milliGRAM(s) Oral daily  chlorhexidine 4% Liquid 1 Application(s) Topical <User Schedule>  colchicine 0.6 milliGRAM(s) Oral two times a day  dextrose 10% Bolus 125 milliLiter(s) IV Bolus once  dextrose 5%. 1000 milliLiter(s) (100 mL/Hr) IV Continuous <Continuous>  dextrose 5%. 1000 milliLiter(s) (50 mL/Hr) IV Continuous <Continuous>  dextrose 50% Injectable 25 Gram(s) IV Push once  dextrose 50% Injectable 12.5 Gram(s) IV Push once  enoxaparin Injectable 40 milliGRAM(s) SubCutaneous <User Schedule>  glucagon  Injectable 1 milliGRAM(s) IntraMuscular once  losartan 25 milliGRAM(s) Oral daily  multivitamin 1 Tablet(s) Oral daily  pantoprazole   Suspension 40 milliGRAM(s) Oral daily  polyethylene glycol 3350 17 Gram(s) Oral daily  QUEtiapine 25 milliGRAM(s) Oral every 8 hours  senna 2 Tablet(s) Oral at bedtime  sodium chloride 0.9%. 1000 milliLiter(s) (50 mL/Hr) IV Continuous <Continuous>      PHYSICAL EXAM:  T(C): 36.8 (04-13-24 @ 16:05), Max: 37.1 (04-13-24 @ 04:49)  HR: 87 (04-13-24 @ 16:05) (87 - 90)  BP: 146/89 (04-13-24 @ 16:05) (130/76 - 150/94)  RR: 18 (04-13-24 @ 16:05) (18 - 19)  SpO2: 94% (04-13-24 @ 16:05) (93% - 96%)  Wt(kg): --  I&O's Summary    12 Apr 2024 07:01 - 13 Apr 2024 07:00  --------------------------------------------------------  IN: 668 mL / OUT: 925 mL / NET: -257 mL    13 Apr 2024 07:01  -  13 Apr 2024 18:27  --------------------------------------------------------  IN: 1080 mL / OUT: 0 mL / NET: 1080 mL          Appearance: Normal	  HEENT:  PERRLA   Lymphatic: No lymphadenopathy   Cardiovascular: Normal S1 S2, no JVD  Respiratory: normal effort , clear  Gastrointestinal:  Soft, Non-tender  Skin: No rashes,  warm to touch  Psychiatry:  Mood & affect appropriate  Musculuskeletal: No edema    recent labs, Imaging and EKGs personally reviewed             04-12-24 @ 07:01  -  04-13-24 @ 07:00  --------------------------------------------------------  IN: 668 mL / OUT: 925 mL / NET: -257 mL    04-13-24 @ 07:01  -  04-13-24 @ 18:28  --------------------------------------------------------  IN: 1080 mL / OUT: 0 mL / NET: 1080 mL

## 2024-04-13 NOTE — PROGRESS NOTE ADULT - ASSESSMENT
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 GI consulted for dysphagia.     1. IPH   per neurology     2. Oropharyngeal dysphagia   now cleared for dysphagia diet   no indication for PEG at this time     3. UTI   on ceftriaxone     4. Gout

## 2024-04-13 NOTE — PROGRESS NOTE ADULT - SUBJECTIVE AND OBJECTIVE BOX
THE PATIENT WAS SEEN AND EXAMINED BY ME WITH THE HOUSESTAFF AND STROKE TEAM DURING MORNING ROUNDS.   HPI:  58M Hx eczema/Gout intermittently takes ASA for pain, p/f being found down by wife after hearing thud. No hx HTN. 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 CTA grossly negative. Coags/TEG/ARU pend. ED intubated for inability to manage secretions. ICH score 2   Exam prior to intubation: eye opening apraxia, Ox1, PERRL, upgaze and right gaze palsy could cross midline, int FC, L side 5/5, LUE extensor, LLe flaccid      SUBJECTIVE: No events overnight.  No new neurologic complaints.  ROS negative unless otherwise noted.    acetaminophen     Tablet .. 650 milliGRAM(s) Oral every 6 hours PRN  amLODIPine   Tablet 10 milliGRAM(s) Oral daily  chlorhexidine 4% Liquid 1 Application(s) Topical <User Schedule>  colchicine 0.6 milliGRAM(s) Oral two times a day  dextrose 10% Bolus 125 milliLiter(s) IV Bolus once  dextrose 5%. 1000 milliLiter(s) IV Continuous <Continuous>  dextrose 5%. 1000 milliLiter(s) IV Continuous <Continuous>  dextrose 50% Injectable 12.5 Gram(s) IV Push once  dextrose 50% Injectable 25 Gram(s) IV Push once  dextrose Oral Gel 15 Gram(s) Oral once PRN  enoxaparin Injectable 40 milliGRAM(s) SubCutaneous <User Schedule>  glucagon  Injectable 1 milliGRAM(s) IntraMuscular once  losartan 25 milliGRAM(s) Oral daily  multivitamin 1 Tablet(s) Oral daily  ondansetron Injectable 4 milliGRAM(s) IV Push every 6 hours PRN  pantoprazole   Suspension 40 milliGRAM(s) Oral daily  polyethylene glycol 3350 17 Gram(s) Oral daily  QUEtiapine 25 milliGRAM(s) Oral every 8 hours  senna 2 Tablet(s) Oral at bedtime  sodium chloride 0.9%. 1000 milliLiter(s) IV Continuous <Continuous>  traMADol 25 milliGRAM(s) Oral every 4 hours PRN  traMADol 50 milliGRAM(s) Oral every 4 hours PRN      PHYSICAL EXAM:   Vital Signs Last 24 Hrs  T(C): 37.1 (13 Apr 2024 04:49), Max: 37.2 (12 Apr 2024 09:00)  T(F): 98.8 (13 Apr 2024 04:49), Max: 99 (12 Apr 2024 09:00)  HR: 90 (13 Apr 2024 04:49) (89 - 94)  BP: 150/94 (13 Apr 2024 04:49) (123/84 - 150/94)  BP(mean): --  RR: 19 (13 Apr 2024 04:49) (18 - 19)  SpO2: 93% (13 Apr 2024 04:49) (93% - 96%)  Patient On (Oxygen Delivery Method): room air        General: No acute distress  HEENT: EOM intact, visual fields full  Abdomen: Soft, nontender, nondistended   Extremities: No edema    Neuro exam:  MS: Awake, alert. Oriented to self, place, month, not year, (unclear if disorientation vs semantic paraphasic errors), follows simple commands, speech fluent but with moderate dysarthria  CN: pupils equal, mild R gaze palsy, mild R facial droop  Motor: 5/5 LUE strength, LLE strength antigravity but pain limited, no RUE or RLE movement .   Sensory: Moderately decreased sensation to LT in RUE and RLE.    LABS:                        12.6   8.60  )-----------( 330      ( 13 Apr 2024 05:50 )             40.4    04-13    139  |  103  |  17  ----------------------------<  103<H>  3.7   |  22  |  0.72    Ca    9.0      13 Apr 2024 05:50          IMAGING: Reviewed by me.   MRI Brain w/wo 4/10/24:   -Ongoing expected evolution of left thalamocapsular hemorrhage measuring up to 4.2 cm. No underlying mass lesion or vascular malformation is identified.  -Chronic microhemorrhages in the right thalamus and basal ganglia, compatible with background of chronic hypertensive microangiopathy.  -Small acute infarcts in the right caudate tail and periatrial white matter.    CTH 4/7/24: No change in left basal ganglia and thalamic parenchymal   hemorrhage compared with 4/5/2024.    CTH 4/5/24:  No change in left thalamic hemorrhage since 4/4/2024.    CTH 4/4/24: Acute parenchymal hemorrhage involving the left thalamus is again seen   with involvement of the left lenticular nucleus and posteriorleft corona   radiata region. This finding measures approximately 3.8 x 2.6 cm and   previously measured approximately 3.7 x 3.1 cm. Surrounding edema is   again seen. No significant shift or herniation is seen.      CTH 4/3/24: Acute intraparenchymal hemorrhage centered in the left thalamus measures   approximately 3.3 x 1.5 x 4.2 cm in the sagittal and coronal planes,   stable from 04/02/2024 at 9:19 PM.    CTH 4/2/24: An acute left thalamic hemorrhage measures approximately 3.2 x 1.7 x 3.9 cm in the sagittal and coronal planes. No evidence of acute cerebral infarction.    CT ANGIOGRAPHY NECK 4/2/24:  1. Cervical carotid systems and vertebral arteries are patent bilaterally   without stenosis.  No hemodynamically significant carotid stenosis using   NASCET criteria.  2.  Short segment dissection flap in the proximal right internal carotid   artery, of unknown chronicity.    CT ANGIOGRAPHY BRAIN 4/2/24:  1.  No major vessel occlusion, stenosis or aneurysm is identified about   the [place].  Normal anatomic variants as discussed above.  2.  The dural venous sinuses are incompletely opacified due to   acquisition during early arterial phase.  3.  No evidence of an arteriovenous malformation.  4.  No evidence of active contrast extravasation into the patient's left   thalamic hematoma.   THE PATIENT WAS SEEN AND EXAMINED BY ME WITH THE HOUSESTAFF AND STROKE TEAM DURING MORNING ROUNDS.   HPI:  58M Hx eczema/Gout intermittently takes ASA for pain, p/f being found down by wife after hearing thud. No hx HTN. 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 CTA grossly negative. Coags/TEG/ARU pend. ED intubated for inability to manage secretions. ICH score 2   Exam prior to intubation: eye opening apraxia, Ox1, PERRL, upgaze and right gaze palsy could cross midline, int FC, L side 5/5, LUE extensor, LLe flaccid      SUBJECTIVE: No events overnight.  No new neurologic complaints.  ROS negative unless otherwise noted.    acetaminophen     Tablet .. 650 milliGRAM(s) Oral every 6 hours PRN  amLODIPine   Tablet 10 milliGRAM(s) Oral daily  chlorhexidine 4% Liquid 1 Application(s) Topical <User Schedule>  colchicine 0.6 milliGRAM(s) Oral two times a day  dextrose 10% Bolus 125 milliLiter(s) IV Bolus once  dextrose 5%. 1000 milliLiter(s) IV Continuous <Continuous>  dextrose 5%. 1000 milliLiter(s) IV Continuous <Continuous>  dextrose 50% Injectable 12.5 Gram(s) IV Push once  dextrose 50% Injectable 25 Gram(s) IV Push once  dextrose Oral Gel 15 Gram(s) Oral once PRN  enoxaparin Injectable 40 milliGRAM(s) SubCutaneous <User Schedule>  glucagon  Injectable 1 milliGRAM(s) IntraMuscular once  losartan 25 milliGRAM(s) Oral daily  multivitamin 1 Tablet(s) Oral daily  ondansetron Injectable 4 milliGRAM(s) IV Push every 6 hours PRN  pantoprazole   Suspension 40 milliGRAM(s) Oral daily  polyethylene glycol 3350 17 Gram(s) Oral daily  QUEtiapine 25 milliGRAM(s) Oral every 8 hours  senna 2 Tablet(s) Oral at bedtime  sodium chloride 0.9%. 1000 milliLiter(s) IV Continuous <Continuous>  traMADol 25 milliGRAM(s) Oral every 4 hours PRN  traMADol 50 milliGRAM(s) Oral every 4 hours PRN      PHYSICAL EXAM:   Vital Signs Last 24 Hrs  T(C): 37.1 (13 Apr 2024 04:49), Max: 37.2 (12 Apr 2024 09:00)  T(F): 98.8 (13 Apr 2024 04:49), Max: 99 (12 Apr 2024 09:00)  HR: 90 (13 Apr 2024 04:49) (89 - 94)  BP: 150/94 (13 Apr 2024 04:49) (123/84 - 150/94)  BP(mean): --  RR: 19 (13 Apr 2024 04:49) (18 - 19)  SpO2: 93% (13 Apr 2024 04:49) (93% - 96%)  Patient On (Oxygen Delivery Method): room air        General: No acute distress  HEENT: EOM intact, visual fields full, torticollis   Abdomen: Soft, nontender, nondistended   Extremities: No edema    Neuro exam:  MS: Awake, alert. Oriented to self, place, month, not year, states incorrect age, can't name president (unclear if disorientation vs semantic paraphasic errors), follows simple commands, speech fluent   CN: pupils equal, mild R gaze palsy, mild R facial droop, mild dysarthria  Motor: 5/5 LUE strength, LLE strength antigravity but pain limited, no RUE or RLE movement .   Sensory: Moderately decreased sensation to LT in RUE and RLE.    LABS:                        12.6   8.60  )-----------( 330      ( 13 Apr 2024 05:50 )             40.4    04-13    139  |  103  |  17  ----------------------------<  103<H>  3.7   |  22  |  0.72    Ca    9.0      13 Apr 2024 05:50          IMAGING: Reviewed by me.   MRI Brain w/wo 4/10/24:   -Ongoing expected evolution of left thalamocapsular hemorrhage measuring up to 4.2 cm. No underlying mass lesion or vascular malformation is identified.  -Chronic microhemorrhages in the right thalamus and basal ganglia, compatible with background of chronic hypertensive microangiopathy.  -Small acute infarcts in the right caudate tail and periatrial white matter.    CTH 4/7/24: No change in left basal ganglia and thalamic parenchymal   hemorrhage compared with 4/5/2024.    CTH 4/5/24:  No change in left thalamic hemorrhage since 4/4/2024.    CTH 4/4/24: Acute parenchymal hemorrhage involving the left thalamus is again seen   with involvement of the left lenticular nucleus and posteriorleft corona   radiata region. This finding measures approximately 3.8 x 2.6 cm and   previously measured approximately 3.7 x 3.1 cm. Surrounding edema is   again seen. No significant shift or herniation is seen.      CTH 4/3/24: Acute intraparenchymal hemorrhage centered in the left thalamus measures   approximately 3.3 x 1.5 x 4.2 cm in the sagittal and coronal planes,   stable from 04/02/2024 at 9:19 PM.    CTH 4/2/24: An acute left thalamic hemorrhage measures approximately 3.2 x 1.7 x 3.9 cm in the sagittal and coronal planes. No evidence of acute cerebral infarction.    CT ANGIOGRAPHY NECK 4/2/24:  1. Cervical carotid systems and vertebral arteries are patent bilaterally   without stenosis.  No hemodynamically significant carotid stenosis using   NASCET criteria.  2.  Short segment dissection flap in the proximal right internal carotid   artery, of unknown chronicity.    CT ANGIOGRAPHY BRAIN 4/2/24:  1.  No major vessel occlusion, stenosis or aneurysm is identified about   the [place].  Normal anatomic variants as discussed above.  2.  The dural venous sinuses are incompletely opacified due to   acquisition during early arterial phase.  3.  No evidence of an arteriovenous malformation.  4.  No evidence of active contrast extravasation into the patient's left   thalamic hematoma.

## 2024-04-13 NOTE — PROGRESS NOTE ADULT - ASSESSMENT
Patient is a 58 year old male with a PMHx of Eczema, Gout who was reportedly intermittently taking ASA for pain, who presented to [facility] on 4/2/2024 for being found down by his wife. Per neurology, patient without history of HTN. Patient was found to have a L thalamic IPH extending to the midbrain with no IVH or hydro, with modest mass effect. CTA grossly negative. Patient was intubated in the ER for inability to manage secretions. Internal Medicine has been consulted on Mr. Mishra's care for medical management.        IPH   - 4/2 CT H w/ acute L IPH  - 4/2 CTA H/N w/ Short segment dissection flap of the proximal R ICA  - 4/7 CT H w/ no change in the L basal ganglia and thalamic parenchymal hemorrhage   - MR head pending   - Likely 2/2 HTN as per Stroke neurology;  Consider ILR placement to rule out occult arrythmia   - Neuro checks as per protocol  - Monitor on tele   - PT / OT/ S+S  - Fall, Seizure, Aspiration precautions   - NSICU care appreciated  - Per Stroke Neurology     R/O Sepsis --> Likely 2/2 UTI   - Pt febrile, tachycardic, with leukocytosis   - 4/5 BCx 1 of 2 w/ Staph epi, 4/6 BCx2 negative   - F/u 4/10 UCx and BCx 2 --> In lab  - Likely UTI in view of grossly + UA , negative urine Cx  - Lactate negative, RSV/Flu/Covid negative, Procal 0.29   - On Rocephin for ABX   - Antipyretics PRN  - Continue to monitor and trend CBC, temp curve, VS   - If recurrently febrile while on ABX, suggest CT C/A/P infectious work up     Dysphagia   - W/ NGT  - Aspiration precautions; Oral care  - GI eval appreciated; F/u recs --> Tentative plan for EGD/ PEG on Monday if fails repeat S+S     Hypoxia  - S/P NSICU, S/P Intubation and now self extubated 4/6  - Incentive spirometer   - Monitor O2 saturation; Supplement to maintain > 90%     Gout, LE Edema   - 4/9 Duplex negative for DVT  - Home allopurinol was held on admission. Indomethacin resumed per Rheum   - On Colchicine 0.6 PO BID  - Per Rheumatology     Pre-DM  - A1C of 5.8  - On Sliding scale  - Monitor glucose levels and adjust as tolerated    HTN   - On Losartan 25 Mg and Norvasc 10   - TTE w/ EF of 69%, no evidence of LV thrombus   - Monitor BP and adjust as tolerated    PPX  - On Lovenox 40   - Start PPI

## 2024-04-14 LAB
ANION GAP SERPL CALC-SCNC: 9 MMOL/L — SIGNIFICANT CHANGE UP (ref 5–17)
BUN SERPL-MCNC: 15 MG/DL — SIGNIFICANT CHANGE UP (ref 7–23)
CALCIUM SERPL-MCNC: 9 MG/DL — SIGNIFICANT CHANGE UP (ref 8.4–10.5)
CHLORIDE SERPL-SCNC: 101 MMOL/L — SIGNIFICANT CHANGE UP (ref 96–108)
CO2 SERPL-SCNC: 26 MMOL/L — SIGNIFICANT CHANGE UP (ref 22–31)
CREAT SERPL-MCNC: 0.72 MG/DL — SIGNIFICANT CHANGE UP (ref 0.5–1.3)
EGFR: 107 ML/MIN/1.73M2 — SIGNIFICANT CHANGE UP
GLUCOSE SERPL-MCNC: 101 MG/DL — HIGH (ref 70–99)
HCT VFR BLD CALC: 39 % — SIGNIFICANT CHANGE UP (ref 39–50)
HGB BLD-MCNC: 12.9 G/DL — LOW (ref 13–17)
MCHC RBC-ENTMCNC: 28.6 PG — SIGNIFICANT CHANGE UP (ref 27–34)
MCHC RBC-ENTMCNC: 33.1 GM/DL — SIGNIFICANT CHANGE UP (ref 32–36)
MCV RBC AUTO: 86.5 FL — SIGNIFICANT CHANGE UP (ref 80–100)
NRBC # BLD: 0 /100 WBCS — SIGNIFICANT CHANGE UP (ref 0–0)
PLATELET # BLD AUTO: 349 K/UL — SIGNIFICANT CHANGE UP (ref 150–400)
POTASSIUM SERPL-MCNC: 4 MMOL/L — SIGNIFICANT CHANGE UP (ref 3.5–5.3)
POTASSIUM SERPL-SCNC: 4 MMOL/L — SIGNIFICANT CHANGE UP (ref 3.5–5.3)
RBC # BLD: 4.51 M/UL — SIGNIFICANT CHANGE UP (ref 4.2–5.8)
RBC # FLD: 12 % — SIGNIFICANT CHANGE UP (ref 10.3–14.5)
SODIUM SERPL-SCNC: 136 MMOL/L — SIGNIFICANT CHANGE UP (ref 135–145)
WBC # BLD: 9.91 K/UL — SIGNIFICANT CHANGE UP (ref 3.8–10.5)
WBC # FLD AUTO: 9.91 K/UL — SIGNIFICANT CHANGE UP (ref 3.8–10.5)

## 2024-04-14 PROCEDURE — 99233 SBSQ HOSP IP/OBS HIGH 50: CPT

## 2024-04-14 RX ORDER — LANOLIN ALCOHOL/MO/W.PET/CERES
3 CREAM (GRAM) TOPICAL AT BEDTIME
Refills: 0 | Status: DISCONTINUED | OUTPATIENT
Start: 2024-04-14 | End: 2024-04-15

## 2024-04-14 RX ADMIN — Medication 1 TABLET(S): at 11:48

## 2024-04-14 RX ADMIN — QUETIAPINE FUMARATE 25 MILLIGRAM(S): 200 TABLET, FILM COATED ORAL at 21:11

## 2024-04-14 RX ADMIN — PANTOPRAZOLE SODIUM 40 MILLIGRAM(S): 20 TABLET, DELAYED RELEASE ORAL at 11:58

## 2024-04-14 RX ADMIN — Medication 3 MILLIGRAM(S): at 21:11

## 2024-04-14 RX ADMIN — QUETIAPINE FUMARATE 25 MILLIGRAM(S): 200 TABLET, FILM COATED ORAL at 05:45

## 2024-04-14 RX ADMIN — AMLODIPINE BESYLATE 10 MILLIGRAM(S): 2.5 TABLET ORAL at 05:45

## 2024-04-14 RX ADMIN — CHLORHEXIDINE GLUCONATE 1 APPLICATION(S): 213 SOLUTION TOPICAL at 21:11

## 2024-04-14 RX ADMIN — ENOXAPARIN SODIUM 40 MILLIGRAM(S): 100 INJECTION SUBCUTANEOUS at 19:27

## 2024-04-14 RX ADMIN — QUETIAPINE FUMARATE 25 MILLIGRAM(S): 200 TABLET, FILM COATED ORAL at 13:23

## 2024-04-14 RX ADMIN — LOSARTAN POTASSIUM 25 MILLIGRAM(S): 100 TABLET, FILM COATED ORAL at 05:45

## 2024-04-14 RX ADMIN — Medication 0.6 MILLIGRAM(S): at 05:46

## 2024-04-14 RX ADMIN — POLYETHYLENE GLYCOL 3350 17 GRAM(S): 17 POWDER, FOR SOLUTION ORAL at 11:48

## 2024-04-14 RX ADMIN — Medication 0.6 MILLIGRAM(S): at 17:56

## 2024-04-14 NOTE — PROGRESS NOTE ADULT - SUBJECTIVE AND OBJECTIVE BOX
Chief Complaint:  Patient is a 58y old  Male who presents with a chief complaint of IPH (12 Apr 2024 08:30)          Interval Events:     no events overnight        acetaminophen     Tablet .. 650 milliGRAM(s) Oral every 6 hours PRN  amLODIPine   Tablet 10 milliGRAM(s) Oral daily  chlorhexidine 4% Liquid 1 Application(s) Topical <User Schedule>  colchicine 0.6 milliGRAM(s) Oral two times a day  dextrose 10% Bolus 125 milliLiter(s) IV Bolus once  dextrose 5%. 1000 milliLiter(s) IV Continuous <Continuous>  dextrose 5%. 1000 milliLiter(s) IV Continuous <Continuous>  dextrose 50% Injectable 25 Gram(s) IV Push once  dextrose 50% Injectable 12.5 Gram(s) IV Push once  dextrose Oral Gel 15 Gram(s) Oral once PRN  enoxaparin Injectable 40 milliGRAM(s) SubCutaneous <User Schedule>  glucagon  Injectable 1 milliGRAM(s) IntraMuscular once  losartan 25 milliGRAM(s) Oral daily  multivitamin 1 Tablet(s) Oral daily  ondansetron Injectable 4 milliGRAM(s) IV Push every 6 hours PRN  pantoprazole   Suspension 40 milliGRAM(s) Oral daily  polyethylene glycol 3350 17 Gram(s) Oral daily  QUEtiapine 25 milliGRAM(s) Oral every 8 hours  senna 2 Tablet(s) Oral at bedtime  sodium chloride 0.9%. 1000 milliLiter(s) IV Continuous <Continuous>  traMADol 25 milliGRAM(s) Oral every 4 hours PRN  traMADol 50 milliGRAM(s) Oral every 4 hours PRN                            12.6   8.60  )-----------( 330      ( 13 Apr 2024 05:50 )             40.4       Hemoglobin: 12.6 g/dL (04-13 @ 05:50)  Hemoglobin: 12.5 g/dL (04-11 @ 06:36)  Hemoglobin: 13.2 g/dL (04-10 @ 20:43)  Hemoglobin: 13.2 g/dL (04-10 @ 20:43)  Hemoglobin: 14.9 g/dL (04-10 @ 05:05)      04-13    139  |  103  |  17  ----------------------------<  103<H>  3.7   |  22  |  0.72    Ca    9.0      13 Apr 2024 05:50      Creatinine Trend: 0.72<--, 0.79<--, 0.72<--, 0.76<--, 0.64<--, 0.65<--    COAGS:           T(C): 37.1 (04-13-24 @ 08:22), Max: 37.1 (04-12-24 @ 13:00)  HR: 88 (04-13-24 @ 08:22) (88 - 94)  BP: 139/85 (04-13-24 @ 08:22) (123/84 - 150/94)  RR: 18 (04-13-24 @ 08:22) (18 - 19)  SpO2: 96% (04-13-24 @ 08:22) (93% - 96%)  Wt(kg): --    I&O's Summary    12 Apr 2024 07:01  -  13 Apr 2024 07:00  --------------------------------------------------------  IN: 668 mL / OUT: 925 mL / NET: -257 mL        Review of Systems:  General:  No wt loss, fevers, chills, night sweats, fatigue,   Eyes:  Good vision, no reported pain  ENT:  No sore throat, pain, runny nose, dysphagia  CV:  No pain, palpitations, hypo/hypertension  Resp:  No dyspnea, cough, tachypnea, wheezing  GI:  See HPI  :  No pain, bleeding, incontinence, nocturia  Muscle:  No pain, weakness  Neuro:  No weakness, tingling, memory problems  Psych:  No fatigue, insomnia, mood problems, depression  Endocrine:  No polyuria, polydipsia, cold/heat intolerance  Heme:  No petechiae, ecchymosis, easy bruisability  Integumentary:  No rash, edema       PHYSICAL EXAM:     GENERAL:  Appears stated age, well-groomed, well-nourished, no distress  HEENT:  NC/AT,  conjunctivae anicteric, clear and pink,   NECK: supple, trachea midline  CHEST:  Full & symmetric excursion, no increased effort, breath sounds clear  HEART:  Regular rhythm, no JVD  ABDOMEN:  Soft, non-tender, non-distended, normoactive bowel sounds,  no masses , no hepatosplenomegaly  EXTREMITIES:  no cyanosis,clubbing or edema  SKIN:  No rash, erythema, or, ecchymoses, no jaundice  NEURO:  Alert, non-focal, no asterixis  PSYCH: Appropriate affect, oriented to place and time  RECTAL: Deferred

## 2024-04-14 NOTE — PROGRESS NOTE ADULT - SUBJECTIVE AND OBJECTIVE BOX
THE PATIENT WAS SEEN AND EXAMINED BY ME WITH THE HOUSESTAFF AND STROKE TEAM DURING MORNING ROUNDS.   HPI:  58M Hx eczema/Gout intermittently takes ASA for pain, p/f being found down by wife after hearing thud. No hx HTN. 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 CTA grossly negative. Coags/TEG/ARU pend. ED intubated for inability to manage secretions. ICH score 2   Exam prior to intubation: eye opening apraxia, Ox1, PERRL, upgaze and right gaze palsy could cross midline, int FC, L side 5/5, LUE extensor, LLe flaccid      SUBJECTIVE: No events overnight.  No new neurologic complaints.  ROS negative unless otherwise noted.    MEDICATIONS  (STANDING):  amLODIPine   Tablet 10 milliGRAM(s) Oral daily  chlorhexidine 4% Liquid 1 Application(s) Topical <User Schedule>  colchicine 0.6 milliGRAM(s) Oral two times a day  dextrose 10% Bolus 125 milliLiter(s) IV Bolus once  dextrose 5%. 1000 milliLiter(s) (100 mL/Hr) IV Continuous <Continuous>  dextrose 5%. 1000 milliLiter(s) (50 mL/Hr) IV Continuous <Continuous>  dextrose 50% Injectable 12.5 Gram(s) IV Push once  dextrose 50% Injectable 25 Gram(s) IV Push once  enoxaparin Injectable 40 milliGRAM(s) SubCutaneous <User Schedule>  glucagon  Injectable 1 milliGRAM(s) IntraMuscular once  losartan 25 milliGRAM(s) Oral daily  multivitamin 1 Tablet(s) Oral daily  pantoprazole   Suspension 40 milliGRAM(s) Oral daily  polyethylene glycol 3350 17 Gram(s) Oral daily  QUEtiapine 25 milliGRAM(s) Oral every 8 hours  senna 2 Tablet(s) Oral at bedtime  sodium chloride 0.9%. 1000 milliLiter(s) (50 mL/Hr) IV Continuous <Continuous>    MEDICATIONS  (PRN):  acetaminophen     Tablet .. 650 milliGRAM(s) Oral every 6 hours PRN Temp greater or equal to 38C (100.4F), Mild Pain (1 - 3)  dextrose Oral Gel 15 Gram(s) Oral once PRN Blood Glucose LESS THAN 70 milliGRAM(s)/deciliter  ondansetron Injectable 4 milliGRAM(s) IV Push every 6 hours PRN Nausea and/or Vomiting  traMADol 50 milliGRAM(s) Oral every 4 hours PRN Severe Pain (7 - 10)  traMADol 25 milliGRAM(s) Oral every 4 hours PRN Moderate Pain (4 - 6)        PHYSICAL EXAM:   Vital Signs Last 24 Hrs  T(C): 36.7 (14 Apr 2024 07:26), Max: 37.1 (13 Apr 2024 08:22)  T(F): 98 (14 Apr 2024 07:26), Max: 98.7 (13 Apr 2024 08:22)  HR: 80 (14 Apr 2024 07:26) (80 - 96)  BP: 137/91 (14 Apr 2024 07:26) (130/76 - 146/89)  BP(mean): --  RR: 18 (14 Apr 2024 07:26) (16 - 18)  SpO2: 95% (14 Apr 2024 07:26) (94% - 96%)    Parameters below as of 14 Apr 2024 07:26  Patient On (Oxygen Delivery Method): room air            General: No acute distress  HEENT: EOM intact, visual fields full, torticollis   Abdomen: Soft, nontender, nondistended   Extremities: No edema    Neuro exam:  MS: Awake, alert. Oriented to self, place, month, not year, states incorrect age, can't name president (unclear if disorientation vs semantic paraphasic errors), follows simple commands, speech fluent   CN: pupils equal, mild R gaze palsy, mild R facial droop, mild dysarthria  Motor: 5/5 LUE strength, LLE strength antigravity but pain limited, no RUE or RLE movement .   Sensory: Moderately decreased sensation to LT in RUE and RLE.    LABS:                          12.9   9.91  )-----------( 349      ( 14 Apr 2024 06:33 )             39.0     04-14    136  |  101  |  15  ----------------------------<  101<H>  4.0   |  26  |  0.72    Ca    9.0      14 Apr 2024 06:33              IMAGING: Reviewed by me.   MRI Brain w/wo 4/10/24:   -Ongoing expected evolution of left thalamocapsular hemorrhage measuring up to 4.2 cm. No underlying mass lesion or vascular malformation is identified.  -Chronic microhemorrhages in the right thalamus and basal ganglia, compatible with background of chronic hypertensive microangiopathy.  -Small acute infarcts in the right caudate tail and periatrial white matter.    CTH 4/7/24: No change in left basal ganglia and thalamic parenchymal   hemorrhage compared with 4/5/2024.    CTH 4/5/24:  No change in left thalamic hemorrhage since 4/4/2024.    CTH 4/4/24: Acute parenchymal hemorrhage involving the left thalamus is again seen   with involvement of the left lenticular nucleus and posteriorleft corona   radiata region. This finding measures approximately 3.8 x 2.6 cm and   previously measured approximately 3.7 x 3.1 cm. Surrounding edema is   again seen. No significant shift or herniation is seen.      CTH 4/3/24: Acute intraparenchymal hemorrhage centered in the left thalamus measures   approximately 3.3 x 1.5 x 4.2 cm in the sagittal and coronal planes,   stable from 04/02/2024 at 9:19 PM.    CTH 4/2/24: An acute left thalamic hemorrhage measures approximately 3.2 x 1.7 x 3.9 cm in the sagittal and coronal planes. No evidence of acute cerebral infarction.    CT ANGIOGRAPHY NECK 4/2/24:  1. Cervical carotid systems and vertebral arteries are patent bilaterally   without stenosis.  No hemodynamically significant carotid stenosis using   NASCET criteria.  2.  Short segment dissection flap in the proximal right internal carotid   artery, of unknown chronicity.    CT ANGIOGRAPHY BRAIN 4/2/24:  1.  No major vessel occlusion, stenosis or aneurysm is identified about   the [place].  Normal anatomic variants as discussed above.  2.  The dural venous sinuses are incompletely opacified due to   acquisition during early arterial phase.  3.  No evidence of an arteriovenous malformation.  4.  No evidence of active contrast extravasation into the patient's left   thalamic hematoma.   THE PATIENT WAS SEEN AND EXAMINED BY ME WITH THE HOUSESTAFF AND STROKE TEAM DURING MORNING ROUNDS.   HPI:  58M Hx eczema/Gout intermittently takes ASA for pain, p/f being found down by wife after hearing thud. No hx HTN. 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 CTA grossly negative. Coags/TEG/ARU pend. ED intubated for inability to manage secretions. ICH score 2   Exam prior to intubation: eye opening apraxia, Ox1, PERRL, upgaze and right gaze palsy could cross midline, int FC, L side 5/5, LUE extensor, LLe flaccid      SUBJECTIVE: No events overnight.  No new neurologic complaints.  ROS negative unless otherwise noted.    MEDICATIONS  (STANDING):  amLODIPine   Tablet 10 milliGRAM(s) Oral daily  chlorhexidine 4% Liquid 1 Application(s) Topical <User Schedule>  colchicine 0.6 milliGRAM(s) Oral two times a day  dextrose 10% Bolus 125 milliLiter(s) IV Bolus once  dextrose 5%. 1000 milliLiter(s) (100 mL/Hr) IV Continuous <Continuous>  dextrose 5%. 1000 milliLiter(s) (50 mL/Hr) IV Continuous <Continuous>  dextrose 50% Injectable 12.5 Gram(s) IV Push once  dextrose 50% Injectable 25 Gram(s) IV Push once  enoxaparin Injectable 40 milliGRAM(s) SubCutaneous <User Schedule>  glucagon  Injectable 1 milliGRAM(s) IntraMuscular once  losartan 25 milliGRAM(s) Oral daily  multivitamin 1 Tablet(s) Oral daily  pantoprazole   Suspension 40 milliGRAM(s) Oral daily  polyethylene glycol 3350 17 Gram(s) Oral daily  QUEtiapine 25 milliGRAM(s) Oral every 8 hours  senna 2 Tablet(s) Oral at bedtime  sodium chloride 0.9%. 1000 milliLiter(s) (50 mL/Hr) IV Continuous <Continuous>    MEDICATIONS  (PRN):  acetaminophen     Tablet .. 650 milliGRAM(s) Oral every 6 hours PRN Temp greater or equal to 38C (100.4F), Mild Pain (1 - 3)  dextrose Oral Gel 15 Gram(s) Oral once PRN Blood Glucose LESS THAN 70 milliGRAM(s)/deciliter  ondansetron Injectable 4 milliGRAM(s) IV Push every 6 hours PRN Nausea and/or Vomiting  traMADol 50 milliGRAM(s) Oral every 4 hours PRN Severe Pain (7 - 10)  traMADol 25 milliGRAM(s) Oral every 4 hours PRN Moderate Pain (4 - 6)        PHYSICAL EXAM:   Vital Signs Last 24 Hrs  T(C): 36.7 (14 Apr 2024 07:26), Max: 37.1 (13 Apr 2024 08:22)  T(F): 98 (14 Apr 2024 07:26), Max: 98.7 (13 Apr 2024 08:22)  HR: 80 (14 Apr 2024 07:26) (80 - 96)  BP: 137/91 (14 Apr 2024 07:26) (130/76 - 146/89)  BP(mean): --  RR: 18 (14 Apr 2024 07:26) (16 - 18)  SpO2: 95% (14 Apr 2024 07:26) (94% - 96%)    Parameters below as of 14 Apr 2024 07:26  Patient On (Oxygen Delivery Method): room air            General: No acute distress  HEENT: EOM intact, visual fields full, torticollis   Abdomen: Soft, nontender, nondistended   Extremities: No edema    Neuro exam:  MS: Awake, alert. Oriented to self, place, month, not year, states incorrect age, can't name president (unclear if disorientation vs semantic paraphasic errors), follows simple commands, speech fluent   CN: pupils equal, mild R gaze palsy, L eye hyperoptic, diplopia,  mild R facial droop, mild dysarthria  Motor: 5/5 LUE strength, LLE strength antigravity but pain limited, no RUE or RLE movement.   Sensory: Moderately decreased sensation to LT in RUE and RLE.    LABS:                          12.9   9.91  )-----------( 349      ( 14 Apr 2024 06:33 )             39.0     04-14    136  |  101  |  15  ----------------------------<  101<H>  4.0   |  26  |  0.72    Ca    9.0      14 Apr 2024 06:33              IMAGING: Reviewed by me.   MRI Brain w/wo 4/10/24:   -Ongoing expected evolution of left thalamocapsular hemorrhage measuring up to 4.2 cm. No underlying mass lesion or vascular malformation is identified.  -Chronic microhemorrhages in the right thalamus and basal ganglia, compatible with background of chronic hypertensive microangiopathy.  -Small acute infarcts in the right caudate tail and periatrial white matter.    CTH 4/7/24: No change in left basal ganglia and thalamic parenchymal   hemorrhage compared with 4/5/2024.    CTH 4/5/24:  No change in left thalamic hemorrhage since 4/4/2024.    CTH 4/4/24: Acute parenchymal hemorrhage involving the left thalamus is again seen   with involvement of the left lenticular nucleus and posteriorleft corona   radiata region. This finding measures approximately 3.8 x 2.6 cm and   previously measured approximately 3.7 x 3.1 cm. Surrounding edema is   again seen. No significant shift or herniation is seen.      CTH 4/3/24: Acute intraparenchymal hemorrhage centered in the left thalamus measures   approximately 3.3 x 1.5 x 4.2 cm in the sagittal and coronal planes,   stable from 04/02/2024 at 9:19 PM.    CTH 4/2/24: An acute left thalamic hemorrhage measures approximately 3.2 x 1.7 x 3.9 cm in the sagittal and coronal planes. No evidence of acute cerebral infarction.    CT ANGIOGRAPHY NECK 4/2/24:  1. Cervical carotid systems and vertebral arteries are patent bilaterally   without stenosis.  No hemodynamically significant carotid stenosis using   NASCET criteria.  2.  Short segment dissection flap in the proximal right internal carotid   artery, of unknown chronicity.    CT ANGIOGRAPHY BRAIN 4/2/24:  1.  No major vessel occlusion, stenosis or aneurysm is identified about   the [place].  Normal anatomic variants as discussed above.  2.  The dural venous sinuses are incompletely opacified due to   acquisition during early arterial phase.  3.  No evidence of an arteriovenous malformation.  4.  No evidence of active contrast extravasation into the patient's left   thalamic hematoma.   THE PATIENT WAS SEEN AND EXAMINED BY ME WITH THE HOUSESTAFF AND STROKE TEAM DURING MORNING ROUNDS.   HPI:  58M Hx eczema/Gout intermittently takes ASA for pain, p/f being found down by wife after hearing thud. No hx HTN. 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 CTA grossly negative. Coags/TEG/ARU pend. ED intubated for inability to manage secretions. ICH score 2   Exam prior to intubation: eye opening apraxia, Ox1, PERRL, upgaze and right gaze palsy could cross midline, int FC, L side 5/5, LUE extensor, LLe flaccid      SUBJECTIVE: No events overnight.  C/O diplopia (since stroke onset) No other new neurologic complaints.  ROS negative unless otherwise noted.    MEDICATIONS  (STANDING):  amLODIPine   Tablet 10 milliGRAM(s) Oral daily  chlorhexidine 4% Liquid 1 Application(s) Topical <User Schedule>  colchicine 0.6 milliGRAM(s) Oral two times a day  dextrose 10% Bolus 125 milliLiter(s) IV Bolus once  dextrose 5%. 1000 milliLiter(s) (100 mL/Hr) IV Continuous <Continuous>  dextrose 5%. 1000 milliLiter(s) (50 mL/Hr) IV Continuous <Continuous>  dextrose 50% Injectable 12.5 Gram(s) IV Push once  dextrose 50% Injectable 25 Gram(s) IV Push once  enoxaparin Injectable 40 milliGRAM(s) SubCutaneous <User Schedule>  glucagon  Injectable 1 milliGRAM(s) IntraMuscular once  losartan 25 milliGRAM(s) Oral daily  multivitamin 1 Tablet(s) Oral daily  pantoprazole   Suspension 40 milliGRAM(s) Oral daily  polyethylene glycol 3350 17 Gram(s) Oral daily  QUEtiapine 25 milliGRAM(s) Oral every 8 hours  senna 2 Tablet(s) Oral at bedtime  sodium chloride 0.9%. 1000 milliLiter(s) (50 mL/Hr) IV Continuous <Continuous>    MEDICATIONS  (PRN):  acetaminophen     Tablet .. 650 milliGRAM(s) Oral every 6 hours PRN Temp greater or equal to 38C (100.4F), Mild Pain (1 - 3)  dextrose Oral Gel 15 Gram(s) Oral once PRN Blood Glucose LESS THAN 70 milliGRAM(s)/deciliter  ondansetron Injectable 4 milliGRAM(s) IV Push every 6 hours PRN Nausea and/or Vomiting  traMADol 50 milliGRAM(s) Oral every 4 hours PRN Severe Pain (7 - 10)  traMADol 25 milliGRAM(s) Oral every 4 hours PRN Moderate Pain (4 - 6)        PHYSICAL EXAM:   Vital Signs Last 24 Hrs  T(C): 36.7 (14 Apr 2024 07:26), Max: 37.1 (13 Apr 2024 08:22)  T(F): 98 (14 Apr 2024 07:26), Max: 98.7 (13 Apr 2024 08:22)  HR: 80 (14 Apr 2024 07:26) (80 - 96)  BP: 137/91 (14 Apr 2024 07:26) (130/76 - 146/89)  BP(mean): --  RR: 18 (14 Apr 2024 07:26) (16 - 18)  SpO2: 95% (14 Apr 2024 07:26) (94% - 96%)    Parameters below as of 14 Apr 2024 07:26  Patient On (Oxygen Delivery Method): room air            General: No acute distress  HEENT: EOM intact, visual fields full, torticollis   Abdomen: Soft, nontender, nondistended   Extremities: No edema    Neuro exam:  MS: Awake, alert. Oriented to self, place, month, not year, states incorrect age, can't name president (unclear if disorientation vs semantic paraphasic errors), follows simple commands, speech fluent   CN: pupils equal, mild R gaze palsy, skew deviation- L eye hyperopic ,  mild R facial droop, mild dysarthria  Motor: 5/5 LUE strength, LLE strength antigravity but pain limited, no RUE or RLE movement.   Sensory: Moderately decreased sensation to LT in RUE and RLE.    LABS:                          12.9   9.91  )-----------( 349      ( 14 Apr 2024 06:33 )             39.0     04-14    136  |  101  |  15  ----------------------------<  101<H>  4.0   |  26  |  0.72    Ca    9.0      14 Apr 2024 06:33              IMAGING: Reviewed by me.   MRI Brain w/wo 4/10/24:   -Ongoing expected evolution of left thalamocapsular hemorrhage measuring up to 4.2 cm. No underlying mass lesion or vascular malformation is identified.  -Chronic microhemorrhages in the right thalamus and basal ganglia, compatible with background of chronic hypertensive microangiopathy.  -Small acute infarcts in the right caudate tail and periatrial white matter.    CTH 4/7/24: No change in left basal ganglia and thalamic parenchymal   hemorrhage compared with 4/5/2024.    CTH 4/5/24:  No change in left thalamic hemorrhage since 4/4/2024.    CTH 4/4/24: Acute parenchymal hemorrhage involving the left thalamus is again seen   with involvement of the left lenticular nucleus and posteriorleft corona   radiata region. This finding measures approximately 3.8 x 2.6 cm and   previously measured approximately 3.7 x 3.1 cm. Surrounding edema is   again seen. No significant shift or herniation is seen.      CTH 4/3/24: Acute intraparenchymal hemorrhage centered in the left thalamus measures   approximately 3.3 x 1.5 x 4.2 cm in the sagittal and coronal planes,   stable from 04/02/2024 at 9:19 PM.    CTH 4/2/24: An acute left thalamic hemorrhage measures approximately 3.2 x 1.7 x 3.9 cm in the sagittal and coronal planes. No evidence of acute cerebral infarction.    CT ANGIOGRAPHY NECK 4/2/24:  1. Cervical carotid systems and vertebral arteries are patent bilaterally   without stenosis.  No hemodynamically significant carotid stenosis using   NASCET criteria.  2.  Short segment dissection flap in the proximal right internal carotid   artery, of unknown chronicity.    CT ANGIOGRAPHY BRAIN 4/2/24:  1.  No major vessel occlusion, stenosis or aneurysm is identified about   the [place].  Normal anatomic variants as discussed above.  2.  The dural venous sinuses are incompletely opacified due to   acquisition during early arterial phase.  3.  No evidence of an arteriovenous malformation.  4.  No evidence of active contrast extravasation into the patient's left   thalamic hematoma.

## 2024-04-14 NOTE — PROGRESS NOTE ADULT - ASSESSMENT
Patient is a 58 year old male with a PMHx of Eczema, Gout who was reportedly intermittently taking ASA for pain, who presented to [facility] on 4/2/2024 for being found down by his wife. Per neurology, patient without history of HTN. Patient was found to have a L thalamic IPH extending to the midbrain with no IVH or hydro, with modest mass effect. CTA grossly negative. Patient was intubated in the ER for inability to manage secretions. Internal Medicine has been consulted on Mr. Mishra's care for medical management.        IPH   - 4/2 CT H w/ acute L IPH  - 4/2 CTA H/N w/ Short segment dissection flap of the proximal R ICA  - 4/7 CT H w/ no change in the L basal ganglia and thalamic parenchymal hemorrhage   - MR head pending   - Likely 2/2 HTN as per Stroke neurology;  Consider ILR placement to rule out occult arrythmia   - Neuro checks as per protocol  - Monitor on tele   - PT / OT/ S+S  - Fall, Seizure, Aspiration precautions   - NSICU care appreciated  - Per Stroke Neurology     R/O Sepsis --> Likely 2/2 UTI   - Pt febrile, tachycardic, with leukocytosis   - 4/5 BCx 1 of 2 w/ Staph epi, 4/6 BCx2 negative   - F/u 4/10 UCx and BCx 2 --> In lab  - Likely UTI in view of grossly + UA , negative urine Cx  - Lactate negative, RSV/Flu/Covid negative, Procal 0.29   - On Rocephin for ABX   - Antipyretics PRN  - Continue to monitor and trend CBC, temp curve, VS   - If recurrently febrile while on ABX, suggest CT C/A/P infectious work up     Dysphagia   - W/ NGT  - Aspiration precautions; Oral care  - GI eval appreciated; F/u recs --> Tentative plan for EGD/ PEG on Monday if fails repeat S+S     Hypoxia  - S/P NSICU, S/P Intubation and now self extubated 4/6  - Incentive spirometer   - Monitor O2 saturation; Supplement to maintain > 90%     Gout, LE Edema   - 4/9 Duplex negative for DVT  - Home allopurinol was held on admission. Indomethacin resumed per Rheum   - On Colchicine 0.6 PO BID  - Per Rheumatology     Pre-DM  - A1C of 5.8  - On Sliding scale  - Monitor glucose levels and adjust as tolerated    HTN   - On Losartan 25 Mg and Norvasc 10   - TTE w/ EF of 69%, no evidence of LV thrombus   - Monitor BP and adjust as tolerated    PPX  - On Lovenox 40   - Start PPI

## 2024-04-14 NOTE — PROGRESS NOTE ADULT - ASSESSMENT
58M Hx eczema/Gout intermittently takes ASA for pain, p/f being found down by wife after hearing thud. No hx HTN. 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 CTA grossly negative.    Impression: Subjectively improving (although still R hemiplegia on examination) R sensorimotor deficit with mild R gaze palsy, moderate dysarthria, and possible subtle subcortical aphasia (given possible rare semantic paraphasic errors), due to L thalamic IPH, likely i/s/o hypertension (although no prior diagnosis). Angiogram negative for underlying vascular malformation.    NEURO: Neurologic exam stable from yesterday, improved since admission. Continue close monitoring for neurologic deterioration. Goal BP<140/90. MRI Brain W/wo, CTH, CTA Head w/o and Neck w/contrast results as above. Physical therapy/OT/Speech eval recommend AR. Discontinued VPA (greater than 7 days after initial presentation, and subcortical location of ICH, EEG negative, no need for further seizure ppx at this time)    ANTITHROMBOTIC THERAPY: none due to thalamic IPH    PULMONARY: CXR (4/10/24) clear, protecting airway, saturating well     CARDIOVASCULAR:  TTE: Ef 69%,  There is no evidence of a left ventricular thrombus. There is normal LV mass and normal geometry. continue cardiac monitoring. c/w Losartan 25mg + Amlodipine 10mg for HTN (goal normotension).                               SBP goal: <140    GASTROINTESTINAL:  dysphagia screen initially failed.  c/w senna and miralax for bowel regimen. s/p MBS on 4/11/24 and placed on diet, tolerating well.     Diet: minced and moist    RENAL: BUN/Cr stable, good urine output      Na Goal: Greater than 135     Canales: N    HEMATOLOGY: H/H stable anemia, Platelets normal (330). B/L UE arterial doppler (4/8/24) negative for arterial vascular disease. B/L LE doppler (4/9/24) neg for DVT.      DVT ppx:  LMWH       ID: Pt found to be febrile to 102 on 4/10 PM, tachycardic as well. Ordered BCx NGTD, UCx neg, CXR neg, RVP neg, UA+, s/p ceftriaxone, medicine team following    OTHER: Rheumatology: appreciated recommendations: -C/w colchicine at current dose, hold indomethacin and allopurinol  -Check HLA [id number] given higher prevalence in  population, though lower suspicion of allopurinol hypersensitivity given absence of acute rash  -Radiographs of B/L knees (b/l knee joint effusions, no fx or dislocation, Small bilateral superior and inferior patellar and peripheral lateral tibial plateau articular margin osteophytes, 2 adjacent small nodular soft tissue calcifications adjacent to proximal posterior left tibial metaphysis) and Right foot (no fx or dislocation or lesions, 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 changes, bipartite appearing lateral hallux sesamoid)      DISPOSITION:  PT recommending acute rehab once stable and work up complete       CORE MEASURES:        Admission NIHSS:      Tenectaplase: [] YES [x] NO      LDL/HDL: 107/67     Depression Screen: no      Statin Therapy: No     Dysphagia Screen: [] PASS [X] FAIL     Smoking [] YES [X] NO      Afib [] YES [X] NO     Stroke Education [X] YES [] NO    Obtain screening lower extremity venous ultrasound in patients who meet 1 or more of the following criteria as patient is high risk for DVT/PE on admission:   [] History of DVT/PE  []Hypercoagulable states (Factor V Leiden, Cancer, OCP, etc. )  []Prolonged immobility (hemiplegia/hemiparesis/post operative or any other extended immobilization)  [] Transferred from outside facility (Rehab or Long term care)  [] Age </= to 50   58M Hx eczema/Gout intermittently takes ASA for pain, p/f being found down by wife after hearing thud. No hx HTN. 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 CTA grossly negative.    Impression: Subjectively improving (although still R hemiplegia on examination) R sensorimotor deficit with mild R gaze palsy, moderate dysarthria, and possible subtle subcortical aphasia (given possible rare semantic paraphasic errors), due to L thalamic IPH, likely i/s/o hypertension (although no prior diagnosis). Angiogram negative for underlying vascular malformation.    NEURO: Neurologic exam stable from yesterday, improved since admission. Continue close monitoring for neurologic deterioration. Goal BP<140/90. MRI Brain W/wo, CTH, CTA Head w/o and Neck w/contrast results as above. Physical therapy/OT/Speech eval recommend AR. Discontinued VPA (greater than 7 days after initial presentation, and subcortical location of ICH, EEG negative, no need for further seizure ppx at this time)    ANTITHROMBOTIC THERAPY: none due to thalamic IPH    PULMONARY: CXR (4/10/24) clear, protecting airway, saturating well     CARDIOVASCULAR:  TTE: Ef 69%,  There is no evidence of a left ventricular thrombus. There is normal LV mass and normal geometry. continue cardiac monitoring. c/w Losartan 25mg + Amlodipine 10mg for HTN (goal normotension).                               SBP goal: <140    GASTROINTESTINAL:  dysphagia screen initially failed.  c/w senna and miralax for bowel regimen. s/p MBS on 4/11/24 and placed on diet, tolerating well.     Diet: minced and moist    RENAL: BUN/Cr stable, good urine output      Na Goal: Greater than 135     Canales: N    HEMATOLOGY: H/H stable anemia, Platelets normal (330). B/L UE arterial doppler (4/8/24) negative for arterial vascular disease. B/L LE doppler (4/9/24) neg for DVT.      DVT ppx:  LMWH       ID: Pt found to be febrile to 102 on 4/10 PM, tachycardic as well. Ordered BCx NGTD, UCx neg, CXR neg, RVP neg, UA+, s/p ceftriaxone, medicine team following    OTHER: Rheumatology: appreciated recommendations: -C/w colchicine at current dose, hold indomethacin and allopurinol  -Check HLA [id number] given higher prevalence in  population, though lower suspicion of allopurinol hypersensitivity given absence of acute rash  -Radiographs of B/L knees (b/l knee joint effusions, no fx or dislocation, Small bilateral superior and inferior patellar and peripheral lateral tibial plateau articular margin osteophytes, 2 adjacent small nodular soft tissue calcifications adjacent to proximal posterior left tibial metaphysis) and Right foot (no fx or dislocation or lesions, 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 changes, bipartite appearing lateral hallux sesamoid)  -pt c/o insomnia- started melatonin as ordered  -L/e dopplers ordered for leg pain      DISPOSITION:  PT recommending acute rehab once stable and work up complete       CORE MEASURES:        Admission NIHSS:      Tenectaplase: [] YES [x] NO      LDL/HDL: 107/67     Depression Screen: no      Statin Therapy: No     Dysphagia Screen: [] PASS [X] FAIL     Smoking [] YES [X] NO      Afib [] YES [X] NO     Stroke Education [X] YES [] NO    Obtain screening lower extremity venous ultrasound in patients who meet 1 or more of the following criteria as patient is high risk for DVT/PE on admission:   [] History of DVT/PE  []Hypercoagulable states (Factor V Leiden, Cancer, OCP, etc. )  []Prolonged immobility (hemiplegia/hemiparesis/post operative or any other extended immobilization)  [] Transferred from outside facility (Rehab or Long term care)  [] Age </= to 50   58M Hx eczema/Gout intermittently takes ASA for pain, p/f being found down by wife after hearing thud. No hx HTN. 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 CTA grossly negative.    Impression: Subjectively improving (although still R hemiplegia on examination) R sensorimotor deficit with mild R gaze palsy, moderate dysarthria, and possible subtle subcortical aphasia (given possible rare semantic paraphasic errors), due to L thalamic IPH, likely i/s/o hypertension (although no prior diagnosis). Angiogram negative for underlying vascular malformation.    NEURO: Neurologic exam stable from yesterday, improved since admission. His diplopia may be due to a skew deviation. Continue close monitoring for neurologic deterioration. Goal BP<140/90. MRI Brain W/wo, CTH, CTA Head w/o and Neck w/contrast results as above. Physical therapy/OT/Speech eval recommend AR. Discontinued VPA (greater than 7 days after initial presentation, and subcortical location of ICH, EEG negative, no need for further seizure ppx at this time)    ANTITHROMBOTIC THERAPY: none due to thalamic IPH    PULMONARY: CXR (4/10/24) clear, protecting airway, saturating well     CARDIOVASCULAR:  TTE: Ef 69%,  There is no evidence of a left ventricular thrombus. There is normal LV mass and normal geometry. continue cardiac monitoring. c/w Losartan 25mg + Amlodipine 10mg for HTN (goal normotension).                               SBP goal: <140    GASTROINTESTINAL:  dysphagia screen initially failed.  c/w senna and miralax for bowel regimen. s/p MBS on 4/11/24 and placed on diet, tolerating well.     Diet: minced and moist    RENAL: BUN/Cr stable, good urine output      Na Goal: Greater than 135     Canales: N    HEMATOLOGY: H/H stable anemia, Platelets normal (330). B/L UE arterial doppler (4/8/24) negative for arterial vascular disease. B/L LE doppler (4/9/24) neg for DVT.      DVT ppx:  LMWH       ID: Pt found to be febrile to 102 on 4/10 PM, tachycardic as well. Ordered BCx NGTD, UCx neg, CXR neg, RVP neg, UA+, s/p ceftriaxone, medicine team following    OTHER: Rheumatology: appreciated recommendations: -C/w colchicine at current dose, hold indomethacin and allopurinol  -Check HLA [id number]:01 given higher prevalence in  population, though lower suspicion of allopurinol hypersensitivity given absence of acute rash  -Radiographs of B/L knees (b/l knee joint effusions, no fx or dislocation, Small bilateral superior and inferior patellar and peripheral lateral tibial plateau articular margin osteophytes, 2 adjacent small nodular soft tissue calcifications adjacent to proximal posterior left tibial metaphysis) and Right foot (no fx or dislocation or lesions, 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 changes, bipartite appearing lateral hallux sesamoid)  -pt c/o insomnia- started melatonin as ordered  -L/e dopplers ordered for leg pain      DISPOSITION:  PT recommending acute rehab once stable and work up complete       CORE MEASURES:        Admission NIHSS:      Tenectaplase: [] YES [x] NO      LDL/HDL: 107/67     Depression Screen: no      Statin Therapy: No     Dysphagia Screen: [] PASS [X] FAIL     Smoking [] YES [X] NO      Afib [] YES [X] NO     Stroke Education [X] YES [] NO    Obtain screening lower extremity venous ultrasound in patients who meet 1 or more of the following criteria as patient is high risk for DVT/PE on admission:   [] History of DVT/PE  []Hypercoagulable states (Factor V Leiden, Cancer, OCP, etc. )  []Prolonged immobility (hemiplegia/hemiparesis/post operative or any other extended immobilization)  [] Transferred from outside facility (Rehab or Long term care)  [] Age </= to 50

## 2024-04-14 NOTE — PROGRESS NOTE ADULT - SUBJECTIVE AND OBJECTIVE BOX
Name of Patient : ROSALEE CARR  MRN: [id number]  Date of visit: 04-14-24       Subjective: Patient seen and examined. No new events except as noted.     REVIEW OF SYSTEMS:  limited    MEDICATIONS:  MEDICATIONS  (STANDING):  amLODIPine   Tablet 10 milliGRAM(s) Oral daily  chlorhexidine 4% Liquid 1 Application(s) Topical <User Schedule>  dextrose 10% Bolus 125 milliLiter(s) IV Bolus once  dextrose 5%. 1000 milliLiter(s) (100 mL/Hr) IV Continuous <Continuous>  dextrose 5%. 1000 milliLiter(s) (50 mL/Hr) IV Continuous <Continuous>  dextrose 50% Injectable 25 Gram(s) IV Push once  dextrose 50% Injectable 12.5 Gram(s) IV Push once  enoxaparin Injectable 40 milliGRAM(s) SubCutaneous <User Schedule>  glucagon  Injectable 1 milliGRAM(s) IntraMuscular once  losartan 25 milliGRAM(s) Oral daily  multivitamin 1 Tablet(s) Oral daily  pantoprazole   Suspension 40 milliGRAM(s) Oral daily  polyethylene glycol 3350 17 Gram(s) Oral daily  QUEtiapine 25 milliGRAM(s) Oral every 8 hours  senna 2 Tablet(s) Oral at bedtime  sodium chloride 0.9%. 1000 milliLiter(s) (50 mL/Hr) IV Continuous <Continuous>      PHYSICAL EXAM:  T(C): 36.4 (04-14-24 @ 16:52), Max: 36.8 (04-14-24 @ 00:13)  HR: 95 (04-14-24 @ 16:52) (80 - 97)  BP: 125/82 (04-14-24 @ 16:52) (121/78 - 137/91)  RR: 18 (04-14-24 @ 16:52) (16 - 18)  SpO2: 95% (04-14-24 @ 16:52) (95% - 96%)  Wt(kg): --  I&O's Summary    13 Apr 2024 07:01  -  14 Apr 2024 07:00  --------------------------------------------------------  IN: 2230 mL / OUT: 1300 mL / NET: 930 mL    14 Apr 2024 07:01 - 14 Apr 2024 21:58  --------------------------------------------------------  IN: 0 mL / OUT: 1200 mL / NET: -1200 mL          Appearance: Normal	  HEENT:  PERRLA   Lymphatic: No lymphadenopathy   Cardiovascular: Normal S1 S2, no JVD  Respiratory: normal effort , clear  Gastrointestinal:  Soft, Non-tender  Skin: No rashes,  warm to touch  Psychiatry:  Mood & affect appropriate  Musculuskeletal: No edema    recent labs, Imaging and EKGs personally reviewed             04-13-24 @ 07:01  -  04-14-24 @ 07:00  --------------------------------------------------------  IN: 2230 mL / OUT: 1300 mL / NET: 930 mL    04-14-24 @ 07:01  -  04-14-24 @ 21:58  --------------------------------------------------------  IN: 0 mL / OUT: 1200 mL / NET: -1200 mL

## 2024-04-14 NOTE — PROGRESS NOTE ADULT - TIME BILLING
I attest my time as attending is greater than 50% of the total combined time spent on qualifying patient care activities by the PA/NP and attending.  I have made amendments to the documentation where necessary.  Additional comments: Agree with above.  Patient seen and examined.  Discussed condition and plan with patient.
I attest my time as attending is greater than 50% of the total combined time spent on qualifying patient care activities by the PA/NP and attending.  I have made amendments to the documentation where necessary.  Additional comments: Agree with above.  Patient seen and examined.  Discussed condition and plan with patient.
I was present with the Resident during the key portions of the history and exam. I discussed the case with the Resident and agree with the findings and plan as documented in the Resident's note, unless noted below.   ROS otherwise negative
I attest my time as attending is greater than 50% of the total combined time spent on qualifying patient care activities by the PA/NP and attending.  I have made amendments to the documentation where necessary.  Additional comments: Agree with above.  Patient seen and examined.  Discussed condition and plan with patient.
I attest my time as attending is greater than 50% of the total combined time spent on qualifying patient care activities by the PA/NP and attending.  I have made amendments to the documentation where necessary.  Additional comments: Agree with above.  Patient seen and examined.  Discussed condition and plan with patient.
Imaging review, plan formulation, coordination of care

## 2024-04-15 ENCOUNTER — INPATIENT (INPATIENT)
Facility: HOSPITAL | Age: 59
LOS: 23 days | Discharge: NURSING, REHAB OR LONG-TERM CARE | DRG: 66 | End: 2024-05-09
Attending: STUDENT IN AN ORGANIZED HEALTH CARE EDUCATION/TRAINING PROGRAM | Admitting: STUDENT IN AN ORGANIZED HEALTH CARE EDUCATION/TRAINING PROGRAM
Payer: COMMERCIAL

## 2024-04-15 ENCOUNTER — TRANSCRIPTION ENCOUNTER (OUTPATIENT)
Age: 59
End: 2024-04-15

## 2024-04-15 VITALS
TEMPERATURE: 98 F | RESPIRATION RATE: 18 BRPM | DIASTOLIC BLOOD PRESSURE: 89 MMHG | HEART RATE: 113 BPM | SYSTOLIC BLOOD PRESSURE: 125 MMHG | OXYGEN SATURATION: 94 %

## 2024-04-15 VITALS
SYSTOLIC BLOOD PRESSURE: 135 MMHG | WEIGHT: 168.87 LBS | DIASTOLIC BLOOD PRESSURE: 93 MMHG | RESPIRATION RATE: 16 BRPM | HEIGHT: 66 IN | HEART RATE: 114 BPM | OXYGEN SATURATION: 97 % | TEMPERATURE: 98 F

## 2024-04-15 DIAGNOSIS — I61.8 OTHER NONTRAUMATIC INTRACEREBRAL HEMORRHAGE: ICD-10-CM

## 2024-04-15 PROBLEM — L30.9 DERMATITIS, UNSPECIFIED: Chronic | Status: ACTIVE | Noted: 2024-04-02

## 2024-04-15 PROBLEM — M10.9 GOUT, UNSPECIFIED: Chronic | Status: ACTIVE | Noted: 2024-04-02

## 2024-04-15 LAB
CULTURE RESULTS: SIGNIFICANT CHANGE UP
CULTURE RESULTS: SIGNIFICANT CHANGE UP
FLUAV AG NPH QL: SIGNIFICANT CHANGE UP
FLUBV AG NPH QL: SIGNIFICANT CHANGE UP
RSV RNA NPH QL NAA+NON-PROBE: SIGNIFICANT CHANGE UP
SARS-COV-2 RNA SPEC QL NAA+PROBE: SIGNIFICANT CHANGE UP
SPECIMEN SOURCE: SIGNIFICANT CHANGE UP
SPECIMEN SOURCE: SIGNIFICANT CHANGE UP

## 2024-04-15 PROCEDURE — 85730 THROMBOPLASTIN TIME PARTIAL: CPT

## 2024-04-15 PROCEDURE — 97129 THER IVNTJ 1ST 15 MIN: CPT

## 2024-04-15 PROCEDURE — 36227 PLACE CATH XTRNL CAROTID: CPT

## 2024-04-15 PROCEDURE — 84480 ASSAY TRIIODOTHYRONINE (T3): CPT

## 2024-04-15 PROCEDURE — 84550 ASSAY OF BLOOD/URIC ACID: CPT

## 2024-04-15 PROCEDURE — 80048 BASIC METABOLIC PNL TOTAL CA: CPT

## 2024-04-15 PROCEDURE — 87070 CULTURE OTHR SPECIMN AEROBIC: CPT

## 2024-04-15 PROCEDURE — 85014 HEMATOCRIT: CPT

## 2024-04-15 PROCEDURE — 84484 ASSAY OF TROPONIN QUANT: CPT

## 2024-04-15 PROCEDURE — 94002 VENT MGMT INPAT INIT DAY: CPT

## 2024-04-15 PROCEDURE — 99285 EMERGENCY DEPT VISIT HI MDM: CPT | Mod: 25

## 2024-04-15 PROCEDURE — 96375 TX/PRO/DX INJ NEW DRUG ADDON: CPT

## 2024-04-15 PROCEDURE — 36224 PLACE CATH CAROTD ART: CPT

## 2024-04-15 PROCEDURE — 92526 ORAL FUNCTION THERAPY: CPT

## 2024-04-15 PROCEDURE — C1760: CPT

## 2024-04-15 PROCEDURE — 87150 DNA/RNA AMPLIFIED PROBE: CPT

## 2024-04-15 PROCEDURE — 81001 URINALYSIS AUTO W/SCOPE: CPT

## 2024-04-15 PROCEDURE — 74230 X-RAY XM SWLNG FUNCJ C+: CPT

## 2024-04-15 PROCEDURE — 82330 ASSAY OF CALCIUM: CPT

## 2024-04-15 PROCEDURE — 85018 HEMOGLOBIN: CPT

## 2024-04-15 PROCEDURE — 92507 TX SP LANG VOICE COMM INDIV: CPT

## 2024-04-15 PROCEDURE — 84132 ASSAY OF SERUM POTASSIUM: CPT

## 2024-04-15 PROCEDURE — 92610 EVALUATE SWALLOWING FUNCTION: CPT

## 2024-04-15 PROCEDURE — 93970 EXTREMITY STUDY: CPT | Mod: 26

## 2024-04-15 PROCEDURE — 81381 HLA I TYPING 1 ALLELE HR: CPT

## 2024-04-15 PROCEDURE — C8929: CPT

## 2024-04-15 PROCEDURE — 84443 ASSAY THYROID STIM HORMONE: CPT

## 2024-04-15 PROCEDURE — 83605 ASSAY OF LACTIC ACID: CPT

## 2024-04-15 PROCEDURE — 96374 THER/PROPH/DIAG INJ IV PUSH: CPT

## 2024-04-15 PROCEDURE — 82947 ASSAY GLUCOSE BLOOD QUANT: CPT

## 2024-04-15 PROCEDURE — 80307 DRUG TEST PRSMV CHEM ANLYZR: CPT

## 2024-04-15 PROCEDURE — 94799 UNLISTED PULMONARY SVC/PX: CPT

## 2024-04-15 PROCEDURE — 36226 PLACE CATH VERTEBRAL ART: CPT

## 2024-04-15 PROCEDURE — 99239 HOSP IP/OBS DSCHRG MGMT >30: CPT

## 2024-04-15 PROCEDURE — 87186 SC STD MICRODIL/AGAR DIL: CPT

## 2024-04-15 PROCEDURE — 86850 RBC ANTIBODY SCREEN: CPT

## 2024-04-15 PROCEDURE — 70553 MRI BRAIN STEM W/O & W/DYE: CPT | Mod: MC

## 2024-04-15 PROCEDURE — 84295 ASSAY OF SERUM SODIUM: CPT

## 2024-04-15 PROCEDURE — 92611 MOTION FLUOROSCOPY/SWALLOW: CPT

## 2024-04-15 PROCEDURE — 87637 SARSCOV2&INF A&B&RSV AMP PRB: CPT

## 2024-04-15 PROCEDURE — 87641 MR-STAPH DNA AMP PROBE: CPT

## 2024-04-15 PROCEDURE — A9585: CPT

## 2024-04-15 PROCEDURE — 87040 BLOOD CULTURE FOR BACTERIA: CPT

## 2024-04-15 PROCEDURE — 85576 BLOOD PLATELET AGGREGATION: CPT

## 2024-04-15 PROCEDURE — 97166 OT EVAL MOD COMPLEX 45 MIN: CPT

## 2024-04-15 PROCEDURE — 95714 VEEG EA 12-26 HR UNMNTR: CPT

## 2024-04-15 PROCEDURE — 84145 PROCALCITONIN (PCT): CPT

## 2024-04-15 PROCEDURE — 95700 EEG CONT REC W/VID EEG TECH: CPT

## 2024-04-15 PROCEDURE — 85396 CLOTTING ASSAY WHOLE BLOOD: CPT

## 2024-04-15 PROCEDURE — 86900 BLOOD TYPING SEROLOGIC ABO: CPT

## 2024-04-15 PROCEDURE — 85025 COMPLETE CBC W/AUTO DIFF WBC: CPT

## 2024-04-15 PROCEDURE — C1894: CPT

## 2024-04-15 PROCEDURE — 73620 X-RAY EXAM OF FOOT: CPT

## 2024-04-15 PROCEDURE — 97162 PT EVAL MOD COMPLEX 30 MIN: CPT

## 2024-04-15 PROCEDURE — 93970 EXTREMITY STUDY: CPT

## 2024-04-15 PROCEDURE — 71045 X-RAY EXAM CHEST 1 VIEW: CPT

## 2024-04-15 PROCEDURE — 92523 SPEECH SOUND LANG COMPREHEN: CPT

## 2024-04-15 PROCEDURE — 97530 THERAPEUTIC ACTIVITIES: CPT

## 2024-04-15 PROCEDURE — C1769: CPT

## 2024-04-15 PROCEDURE — 93931 UPPER EXTREMITY STUDY: CPT

## 2024-04-15 PROCEDURE — 82803 BLOOD GASES ANY COMBINATION: CPT

## 2024-04-15 PROCEDURE — 85027 COMPLETE CBC AUTOMATED: CPT

## 2024-04-15 PROCEDURE — 86901 BLOOD TYPING SEROLOGIC RH(D): CPT

## 2024-04-15 PROCEDURE — 94003 VENT MGMT INPAT SUBQ DAY: CPT

## 2024-04-15 PROCEDURE — 87077 CULTURE AEROBIC IDENTIFY: CPT

## 2024-04-15 PROCEDURE — 36415 COLL VENOUS BLD VENIPUNCTURE: CPT

## 2024-04-15 PROCEDURE — 70496 CT ANGIOGRAPHY HEAD: CPT | Mod: MC

## 2024-04-15 PROCEDURE — 84439 ASSAY OF FREE THYROXINE: CPT

## 2024-04-15 PROCEDURE — C1887: CPT

## 2024-04-15 PROCEDURE — 84100 ASSAY OF PHOSPHORUS: CPT

## 2024-04-15 PROCEDURE — 83036 HEMOGLOBIN GLYCOSYLATED A1C: CPT

## 2024-04-15 PROCEDURE — 87640 STAPH A DNA AMP PROBE: CPT

## 2024-04-15 PROCEDURE — 87086 URINE CULTURE/COLONY COUNT: CPT

## 2024-04-15 PROCEDURE — 93005 ELECTROCARDIOGRAM TRACING: CPT

## 2024-04-15 PROCEDURE — 85610 PROTHROMBIN TIME: CPT

## 2024-04-15 PROCEDURE — 36569 INSJ PICC 5 YR+ W/O IMAGING: CPT

## 2024-04-15 PROCEDURE — 97110 THERAPEUTIC EXERCISES: CPT

## 2024-04-15 PROCEDURE — 82565 ASSAY OF CREATININE: CPT

## 2024-04-15 PROCEDURE — 80053 COMPREHEN METABOLIC PANEL: CPT

## 2024-04-15 PROCEDURE — 73560 X-RAY EXAM OF KNEE 1 OR 2: CPT

## 2024-04-15 PROCEDURE — 82962 GLUCOSE BLOOD TEST: CPT

## 2024-04-15 PROCEDURE — 83735 ASSAY OF MAGNESIUM: CPT

## 2024-04-15 PROCEDURE — 84436 ASSAY OF TOTAL THYROXINE: CPT

## 2024-04-15 PROCEDURE — 82435 ASSAY OF BLOOD CHLORIDE: CPT

## 2024-04-15 PROCEDURE — 87184 SC STD DISK METHOD PER PLATE: CPT

## 2024-04-15 PROCEDURE — C1751: CPT

## 2024-04-15 PROCEDURE — 80061 LIPID PANEL: CPT

## 2024-04-15 PROCEDURE — 70498 CT ANGIOGRAPHY NECK: CPT | Mod: MC

## 2024-04-15 PROCEDURE — 95711 VEEG 2-12 HR UNMONITORED: CPT

## 2024-04-15 PROCEDURE — 70450 CT HEAD/BRAIN W/O DYE: CPT | Mod: MC

## 2024-04-15 RX ORDER — PANTOPRAZOLE SODIUM 20 MG/1
40 TABLET, DELAYED RELEASE ORAL
Refills: 0 | Status: DISCONTINUED | OUTPATIENT
Start: 2024-04-16 | End: 2024-04-22

## 2024-04-15 RX ORDER — COLCHICINE 0.6 MG
1 TABLET ORAL
Refills: 0 | DISCHARGE

## 2024-04-15 RX ORDER — AMLODIPINE BESYLATE 2.5 MG/1
1 TABLET ORAL
Qty: 0 | Refills: 0 | DISCHARGE
Start: 2024-04-15

## 2024-04-15 RX ORDER — LANOLIN ALCOHOL/MO/W.PET/CERES
3 CREAM (GRAM) TOPICAL AT BEDTIME
Refills: 0 | Status: DISCONTINUED | OUTPATIENT
Start: 2024-04-15 | End: 2024-04-17

## 2024-04-15 RX ORDER — ACETAMINOPHEN 500 MG
650 TABLET ORAL EVERY 6 HOURS
Refills: 0 | Status: DISCONTINUED | OUTPATIENT
Start: 2024-04-15 | End: 2024-05-09

## 2024-04-15 RX ORDER — LOSARTAN POTASSIUM 100 MG/1
25 TABLET, FILM COATED ORAL DAILY
Refills: 0 | Status: DISCONTINUED | OUTPATIENT
Start: 2024-04-15 | End: 2024-04-22

## 2024-04-15 RX ORDER — ENOXAPARIN SODIUM 100 MG/ML
40 INJECTION SUBCUTANEOUS EVERY 24 HOURS
Refills: 0 | Status: DISCONTINUED | OUTPATIENT
Start: 2024-04-15 | End: 2024-05-09

## 2024-04-15 RX ORDER — PANTOPRAZOLE SODIUM 20 MG/1
40 TABLET, DELAYED RELEASE ORAL
Refills: 0 | Status: DISCONTINUED | OUTPATIENT
Start: 2024-04-15 | End: 2024-04-15

## 2024-04-15 RX ORDER — POLYETHYLENE GLYCOL 3350 17 G/17G
17 POWDER, FOR SOLUTION ORAL
Qty: 0 | Refills: 0 | DISCHARGE
Start: 2024-04-15

## 2024-04-15 RX ORDER — POLYETHYLENE GLYCOL 3350 17 G/17G
17 POWDER, FOR SOLUTION ORAL DAILY
Refills: 0 | Status: DISCONTINUED | OUTPATIENT
Start: 2024-04-15 | End: 2024-04-28

## 2024-04-15 RX ORDER — LANOLIN ALCOHOL/MO/W.PET/CERES
1 CREAM (GRAM) TOPICAL
Qty: 0 | Refills: 0 | DISCHARGE
Start: 2024-04-15

## 2024-04-15 RX ORDER — COLCHICINE 0.6 MG
0.6 TABLET ORAL DAILY
Refills: 0 | Status: DISCONTINUED | OUTPATIENT
Start: 2024-04-15 | End: 2024-04-25

## 2024-04-15 RX ORDER — SENNA PLUS 8.6 MG/1
2 TABLET ORAL
Qty: 0 | Refills: 0 | DISCHARGE
Start: 2024-04-15

## 2024-04-15 RX ORDER — INDOMETHACIN 50 MG
1 CAPSULE ORAL
Refills: 0 | DISCHARGE

## 2024-04-15 RX ORDER — TRAMADOL HYDROCHLORIDE 50 MG/1
0.5 TABLET ORAL
Qty: 0 | Refills: 0 | DISCHARGE
Start: 2024-04-15

## 2024-04-15 RX ORDER — ALLOPURINOL 300 MG
1 TABLET ORAL
Refills: 0 | DISCHARGE

## 2024-04-15 RX ORDER — TRAMADOL HYDROCHLORIDE 50 MG/1
50 TABLET ORAL EVERY 4 HOURS
Refills: 0 | Status: ACTIVE | OUTPATIENT
Start: 2024-04-15 | End: 2024-04-22

## 2024-04-15 RX ORDER — AMLODIPINE BESYLATE 2.5 MG/1
10 TABLET ORAL DAILY
Refills: 0 | Status: DISCONTINUED | OUTPATIENT
Start: 2024-04-15 | End: 2024-04-22

## 2024-04-15 RX ORDER — COLCHICINE 0.6 MG
1 TABLET ORAL
Qty: 0 | Refills: 0 | DISCHARGE
Start: 2024-04-15

## 2024-04-15 RX ORDER — SENNA PLUS 8.6 MG/1
2 TABLET ORAL AT BEDTIME
Refills: 0 | Status: DISCONTINUED | OUTPATIENT
Start: 2024-04-15 | End: 2024-04-28

## 2024-04-15 RX ORDER — LOSARTAN POTASSIUM 100 MG/1
1 TABLET, FILM COATED ORAL
Qty: 0 | Refills: 0 | DISCHARGE
Start: 2024-04-15

## 2024-04-15 RX ORDER — PANTOPRAZOLE SODIUM 20 MG/1
40 TABLET, DELAYED RELEASE ORAL
Qty: 0 | Refills: 0 | DISCHARGE
Start: 2024-04-15

## 2024-04-15 RX ORDER — ENOXAPARIN SODIUM 100 MG/ML
40 INJECTION SUBCUTANEOUS
Qty: 0 | Refills: 0 | DISCHARGE
Start: 2024-04-15

## 2024-04-15 RX ADMIN — QUETIAPINE FUMARATE 25 MILLIGRAM(S): 200 TABLET, FILM COATED ORAL at 04:29

## 2024-04-15 RX ADMIN — Medication 0.6 MILLIGRAM(S): at 13:38

## 2024-04-15 RX ADMIN — SENNA PLUS 2 TABLET(S): 8.6 TABLET ORAL at 22:21

## 2024-04-15 RX ADMIN — Medication 1 TABLET(S): at 13:38

## 2024-04-15 RX ADMIN — ENOXAPARIN SODIUM 40 MILLIGRAM(S): 100 INJECTION SUBCUTANEOUS at 22:21

## 2024-04-15 RX ADMIN — Medication 3 MILLIGRAM(S): at 23:21

## 2024-04-15 RX ADMIN — LOSARTAN POTASSIUM 25 MILLIGRAM(S): 100 TABLET, FILM COATED ORAL at 04:29

## 2024-04-15 RX ADMIN — QUETIAPINE FUMARATE 25 MILLIGRAM(S): 200 TABLET, FILM COATED ORAL at 13:38

## 2024-04-15 RX ADMIN — PANTOPRAZOLE SODIUM 40 MILLIGRAM(S): 20 TABLET, DELAYED RELEASE ORAL at 13:37

## 2024-04-15 RX ADMIN — AMLODIPINE BESYLATE 10 MILLIGRAM(S): 2.5 TABLET ORAL at 04:29

## 2024-04-15 NOTE — H&P ADULT - NSHPPHYSICALEXAM_GEN_ALL_CORE
Gen - NAD, Comfortable  HEENT - NCAT, EOMI, MMM  Neck - Supple, No limited ROM  Pulm - CTAB, No wheeze, No rhonchi, No crackles  Cardiovascular - RRR, S1S2, No murmurs  Abdomen - Soft, NT/ND, +BS  Extremities - No C/C/E, No calf tenderness  Neuro-     Cognitive - AAOx3     Communication - Fluent, + dysarthria, mild aphasia, repeating phrases      Attention: Intact      Judgement: Good evidence of judgement     Memory: Recall 3 objects immediate and 2/3 3 min later         Cranial Nerves - CN 2-12 intact     Motor -                     LEFT    UE - ShAB 5/5, EF 5/5, EE 5/5, WE 5/5,  5/5                    RIGHT UE - ShAB 0/5, EF 0/5, EE 0/5, WE 0/5,  0/5                    LEFT    LE - HF 5/5, KE 5/5, DF 5/5, PF 5/5                    RIGHT LE - HF 0/5, KE 0/5, DF 0/5, PF 0/5        Sensory - present but diminished on RUE and RLE     Reflexes - reduced reflexes on the RUE and RLE     Tone - flaccid LUE and LLE  Psychiatric - Mood stable, Affect WNL  Skin:  patient with bruising on BUE w/ RLE scabbing and right sided back scabs   Wounds: None Present Gen - NAD, Comfortable  HEENT - NCAT, EOMI, MMM  Neck - Supple, No limited ROM  Pulm - CTAB, No wheeze, No rhonchi, No crackles  Cardiovascular - RRR, S1S2, No murmurs  Abdomen - Soft, NT/ND, +BS  Extremities - No C/C/E, No calf tenderness  Neuro-     Cognitive - AAOx3     Communication - Fluent, + dysarthria, mild aphasia, repeating phrases      Attention: Intact      Judgement: Good evidence of judgement     Memory: Recall 3 objects immediate and 2/3 3 min later         Cranial Nerves - left sided facial droop present     Motor -                     LEFT    UE - ShAB 5/5, EF 5/5, EE 5/5, WE 5/5,  5/5                    RIGHT UE - ShAB 0/5, EF 0/5, EE 0/5, WE 0/5,  0/5                    LEFT    LE - HF 5/5, KE 5/5, DF 5/5, PF 5/5                    RIGHT LE - HF 0/5, KE 0/5, DF 0/5, PF 0/5        Sensory - present but diminished on RUE and RLE     Reflexes - reduced reflexes on the RUE and RLE     Tone - flaccid LUE and LLE  Psychiatric - Mood stable, Affect WNL  Skin:  patient with bruising on BUE w/ RLE scabbing and right sided back scabs   Wounds: None Present Gen - NAD, Comfortable  HEENT - NCAT, EOMI, MMM  Neck - Supple, No limited ROM  Pulm - CTAB, No wheeze, No rhonchi, No crackles  Cardiovascular - RRR, S1S2, No murmurs  Abdomen - Soft, NT/ND, +BS  Extremities - No C/C/E, No calf tenderness  Neuro-     Cognitive - AAOx3     Communication - Fluent, + mild dysarthria,     Attention: Impaired, distractable, perseverative, right Inattention     Judgement: impaired     Memory: Recall 3 objects immediate and 2/3 3 min later         Cranial Nerves - EOMI, VF intact, left sided facial droop present, Sensation intact, Dysphagia, dysarthria     Motor -                     LEFT    UE - ShAB 5/5, EF 5/5, EE 5/5, WE 5/5,  5/5                    RIGHT UE - ShAB 0/5, EF 0/5, EE 0/5, WE 0/5,  0/5                    LEFT    LE - HF 5/5, KE 5/5, DF 5/5, PF 5/5                    RIGHT LE - HF 1/5, Hip Adduction-- 1/5, KE 1/5, DF 0/5, PF 0/5        Sensory - present but diminished on RUE and RLE     Reflexes - reduced reflexes on the RUE and RLE     Tone - flaccid RUE and RLE  Psychiatric - Mood stable, Affect WNL  Skin:  patient with bruising on BUE w/ RLE scabbing and right sided back scabs   Wounds: None Present

## 2024-04-15 NOTE — DISCHARGE NOTE PROVIDER - CARE PROVIDERS DIRECT ADDRESSES
[email or web address]

## 2024-04-15 NOTE — PROGRESS NOTE ADULT - SUBJECTIVE AND OBJECTIVE BOX
Name of Patient : ROSALEE CARR  MRN: [id number]  Date of visit: 04-15-24 @ 19:21      Subjective: Patient seen and examined. No new events except as noted.     REVIEW OF SYSTEMS:    CONSTITUTIONAL: No weakness, fevers or chills  EYES/ENT: No visual changes;  No vertigo or throat pain   NECK: No pain or stiffness  RESPIRATORY: No cough, wheezing, hemoptysis; No shortness of breath  CARDIOVASCULAR: No chest pain or palpitations  GASTROINTESTINAL: No abdominal or epigastric pain. No nausea, vomiting, or hematemesis; No diarrhea or constipation. No melena or hematochezia.  GENITOURINARY: No dysuria, frequency or hematuria  NEUROLOGICAL: No numbness or weakness  SKIN: No itching, burning, rashes, or lesions   All other review of systems is negative unless indicated above.    MEDICATIONS:  MEDICATIONS  (STANDING):  amLODIPine   Tablet 10 milliGRAM(s) Oral daily  chlorhexidine 4% Liquid 1 Application(s) Topical <User Schedule>  colchicine 0.6 milliGRAM(s) Oral daily  dextrose 10% Bolus 125 milliLiter(s) IV Bolus once  dextrose 5%. 1000 milliLiter(s) (100 mL/Hr) IV Continuous <Continuous>  dextrose 5%. 1000 milliLiter(s) (50 mL/Hr) IV Continuous <Continuous>  dextrose 50% Injectable 12.5 Gram(s) IV Push once  dextrose 50% Injectable 25 Gram(s) IV Push once  enoxaparin Injectable 40 milliGRAM(s) SubCutaneous <User Schedule>  glucagon  Injectable 1 milliGRAM(s) IntraMuscular once  losartan 25 milliGRAM(s) Oral daily  multivitamin 1 Tablet(s) Oral daily  pantoprazole   Suspension 40 milliGRAM(s) Oral daily  polyethylene glycol 3350 17 Gram(s) Oral daily  QUEtiapine 25 milliGRAM(s) Oral every 8 hours  senna 2 Tablet(s) Oral at bedtime  sodium chloride 0.9%. 1000 milliLiter(s) (50 mL/Hr) IV Continuous <Continuous>      PHYSICAL EXAM:  T(C): 36.7 (04-15-24 @ 16:50), Max: 36.9 (04-14-24 @ 21:59)  HR: 113 (04-15-24 @ 16:50) (91 - 113)  BP: 125/89 (04-15-24 @ 16:50) (119/73 - 146/93)  RR: 18 (04-15-24 @ 16:50) (17 - 18)  SpO2: 94% (04-15-24 @ 16:50) (94% - 96%)  Wt(kg): --  I&O's Summary    14 Apr 2024 07:01  -  15 Apr 2024 07:00  --------------------------------------------------------  IN: 650 mL / OUT: 3300 mL / NET: -2650 mL    15 Apr 2024 07:01  -  15 Apr 2024 19:21  --------------------------------------------------------  IN: 680 mL / OUT: 200 mL / NET: 480 mL          Appearance: Normal	  HEENT:  PERRLA   Lymphatic: No lymphadenopathy   Cardiovascular: Normal S1 S2, no JVD  Respiratory: normal effort , clear  Gastrointestinal:  Soft, Non-tender  Skin: No rashes,  warm to touch  Psychiatry:  Mood & affect appropriate  Musculuskeletal: No edema    recent labs, Imaging and EKGs personally reviewed             04-14-24 @ 07:01  -  04-15-24 @ 07:00  --------------------------------------------------------  IN: 650 mL / OUT: 3300 mL / NET: -2650 mL    04-15-24 @ 07:01  -  04-15-24 @ 19:21  --------------------------------------------------------  IN: 680 mL / OUT: 200 mL / NET: 480 mL

## 2024-04-15 NOTE — PROGRESS NOTE ADULT - REASON FOR ADMISSION
IPH
thalamic IPH extending to midbrain 4/2

## 2024-04-15 NOTE — PATIENT PROFILE ADULT - FALL HARM RISK - HARM RISK INTERVENTIONS
Assistance with ambulation/Assistance OOB with selected safe patient handling equipment/Communicate Risk of Fall with Harm to all staff/Discuss with provider need for PT consult/Monitor gait and stability/Reinforce activity limits and safety measures with patient and family/Tailored Fall Risk Interventions/Visual Cue: Yellow wristband and red socks/Bed in lowest position, wheels locked, appropriate side rails in place/Call bell, personal items and telephone in reach/Instruct patient to call for assistance before getting out of bed or chair/Non-slip footwear when patient is out of bed/[place] to call system/Physically safe environment - no spills, clutter or unnecessary equipment/Purposeful Proactive Rounding/Room/bathroom lighting operational, light cord in reach

## 2024-04-15 NOTE — PROGRESS NOTE ADULT - SUBJECTIVE AND OBJECTIVE BOX
THE PATIENT WAS SEEN AND EXAMINED BY ME WITH THE HOUSESTAFF AND STROKE TEAM DURING MORNING ROUNDS.   HPI:  58M Hx eczema/Gout intermittently takes ASA for pain, p/f being found down by wife after hearing thud. No hx HTN. 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 CTA grossly negative. Coags/TEG/ARU pend. ED intubated for inability to manage secretions. ICH score 2   Exam prior to intubation: eye opening apraxia, Ox1, PERRL, upgaze and right gaze palsy could cross midline, int FC, L side 5/5, LUE extensor, LLe flaccid      SUBJECTIVE: No events overnight.  C/O diplopia (since stroke onset) No other new neurologic complaints.  ROS negative unless otherwise noted.    MEDICATIONS  (STANDING):  amLODIPine   Tablet 10 milliGRAM(s) Oral daily  chlorhexidine 4% Liquid 1 Application(s) Topical <User Schedule>  colchicine 0.6 milliGRAM(s) Oral daily  dextrose 10% Bolus 125 milliLiter(s) IV Bolus once  dextrose 5%. 1000 milliLiter(s) (100 mL/Hr) IV Continuous <Continuous>  dextrose 5%. 1000 milliLiter(s) (50 mL/Hr) IV Continuous <Continuous>  dextrose 50% Injectable 25 Gram(s) IV Push once  dextrose 50% Injectable 12.5 Gram(s) IV Push once  enoxaparin Injectable 40 milliGRAM(s) SubCutaneous <User Schedule>  glucagon  Injectable 1 milliGRAM(s) IntraMuscular once  losartan 25 milliGRAM(s) Oral daily  multivitamin 1 Tablet(s) Oral daily  pantoprazole   Suspension 40 milliGRAM(s) Oral daily  polyethylene glycol 3350 17 Gram(s) Oral daily  QUEtiapine 25 milliGRAM(s) Oral every 8 hours  senna 2 Tablet(s) Oral at bedtime  sodium chloride 0.9%. 1000 milliLiter(s) (50 mL/Hr) IV Continuous <Continuous>    MEDICATIONS  (PRN):  acetaminophen     Tablet .. 650 milliGRAM(s) Oral every 6 hours PRN Temp greater or equal to 38C (100.4F), Mild Pain (1 - 3)  dextrose Oral Gel 15 Gram(s) Oral once PRN Blood Glucose LESS THAN 70 milliGRAM(s)/deciliter  melatonin 3 milliGRAM(s) Oral at bedtime PRN Insomnia  ondansetron Injectable 4 milliGRAM(s) IV Push every 6 hours PRN Nausea and/or Vomiting  traMADol 25 milliGRAM(s) Oral every 4 hours PRN Moderate Pain (4 - 6)  traMADol 50 milliGRAM(s) Oral every 4 hours PRN Severe Pain (7 - 10)      PHYSICAL EXAM:   Vital Signs Last 24 Hrs  T(C): 36.8 (04-15-24 @ 04:30), Max: 36.9 (04-14-24 @ 21:59)  T(F): 98.2 (04-15-24 @ 04:30), Max: 98.5 (04-14-24 @ 21:59)  HR: 91 (04-15-24 @ 04:30) (91 - 99)  BP: 146/93 (04-15-24 @ 04:30) (121/78 - 146/93)  RR: 18 (04-15-24 @ 04:30) (17 - 18)  SpO2: 94% (04-15-24 @ 04:30) (94% - 95%)  Wt(kg): --        General: No acute distress  HEENT: EOM intact, visual fields full, torticollis   Abdomen: Soft, nontender, nondistended   Extremities: No edema    Neuro exam:  MS: Awake, alert. Oriented to self, place, month, not year, states incorrect age, can't name president (unclear if disorientation vs semantic paraphasic errors), follows simple commands, speech fluent   CN: pupils equal, mild R gaze palsy, skew deviation- L eye hyperopic ,  mild R facial droop, mild dysarthria  Motor: 5/5 LUE strength, LLE strength antigravity but pain limited, no RUE or RLE movement.   Sensory: Moderately decreased sensation to LT in RUE and RLE.      LABS:  cret                        12.9   9.91  )-----------( 349      ( 14 Apr 2024 06:33 )             39.0     04-14    136  |  101  |  15  ----------------------------<  101<H>  4.0   |  26  |  0.72    Ca    9.0      14 Apr 2024 06:33          IMAGING: Reviewed by me.   MRI Brain w/wo 4/10/24:   -Ongoing expected evolution of left thalamocapsular hemorrhage measuring up to 4.2 cm. No underlying mass lesion or vascular malformation is identified.  -Chronic microhemorrhages in the right thalamus and basal ganglia, compatible with background of chronic hypertensive microangiopathy.  -Small acute infarcts in the right caudate tail and periatrial white matter.    CTH 4/7/24: No change in left basal ganglia and thalamic parenchymal   hemorrhage compared with 4/5/2024.    CTH 4/5/24:  No change in left thalamic hemorrhage since 4/4/2024.    CTH 4/4/24: Acute parenchymal hemorrhage involving the left thalamus is again seen   with involvement of the left lenticular nucleus and posteriorleft corona   radiata region. This finding measures approximately 3.8 x 2.6 cm and   previously measured approximately 3.7 x 3.1 cm. Surrounding edema is   again seen. No significant shift or herniation is seen.      CTH 4/3/24: Acute intraparenchymal hemorrhage centered in the left thalamus measures   approximately 3.3 x 1.5 x 4.2 cm in the sagittal and coronal planes,   stable from 04/02/2024 at 9:19 PM.    CTH 4/2/24: An acute left thalamic hemorrhage measures approximately 3.2 x 1.7 x 3.9 cm in the sagittal and coronal planes. No evidence of acute cerebral infarction.    CT ANGIOGRAPHY NECK 4/2/24:  1. Cervical carotid systems and vertebral arteries are patent bilaterally   without stenosis.  No hemodynamically significant carotid stenosis using   NASCET criteria.  2.  Short segment dissection flap in the proximal right internal carotid   artery, of unknown chronicity.    CT ANGIOGRAPHY BRAIN 4/2/24:  1.  No major vessel occlusion, stenosis or aneurysm is identified about   the [place].  Normal anatomic variants as discussed above.  2.  The dural venous sinuses are incompletely opacified due to   acquisition during early arterial phase.  3.  No evidence of an arteriovenous malformation.  4.  No evidence of active contrast extravasation into the patient's left   thalamic hematoma.

## 2024-04-15 NOTE — H&P ADULT - NSHPSOCIALHISTORY_GEN_ALL_CORE
Smoking - Denied  EtOH - Denied   Drugs - Denied     Lives with wife, he is an , private home [address]  Independent AMB and ADLS PTA     CURRENT FUNCTIONAL STATUS  PT/OT  4/10  following 50% commands  max assist x 2 for all functional mobility  limited by left ankle pain Smoking - Denied  EtOH - Denied   Drugs - Denied     Lives with wife in  in [place], he is an , private home [address], 1 flight inside  Independent AMB and ADLS PTA     CURRENT FUNCTIONAL STATUS  PT/OT  4/10  following 50% commands  max assist x 2 for all functional mobility  limited by left ankle pain

## 2024-04-15 NOTE — DISCHARGE NOTE PROVIDER - NSDCMRMEDTOKEN_GEN_ALL_CORE_FT
amLODIPine 10 mg oral tablet: 1 tab(s) orally once a day  colchicine 0.6 mg oral tablet: 1 tab(s) orally once a day  enoxaparin: 40 milligram(s) subcutaneous once a day (at bedtime)  losartan 25 mg oral tablet: 1 tab(s) orally once a day  melatonin 3 mg oral tablet: 1 tab(s) orally once a day (at bedtime) As needed Insomnia  Multiple Vitamins oral tablet: 1 tab(s) orally once a day  pantoprazole 40 mg oral granule, delayed release: 40 milligram(s) orally once a day  polyethylene glycol 3350 oral powder for reconstitution: 17 gram(s) orally once a day  senna leaf extract oral tablet: 2 tab(s) orally once a day (at bedtime)  traMADol 50 mg oral tablet: 0.5 tab(s) orally every 4 hours As needed Moderate Pain (4 - 6)

## 2024-04-15 NOTE — DISCHARGE NOTE PROVIDER - NSDCCPCAREPLAN_GEN_ALL_CORE_FT
PRINCIPAL DISCHARGE DIAGNOSIS  Diagnosis: Intracerebral hemorrhage  Assessment and Plan of Treatment: Please follow up with neurologist after being discharged from rehab.  The office will call you to schedule an appointment, if you do not hear from them please call [phone]. Continue taking medications as prescribed. If you were prescribed a statin for your cholesterol please make sure you have your liver enzymes checked with your primary care physician. Monitor your blood pressure. Reduce fat, cholesterol and salt in your diet. Increase intake of fruits and vegetables. Limit alcohol to minimum and do not smoke. You may be at risk for falling, make changes to your home to help you walk easier. Keep up to date on vaccinations.  If you experience any symptoms of facial drooping, slurred speech, arm or leg weakness, severe headache, vision changes or any worsening symptoms, notify provider immediately and return to ER.

## 2024-04-15 NOTE — PROGRESS NOTE ADULT - NS ATTEND AMEND GEN_ALL_CORE FT
Thirty three minutes of discharge time was spent on patient exam and discussion including test results, pathophysiology, natural history, stroke risk factors, medication changes and secondary prophylaxis and importance of medication compliance. We also discussed follow up plan. This was discussed with patient/family, who expresses understanding.

## 2024-04-15 NOTE — DISCHARGE NOTE PROVIDER - CARE PROVIDER_API CALL
Farnaz Scott  NP in Family Health  [address] [facility], [address]  [place] [address]  Phone: [phone]  Fax: [phone]  Follow Up Time: 1 month

## 2024-04-15 NOTE — H&P ADULT - ASSESSMENT
Assessment/Plan:  ROSALEE CARR is a 58y with ****.  Hospital Course complicated by ***. Patient now admitted for a multidisciplinary rehab program. 04-15-24 @ 14:56        Comprehensive Multidisciplinary Rehab Program:  - Start comprehensive rehab program of PT/OT/SLP - 3 hours a day, 5 days a week. P&O as needed       HTN  -   - Monitor BP    HLD  - cont statin    T2DM  - Lantus  - Premeal  - SSI  - Monitor fingersticks    Pain  - Tylenol PRN  - Avoid sedating medications that may interfere with cognitive recovery    Mood / Cognition  - Neuropsychology consult  - [ ] Enhanced Supervision  [ ] Constant Observation    Sleep  - Maintain quiet hours and a low stim environment.   - Monitor sleep logs (for BIU)  - Melatonin PRN     GI / Bowel  - Senna qHS  - Miralax PRN Daily  - GI ppx: ***     / Bladder  - Currently patient voids:      [ ] independent      [ ] external collection device (condom cath)      [ ] Indwelling carrington catheter      [ ] Intermittent catheterization  - Continue bladder scans Q8 hours with straight cath for >400cc.  - Toileting schedule every 4 hours    Skin / Pressure injury  - Skin assessment on admission performed [  ] :   - Monitor Incisions:    - Turn q2 hours in bed while awake, air mattress  - nursing to monitor skin qShift  - soft heel protectors  - skin barrier cream PRN    Diet/Dysphagia:  - Diet Consistency: ***  - Supplements: ***  - Aspiration Precautions  - SLP consult for swallow function evaluation and treatment  - Nutrition consult    DVT prophylaxis:   - *****  - SCDs  - Last Doppler on ***       Outpatient Follow-up:  ** Specialty and Name of physician      Code Status/Emergency Contact:      ---------------    Goals: Safe discharge to home  Estimated Length of Stay: 10-14 days  Rehab Potential: Good  Medical Prognosis: Good  Estimated Disposition: Home with home care      PRESCREEN COMPARISON:  I have reviewed the prescreen information and I have found no relevant changes between the preadmission screening and my post admission evaluation.    RATIONALE FOR INPATIENT ADMISSION: Patient demonstrates the following:  [X] Medically appropriate for rehabilitation admission  [X] Has attainable rehab goals with an appropriate initial discharge plan  [X]Has rehabilitation potential (expected to make a significant improvement within a reasonable period of time)  [X] Requires close medical management by a rehab physician, rehab nursing care, Hospitalist and comprehensive interdisciplinary team (including PT, OT and/or SLP, Prosthetics and Orthotics)     Assessment/Plan:  ROSALEE CARR is a 58y with PMhx Gout found down and revealed to have an intraparenchymal hemorrhage.  Patient now admitted for a multidisciplinary rehab program. 04-15-24     Intraparenchymal Hemorrhage  -MRI demonstrated L thalamic hemorrhage, w/ chronic microhemorrhages, and acute infarcts in the right caudate tail and periatrial white matter  -EKG w/o AF  -Echo unremarkable  -CTA w/o large occlusions  Comprehensive Multidisciplinary Rehab Program:  - Start comprehensive rehab program of PT/OT/SLP - 3 hours a day, 5 days a week. P&O as needed     HTN  - losartan 25mg daily  -amlodipine 10mg daily  - Monitor BP    Pain  - Tramadol and Tylenol PRN  - Avoid sedating medications that may interfere with cognitive recovery    Mood / Cognition  - Neuropsychology consult prn    Sleep  - Maintain quiet hours and a low stim environment.   - Monitor sleep logs (for BIU)  - Melatonin PRN     GI / Bowel  - Senna qHS  - Miralax Daily  - GI ppx: protonix     / Bladder  - Currently patient voids:      [ ] independent      [ ] external collection device (condom cath)      [ ] Indwelling carrington catheter      [ ] Intermittent catheterization  - Continue bladder scans q8h with straight cath for >400cc.    Skin / Pressure injury  - Skin assessment on admission performed [  ] :   - Monitor Incisions:    - Turn q2 hours in bed while awake, air mattress  - nursing to monitor skin qShift  - soft heel protectors  - skin barrier cream PRN    Diet/Dysphagia:  - Diet Consistency: diet, minced and moist  - Supplements: multivitamin  - Aspiration Precautions  - SLP consult for swallow function evaluation and treatment  - Nutrition consult    DVT prophylaxis:   - LVNX  - Last Doppler on 4/15      Outpatient Follow-up:  Farnaz Scott  NP in [address], [address]  [place] [address]  Phone [phone]  Fax: [phone]  F/u 1 Month    Yancy Lugo  Neurology  2 weeks    Code Status/Emergency Contact:  Sheron Singh  [phone]    ---------------    Goals: Safe discharge to home  Estimated Length of Stay: 10-14 days  Rehab Potential: Good  Medical Prognosis: Good  Estimated Disposition: Home with home care      PRESCREEN COMPARISON:  I have reviewed the prescreen information and I have found no relevant changes between the preadmission screening and my post admission evaluation.    RATIONALE FOR INPATIENT ADMISSION: Patient demonstrates the following:  [X] Medically appropriate for rehabilitation admission  [X] Has attainable rehab goals with an appropriate initial discharge plan  [X]Has rehabilitation potential (expected to make a significant improvement within a reasonable period of time)  [X] Requires close medical management by a rehab physician, rehab nursing care, Hospitalist and comprehensive interdisciplinary team (including PT, OT and/or SLP, Prosthetics and Orthotics)     Assessment/Plan:  ROSALEE CARR is a 58y with PMhx Gout found down and revealed to have an intraparenchymal hemorrhage.  Patient now admitted for a multidisciplinary rehab program. 04-15-24     Intraparenchymal Hemorrhage  -MRI demonstrated L thalamic hemorrhage, w/ chronic microhemorrhages, and acute infarcts in the right caudate tail and periatrial white matter  -EKG w/o AF  -Echo unremarkable  -CTA w/o large occlusions  Comprehensive Multidisciplinary Rehab Program:  - Start comprehensive rehab program of PT/OT/SLP - 3 hours a day, 5 days a week. P&O as needed     HTN  - losartan 25mg daily  -amlodipine 10mg daily  - Monitor BP    Pain  - Tramadol and Tylenol PRN  - Avoid sedating medications that may interfere with cognitive recovery    Mood / Cognition  - Neuropsychology consult prn    Sleep  - Maintain quiet hours and a low stim environment.   - Monitor sleep logs (for BIU)  - Melatonin PRN     GI / Bowel  - Senna qHS  - Miralax Daily  - GI ppx: protonix     / Bladder  - Currently patient voids:      [X] independent      [ ] external collection device (condom cath)      [ ] Indwelling carrington catheter      [ ] Intermittent catheterization  - Continue bladder scans q8h with straight cath for >400cc.    Skin / Pressure injury  - Skin assessment on admission performed [X ] :   - Monitor Incisions:  none  - Turn q2 hours in bed while awake, air mattress  - nursing to monitor skin qShift  - soft heel protectors  - skin barrier cream PRN    Diet/Dysphagia:  - Diet Consistency: diet, minced and moist  - Supplements: multivitamin  - Aspiration Precautions  - SLP consult for swallow function evaluation and treatment  - Nutrition consult    DVT prophylaxis:   - LVNX  - Last Doppler on 4/15      Outpatient Follow-up:  Farnaz Scott  NP in [address], [address]  [place] [address]  Phone [phone]  Fax: [phone]  F/u 1 Month    Yancy Lugo  Neurology  2 weeks    Code Status/Emergency Contact:  Sheron Singh  [phone]    ---------------    Goals: Safe discharge to home  Estimated Length of Stay: 10-14 days  Rehab Potential: Good  Medical Prognosis: Good  Estimated Disposition: Home with home care      PRESCREEN COMPARISON:  I have reviewed the prescreen information and I have found no relevant changes between the preadmission screening and my post admission evaluation.    RATIONALE FOR INPATIENT ADMISSION: Patient demonstrates the following:  [X] Medically appropriate for rehabilitation admission  [X] Has attainable rehab goals with an appropriate initial discharge plan  [X]Has rehabilitation potential (expected to make a significant improvement within a reasonable period of time)  [X] Requires close medical management by a rehab physician, rehab nursing care, Hospitalist and comprehensive interdisciplinary team (including PT, OT and/or SLP, Prosthetics and Orthotics)     Assessment/Plan:  ROSALEE CARR is a 58y with PMhx Gout found down and revealed to have an intraparenchymal hemorrhage.  Patient now admitted for a multidisciplinary rehab program with right hemiparesis, cognitive deficits, sensory loss, Dysphagia, Dysarthria.      Intraparenchymal Hemorrhage  -MRI demonstrated L thalamic hemorrhage, w/ chronic microhemorrhages, and acute infarcts in the right caudate tail and periatrial white matter  -EKG w/o AF  -Echo unremarkable  -CTA w/o large occlusions  Comprehensive Multidisciplinary Rehab Program:  - Start comprehensive rehab program of PT/OT/SLP - 3 hours a day, 5 days a week. P&O as needed     HTN  - losartan 25mg daily  -amlodipine 10mg daily  - Monitor BP    Pain  - Tramadol and Tylenol PRN  - Avoid sedating medications that may interfere with cognitive recovery    Mood / Cognition  - Neuropsychology consult prn    Sleep  - Maintain quiet hours and a low stim environment.   - Monitor sleep logs (for BIU)  - Melatonin PRN     GI / Bowel  - Senna qHS  - Miralax Daily  - GI ppx: protonix     / Bladder  - Currently patient voids:      [X] independent      [ ] external collection device (condom cath)      [ ] Indwelling carrington catheter      [ ] Intermittent catheterization  - Continue bladder scans q8h with straight cath for >400cc.    Skin / Pressure injury  - Skin assessment on admission performed [X ] :   - Monitor Incisions:  none  - Turn q2 hours in bed while awake, air mattress  - nursing to monitor skin qShift  - soft heel protectors  - skin barrier cream PRN    Dysphagia:  - Diet Consistency: diet, minced and moist  - Supplements: multivitamin  - Aspiration Precautions  - SLP consult for swallow function evaluation and treatment  - Nutrition consult    DVT prophylaxis:   - LVNX  - Last Doppler on 4/15      Outpatient Follow-up:  Farnaz Scott  NP in [address], [address]  [place] [address]  Phone [phone]  Fax: [phone]  F/u 1 Month    Yancy Lugo  Neurology  2 weeks    Code Status/Emergency Contact:  Sheron Singh  [phone]    ---------------    Goals: Safe discharge to home  Estimated Length of Stay: 21 days  Rehab Potential: Good  Medical Prognosis: Good  Estimated Disposition: Home with home care      PRESCREEN COMPARISON:  I have reviewed the prescreen information and I have found no relevant changes between the preadmission screening and my post admission evaluation.    RATIONALE FOR INPATIENT ADMISSION: Patient demonstrates the following:  [X] Medically appropriate for rehabilitation admission  [X] Has attainable rehab goals with an appropriate initial discharge plan  [X]Has rehabilitation potential (expected to make a significant improvement within a reasonable period of time)  [X] Requires close medical management by a rehab physician, rehab nursing care, Hospitalist and comprehensive interdisciplinary team (including PT, OT and/or SLP, Prosthetics and Orthotics)

## 2024-04-15 NOTE — PATIENT PROFILE ADULT - PATIENT'S PREFERRED PRONOUN
Patient was put on new medication, metformin, and had side effects from it and was out of work 06/25 - 06/28 returning to work on 06/29. Patient would like a note for work on these dates. Any questions please call [phone].   Him/He

## 2024-04-15 NOTE — PROGRESS NOTE ADULT - ASSESSMENT
58M Hx eczema/Gout intermittently takes ASA for pain, p/f being found down by wife after hearing thud. No hx HTN. 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 CTA grossly negative.    Impression: Subjectively improving (although still R hemiplegia on examination) R sensorimotor deficit with mild R gaze palsy, moderate dysarthria, and possible subtle subcortical aphasia (given possible rare semantic paraphasic errors), due to L thalamic IPH, likely i/s/o hypertension (although no prior diagnosis). Angiogram negative for underlying vascular malformation.    NEURO: Neurologic exam stable from yesterday, improved since admission. His diplopia may be due to a skew deviation. Continue close monitoring for neurologic deterioration. Goal BP<140/90. MRI Brain W/wo, CTH, CTA Head w/o and Neck w/contrast results as above. Physical therapy/OT/Speech eval recommend AR. Discontinued VPA (greater than 7 days after initial presentation, and subcortical location of ICH, EEG negative, no need for further seizure ppx at this time)    ANTITHROMBOTIC THERAPY: none due to thalamic IPH    PULMONARY: CXR (4/10/24) clear, protecting airway, saturating well     CARDIOVASCULAR:  TTE: Ef 69%,  There is no evidence of a left ventricular thrombus. There is normal LV mass and normal geometry. continue cardiac monitoring. c/w Losartan 25mg + Amlodipine 10mg for HTN (goal normotension).                               SBP goal: <140    GASTROINTESTINAL:  dysphagia screen initially failed.  c/w senna and miralax for bowel regimen. s/p MBS on 4/11/24 and placed on diet, tolerating well.     Diet: minced and moist    RENAL: BUN/Cr stable, good urine output      Na Goal: Greater than 135     Canales: N    HEMATOLOGY: H/H stable anemia, Platelets normal (330). B/L UE arterial doppler (4/8/24) negative for arterial vascular disease. B/L LE doppler (4/9/24) neg for DVT.      DVT ppx:  LMWH       ID: Pt found to be febrile to 102 on 4/10 PM, tachycardic as well. Ordered BCx NGTD, UCx neg, CXR neg, RVP neg, UA+, s/p ceftriaxone, medicine team following    OTHER: Rheumatology: appreciated recommendations: -C/w colchicine at current dose, hold indomethacin and allopurinol  -Check HLA [id number] given higher prevalence in  population, though lower suspicion of allopurinol hypersensitivity given absence of acute rash  -Radiographs of B/L knees (b/l knee joint effusions, no fx or dislocation, Small bilateral superior and inferior patellar and peripheral lateral tibial plateau articular margin osteophytes, 2 adjacent small nodular soft tissue calcifications adjacent to proximal posterior left tibial metaphysis) and Right foot (no fx or dislocation or lesions, 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 changes, bipartite appearing lateral hallux sesamoid)  -pt c/o insomnia- started melatonin as ordered  -L/e dopplers ordered for leg pain      DISPOSITION:  PT recommending acute rehab once stable and work up complete       CORE MEASURES:        Admission NIHSS:      Tenectaplase: [] YES [x] NO      LDL/HDL: 107/67     Depression Screen: no      Statin Therapy: No     Dysphagia Screen: [] PASS [X] FAIL     Smoking [] YES [X] NO      Afib [] YES [X] NO     Stroke Education [X] YES [] NO    Obtain screening lower extremity venous ultrasound in patients who meet 1 or more of the following criteria as patient is high risk for DVT/PE on admission:   [] History of DVT/PE  []Hypercoagulable states (Factor V Leiden, Cancer, OCP, etc. )  []Prolonged immobility (hemiplegia/hemiparesis/post operative or any other extended immobilization)  [] Transferred from outside facility (Rehab or Long term care)  [] Age </= to 50   58M Hx eczema/Gout intermittently takes ASA for pain, p/f being found down by wife after hearing thud. No hx HTN. 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 CTA grossly negative.    Impression: Subjectively improving (although still R hemiplegia on examination) R sensorimotor deficit with mild R gaze palsy, moderate dysarthria, and possible subtle subcortical aphasia (given possible rare semantic paraphasic errors), due to L thalamic IPH, likely i/s/o hypertension (although no prior diagnosis). Angiogram negative for underlying vascular malformation.    NEURO: Neurologic exam stable from yesterday, improved since admission. His diplopia may be due to a skew deviation. Continue close monitoring for neurologic deterioration. Goal BP<140/90. MRI Brain W/wo, CTH, CTA Head w/o and Neck w/contrast results as above. Physical therapy/OT/Speech eval recommend AR. Discontinued VPA (greater than 7 days after initial presentation, and subcortical location of ICH, EEG negative, no need for further seizure ppx at this time)    ANTITHROMBOTIC THERAPY: none due to thalamic IPH    PULMONARY: CXR (4/10/24) clear, protecting airway, saturating well     CARDIOVASCULAR:  TTE: Ef 69%,  There is no evidence of a left ventricular thrombus. There is normal LV mass and normal geometry. continue cardiac monitoring. c/w Losartan 25mg + Amlodipine 10mg for HTN (goal normotension).                               SBP goal: <140    GASTROINTESTINAL:  dysphagia screen initially failed.  c/w senna and miralax for bowel regimen. s/p MBS on 4/11/24 and placed on diet, tolerating well. PPI for prophylaxis tx     Diet: minced and moist    RENAL: BUN/Cr stable, good urine output      Na Goal: Greater than 135     Canales: N    HEMATOLOGY: H/H stable anemia, Platelets normal (330). B/L UE arterial doppler (4/8/24) negative for arterial vascular disease. B/L LE doppler (4/9/24) neg for DVT.      DVT ppx:  LMWH       ID: Pt found to be febrile to 102 on 4/10 PM, tachycardic as well. Ordered BCx NGTD, UCx neg, CXR neg, RVP neg, UA+, s/p ceftriaxone, medicine team following    OTHER: Rheumatology: appreciated recommendations: -C/w colchicine at current dose, hold indomethacin and allopurinol  -Check HLA [id number] given higher prevalence in  population, though lower suspicion of allopurinol hypersensitivity given absence of acute rash  -Radiographs of B/L knees (b/l knee joint effusions, no fx or dislocation, Small bilateral superior and inferior patellar and peripheral lateral tibial plateau articular margin osteophytes, 2 adjacent small nodular soft tissue calcifications adjacent to proximal posterior left tibial metaphysis) and Right foot (no fx or dislocation or lesions, 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 changes, bipartite appearing lateral hallux sesamoid)  -pt c/o insomnia- started melatonin as ordered  -L/e dopplers ordered for leg pain      DISPOSITION:  PT recommending acute rehab once stable and work up complete       CORE MEASURES:        Admission NIHSS:      Tenectaplase: [] YES [x] NO      LDL/HDL: 107/67     Depression Screen: no      Statin Therapy: No     Dysphagia Screen: [] PASS [X] FAIL     Smoking [] YES [X] NO      Afib [] YES [X] NO     Stroke Education [X] YES [] NO    Obtain screening lower extremity venous ultrasound in patients who meet 1 or more of the following criteria as patient is high risk for DVT/PE on admission:   [] History of DVT/PE  []Hypercoagulable states (Factor V Leiden, Cancer, OCP, etc. )  []Prolonged immobility (hemiplegia/hemiparesis/post operative or any other extended immobilization)  [] Transferred from outside facility (Rehab or Long term care)  [] Age </= to 50

## 2024-04-15 NOTE — H&P ADULT - NSHPREVIEWOFSYSTEMS_GEN_ALL_CORE
REVIEW OF SYSTEMS  Constitutional: No fever, No Chills, No fatigue  HEENT: No eye pain, No visual disturbances, No difficulty hearing  Pulm: No cough,  No shortness of breath  Cardio: No chest pain, No palpitations  GI:  No abdominal pain, No nausea, No vomiting, No diarrhea, No constipation  : No dysuria, No frequency, No hematuria  Neuro: No headaches, No memory loss, + loss of strength, + numbness, No tremors  Skin: No itching, No rashes, No lesions, +scabs  Endo: No temperature intolerance  MSK: No joint pain, No joint swelling, + muscle pain, No Neck or back pain  Psych:  No depression, No anxiety

## 2024-04-15 NOTE — PROGRESS NOTE ADULT - NUTRITIONAL ASSESSMENT
This patient has been assessed with a concern for Malnutrition and has been determined to have a diagnosis/diagnoses of Moderate protein-calorie malnutrition.    This patient is being managed with:   Diet Minced and Moist-  Moderately Thick Liquids (MODTHICKLIQS)  Entered: Apr 11 2024  2:29PM  
This patient has been assessed with a concern for Malnutrition and has been determined to have a diagnosis/diagnoses of Moderate protein-calorie malnutrition.    This patient is being managed with:   Diet NPO with Tube Feed-  Tube Feeding Modality: Nasogastric  Jevity 1.5 Jayson (JEVITY1.5RTH)  Total Volume for 24 Hours (mL): 1440  Continuous  Until Goal Tube Feed Rate (mL per Hour): 60  Tube Feed Duration (in Hours): 24  Tube Feed Start Time: 09:00  Entered: Apr 8 2024  2:01PM  
This patient has been assessed with a concern for Malnutrition and has been determined to have a diagnosis/diagnoses of Moderate protein-calorie malnutrition.    This patient is being managed with:   Diet Minced and Moist-  Moderately Thick Liquids (MODTHICKLIQS)  Entered: Apr 11 2024  2:29PM  
This patient has been assessed with a concern for Malnutrition and has been determined to have a diagnosis/diagnoses of Moderate protein-calorie malnutrition.    This patient is being managed with:   Diet NPO after Midnight-     NPO Start Date: 09-Apr-2024   NPO Start Time: 23:59  Except Medications  Entered: Apr 9 2024 11:54AM    Diet NPO with Tube Feed-  Tube Feeding Modality: Nasogastric  Jevity 1.5 Jayson (JEVITY1.5RTH)  Total Volume for 24 Hours (mL): 1440  Continuous  Until Goal Tube Feed Rate (mL per Hour): 60  Tube Feed Duration (in Hours): 24  Tube Feed Start Time: 09:00  Entered: Apr 8 2024  2:01PM

## 2024-04-15 NOTE — DISCHARGE NOTE PROVIDER - DISCHARGE SERVICE FOR PATIENT
Advised by MD Nelson to not start IV  and draw blood even though pt meets code sepsis criteria.
Patient awake, alert, calm and in no acute distress. Pt's temp has come down after getting tyl. Pt now eating chips. VSS. Will continue to monitor.
on the discharge service for the patient. I have reviewed and made amendments to the documentation where necessary.

## 2024-04-15 NOTE — H&P ADULT - NSHPLABSRESULTS_GEN_ALL_CORE
< from: [facility] Duplex Lower Ext Vein Scan, Bilat (04.15.24 @ 10:57) >    IMPRESSION:  No evidence of deep venous thrombosis in either lower extremity.    < end of copied text >    < from: Xray Foot AP + Lateral, Right (04.12.24 @ 11:43) >    IMPRESSION:  No fractures or dislocations.    Tarsometatarsal alignment maintained without evidence for a Lisfranc   injury.    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   Preservedremaining visualized joint spaces and no joint margin erosions.    Bipartite appearing lateral hallux sesamoid. Small calcaneal   enthesophytes.    < end of copied text >    < from: Xray Knee 1 or 2 Views, Bilateral (04.12.24 @ 11:40) >    IMPRESSION:  Bilateral knee joint effusions.    No fractures or dislocations.    Small bilateral superior and inferior patellar and peripheral lateral   tibial plateau articular margin osteophytes otherwise maintained   appearing joint spaces and no joint margin erosions.    2 adjacent small nodular soft tissue calcifications adjacent to proximal   posterior left tibial metaphysis.    < end of copied text >

## 2024-04-15 NOTE — PROGRESS NOTE ADULT - ASSESSMENT
Patient is a 58 year old male with a PMHx of Eczema, Gout who was reportedly intermittently taking ASA for pain, who presented to [facility] on 4/2/2024 for being found down by his wife. Per neurology, patient without history of HTN. Patient was found to have a L thalamic IPH extending to the midbrain with no IVH or hydro, with modest mass effect. CTA grossly negative. Patient was intubated in the ER for inability to manage secretions. Internal Medicine has been consulted on Mr. Mishra's care for medical management.        IPH   - 4/2 CT H w/ acute L IPH  - 4/2 CTA H/N w/ Short segment dissection flap of the proximal R ICA  - 4/7 CT H w/ no change in the L basal ganglia and thalamic parenchymal hemorrhage   - MR head pending   - Likely 2/2 HTN as per Stroke neurology;  Consider ILR placement to rule out occult arrythmia   - Neuro checks as per protocol  - Monitor on tele   - PT / OT/ S+S  - Fall, Seizure, Aspiration precautions   - NSICU care appreciated  - Per Stroke Neurology     R/O Sepsis --> Likely 2/2 UTI   - Pt febrile, tachycardic, with leukocytosis   - 4/5 BCx 1 of 2 w/ Staph epi, 4/6 BCx2 negative   - F/u 4/10 UCx and BCx 2 --> In lab  - Likely UTI in view of grossly + UA , negative urine Cx  - Lactate negative, RSV/Flu/Covid negative, Procal 0.29   - On Rocephin for ABX   - Antipyretics PRN  - Continue to monitor and trend CBC, temp curve, VS   - If recurrently febrile while on ABX, suggest CT C/A/P infectious work up     Dysphagia   - W/ NGT  - Aspiration precautions; Oral care  - GI eval appreciated; F/u recs --> Tentative plan for EGD/ PEG on Monday if fails repeat S+S     Hypoxia  - S/P NSICU, S/P Intubation and now self extubated 4/6  - Incentive spirometer   - Monitor O2 saturation; Supplement to maintain > 90%     Gout, LE Edema   - 4/9 Duplex negative for DVT  - Home allopurinol was held on admission. Indomethacin resumed per Rheum   - On Colchicine 0.6 PO BID  - Per Rheumatology     Pre-DM  - A1C of 5.8  - On Sliding scale  - Monitor glucose levels and adjust as tolerated    HTN   - On Losartan 25 Mg and Norvasc 10   - TTE w/ EF of 69%, no evidence of LV thrombus   - Monitor BP and adjust as tolerated    PPX  - On Lovenox 40   - Start PPI

## 2024-04-15 NOTE — DISCHARGE NOTE PROVIDER - HOSPITAL COURSE
58M Hx eczema/Gout intermittently takes ASA for pain, p/f being found down by wife after hearing thud. No hx HTN. 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 CTA grossly negative. Coags/TEG/ARU pend. ED intubated for inability to manage secretions. ICH score 2   Exam prior to intubation: eye opening apraxia, Ox1, PERRL, upgaze and right gaze palsy could cross midline, int FC, L side 5/5, LUE extensor, LLe flaccid.    MRI Brain w/wo 4/10/24:   -Ongoing expected evolution of left thalamocapsular hemorrhage measuring up to 4.2 cm. No underlying mass lesion or vascular malformation is identified.  -Chronic microhemorrhages in the right thalamus and basal ganglia, compatible with background of chronic hypertensive microangiopathy.  -Small acute infarcts in the right caudate tail and periatrial white matter.    CTH 4/7/24: No change in left basal ganglia and thalamic parenchymal   hemorrhage compared with 4/5/2024.    CTH 4/5/24:  No change in left thalamic hemorrhage since 4/4/2024.    CTH 4/4/24: Acute parenchymal hemorrhage involving the left thalamus is again seen   with involvement of the left lenticular nucleus and posteriorleft corona   radiata region. This finding measures approximately 3.8 x 2.6 cm and   previously measured approximately 3.7 x 3.1 cm. Surrounding edema is   again seen. No significant shift or herniation is seen.      CTH 4/3/24: Acute intraparenchymal hemorrhage centered in the left thalamus measures   approximately 3.3 x 1.5 x 4.2 cm in the sagittal and coronal planes,   stable from 04/02/2024 at 9:19 PM.    CTH 4/2/24: An acute left thalamic hemorrhage measures approximately 3.2 x 1.7 x 3.9 cm in the sagittal and coronal planes. No evidence of acute cerebral infarction.    CT ANGIOGRAPHY NECK 4/2/24:  1. Cervical carotid systems and vertebral arteries are patent bilaterally   without stenosis.  No hemodynamically significant carotid stenosis using   NASCET criteria.  2.  Short segment dissection flap in the proximal right internal carotid   artery, of unknown chronicity.    CT ANGIOGRAPHY BRAIN 4/2/24:  1.  No major vessel occlusion, stenosis or aneurysm is identified about   the [place].  Normal anatomic variants as discussed above.  2.  The dural venous sinuses are incompletely opacified due to   acquisition during early arterial phase.  3.  No evidence of an arteriovenous malformation.  4.  No evidence of active contrast extravasation into the patient's left   thalamic hematoma.    Impression: Subjectively improving (although still R hemiplegia on examination) R sensorimotor deficit with mild R gaze palsy, moderate dysarthria, and possible subtle subcortical aphasia (given possible rare semantic paraphasic errors), due to L thalamic IPH, likely i/s/o hypertension (although no prior diagnosis). Angiogram negative for underlying vascular malformation.  EEG negative, no need for further seizure ppx at this time.  ANTITHROMBOTIC THERAPY: none due to thalamic IPH  TTE: Ef 69%,  There is no evidence of a left ventricular thrombus. There is normal LV mass and normal geometry.  Rheumatology: Gout: -C/w colchicine at current dose, hold indomethacin and allopurinol.    Evaluated by PT/OT and was recommended AR. Patient stable for discharge.

## 2024-04-15 NOTE — PROGRESS NOTE ADULT - PROVIDER SPECIALTY LIST ADULT
Internal Medicine
NSICU
NSICU
Neurology
Neurosurgery
Gastroenterology
Internal Medicine
Neurology
Neurosurgery
Internal Medicine
Internal Medicine
NSICU
Neurology
Neurology
Rheumatology
Vascular Surgery
Gastroenterology
Gastroenterology
NSICU
Gastroenterology
NSICU
Neurology
Neurosurgery
Neurosurgery
NSICU

## 2024-04-15 NOTE — H&P ADULT - HISTORY OF PRESENT ILLNESS
58M PMHx gout found down at home on 4/2/24 by wife brought to [facility] by ambulance found to have a L thalamic hemorrhagic infarct via CT. Patient intubated for inability to protect airway. MRI demonstrated hemorrhage with anticipated evolution. Chronic microhemorrhages found in the R thalamus and BG as well as small acute infarcts in the right caudate tail and periatrial white matter.  CTA clear of major vessel occlusion or stenosis. Patient with residual R hemiparesis, dysarthria, mild aphasia, and R gaze palsy. EEG negative throughout workup. EKG without AF. TTE grossly normal. Gout flare treated by rheumatology with colchicine while inpatient. Patient received PM&R consult who recommended acute inpatient rehabilitation. Patient was medically stabilized and discharged to [facility].

## 2024-04-15 NOTE — H&P ADULT - ATTENDING COMMENTS
Pt. seen with resident & fellow 4/16 AM.  Agree with documentation above as per resident with amendments made as appropriate. Patient medically stable. Appropriate for acute Rehabilitation.     Pt anxious about his deficits and recovery. Perseverates on when he will be able to walk and function again--despite explanation that this will take weeks and more accurate determination can be made after assessing patient over the next couple days.    He reports some tingling pain in right UE and LE.  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  Pt. notes that his career as an  has been stressful.    Nursing reports pt. had multiple BMs since admission.  Pt. voiding -- PVRs < 60cc    Vital Signs Last 24 Hrs  T(C): 36.5 (16 Apr 2024 07:52), Max: 36.7 (15 Apr 2024 16:50)  T(F): 97.7 (16 Apr 2024 07:52), Max: 98.1 (15 Apr 2024 16:50)  HR: 98 (16 Apr 2024 07:52) (94 - 114)  BP: 129/79 (16 Apr 2024 07:52) (119/73 - 144/86)  BP(mean): --  RR: 16 (16 Apr 2024 07:52) (16 - 18)  SpO2: 94% (16 Apr 2024 07:52) (94% - 97%)    Parameters below as of 16 Apr 2024 07:52  Patient On (Oxygen Delivery Method): room air    Physical Exam as amended above                          14.1   13.94 )-----------( 484      ( 16 Apr 2024 06:12 )             43.7   04-16    134<L>  |  98  |  18  ----------------------------<  110<H>  4.3   |  28  |  0.76    Ca    9.3      16 Apr 2024 06:12    TPro  7.5  /  Alb  2.8<L>  /  TBili  0.3  /  DBili  x   /  AST  41<H>  /  ALT  53<H>  /  AlkPhos  70  04-16        58y with PMhx Gout found down and revealed to have an intraparenchymal hemorrhage.  Patient now admitted for a multidisciplinary rehab program with right hemiparesis, cognitive deficits, sensory loss, Dysphagia, Dysarthria.      Right Neuropathic pain, Sleep and anxiety--  --Trial Gabapentin 100mg qhs which will help improve all of the above    Anxiety-- will benefit from Neuropsychology counseling    Leukocytosis-- monitor-- No signs or symptoms of infection.  d/w Hospitalist        Bowels-- Check Abdominal Xray to assess stool load.  Will adjust bowel meds PRN.

## 2024-04-16 LAB
ALBUMIN SERPL ELPH-MCNC: 2.8 G/DL — LOW (ref 3.3–5)
ALP SERPL-CCNC: 70 U/L — SIGNIFICANT CHANGE UP (ref 40–120)
ALT FLD-CCNC: 53 U/L — HIGH (ref 10–45)
ANION GAP SERPL CALC-SCNC: 8 MMOL/L — SIGNIFICANT CHANGE UP (ref 5–17)
ANISOCYTOSIS BLD QL: SLIGHT — SIGNIFICANT CHANGE UP
AST SERPL-CCNC: 41 U/L — HIGH (ref 10–40)
BASOPHILS # BLD AUTO: 0 K/UL — SIGNIFICANT CHANGE UP (ref 0–0.2)
BASOPHILS NFR BLD AUTO: 0 % — SIGNIFICANT CHANGE UP (ref 0–2)
BILIRUB SERPL-MCNC: 0.3 MG/DL — SIGNIFICANT CHANGE UP (ref 0.2–1.2)
BUN SERPL-MCNC: 18 MG/DL — SIGNIFICANT CHANGE UP (ref 7–23)
CALCIUM SERPL-MCNC: 9.3 MG/DL — SIGNIFICANT CHANGE UP (ref 8.4–10.5)
CHLORIDE SERPL-SCNC: 98 MMOL/L — SIGNIFICANT CHANGE UP (ref 96–108)
CO2 SERPL-SCNC: 28 MMOL/L — SIGNIFICANT CHANGE UP (ref 22–31)
CREAT SERPL-MCNC: 0.76 MG/DL — SIGNIFICANT CHANGE UP (ref 0.5–1.3)
EGFR: 106 ML/MIN/1.73M2 — SIGNIFICANT CHANGE UP
EOSINOPHIL # BLD AUTO: 0.7 K/UL — HIGH (ref 0–0.5)
EOSINOPHIL NFR BLD AUTO: 5 % — SIGNIFICANT CHANGE UP (ref 0–6)
GLUCOSE SERPL-MCNC: 110 MG/DL — HIGH (ref 70–99)
HCT VFR BLD CALC: 43.7 % — SIGNIFICANT CHANGE UP (ref 39–50)
HGB BLD-MCNC: 14.1 G/DL — SIGNIFICANT CHANGE UP (ref 13–17)
LYMPHOCYTES # BLD AUTO: 0.98 K/UL — LOW (ref 1–3.3)
LYMPHOCYTES # BLD AUTO: 7 % — LOW (ref 13–44)
MANUAL SMEAR VERIFICATION: SIGNIFICANT CHANGE UP
MCHC RBC-ENTMCNC: 28.5 PG — SIGNIFICANT CHANGE UP (ref 27–34)
MCHC RBC-ENTMCNC: 32.3 GM/DL — SIGNIFICANT CHANGE UP (ref 32–36)
MCV RBC AUTO: 88.5 FL — SIGNIFICANT CHANGE UP (ref 80–100)
MONOCYTES # BLD AUTO: 0.84 K/UL — SIGNIFICANT CHANGE UP (ref 0–0.9)
MONOCYTES NFR BLD AUTO: 6 % — SIGNIFICANT CHANGE UP (ref 2–14)
MYELOCYTES NFR BLD: 3 % — HIGH (ref 0–0)
NEUTROPHILS # BLD AUTO: 11.01 K/UL — HIGH (ref 1.8–7.4)
NEUTROPHILS NFR BLD AUTO: 77 % — SIGNIFICANT CHANGE UP (ref 43–77)
NEUTS BAND # BLD: 2 % — SIGNIFICANT CHANGE UP (ref 0–8)
NRBC # BLD: 0 /100 WBCS — SIGNIFICANT CHANGE UP (ref 0–0)
PLAT MORPH BLD: NORMAL — SIGNIFICANT CHANGE UP
PLATELET # BLD AUTO: 484 K/UL — HIGH (ref 150–400)
POTASSIUM SERPL-MCNC: 4.3 MMOL/L — SIGNIFICANT CHANGE UP (ref 3.5–5.3)
POTASSIUM SERPL-SCNC: 4.3 MMOL/L — SIGNIFICANT CHANGE UP (ref 3.5–5.3)
PROT SERPL-MCNC: 7.5 G/DL — SIGNIFICANT CHANGE UP (ref 6–8.3)
RBC # BLD: 4.94 M/UL — SIGNIFICANT CHANGE UP (ref 4.2–5.8)
RBC # FLD: 12.3 % — SIGNIFICANT CHANGE UP (ref 10.3–14.5)
RBC BLD AUTO: ABNORMAL
SODIUM SERPL-SCNC: 134 MMOL/L — LOW (ref 135–145)
WBC # BLD: 13.94 K/UL — HIGH (ref 3.8–10.5)
WBC # FLD AUTO: 13.94 K/UL — HIGH (ref 3.8–10.5)

## 2024-04-16 PROCEDURE — 99222 1ST HOSP IP/OBS MODERATE 55: CPT

## 2024-04-16 PROCEDURE — 99223 1ST HOSP IP/OBS HIGH 75: CPT

## 2024-04-16 PROCEDURE — 74018 RADEX ABDOMEN 1 VIEW: CPT | Mod: 26

## 2024-04-16 RX ORDER — GABAPENTIN 400 MG/1
100 CAPSULE ORAL AT BEDTIME
Refills: 0 | Status: DISCONTINUED | OUTPATIENT
Start: 2024-04-16 | End: 2024-04-21

## 2024-04-16 RX ADMIN — LOSARTAN POTASSIUM 25 MILLIGRAM(S): 100 TABLET, FILM COATED ORAL at 05:22

## 2024-04-16 RX ADMIN — ENOXAPARIN SODIUM 40 MILLIGRAM(S): 100 INJECTION SUBCUTANEOUS at 22:25

## 2024-04-16 RX ADMIN — Medication 1 TABLET(S): at 12:01

## 2024-04-16 RX ADMIN — Medication 0.6 MILLIGRAM(S): at 12:01

## 2024-04-16 RX ADMIN — PANTOPRAZOLE SODIUM 40 MILLIGRAM(S): 20 TABLET, DELAYED RELEASE ORAL at 05:22

## 2024-04-16 RX ADMIN — AMLODIPINE BESYLATE 10 MILLIGRAM(S): 2.5 TABLET ORAL at 05:22

## 2024-04-16 RX ADMIN — Medication 1 ENEMA: at 18:31

## 2024-04-16 RX ADMIN — GABAPENTIN 100 MILLIGRAM(S): 400 CAPSULE ORAL at 22:24

## 2024-04-16 NOTE — DIETITIAN INITIAL EVALUATION ADULT - OTHER INFO
ROSALEE CARR is a 58y with PMhx Gout found down and revealed to have an intraparenchymal hemorrhage.  Patient now admitted for a multidisciplinary rehab program with right hemiparesis, cognitive deficits, sensory loss, Dysphagia, Dysarthria. (Per H&P note 4/15/24)    Patient was seen at bedside today. Stated NKFA. At this time patient tolerating minced and moist diet w/ adequate appetite/intake consuming % of meals per patient & observation of breakfast tray. Patient noted to be on a bowel regimen. Denies any N/V/D/C. Denies any chewing/swallowing issues. Last BM noted on 4/16/24. Edema 2+ noted on right foot. Skin tear noted on left buttock. No pressure injuries noted. ROSALEE CARR is a 58y with PMhx Gout found down and revealed to have an intraparenchymal hemorrhage.  Patient now admitted for a multidisciplinary rehab program with right hemiparesis, cognitive deficits, sensory loss, Dysphagia, Dysarthria. (Per H&P note 4/15/24)    Patient was seen at bedside today. Stated NKFA. At this time patient tolerating minced and moist diet w/ adequate appetite/intake consuming % of meals per patient & observation of breakfast tray. Patient noted to be on a bowel regimen. Denies any N/V/D/C. Last BM noted on 4/16/24. Edema 2+ noted on right foot. Skin tear noted on left buttock. No pressure injuries noted. CARRROSALEE is a 58y with PMhx Gout found down and revealed to have an intraparenchymal hemorrhage.  Patient now admitted for a multidisciplinary rehab program with right hemiparesis, cognitive deficits, sensory loss, Dysphagia, Dysarthria. (Per H&P note 4/15/24)    Patient was seen at bedside today. Stated NKFA. At this time patient tolerating minced and moist diet w/ adequate appetite/intake consuming % of meals per patient & observation of breakfast tray. Patient noted to be on a bowel regimen. Last BM noted on 4/16/24. Denies any N/V/D/C. Last BM noted on 4/16/24. Edema 2+ noted on right foot. Skin tear noted on left buttock. No pressure injuries noted.

## 2024-04-16 NOTE — CONSULT NOTE ADULT - SUBJECTIVE AND OBJECTIVE BOX
HPI:  58M PMHx gout found down at home on 4/2/24 by wife brought to [facility] by ambulance found to have a L thalamic hemorrhagic infarct via CT. Patient intubated for inability to protect airway. MRI demonstrated hemorrhage with anticipated evolution. Chronic microhemorrhages found in the R thalamus and BG as well as small acute infarcts in the right caudate tail and periatrial white matter.  CTA clear of major vessel occlusion or stenosis. Patient with residual R hemiparesis, dysarthria, mild aphasia, and R gaze palsy. EEG negative throughout workup. EKG without AF. TTE grossly normal. Gout flare treated by rheumatology with colchicine while inpatient. Patient received PM&R consult who recommended acute inpatient rehabilitation. Patient was medically stabilized and discharged to [facility].      Patient seen and examined today. Denies any chest pain, headache, gout flare ups. Feels well. No new medical concerns.     PAST MEDICAL & SURGICAL HISTORY:  Eczema  Gout  Intraparenchymal hemorrhage of brain      Review of Systems:   CONSTITUTIONAL: No fever  EYES: No changes  ENMT: No sinus or throat pain  NECK: No pain  RESPIRATORY: No cough, No shortness of breath  CARDIOVASCULAR: No chest pain, palpitations  GASTROINTESTINAL: No abdominal or epigastric pain. No nausea, vomiting  GENITOURINARY: No dysuria  NEUROLOGICAL: No headaches  SKIN: No rash  ENDOCRINE: No heat or cold intolerance; No hair loss  MUSCULOSKELETAL: No joint pain  PSYCHIATRIC: No depression    Allergies    No Known Allergies    Intolerances    Social History: , denies drug use    MEDICATIONS  (STANDING):  amLODIPine   Tablet 10 milliGRAM(s) Oral daily  colchicine 0.6 milliGRAM(s) Oral daily  enoxaparin Injectable 40 milliGRAM(s) SubCutaneous every 24 hours  gabapentin 100 milliGRAM(s) Oral at bedtime  losartan 25 milliGRAM(s) Oral daily  multivitamin 1 Tablet(s) Oral daily  pantoprazole   Suspension 40 milliGRAM(s) Oral before breakfast  polyethylene glycol 3350 17 Gram(s) Oral daily  senna 2 Tablet(s) Oral at bedtime    MEDICATIONS  (PRN):  acetaminophen     Tablet .. 650 milliGRAM(s) Oral every 6 hours PRN Mild Pain (1 - 3)  melatonin 3 milliGRAM(s) Oral at bedtime PRN Insomnia  traMADol 50 milliGRAM(s) Oral every 4 hours PRN Moderate Pain (4 - 6)      Vital Signs Last 24 Hrs  T(C): 36.5 (16 Apr 2024 07:52), Max: 36.7 (15 Apr 2024 16:50)  T(F): 97.7 (16 Apr 2024 07:52), Max: 98.1 (15 Apr 2024 16:50)  HR: 98 (16 Apr 2024 07:52) (94 - 114)  BP: 129/79 (16 Apr 2024 07:52) (119/73 - 144/86)  BP(mean): --  RR: 16 (16 Apr 2024 07:52) (16 - 18)  SpO2: 94% (16 Apr 2024 07:52) (94% - 97%)    Parameters below as of 16 Apr 2024 07:52  Patient On (Oxygen Delivery Method): room air    PHYSICAL EXAM:  GENERAL: NAD, well-developed  HEAD:  Atraumatic, Normocephalic  EYES: EOMI, PERRLA, conjunctiva and sclera clear  NECK: Supple, No JVD  CHEST/LUNG: Clear to auscultation bilaterally; No wheeze  HEART: Regular rate and rhythm; No murmurs, rubs, or gallops  ABDOMEN: Soft, Nontender, Nondistended; Bowel sounds present  EXTREMITIES:  2+ Peripheral Pulses, No clubbing, cyanosis, or edema  PSYCH: AAOx3  NEUROLOGY: right hemiparesis   SKIN: No rashes or lesions    LABS:                        14.1   13.94 )-----------( 484      ( 16 Apr 2024 06:12 )             43.7     04-16    134<L>  |  98  |  18  ----------------------------<  110<H>  4.3   |  28  |  0.76    Ca    9.3      16 Apr 2024 06:12    TPro  7.5  /  Alb  2.8<L>  /  TBili  0.3  /  DBili  x   /  AST  41<H>  /  ALT  53<H>  /  AlkPhos  70  04-16          Urinalysis Basic - ( 16 Apr 2024 06:12 )    Color: x / Appearance: x / SG: x / pH: x  Gluc: 110 mg/dL / Ketone: x  / Bili: x / Urobili: x   Blood: x / Protein: x / Nitrite: x   Leuk Esterase: x / RBC: x / WBC x   Sq Epi: x / Non Sq Epi: x / Bacteria: x        RADIOLOGY & ADDITIONAL TESTS:    Imaging Personally Reviewed:    Consultant(s) Notes Reviewed:      Care Discussed with Consultants/Other Providers: Rehab provider    Assessment and Plan:

## 2024-04-16 NOTE — DIETITIAN INITIAL EVALUATION ADULT - PERTINENT MEDS FT
MEDICATIONS  (STANDING):  amLODIPine   Tablet 10 milliGRAM(s) Oral daily  colchicine 0.6 milliGRAM(s) Oral daily  enoxaparin Injectable 40 milliGRAM(s) SubCutaneous every 24 hours  gabapentin 100 milliGRAM(s) Oral at bedtime  losartan 25 milliGRAM(s) Oral daily  multivitamin 1 Tablet(s) Oral daily  pantoprazole   Suspension 40 milliGRAM(s) Oral before breakfast  polyethylene glycol 3350 17 Gram(s) Oral daily  senna 2 Tablet(s) Oral at bedtime    MEDICATIONS  (PRN):  acetaminophen     Tablet .. 650 milliGRAM(s) Oral every 6 hours PRN Mild Pain (1 - 3)  melatonin 3 milliGRAM(s) Oral at bedtime PRN Insomnia  traMADol 50 milliGRAM(s) Oral every 4 hours PRN Moderate Pain (4 - 6)

## 2024-04-16 NOTE — DIETITIAN INITIAL EVALUATION ADULT - ADD RECOMMEND
1. Continue Minced + Moist diet, Mod thick liquids diet as tolerated  2. Continue Multivitamin, to ensure 100% RDA met   3. Monitor PO intake, GI tolerance, skin integrity, labs, weight, and bowel movement regularity.   4. Honor food preferences as feasible.  5. Follow SLP recommendations  6. Provide ongoing diet education as needed  7. RD remains available upon request and will follow-up per protocol

## 2024-04-16 NOTE — DIETITIAN INITIAL EVALUATION ADULT - PERTINENT LABORATORY DATA
04-16    134<L>  |  98  |  18  ----------------------------<  110<H>  4.3   |  28  |  0.76    Ca    9.3      16 Apr 2024 06:12    TPro  7.5  /  Alb  2.8<L>  /  TBili  0.3  /  DBili  x   /  AST  41<H>  /  ALT  53<H>  /  AlkPhos  70  04-16  A1C with Estimated Average Glucose Result: 5.8 % (04-03-24 @ 04:46)  A1C with Estimated Average Glucose Result: 5.8 % (04-03-24 @ 00:12)

## 2024-04-16 NOTE — DIETITIAN INITIAL EVALUATION ADULT - OBTAIN WEEKLY WEIGHT
Face to face end of shift report received from Anna MUNROE RN. Rounding completed. [place] [facility] [place]     Vikki Saldivar  4/29/2018  4:10 PM         yes

## 2024-04-17 PROCEDURE — 90832 PSYTX W PT 30 MINUTES: CPT

## 2024-04-17 PROCEDURE — 99232 SBSQ HOSP IP/OBS MODERATE 35: CPT

## 2024-04-17 RX ORDER — LANOLIN ALCOHOL/MO/W.PET/CERES
6 CREAM (GRAM) TOPICAL
Refills: 0 | Status: DISCONTINUED | OUTPATIENT
Start: 2024-04-17 | End: 2024-04-18

## 2024-04-17 RX ADMIN — Medication 1 TABLET(S): at 11:20

## 2024-04-17 RX ADMIN — Medication 0.6 MILLIGRAM(S): at 11:19

## 2024-04-17 RX ADMIN — AMLODIPINE BESYLATE 10 MILLIGRAM(S): 2.5 TABLET ORAL at 06:10

## 2024-04-17 RX ADMIN — Medication 6 MILLIGRAM(S): at 21:12

## 2024-04-17 RX ADMIN — PANTOPRAZOLE SODIUM 40 MILLIGRAM(S): 20 TABLET, DELAYED RELEASE ORAL at 06:10

## 2024-04-17 RX ADMIN — LOSARTAN POTASSIUM 25 MILLIGRAM(S): 100 TABLET, FILM COATED ORAL at 06:09

## 2024-04-17 RX ADMIN — ENOXAPARIN SODIUM 40 MILLIGRAM(S): 100 INJECTION SUBCUTANEOUS at 21:16

## 2024-04-17 RX ADMIN — SENNA PLUS 2 TABLET(S): 8.6 TABLET ORAL at 21:13

## 2024-04-17 RX ADMIN — GABAPENTIN 100 MILLIGRAM(S): 400 CAPSULE ORAL at 21:12

## 2024-04-17 NOTE — PROGRESS NOTE ADULT - SUBJECTIVE AND OBJECTIVE BOX
58M PMHx gout found down at home on 4/2/24 by wife brought to [facility] by ambulance found to have a L thalamic hemorrhagic infarct via CT. Patient intubated for inability to protect airway. MRI demonstrated hemorrhage with anticipated evolution. Chronic microhemorrhages found in the R thalamus and BG as well as small acute infarcts in the right caudate tail and periatrial white matter.  CTA clear of major vessel occlusion or stenosis. Patient with residual R hemiparesis, dysarthria, mild aphasia, and R gaze palsy. EEG negative throughout workup. EKG without AF. TTE grossly normal. Gout flare treated by rheumatology with colchicine while inpatient. Patient received PM&R consult who recommended acute inpatient rehabilitation. Patient was medically stabilized and discharged to [facility].      No acute events overnight.  Stated he could not sleep because noise.    PAST MEDICAL & SURGICAL HISTORY:  Eczema  Gout  Intraparenchymal hemorrhage of brain      Allergies    No Known Allergies    Intolerances    Social History: , denies drug use    MEDICATIONS  (STANDING):  amLODIPine   Tablet 10 milliGRAM(s) Oral daily  colchicine 0.6 milliGRAM(s) Oral daily  enoxaparin Injectable 40 milliGRAM(s) SubCutaneous every 24 hours  gabapentin 100 milliGRAM(s) Oral at bedtime  losartan 25 milliGRAM(s) Oral daily  multivitamin 1 Tablet(s) Oral daily  pantoprazole   Suspension 40 milliGRAM(s) Oral before breakfast  polyethylene glycol 3350 17 Gram(s) Oral daily  senna 2 Tablet(s) Oral at bedtime    MEDICATIONS  (PRN):  acetaminophen     Tablet .. 650 milliGRAM(s) Oral every 6 hours PRN Mild Pain (1 - 3)  melatonin 3 milliGRAM(s) Oral at bedtime PRN Insomnia  traMADol 50 milliGRAM(s) Oral every 4 hours PRN Moderate Pain (4 - 6)    T(C): 36.8 (04-17-24 @ 07:27), Max: 37 (04-16-24 @ 20:13)  T(F): 98.3 (04-17-24 @ 07:27), Max: 98.6 (04-16-24 @ 20:13)  HR: 99 (04-17-24 @ 07:27) (98 - 105)  BP: 152/89 (04-17-24 @ 07:27) (128/84 - 152/89)  ABP: --  ABP(mean): --  RR: 16 (04-17-24 @ 07:27) (16 - 16)  SpO2: 96% (04-17-24 @ 07:27) (94% - 96%)    Patient On (Oxygen Delivery Method): room air    PHYSICAL EXAM:  on exam aao x3, no apparant distress  both lungs clear  s1,s 2 regular  abdomen- soft  no pedal edema  Right sided hemiparesis present    LABS:                        14.1   13.94 )-----------( 484      ( 16 Apr 2024 06:12 )             43.7     04-16    134<L>  |  98  |  18  ----------------------------<  110<H>  4.3   |  28  |  0.76    Ca    9.3      16 Apr 2024 06:12    TPro  7.5  /  Alb  2.8<L>  /  TBili  0.3  /  DBili  x   /  AST  41<H>  /  ALT  53<H>  /  AlkPhos  70  04-16      Urinalysis Basic - ( 16 Apr 2024 06:12 )    Color: x / Appearance: x / SG: x / pH: x  Gluc: 110 mg/dL / Ketone: x  / Bili: x / Urobili: x   Blood: x / Protein: x / Nitrite: x   Leuk Esterase: x / RBC: x / WBC x   Sq Epi: x / Non Sq Epi: x / Bacteria: x       CAPILLARY BLOOD GLUCOSE            Urinalysis Basic - ( 16 Apr 2024 06:12 )    Color: x / Appearance: x / SG: x / pH: x  Gluc: 110 mg/dL / Ketone: x  / Bili: x / Urobili: x   Blood: x / Protein: x / Nitrite: x   Leuk Esterase: x / RBC: x / WBC x   Sq Epi: x / Non Sq Epi: x / Bacteria: x        RADIOLOGY & ADDITIONAL TESTS:    Consultant(s) Notes Reviewed:  [x ] YES  [ ] NO  Care Discussed with Consultants/Other Providers [ x] YES  [ ] NO  Imaging Personally Reviewed:  [x ] YES  [ ] NO      Urinalysis Basic - ( 16 Apr 2024 06:12 )    Color: x / Appearance: x / SG: x / pH: x  Gluc: 110 mg/dL / Ketone: x  / Bili: x / Urobili: x   Blood: x / Protein: x / Nitrite: x   Leuk Esterase: x / RBC: x / WBC x   Sq Epi: x / Non Sq Epi: x / Bacteria: x

## 2024-04-17 NOTE — PROGRESS NOTE ADULT - SUBJECTIVE AND OBJECTIVE BOX
Pt was seen for  minutes for supportive tx. Pt c/o . Reviewed chart, approached Pt for an initial consult, introduced the role of neuropsychology in the rehab team, and explained the nature and purpose of the consult.    58M PMHx gout found down at home on 4/2/24 by wife brought to [facility] by ambulance found to have a L thalamic hemorrhagic infarct via CT. Patient intubated for inability to protect airway. MRI demonstrated hemorrhage with anticipated evolution. Chronic microhemorrhages found in the R thalamus and BG as well as small acute infarcts in the right caudate tail and periatrial white matter.  CTA clear of major vessel occlusion or stenosis. Patient with residual R hemiparesis, dysarthria, mild aphasia, and R gaze palsy. EEG negative throughout workup. EKG without AF. TTE grossly normal.    Pt agreed to participate. Session focused on establishing rapport with Pt, gathering/collecting/getting familiarized with Pt’s personal hx, and assessing his/her current emotional functioning.  Pt provided some/significant information about his/her medical hx and social hx.     Pt answered all questions during the clinical interview in order to elicit emotional symptoms. Pt reported experiencing  . Support and encouragement were provided.     Pt alert, fair attention/attentive, expressive language, thought processes -  , thought contents - , affect, mood, denied AH/VH, denied SI/HI/I/P, calm behavior. Plan: Continue the assessment process.     Pt was seen for  minutes for supportive tx. Pt c/o dizziness, fatigue, poor sleep. Reviewed chart, approached Pt for an initial consult, introduced the role of neuropsychology in the rehab team, and explained the nature and purpose of the consult. Pt is a 57 y/o male with PMHx of gout found down at home on 4/2/24 by wife brought to [facility] by ambulance found to have a L thalamic hemorrhagic infarct via CT. Patient intubated for inability to protect airway. MRI demonstrated hemorrhage with anticipated evolution. Chronic microhemorrhages found in the R thalamus and BG as well as small acute infarcts in the right caudate tail and periatrial white matter.  CTA clear of major vessel occlusion or stenosis. Patient with residual R hemiparesis, dysarthria, mild aphasia, and R gaze palsy. EEG negative throughout workup. EKG without AF. TTE grossly normal. Pt agreed to participate; Pt was well related and candidly discussed his present medical condition. Session focused on establishing rapport with Pt, gathering relevant biographical information, and assessing his/her current emotional functioning. Pt provided significant information about his medical hx and social hx. Additionally, Pt answered all questions during the clinical interview in order to elicit emotional symptoms. Pt reported experiencing insomnia, bad dreams, and fatigue. Pt was unable to complete the full consult due to significant dizziness and fatigue; agreed to f/u within the next day or two. Support and encouragement were provided.

## 2024-04-17 NOTE — PROGRESS NOTE ADULT - ASSESSMENT
Pt alert, fair attention, intact expressive language, thought processes - goal-directed, no abnormal thought contents noted, depressed affect, dizzy mood, denied AH/VH, denied SI/HI/I/P, calm behavior. Plan: Continue the assessment process.

## 2024-04-17 NOTE — PROGRESS NOTE ADULT - ASSESSMENT
58y with PMH Gout found down and revealed to have an intraparenchymal hemorrhage.  Patient now admitted for a multidisciplinary rehab program with right hemiparesis    #Intraparenchymal Hemorrhage  -MRI demonstrated L thalamic hemorrhage, w/ chronic microhemorrhages, and acute infarcts in the right caudate tail and periatrial white matter  -EKG w/o AF  -Echo unremarkable  -CTA w/o large occlusions  -Comprehensive Multidisciplinary Rehab Program    #HTN  - losartan 25mg daily  -amlodipine 10mg daily  - Monitor BP    #Gout  -c/w colchicine     #Dysphagia:  - Diet Consistency: diet, minced and moist  - Aspiration Precautions  - SLP consult for swallow function evaluation and treatment    #DVT prophylaxis:   -lovenox    # Sleep  - melatonin

## 2024-04-17 NOTE — PROGRESS NOTE ADULT - SUBJECTIVE AND OBJECTIVE BOX
HPI:  58M PMHx gout found down at home on 4/2/24 by wife brought to [facility] by ambulance found to have a L thalamic hemorrhagic infarct via CT. Patient intubated for inability to protect airway. MRI demonstrated hemorrhage with anticipated evolution. Chronic microhemorrhages found in the R thalamus and BG as well as small acute infarcts in the right caudate tail and periatrial white matter.  CTA clear of major vessel occlusion or stenosis. Patient with residual R hemiparesis, dysarthria, mild aphasia, and R gaze palsy. EEG negative throughout workup. EKG without AF. TTE grossly normal. Gout flare treated by rheumatology with colchicine while inpatient. Patient received PM&R consult who recommended acute inpatient rehabilitation. Patient was medically stabilized and discharged to [facility].  (15 Apr 2024 14:16)          Subjective:      VITALS  Vital Signs Last 24 Hrs  T(C): 36.8 (17 Apr 2024 07:27), Max: 37 (16 Apr 2024 20:13)  T(F): 98.3 (17 Apr 2024 07:27), Max: 98.6 (16 Apr 2024 20:13)  HR: 99 (17 Apr 2024 07:27) (98 - 105)  BP: 152/89 (17 Apr 2024 07:27) (128/84 - 152/89)  BP(mean): --  RR: 16 (17 Apr 2024 07:27) (16 - 16)  SpO2: 96% (17 Apr 2024 07:27) (94% - 96%)    Parameters below as of 17 Apr 2024 07:27  Patient On (Oxygen Delivery Method): room air        REVIEW OF SYMPTOMS  Neurological deficits      PHYSICAL EXAM  Gen - NAD, Comfortable  HEENT - NCAT, EOMI, MMM  Neck - Supple, No limited ROM  Pulm - CTAB, No wheeze, No rhonchi, No crackles  Cardiovascular - RRR, S1S2, No murmurs  Abdomen - Soft, NT/ND, +BS  Extremities - No C/C/E, No calf tenderness  Neuro-     Cognitive - AAOx3     Communication - Fluent, + mild dysarthria,     Attention: Impaired, distractable, perseverative, right Inattention     Judgement: impaired     Memory: Recall 3 objects immediate and 2/3 3 min later         Cranial Nerves - EOMI, VF intact, left sided facial droop present, Sensation intact, Dysphagia, dysarthria     Motor -                     LEFT    UE - ShAB 5/5, EF 5/5, EE 5/5, WE 5/5,  5/5                    RIGHT UE - ShAB 0/5, EF 0/5, EE 0/5, WE 0/5,  0/5                    LEFT    LE - HF 5/5, KE 5/5, DF 5/5, PF 5/5                    RIGHT LE - HF 1/5, Hip Adduction-- 1/5, KE 1/5, DF 0/5, PF 0/5        Sensory - present but diminished on RUE and RLE     Reflexes - reduced reflexes on the RUE and RLE     Tone - flaccid RUE and RLE  Psychiatric - Mood stable, Affect WNL  Skin:  patient with bruising on BUE w/ RLE scabbing and right sided back scabs   Wounds: None Present      RECENT LABS                        14.1   13.94 )-----------( 484      ( 16 Apr 2024 06:12 )             43.7     04-16    134<L>  |  98  |  18  ----------------------------<  110<H>  4.3   |  28  |  0.76    Ca    9.3      16 Apr 2024 06:12    TPro  7.5  /  Alb  2.8<L>  /  TBili  0.3  /  DBili  x   /  AST  41<H>  /  ALT  53<H>  /  AlkPhos  70  04-16      Urinalysis Basic - ( 16 Apr 2024 06:12 )    Color: x / Appearance: x / SG: x / pH: x  Gluc: 110 mg/dL / Ketone: x  / Bili: x / Urobili: x   Blood: x / Protein: x / Nitrite: x   Leuk Esterase: x / RBC: x / WBC x   Sq Epi: x / Non Sq Epi: x / Bacteria: x          RADIOLOGY/OTHER RESULTS      MEDICATIONS  (STANDING):  amLODIPine   Tablet 10 milliGRAM(s) Oral daily  colchicine 0.6 milliGRAM(s) Oral daily  enoxaparin Injectable 40 milliGRAM(s) SubCutaneous every 24 hours  gabapentin 100 milliGRAM(s) Oral at bedtime  losartan 25 milliGRAM(s) Oral daily  multivitamin 1 Tablet(s) Oral daily  pantoprazole   Suspension 40 milliGRAM(s) Oral before breakfast  polyethylene glycol 3350 17 Gram(s) Oral daily  senna 2 Tablet(s) Oral at bedtime    MEDICATIONS  (PRN):  acetaminophen     Tablet .. 650 milliGRAM(s) Oral every 6 hours PRN Mild Pain (1 - 3)  melatonin 3 milliGRAM(s) Oral at bedtime PRN Insomnia  traMADol 50 milliGRAM(s) Oral every 4 hours PRN Moderate Pain (4 - 6)         Assessment:  · Assessment	  Assessment/Plan:  ROSALEE CARR is a 58y with PMhx Gout found down and revealed to have an intraparenchymal hemorrhage.  Patient now admitted for a multidisciplinary rehab program with right hemiparesis, cognitive deficits, sensory loss, Dysphagia, Dysarthria.      Intraparenchymal Hemorrhage  -MRI demonstrated L thalamic hemorrhage, w/ chronic microhemorrhages, and acute infarcts in the right caudate tail and periatrial white matter  -EKG w/o AF  -Echo unremarkable  -CTA w/o large occlusions  Comprehensive Multidisciplinary Rehab Program:  - Start comprehensive rehab program of PT/OT/SLP - 3 hours a day, 5 days a week. P&O as needed     HTN  - losartan 25mg daily  -amlodipine 10mg daily  - Monitor BP    Pain  - Tramadol and Tylenol PRN  - Avoid sedating medications that may interfere with cognitive recovery    Mood / Cognition  - Neuropsychology consult prn    Sleep  - Maintain quiet hours and a low stim environment.   - Monitor sleep logs (for BIU)  - Melatonin PRN     GI / Bowel  - Senna qHS  - Miralax Daily  - GI ppx: protonix     / Bladder  - Currently patient voids:      [X] independent      [ ] external collection device (condom cath)      [ ] Indwelling carrington catheter      [ ] Intermittent catheterization  - Continue bladder scans q8h with straight cath for >400cc.    Skin / Pressure injury  - Skin assessment on admission performed [X ] :   - Monitor Incisions:  none  - Turn q2 hours in bed while awake, air mattress  - nursing to monitor skin qShift  - soft heel protectors  - skin barrier cream PRN    Dysphagia:  - Diet Consistency: diet, minced and moist  - Supplements: multivitamin  - Aspiration Precautions  - SLP consult for swallow function evaluation and treatment  - Nutrition consult    DVT prophylaxis:   - LVNX  - Last Doppler on 4/15      Outpatient Follow-up:  Farnaz Scott  NP in [address], [address]  [place] [address]  Phone [phone]  Fax: [phone]  F/u 1 Month    Yancy Lugo  Neurology  2 weeks    Code Status/Emergency Contact:  Sheron Singh  [phone]     HPI:  58M PMHx gout found down at home on 4/2/24 by wife brought to [facility] by ambulance found to have a L thalamic hemorrhagic infarct via CT. Patient intubated for inability to protect airway. MRI demonstrated hemorrhage with anticipated evolution. Chronic microhemorrhages found in the R thalamus and BG as well as small acute infarcts in the right caudate tail and periatrial white matter.  CTA clear of major vessel occlusion or stenosis. Patient with residual R hemiparesis, dysarthria, mild aphasia, and R gaze palsy. EEG negative throughout workup. EKG without AF. TTE grossly normal. Gout flare treated by rheumatology with colchicine while inpatient. Patient received PM&R consult who recommended acute inpatient rehabilitation. Patient was medically stabilized and discharged to [facility].  (15 Apr 2024 14:16)          Subjective:  Seen this AM in PT.  Practicing standing using Ioana-Steady.  Pt. reports poor sleep at night.  States he gets anxious and has a lot on his mind.  Denies pain.  Pt. had large BMs after enema yesterday.        VITALS  Vital Signs Last 24 Hrs  T(C): 36.8 (17 Apr 2024 07:27), Max: 37 (16 Apr 2024 20:13)  T(F): 98.3 (17 Apr 2024 07:27), Max: 98.6 (16 Apr 2024 20:13)  HR: 99 (17 Apr 2024 07:27) (98 - 105)  BP: 152/89 (17 Apr 2024 07:27) (128/84 - 152/89)  BP(mean): --  RR: 16 (17 Apr 2024 07:27) (16 - 16)  SpO2: 96% (17 Apr 2024 07:27) (94% - 96%)    Parameters below as of 17 Apr 2024 07:27  Patient On (Oxygen Delivery Method): room air        REVIEW OF SYMPTOMS  Neurological deficits      PHYSICAL EXAM  Gen - NAD, Comfortable  HEENT - NCAT, EOMI, MMM  Neck - Supple, No limited ROM  Pulm - breathing comfortably on RA  Cardiovascular - warm well perfused  Abdomen - Soft, NT/ND,   Extremities - No edema, No calf tenderness  Neuro-     Cognitive - AAOx3     Communication - Fluent, + mild dysarthria,     Attention: Impaired, distractable, perseverative, right Inattention     Judgement: impaired     Memory: Recall 3 objects immediate and 2/3 3 min later         Cranial Nerves - EOMI, VF intact, left sided facial droop present, Sensation intact, Dysphagia, dysarthria     Motor -                     LEFT    UE - ShAB 5/5, EF 5/5, EE 5/5, WE 5/5,  5/5                    RIGHT UE - ShAB 0/5, EF 0/5, EE 0/5, WE 0/5,  0/5                    LEFT    LE - HF 5/5, KE 5/5, DF 5/5, PF 5/5                    RIGHT LE - HF 1/5, Hip Adduction-- 1/5, KE 1/5, DF 0/5, PF 0/5        Sensory - present but diminished on RUE and RLE     Reflexes - reduced reflexes on the RUE and RLE     Tone - flaccid RUE and RLE  Psychiatric - Mood stable, Anxious        RECENT LABS                        14.1   13.94 )-----------( 484      ( 16 Apr 2024 06:12 )             43.7     04-16    134<L>  |  98  |  18  ----------------------------<  110<H>  4.3   |  28  |  0.76    Ca    9.3      16 Apr 2024 06:12    TPro  7.5  /  Alb  2.8<L>  /  TBili  0.3  /  DBili  x   /  AST  41<H>  /  ALT  53<H>  /  AlkPhos  70  04-16      Urinalysis Basic - ( 16 Apr 2024 06:12 )    Color: x / Appearance: x / SG: x / pH: x  Gluc: 110 mg/dL / Ketone: x  / Bili: x / Urobili: x   Blood: x / Protein: x / Nitrite: x   Leuk Esterase: x / RBC: x / WBC x   Sq Epi: x / Non Sq Epi: x / Bacteria: x          RADIOLOGY/OTHER RESULTS      MEDICATIONS  (STANDING):  amLODIPine   Tablet 10 milliGRAM(s) Oral daily  colchicine 0.6 milliGRAM(s) Oral daily  enoxaparin Injectable 40 milliGRAM(s) SubCutaneous every 24 hours  gabapentin 100 milliGRAM(s) Oral at bedtime  losartan 25 milliGRAM(s) Oral daily  multivitamin 1 Tablet(s) Oral daily  pantoprazole   Suspension 40 milliGRAM(s) Oral before breakfast  polyethylene glycol 3350 17 Gram(s) Oral daily  senna 2 Tablet(s) Oral at bedtime    MEDICATIONS  (PRN):  acetaminophen     Tablet .. 650 milliGRAM(s) Oral every 6 hours PRN Mild Pain (1 - 3)  melatonin 3 milliGRAM(s) Oral at bedtime PRN Insomnia  traMADol 50 milliGRAM(s) Oral every 4 hours PRN Moderate Pain (4 - 6)         Assessment:  · Assessment	  Assessment/Plan:  ROSALEE CARR is a 58y with PMhx Gout found down and revealed to have an intraparenchymal hemorrhage.  Patient now admitted for a multidisciplinary rehab program with right hemiparesis, cognitive deficits, sensory loss, Dysphagia, Dysarthria.      Intraparenchymal Hemorrhage  -MRI demonstrated L thalamic hemorrhage, w/ chronic microhemorrhages, and acute infarcts in the right caudate tail and periatrial white matter  -EKG w/o AF  -Echo unremarkable  -CTA w/o large occlusions  Comprehensive Multidisciplinary Rehab Program:  - Start comprehensive rehab program of PT/OT/SLP - 3 hours a day, 5 days a week. P&O as needed     HTN  - losartan 25mg daily  -amlodipine 10mg daily  - Monitor BP    Pain  - Tramadol and Tylenol PRN  - Avoid sedating medications that may interfere with cognitive recovery    Mood / Cognition  - Neuropsychology consult   --REcreation therapy consult    Sleep  - Maintain quiet hours and a low stim environment.   - scheduled melatonin 6mg at 8pm    GI / Bowel  - Senna qHS  - Miralax Daily  - GI ppx: protonix     / Bladder  - Voiding with low PVRs    Skin / Pressure injury  - Skin assessment on admission performed [X ] :   - Monitor Incisions:  none  - Turn q2 hours in bed while awake, air mattress  - nursing to monitor skin qShift  - soft heel protectors  - skin barrier cream PRN    Dysphagia:  - Diet Consistency: diet, minced and moist with moderately thick liquid  --On Dysphagia Free Water Protocol  - Supplements: multivitamin  - Aspiration Precautions  - SLP consult for swallow function evaluation and treatment  - Nutrition consult    DVT prophylaxis:   - LVNX  - Last Doppler on 4/15      Outpatient Follow-up:  Farnaz Scott  NP in [address], [address]  [place] [address]  Phone [phone]  Fax: [phone]  F/u 1 Month    Ynacy Lugo  Neurology  2 weeks    Code Status/Emergency Contact:  Sheron Singh  [phone]

## 2024-04-17 NOTE — PROGRESS NOTE ADULT - ATTENDING COMMENTS
Pt. seen with fellow.  Agree with documentation above as per fellow with amendments made as appropriate. Patient medically stable. Making progress towards rehab goals.     Left thalamic ICH  Sleep  - Maintain quiet hours and a low stim environment.   - scheduled melatonin 6mg at 8pm

## 2024-04-18 LAB
ALBUMIN SERPL ELPH-MCNC: 3.1 G/DL — LOW (ref 3.3–5)
ALP SERPL-CCNC: 83 U/L — SIGNIFICANT CHANGE UP (ref 40–120)
ALT FLD-CCNC: 51 U/L — HIGH (ref 10–45)
ANION GAP SERPL CALC-SCNC: 6 MMOL/L — SIGNIFICANT CHANGE UP (ref 5–17)
AST SERPL-CCNC: 33 U/L — SIGNIFICANT CHANGE UP (ref 10–40)
BASOPHILS # BLD AUTO: 0.08 K/UL — SIGNIFICANT CHANGE UP (ref 0–0.2)
BASOPHILS NFR BLD AUTO: 0.7 % — SIGNIFICANT CHANGE UP (ref 0–2)
BILIRUB SERPL-MCNC: 0.4 MG/DL — SIGNIFICANT CHANGE UP (ref 0.2–1.2)
BUN SERPL-MCNC: 21 MG/DL — SIGNIFICANT CHANGE UP (ref 7–23)
CALCIUM SERPL-MCNC: 9.2 MG/DL — SIGNIFICANT CHANGE UP (ref 8.4–10.5)
CHLORIDE SERPL-SCNC: 102 MMOL/L — SIGNIFICANT CHANGE UP (ref 96–108)
CO2 SERPL-SCNC: 31 MMOL/L — SIGNIFICANT CHANGE UP (ref 22–31)
CREAT SERPL-MCNC: 0.97 MG/DL — SIGNIFICANT CHANGE UP (ref 0.5–1.3)
EGFR: 91 ML/MIN/1.73M2 — SIGNIFICANT CHANGE UP
EOSINOPHIL # BLD AUTO: 0.92 K/UL — HIGH (ref 0–0.5)
EOSINOPHIL NFR BLD AUTO: 7.5 % — HIGH (ref 0–6)
GLUCOSE SERPL-MCNC: 108 MG/DL — HIGH (ref 70–99)
HCT VFR BLD CALC: 42.8 % — SIGNIFICANT CHANGE UP (ref 39–50)
HGB BLD-MCNC: 14.1 G/DL — SIGNIFICANT CHANGE UP (ref 13–17)
IMM GRANULOCYTES NFR BLD AUTO: 1.5 % — HIGH (ref 0–0.9)
LYMPHOCYTES # BLD AUTO: 0.96 K/UL — LOW (ref 1–3.3)
LYMPHOCYTES # BLD AUTO: 7.8 % — LOW (ref 13–44)
MCHC RBC-ENTMCNC: 28.8 PG — SIGNIFICANT CHANGE UP (ref 27–34)
MCHC RBC-ENTMCNC: 32.9 GM/DL — SIGNIFICANT CHANGE UP (ref 32–36)
MCV RBC AUTO: 87.3 FL — SIGNIFICANT CHANGE UP (ref 80–100)
MONOCYTES # BLD AUTO: 1.03 K/UL — HIGH (ref 0–0.9)
MONOCYTES NFR BLD AUTO: 8.4 % — SIGNIFICANT CHANGE UP (ref 2–14)
NEUTROPHILS # BLD AUTO: 9.08 K/UL — HIGH (ref 1.8–7.4)
NEUTROPHILS NFR BLD AUTO: 74.1 % — SIGNIFICANT CHANGE UP (ref 43–77)
NRBC # BLD: 0 /100 WBCS — SIGNIFICANT CHANGE UP (ref 0–0)
PLATELET # BLD AUTO: 478 K/UL — HIGH (ref 150–400)
POTASSIUM SERPL-MCNC: 4.1 MMOL/L — SIGNIFICANT CHANGE UP (ref 3.5–5.3)
POTASSIUM SERPL-SCNC: 4.1 MMOL/L — SIGNIFICANT CHANGE UP (ref 3.5–5.3)
PROT SERPL-MCNC: 7.5 G/DL — SIGNIFICANT CHANGE UP (ref 6–8.3)
RBC # BLD: 4.9 M/UL — SIGNIFICANT CHANGE UP (ref 4.2–5.8)
RBC # FLD: 12.4 % — SIGNIFICANT CHANGE UP (ref 10.3–14.5)
SODIUM SERPL-SCNC: 139 MMOL/L — SIGNIFICANT CHANGE UP (ref 135–145)
WBC # BLD: 12.26 K/UL — HIGH (ref 3.8–10.5)
WBC # FLD AUTO: 12.26 K/UL — HIGH (ref 3.8–10.5)

## 2024-04-18 PROCEDURE — 74230 X-RAY XM SWLNG FUNCJ C+: CPT | Mod: 26

## 2024-04-18 PROCEDURE — 99232 SBSQ HOSP IP/OBS MODERATE 35: CPT

## 2024-04-18 RX ORDER — LANOLIN ALCOHOL/MO/W.PET/CERES
9 CREAM (GRAM) TOPICAL
Refills: 0 | Status: DISCONTINUED | OUTPATIENT
Start: 2024-04-18 | End: 2024-04-19

## 2024-04-18 RX ADMIN — GABAPENTIN 100 MILLIGRAM(S): 400 CAPSULE ORAL at 21:09

## 2024-04-18 RX ADMIN — Medication 1 TABLET(S): at 11:38

## 2024-04-18 RX ADMIN — LOSARTAN POTASSIUM 25 MILLIGRAM(S): 100 TABLET, FILM COATED ORAL at 05:14

## 2024-04-18 RX ADMIN — Medication 0.6 MILLIGRAM(S): at 11:38

## 2024-04-18 RX ADMIN — Medication 650 MILLIGRAM(S): at 10:20

## 2024-04-18 RX ADMIN — Medication 9 MILLIGRAM(S): at 21:09

## 2024-04-18 RX ADMIN — SENNA PLUS 2 TABLET(S): 8.6 TABLET ORAL at 21:09

## 2024-04-18 RX ADMIN — AMLODIPINE BESYLATE 10 MILLIGRAM(S): 2.5 TABLET ORAL at 05:14

## 2024-04-18 RX ADMIN — ENOXAPARIN SODIUM 40 MILLIGRAM(S): 100 INJECTION SUBCUTANEOUS at 21:10

## 2024-04-18 RX ADMIN — Medication 650 MILLIGRAM(S): at 09:20

## 2024-04-18 RX ADMIN — PANTOPRAZOLE SODIUM 40 MILLIGRAM(S): 20 TABLET, DELAYED RELEASE ORAL at 05:14

## 2024-04-18 NOTE — PROGRESS NOTE ADULT - SUBJECTIVE AND OBJECTIVE BOX
HPI:  58M PMHx gout found down at home on 4/2/24 by wife brought to [facility] by ambulance found to have a L thalamic hemorrhagic infarct via CT. Patient intubated for inability to protect airway. MRI demonstrated hemorrhage with anticipated evolution. Chronic microhemorrhages found in the R thalamus and BG as well as small acute infarcts in the right caudate tail and periatrial white matter.  CTA clear of major vessel occlusion or stenosis. Patient with residual R hemiparesis, dysarthria, mild aphasia, and R gaze palsy. EEG negative throughout workup. EKG without AF. TTE grossly normal. Gout flare treated by rheumatology with colchicine while inpatient. Patient received PM&R consult who recommended acute inpatient rehabilitation. Patient was medically stabilized and discharged to [facility].  (15 Apr 2024 14:16)          Subjective:  Seen this AM. Pt. reports poor sleep.  Partially due to anxiety and in part c/o nursing waking him up a few times to change him.  Pt. is continent, but needs 2 person assist for toileting so has been having accidents.  denies pain.  Neuropathic pain in right UE and LE is better.       VITALS  Vital Signs Last 24 Hrs  T(C): 36.6 (18 Apr 2024 07:33), Max: 36.8 (17 Apr 2024 20:53)  T(F): 97.9 (18 Apr 2024 07:33), Max: 98.3 (17 Apr 2024 20:53)  HR: 94 (18 Apr 2024 07:33) (71 - 94)  BP: 127/83 (18 Apr 2024 07:33) (115/75 - 133/84)  BP(mean): --  RR: 16 (18 Apr 2024 07:33) (14 - 16)  SpO2: 97% (18 Apr 2024 07:33) (97% - 97%)    Parameters below as of 18 Apr 2024 07:33  Patient On (Oxygen Delivery Method): room air        REVIEW OF SYMPTOMS  Neurological deficits      PHYSICAL EXAM  Gen - NAD, Comfortable  HEENT - NCAT, EOMI, MMM  Neck - Supple, No limited ROM  Pulm - breathing comfortably on RA  Cardiovascular - warm well perfused  Abdomen - Soft, NT/ND,   Extremities - No edema, No calf tenderness  Neuro-     Cognitive - AAOx3     Communication - Fluent, + mild dysarthria,     Attention: Impaired, distractable, perseverative, right Inattention     Judgement: impaired     Memory: Recall 3 objects immediate and 2/3 3 min later         Cranial Nerves - EOMI, VF intact, left sided facial droop present, Sensation intact, Dysphagia, dysarthria     Motor -                     LEFT    UE - ShAB 5/5, EF 5/5, EE 5/5, WE 5/5,  5/5                    RIGHT UE - ShAB 0/5, EF 0/5, EE 0/5, WE 0/5,  0/5                    LEFT    LE - HF 5/5, KE 5/5, DF 5/5, PF 5/5                    RIGHT LE - HF 1/5, Hip Adduction-- 1/5, KE 1/5, DF 0/5, PF 0/5        Sensory - present but diminished on RUE and RLE     Reflexes - reduced reflexes on the RUE and RLE     Tone - flaccid RUE and RLE  Psychiatric - Mood stable, Anxious      RECENT LABS                        14.1   12.26 )-----------( 478      ( 18 Apr 2024 08:47 )             42.8     04-18    139  |  102  |  21  ----------------------------<  108<H>  4.1   |  31  |  0.97    Ca    9.2      18 Apr 2024 08:47    TPro  7.5  /  Alb  3.1<L>  /  TBili  0.4  /  DBili  x   /  AST  33  /  ALT  51<H>  /  AlkPhos  83  04-18      Urinalysis Basic - ( 18 Apr 2024 08:47 )    Color: x / Appearance: x / SG: x / pH: x  Gluc: 108 mg/dL / Ketone: x  / Bili: x / Urobili: x   Blood: x / Protein: x / Nitrite: x   Leuk Esterase: x / RBC: x / WBC x   Sq Epi: x / Non Sq Epi: x / Bacteria: x          RADIOLOGY/OTHER RESULTS      MEDICATIONS  (STANDING):  amLODIPine   Tablet 10 milliGRAM(s) Oral daily  colchicine 0.6 milliGRAM(s) Oral daily  enoxaparin Injectable 40 milliGRAM(s) SubCutaneous every 24 hours  gabapentin 100 milliGRAM(s) Oral at bedtime  losartan 25 milliGRAM(s) Oral daily  melatonin 9 milliGRAM(s) Oral <User Schedule>  multivitamin 1 Tablet(s) Oral daily  pantoprazole   Suspension 40 milliGRAM(s) Oral before breakfast  polyethylene glycol 3350 17 Gram(s) Oral daily  senna 2 Tablet(s) Oral at bedtime    MEDICATIONS  (PRN):  acetaminophen     Tablet .. 650 milliGRAM(s) Oral every 6 hours PRN Mild Pain (1 - 3)  traMADol 50 milliGRAM(s) Oral every 4 hours PRN Moderate Pain (4 - 6)         Yes Negative

## 2024-04-18 NOTE — PROGRESS NOTE ADULT - ASSESSMENT
58y with PMhx Gout found down and revealed to have an intraparenchymal hemorrhage.  Patient now admitted for a multidisciplinary rehab program with right hemiparesis, cognitive deficits, sensory loss, Dysphagia, Dysarthria.      Intraparenchymal Hemorrhage  -MRI demonstrated L thalamic hemorrhage, w/ chronic microhemorrhages, and acute infarcts in the right caudate tail and periatrial white matter  -EKG w/o AF  -Echo unremarkable  -CTA w/o large occlusions  Comprehensive Multidisciplinary Rehab Program:  - Cont comprehensive rehab program of PT/OT/SLP - 3 hours a day, 5 days a week. P&O as needed     HTN  - losartan 25mg daily  -amlodipine 10mg daily  - Monitor BP    Pain  - d/c Tramadol as pt. has not needed.    -- Tylenol PRN  - Avoid sedating medications that may interfere with cognitive recovery    Mood / Cognition  - Neuropsychology consult -- 4/17 note reviewed.   --REcreation therapy consult    Sleep  - Maintain quiet hours and a low stim environment.   - increase melatonin to 9mg at 8pm    GI / Bowel  - Senna qHS  - Miralax Daily  - GI ppx: protonix     / Bladder  - Voiding with low PVRs  --ordered condom catheter at night to avoid pt. be awoken to be changed.     Skin / Pressure injury  - Skin assessment on admission performed [X ] :   - Monitor Incisions:  none  - Turn q2 hours in bed while awake, air mattress  - nursing to monitor skin qShift  - soft heel protectors  - skin barrier cream PRN    Dysphagia:  -MBS today-- diet consistency still minced and moist but upgraded to thin liquids  --On Dysphagia Free Water Protocol  - Supplements: multivitamin  - Aspiration Precautions  - SLP consult for swallow function evaluation and treatment  - Nutrition consult    DVT prophylaxis:   - Lovenox 40mg   - Last Doppler on 4/15--Neg.     IDT 4/18--  SW: (4/18/24) Patient resides with wife and teenage children in a private house- has 1 step to enter if in basement apartment- or 7 RICARDO if going  to main level. Patient's spouse in main support, works part time. Patient was independent prior, working as a private contractor and part-time professor. Patient's wife provided transport prior.    Speech therapy--  moderate oropharyngeal dysphagia--Minced and Moist diet-- Upgraded to thin liquids; Moderate Dysarthria;  Severe Cognitive deficit-- impaired insight, judgement, Orientation--19/30 on OLOG, attention, mod-sev executive function deficits, 40% convergent naming  OT-- Mod A x 2 transfers; Max A x 2 Ioana-Steady;  Tot A--ADLs exc. Mod A eating and grooming.  Goals -- Min A ADLs and Mod A Tx  PT-- Max A-- BM, squat-pivot transfer;  No ambulation or stairs yet.      Goals:    1. TRansfer OOB and to toilet with Mod A  2. Sequence 4 step ADL task with Moderate cues.     ELOS: 5/6 RADHA    ** Tried to call pt's wife, Sheron, to provide update but phone cut off during conversation.  When I called back, I received Voicemail-- Left a message for her to call back.       Outpatient Follow-up:  Farnza Scott  NP in [address], [address]  [place] [address]  Phone [phone]  Fax: [phone]  F/u 1 Month    Yancy Lugo  Neurology  2 weeks    Code Status/Emergency Contact:  Sheron Singh  [phone]

## 2024-04-19 PROCEDURE — 99232 SBSQ HOSP IP/OBS MODERATE 35: CPT

## 2024-04-19 PROCEDURE — 99233 SBSQ HOSP IP/OBS HIGH 50: CPT

## 2024-04-19 RX ORDER — RAMELTEON 8 MG
8 TABLET ORAL AT BEDTIME
Refills: 0 | Status: DISCONTINUED | OUTPATIENT
Start: 2024-04-19 | End: 2024-04-21

## 2024-04-19 RX ADMIN — SENNA PLUS 2 TABLET(S): 8.6 TABLET ORAL at 21:03

## 2024-04-19 RX ADMIN — AMLODIPINE BESYLATE 10 MILLIGRAM(S): 2.5 TABLET ORAL at 05:19

## 2024-04-19 RX ADMIN — Medication 8 MILLIGRAM(S): at 21:03

## 2024-04-19 RX ADMIN — PANTOPRAZOLE SODIUM 40 MILLIGRAM(S): 20 TABLET, DELAYED RELEASE ORAL at 05:19

## 2024-04-19 RX ADMIN — Medication 0.6 MILLIGRAM(S): at 12:33

## 2024-04-19 RX ADMIN — GABAPENTIN 100 MILLIGRAM(S): 400 CAPSULE ORAL at 21:03

## 2024-04-19 RX ADMIN — Medication 1 TABLET(S): at 12:32

## 2024-04-19 RX ADMIN — ENOXAPARIN SODIUM 40 MILLIGRAM(S): 100 INJECTION SUBCUTANEOUS at 21:05

## 2024-04-19 RX ADMIN — LOSARTAN POTASSIUM 25 MILLIGRAM(S): 100 TABLET, FILM COATED ORAL at 05:19

## 2024-04-19 NOTE — PROGRESS NOTE ADULT - ASSESSMENT
58y with PMH Gout found down and revealed to have an intraparenchymal hemorrhage.  Patient now admitted for a multidisciplinary rehab program with right hemiparesis    #Intraparenchymal Hemorrhage  -MRI demonstrated L thalamic hemorrhage, w/ chronic microhemorrhages, and acute infarcts in the right caudate tail and periatrial white matter  -EKG w/o AF  -Echo unremarkable  -CTA w/o large occlusions  -Comprehensive Multidisciplinary Rehab Program    #HTN  - losartan 25mg daily  -amlodipine 10mg daily  - Monitor BP    #Gout  -c/w colchicine     #Dysphagia:  - Diet Consistency: diet, minced and moist  - Aspiration Precautions    #DVT prophylaxis:   -lovenox    # Sleep  - melatonin

## 2024-04-19 NOTE — PROGRESS NOTE ADULT - ASSESSMENT
58y with PMhx Gout found down and revealed to have an intraparenchymal hemorrhage.  Patient now admitted for a multidisciplinary rehab program with right hemiparesis, cognitive deficits, sensory loss, Dysphagia, Dysarthria.      Intraparenchymal Hemorrhage  -MRI demonstrated L thalamic hemorrhage, w/ chronic microhemorrhages, and acute infarcts in the right caudate tail and periatrial white matter  -EKG w/o AF  -Echo unremarkable  -CTA w/o large occlusions  Comprehensive Multidisciplinary Rehab Program:  - Cont comprehensive rehab program of PT/OT/SLP - 3 hours a day, 5 days a week. P&O as needed     HTN  - losartan 25mg daily  -amlodipine 10mg daily  - BP controlled    Pain   -- Tylenol PRN  - Avoid sedating medications that may interfere with cognitive recovery    Mood / Cognition  - Neuropsychology consult -- 4/17 note reviewed.   --REcreation therapy consult    Sleep  - Maintain quiet hours and a low stim environment.   - d/c  melatonin 9mg   --Trial Rozerem 8mg qhs     GI / Bowel  - Senna qHS  - Miralax Daily  - GI ppx: protonix     / Bladder  - Voiding with low PVRs  --ordered condom catheter at night to avoid pt. be awoken to be changed.     Skin / Pressure injury  - Skin assessment on admission performed [X ] :   - Monitor Incisions:  none  - Turn q2 hours in bed while awake, air mattress  - nursing to monitor skin qShift  - soft heel protectors  - skin barrier cream PRN    Dysphagia:  -MBS 4/18-- diet consistency still minced and moist but upgraded to thin liquids  --On Dysphagia Free Water Protocol  - Supplements: multivitamin  - Aspiration Precautions  - SLP consult for swallow function evaluation and treatment  - Nutrition consult    DVT prophylaxis:   - Lovenox 40mg   - Last Doppler on 4/15--Neg.     IDT 4/18--  SW: (4/18/24) Patient resides with wife and teenage children in a private house- has 1 step to enter if in basement apartment- or 7 RICARDO if going  to main level. Patient's spouse in main support, works part time. Patient was independent prior, working as a private contractor and part-time professor. Patient's wife provided transport prior.    Speech therapy--  moderate oropharyngeal dysphagia--Minced and Moist diet-- Upgraded to thin liquids; Moderate Dysarthria;  Severe Cognitive deficit-- impaired insight, judgement, Orientation--19/30 on OLOG, attention, mod-sev executive function deficits, 40% convergent naming  OT-- Mod A x 2 transfers; Max A x 2 Ioana-Steady;  Tot A--ADLs exc. Mod A eating and grooming.  Goals -- Min A ADLs and Mod A Tx  PT-- Max A-- BM, squat-pivot transfer;  No ambulation or stairs yet.      Goals:    1. TRansfer OOB and to toilet with Mod A  2. Sequence 4 step ADL task with Moderate cues.     ELOS: 5/6 RADHA    ** Tried to call pt's wife, Sheron, to provide update but phone cut off during conversation.  When I called back, I received Voicemail-- Left a message for her to call back.       Outpatient Follow-up:  Farnaz Scott  NP in [address], [address]  [place] [address]  Phone [phone]  Fax: [phone]  F/u 1 Month    Yancy Lugo  Neurology  2 weeks    Code Status/Emergency Contact:  Sheron Singh  [phone]   58y with PMhx Gout found down and revealed to have an intraparenchymal hemorrhage.  Patient now admitted for a multidisciplinary rehab program with right hemiparesis, cognitive deficits, sensory loss, Dysphagia, Dysarthria.      Intraparenchymal Hemorrhage  -MRI demonstrated L thalamic hemorrhage, w/ chronic microhemorrhages, and acute infarcts in the right caudate tail and periatrial white matter  -EKG w/o AF  -Echo unremarkable  -CTA w/o large occlusions  Comprehensive Multidisciplinary Rehab Program:  - Cont comprehensive rehab program of PT/OT/SLP - 3 hours a day, 5 days a week. P&O as needed     HTN  - losartan 25mg daily  -amlodipine 10mg daily  - BP controlled    Pain   -- Tylenol PRN  - Avoid sedating medications that may interfere with cognitive recovery    Mood / Cognition  - Neuropsychology consult -- 4/17 note reviewed.   --REcreation therapy consult    Sleep  - Maintain quiet hours and a low stim environment.   - d/c  melatonin 9mg   --Trial Rozerem 8mg qhs     GI / Bowel  - Senna qHS  - Miralax Daily  - GI ppx: protonix     / Bladder  - Voiding with low PVRs  --ordered condom catheter at night to avoid pt. be awoken to be changed.     Skin / Pressure injury  - Skin assessment on admission performed [X ] :   - Monitor Incisions:  none  - Turn q2 hours in bed while awake, air mattress  - nursing to monitor skin qShift  - soft heel protectors  - skin barrier cream PRN    Dysphagia:  -MBS 4/18-- diet consistency still minced and moist but upgraded to thin liquids  --On Dysphagia Free Water Protocol  - Supplements: multivitamin  - Aspiration Precautions  - SLP consult for swallow function evaluation and treatment  - Nutrition consult    DVT prophylaxis:   - Lovenox 40mg   - Last Doppler on 4/15--Neg.     IDT 4/18--  SW: (4/18/24) Patient resides with wife and teenage children in a private house- has 1 step to enter if in basement apartment- or 7 RICARDO if going  to main level. Patient's spouse in main support, works part time. Patient was independent prior, working as a private contractor and part-time professor. Patient's wife provided transport prior.    Speech therapy--  moderate oropharyngeal dysphagia--Minced and Moist diet-- Upgraded to thin liquids; Moderate Dysarthria;  Severe Cognitive deficit-- impaired insight, judgement, Orientation--19/30 on OLOG, attention, mod-sev executive function deficits, 40% convergent naming  OT-- Mod A x 2 transfers; Max A x 2 Ioana-Steady;  Tot A--ADLs exc. Mod A eating and grooming.  Goals -- Min A ADLs and Mod A Tx  PT-- Max A-- BM, squat-pivot transfer;  No ambulation or stairs yet.      Goals:    1. TRansfer OOB and to toilet with Mod A  2. Sequence 4 step ADL task with Moderate cues.     ELOS: 5/6 RADHA    ** Tried to call pt's wife, Sheron, to provide update but No answer-- Left a message for her to call back.       Outpatient Follow-up:  Farnaz Scott  NP in [address], [address]  [place] [address]  Phone [phone]  Fax: [phone]  F/u 1 Month    Yancy Lugo  Neurology  2 weeks    Code Status/Emergency Contact:  Sheron Singh  [phone]   58y with PMhx Gout found down and revealed to have an intraparenchymal hemorrhage.  Patient now admitted for a multidisciplinary rehab program with right hemiparesis, cognitive deficits, sensory loss, Dysphagia, Dysarthria.      Intraparenchymal Hemorrhage  -MRI demonstrated L thalamic hemorrhage, w/ chronic microhemorrhages, and acute infarcts in the right caudate tail and periatrial white matter  -EKG w/o AF  -Echo unremarkable  -CTA w/o large occlusions  Comprehensive Multidisciplinary Rehab Program:  - Cont comprehensive rehab program of PT/OT/SLP - 3 hours a day, 5 days a week. P&O as needed     HTN  - losartan 25mg daily  -amlodipine 10mg daily  - BP controlled    Pain   -- Tylenol PRN  - Avoid sedating medications that may interfere with cognitive recovery    Mood / Cognition  - Neuropsychology consult -- 4/17 note reviewed.   --REcreation therapy consult    Sleep  - Maintain quiet hours and a low stim environment.   - d/c  melatonin 9mg   --Trial Rozerem 8mg qhs     GI / Bowel  - Senna qHS  - Miralax Daily  - GI ppx: protonix     / Bladder  - Voiding with low PVRs  --ordered condom catheter at night to avoid pt. be awoken to be changed.     Skin / Pressure injury  - Skin assessment on admission performed [X ] :   - Monitor Incisions:  none  - Turn q2 hours in bed while awake, air mattress  - nursing to monitor skin qShift  - soft heel protectors  - skin barrier cream PRN    Dysphagia:  -MBS 4/18-- diet consistency still minced and moist but upgraded to thin liquids  --On Dysphagia Free Water Protocol  - Supplements: multivitamin  - Aspiration Precautions  - SLP consult for swallow function evaluation and treatment  - Nutrition consult    DVT prophylaxis:   - Lovenox 40mg   - Last Doppler on 4/15--Neg.     IDT 4/18--  SW: (4/18/24) Patient resides with wife and teenage children in a private house- has 1 step to enter if in basement apartment- or 7 RICARDO if going  to main level. Patient's spouse in main support, works part time. Patient was independent prior, working as a private contractor and part-time professor. Patient's wife provided transport prior.    Speech therapy--  moderate oropharyngeal dysphagia--Minced and Moist diet-- Upgraded to thin liquids; Moderate Dysarthria;  Severe Cognitive deficit-- impaired insight, judgement, Orientation--19/30 on OLOG, attention, mod-sev executive function deficits, 40% convergent naming  OT-- Mod A x 2 transfers; Max A x 2 Ioana-Steady;  Tot A--ADLs exc. Mod A eating and grooming.  Goals -- Min A ADLs and Mod A Tx  PT-- Max A-- BM, squat-pivot transfer;  No ambulation or stairs yet.      Goals:    1. TRansfer OOB and to toilet with Mod A  2. Sequence 4 step ADL task with Moderate cues.     ELOS: 5/6 RADHA    ** Spoke with pt's  _wife, Sheron___, to provide update on pt's status,  medical update, medications,  progress in therapy, REhab plan of care, ELOS 5/6 and discharge plan for RADHA.  All questions answered.      Outpatient Follow-up:  Farnaz Scott  NP in [address], [address]  [place] [address]  Phone [phone]  Fax: [phone]  F/u 1 Month    Yancy Lugo  Neurology  2 weeks    Code Status/Emergency Contact:  Sheron Singh  [phone]

## 2024-04-19 NOTE — PROGRESS NOTE ADULT - SUBJECTIVE AND OBJECTIVE BOX
HPI:  58M PMHx gout found down at home on 4/2/24 by wife brought to [facility] by ambulance found to have a L thalamic hemorrhagic infarct via CT. Patient intubated for inability to protect airway. MRI demonstrated hemorrhage with anticipated evolution. Chronic microhemorrhages found in the R thalamus and BG as well as small acute infarcts in the right caudate tail and periatrial white matter.  CTA clear of major vessel occlusion or stenosis. Patient with residual R hemiparesis, dysarthria, mild aphasia, and R gaze palsy. EEG negative throughout workup. EKG without AF. TTE grossly normal. Gout flare treated by rheumatology with colchicine while inpatient. Patient received PM&R consult who recommended acute inpatient rehabilitation. Patient was medically stabilized and discharged to [facility].  (15 Apr 2024 14:16)          Subjective:  Seen this AM. Pt. reports he slept better last night but still not well. Denies pain.  Tolerating therapy.  Anxiety seems better today.       VITALS  Vital Signs Last 24 Hrs  T(C): 36.9 (19 Apr 2024 08:00), Max: 36.9 (19 Apr 2024 08:00)  T(F): 98.4 (19 Apr 2024 08:00), Max: 98.4 (19 Apr 2024 08:00)  HR: 98 (19 Apr 2024 08:00) (85 - 98)  BP: 113/76 (19 Apr 2024 08:00) (100/74 - 125/85)  BP(mean): --  RR: 16 (19 Apr 2024 08:00) (15 - 16)  SpO2: 97% (19 Apr 2024 08:00) (96% - 97%)    Parameters below as of 19 Apr 2024 08:00  Patient On (Oxygen Delivery Method): room air        REVIEW OF SYMPTOMS  Neurological deficits      PHYSICAL EXAM  Gen - NAD, Comfortable  HEENT - NCAT, EOMI, MMM  Pulm - breathing comfortably on RA  Cardiovascular - warm well perfused  Abdomen - Soft, NT/ND,   Extremities - No edema, No calf tenderness  Neuro-     Cognitive - AAOx3     Communication - Fluent, + mild dysarthria,     Attention: Impaired, distractable, perseverative, right Inattention     Judgement: impaired     Memory: Recall 3 objects immediate and 2/3 3 min later         Cranial Nerves - EOMI, VF intact, left sided facial droop present, Sensation intact, Dysphagia, dysarthria     Motor -                     LEFT    UE - ShAB 5/5, EF 5/5, EE 5/5, WE 5/5,  5/5                    RIGHT UE - ShAB 0/5, EF 0/5, EE 0/5, WE 0/5,  0/5                    LEFT    LE - HF 5/5, KE 5/5, DF 5/5, PF 5/5                    RIGHT LE - HF 1/5, Hip Adduction-- 1/5, KE 1/5, DF 0/5, PF 0/5        Sensory - present but diminished on RUE and RLE     Reflexes - reduced reflexes on the RUE and RLE     Tone - flaccid RUE and RLE  Psychiatric - Mood stable, Anxious      RECENT LABS                        14.1   12.26 )-----------( 478      ( 18 Apr 2024 08:47 )             42.8     04-18    139  |  102  |  21  ----------------------------<  108<H>  4.1   |  31  |  0.97    Ca    9.2      18 Apr 2024 08:47    TPro  7.5  /  Alb  3.1<L>  /  TBili  0.4  /  DBili  x   /  AST  33  /  ALT  51<H>  /  AlkPhos  83  04-18      Urinalysis Basic - ( 18 Apr 2024 08:47 )    Color: x / Appearance: x / SG: x / pH: x  Gluc: 108 mg/dL / Ketone: x  / Bili: x / Urobili: x   Blood: x / Protein: x / Nitrite: x   Leuk Esterase: x / RBC: x / WBC x   Sq Epi: x / Non Sq Epi: x / Bacteria: x          RADIOLOGY/OTHER RESULTS      MEDICATIONS  (STANDING):  amLODIPine   Tablet 10 milliGRAM(s) Oral daily  colchicine 0.6 milliGRAM(s) Oral daily  enoxaparin Injectable 40 milliGRAM(s) SubCutaneous every 24 hours  gabapentin 100 milliGRAM(s) Oral at bedtime  losartan 25 milliGRAM(s) Oral daily  melatonin 9 milliGRAM(s) Oral <User Schedule>  multivitamin 1 Tablet(s) Oral daily  pantoprazole   Suspension 40 milliGRAM(s) Oral before breakfast  polyethylene glycol 3350 17 Gram(s) Oral daily  senna 2 Tablet(s) Oral at bedtime    MEDICATIONS  (PRN):  acetaminophen     Tablet .. 650 milliGRAM(s) Oral every 6 hours PRN Mild Pain (1 - 3)

## 2024-04-19 NOTE — PROGRESS NOTE ADULT - SUBJECTIVE AND OBJECTIVE BOX
58M PMHx gout found down at home on 4/2/24 by wife brought to [facility] by ambulance found to have a L thalamic hemorrhagic infarct via CT. Patient intubated for inability to protect airway. MRI demonstrated hemorrhage with anticipated evolution. Chronic microhemorrhages found in the R thalamus and BG as well as small acute infarcts in the right caudate tail and periatrial white matter.  CTA clear of major vessel occlusion or stenosis. Patient with residual R hemiparesis, dysarthria, mild aphasia, and R gaze palsy. EEG negative throughout workup. EKG without AF. TTE grossly normal. Gout flare treated by rheumatology with colchicine while inpatient. Patient received PM&R consult who recommended acute inpatient rehabilitation. Patient was medically stabilized and discharged to [facility].      No acute events overnight.  slept better yesterday night    PAST MEDICAL & SURGICAL HISTORY:  Eczema  Gout  Intraparenchymal hemorrhage of brain      Allergies    No Known Allergies    Intolerances    Social History: , denies drug use    MEDICATIONS  (STANDING):  amLODIPine   Tablet 10 milliGRAM(s) Oral daily  colchicine 0.6 milliGRAM(s) Oral daily  enoxaparin Injectable 40 milliGRAM(s) SubCutaneous every 24 hours  gabapentin 100 milliGRAM(s) Oral at bedtime  losartan 25 milliGRAM(s) Oral daily  multivitamin 1 Tablet(s) Oral daily  pantoprazole   Suspension 40 milliGRAM(s) Oral before breakfast  polyethylene glycol 3350 17 Gram(s) Oral daily  senna 2 Tablet(s) Oral at bedtime    MEDICATIONS  (PRN):  acetaminophen     Tablet .. 650 milliGRAM(s) Oral every 6 hours PRN Mild Pain (1 - 3)  melatonin 3 milliGRAM(s) Oral at bedtime PRN Insomnia  traMADol 50 milliGRAM(s) Oral every 4 hours PRN Moderate Pain (4 - 6)    T(C): 36.9 (04-19-24 @ 08:00), Max: 36.9 (04-19-24 @ 08:00)  T(F): 98.4 (04-19-24 @ 08:00), Max: 98.4 (04-19-24 @ 08:00)  HR: 98 (04-19-24 @ 08:00) (85 - 98)  BP: 113/76 (04-19-24 @ 08:00) (100/74 - 125/85)  ABP: --  ABP(mean): --  RR: 16 (04-19-24 @ 08:00) (15 - 16)  SpO2: 97% (04-19-24 @ 08:00) (96% - 97%)      Patient On (Oxygen Delivery Method): room air    PHYSICAL EXAM:  on exam aao x3, no apparant distress  both lungs clear  s1,s 2 regular  abdomen- soft  no pedal edema  Right sided hemiparesis present    LABS:                        14.1   12.26 )-----------( 478      ( 18 Apr 2024 08:47 )             42.8     04-18    139  |  102  |  21  ----------------------------<  108<H>  4.1   |  31  |  0.97    Ca    9.2      18 Apr 2024 08:47    TPro  7.5  /  Alb  3.1<L>  /  TBili  0.4  /  DBili  x   /  AST  33  /  ALT  51<H>  /  AlkPhos  83  04-18      Urinalysis Basic - ( 18 Apr 2024 08:47 )    Color: x / Appearance: x / SG: x / pH: x  Gluc: 108 mg/dL / Ketone: x  / Bili: x / Urobili: x   Blood: x / Protein: x / Nitrite: x   Leuk Esterase: x / RBC: x / WBC x   Sq Epi: x / Non Sq Epi: x / Bacteria: x       CAPILLARY BLOOD GLUCOSE    Urinalysis Basic - ( 18 Apr 2024 08:47 )    Color: x / Appearance: x / SG: x / pH: x  Gluc: 108 mg/dL / Ketone: x  / Bili: x / Urobili: x   Blood: x / Protein: x / Nitrite: x   Leuk Esterase: x / RBC: x / WBC x   Sq Epi: x / Non Sq Epi: x / Bacteria: x      RADIOLOGY & ADDITIONAL TESTS:    Consultant(s) Notes Reviewed:  [x ] YES  [ ] NO  Care Discussed with Consultants/Other Providers [ x] YES  [ ] NO  Imaging Personally Reviewed:  [x ] YES  [ ] NO      Urinalysis Basic - ( 16 Apr 2024 06:12 )    Color: x / Appearance: x / SG: x / pH: x  Gluc: 110 mg/dL / Ketone: x  / Bili: x / Urobili: x   Blood: x / Protein: x / Nitrite: x   Leuk Esterase: x / RBC: x / WBC x   Sq Epi: x / Non Sq Epi: x / Bacteria: x       CAPILLARY BLOOD GLUCOSE            Urinalysis Basic - ( 16 Apr 2024 06:12 )    Color: x / Appearance: x / SG: x / pH: x  Gluc: 110 mg/dL / Ketone: x  / Bili: x / Urobili: x   Blood: x / Protein: x / Nitrite: x   Leuk Esterase: x / RBC: x / WBC x   Sq Epi: x / Non Sq Epi: x / Bacteria: x        RADIOLOGY & ADDITIONAL TESTS:    Consultant(s) Notes Reviewed:  [x ] YES  [ ] NO  Care Discussed with Consultants/Other Providers [ x] YES  [ ] NO  Imaging Personally Reviewed:  [x ] YES  [ ] NO      Urinalysis Basic - ( 16 Apr 2024 06:12 )    Color: x / Appearance: x / SG: x / pH: x  Gluc: 110 mg/dL / Ketone: x  / Bili: x / Urobili: x   Blood: x / Protein: x / Nitrite: x   Leuk Esterase: x / RBC: x / WBC x   Sq Epi: x / Non Sq Epi: x / Bacteria: x

## 2024-04-20 PROCEDURE — 99232 SBSQ HOSP IP/OBS MODERATE 35: CPT

## 2024-04-20 RX ADMIN — Medication 8 MILLIGRAM(S): at 21:58

## 2024-04-20 RX ADMIN — Medication 1 TABLET(S): at 11:56

## 2024-04-20 RX ADMIN — ENOXAPARIN SODIUM 40 MILLIGRAM(S): 100 INJECTION SUBCUTANEOUS at 21:58

## 2024-04-20 RX ADMIN — GABAPENTIN 100 MILLIGRAM(S): 400 CAPSULE ORAL at 21:58

## 2024-04-20 RX ADMIN — Medication 0.6 MILLIGRAM(S): at 11:56

## 2024-04-20 RX ADMIN — LOSARTAN POTASSIUM 25 MILLIGRAM(S): 100 TABLET, FILM COATED ORAL at 06:17

## 2024-04-20 RX ADMIN — POLYETHYLENE GLYCOL 3350 17 GRAM(S): 17 POWDER, FOR SOLUTION ORAL at 11:57

## 2024-04-20 RX ADMIN — AMLODIPINE BESYLATE 10 MILLIGRAM(S): 2.5 TABLET ORAL at 06:16

## 2024-04-20 RX ADMIN — PANTOPRAZOLE SODIUM 40 MILLIGRAM(S): 20 TABLET, DELAYED RELEASE ORAL at 06:17

## 2024-04-20 NOTE — PROGRESS NOTE ADULT - SUBJECTIVE AND OBJECTIVE BOX
Cc: Gait dysfunction    HPI: Patient seen and examined at bedside. No acute events overnight. Slept slightly better with Rozerem.  Pain controlled, no chest pain, no N/V, no Fevers/Chills. No other new ROS  Has been tolerating rehabilitation program.    acetaminophen     Tablet .. 650 milliGRAM(s) Oral every 6 hours PRN  amLODIPine   Tablet 10 milliGRAM(s) Oral daily  colchicine 0.6 milliGRAM(s) Oral daily  enoxaparin Injectable 40 milliGRAM(s) SubCutaneous every 24 hours  gabapentin 100 milliGRAM(s) Oral at bedtime  losartan 25 milliGRAM(s) Oral daily  multivitamin 1 Tablet(s) Oral daily  pantoprazole   Suspension 40 milliGRAM(s) Oral before breakfast  polyethylene glycol 3350 17 Gram(s) Oral daily  ramelteon 8 milliGRAM(s) Oral at bedtime  senna 2 Tablet(s) Oral at bedtime      T(C): 37.1 (04-20-24 @ 08:22), Max: 37.1 (04-20-24 @ 08:22)  HR: 98 (04-20-24 @ 08:22) (91 - 98)  BP: 114/73 (04-20-24 @ 08:22) (114/73 - 130/76)  RR: 16 (04-20-24 @ 08:22) (16 - 16)  SpO2: 95% (04-20-24 @ 08:22) (95% - 95%)    In NAD  HEENT- EOMI  Heart- S1S2  Lungs- CTA bl.  Abd- + BS, NT  Ext- No calf pain  Neuro- Exam unchanged    CBC 4/18/24 reviewed  CMP 4/18/24 reviewed  CMP 4/16/24 reviewed      Imp: Patient with diagnosis of IPH admitted for comprehensive acute rehabilitation.    Plan:  - Continue PT/OT/SLP as indicated  - DVT prophylaxis - Continue Lovenox  - Skin- Turn q2h, check skin daily  - Continue current medications  -Active issues-   Pain - Continue Tylenol PRN, Gabapentin 100mg Qhs (can increase to 300mg Qhs if needed)  Poor sleep- continue Rozerem 8mg Qhs.   - Patient is stable to continue current rehabilitation program.

## 2024-04-20 NOTE — PROGRESS NOTE ADULT - SUBJECTIVE AND OBJECTIVE BOX
Hospitalist: Dexter Warren DO    CHIEF COMPLAINT: Patient is a 59y old  male who presents with a chief complaint of left Intraparenchymal Hemorrhage (20 Apr 2024 13:55)      SUBJECTIVE / OVERNIGHT EVENTS: Patient seen and examined. No acute events overnight. Pain well controlled and patient without any complaints.    MEDICATIONS  (STANDING):  amLODIPine   Tablet 10 milliGRAM(s) Oral daily  colchicine 0.6 milliGRAM(s) Oral daily  enoxaparin Injectable 40 milliGRAM(s) SubCutaneous every 24 hours  gabapentin 100 milliGRAM(s) Oral at bedtime  losartan 25 milliGRAM(s) Oral daily  multivitamin 1 Tablet(s) Oral daily  pantoprazole   Suspension 40 milliGRAM(s) Oral before breakfast  polyethylene glycol 3350 17 Gram(s) Oral daily  ramelteon 8 milliGRAM(s) Oral at bedtime  senna 2 Tablet(s) Oral at bedtime    MEDICATIONS  (PRN):  acetaminophen     Tablet .. 650 milliGRAM(s) Oral every 6 hours PRN Mild Pain (1 - 3)      VITALS:  T(F): 98.8 (04-20-24 @ 08:22), Max: 98.8 (04-20-24 @ 08:22)  HR: 98 (04-20-24 @ 08:22) (91 - 98)  BP: 114/73 (04-20-24 @ 08:22) (114/73 - 130/76)  RR: 16 (04-20-24 @ 08:22) (16 - 16)  SpO2: 95% (04-20-24 @ 08:22)      REVIEW OF SYSTEMS:  For ROV please refer back to H&P     PHYSICAL EXAM:  on exam aao x3, no apparant distress  both lungs clear  s1,s 2 regular  abdomen- soft  no pedal edema  Right sided hemiparesis present      RADIOLOGY & ADDITIONAL TESTS:    Imaging Personally Reviewed:    [X] Consultant(s) Notes Reviewed:  [X] Care Discussed with Consultants/Other Providers:

## 2024-04-21 PROCEDURE — 99232 SBSQ HOSP IP/OBS MODERATE 35: CPT

## 2024-04-21 RX ORDER — TRAZODONE HCL 50 MG
25 TABLET ORAL AT BEDTIME
Refills: 0 | Status: DISCONTINUED | OUTPATIENT
Start: 2024-04-21 | End: 2024-05-09

## 2024-04-21 RX ORDER — GABAPENTIN 400 MG/1
300 CAPSULE ORAL AT BEDTIME
Refills: 0 | Status: DISCONTINUED | OUTPATIENT
Start: 2024-04-21 | End: 2024-05-02

## 2024-04-21 RX ORDER — GABAPENTIN 400 MG/1
100 CAPSULE ORAL DAILY
Refills: 0 | Status: DISCONTINUED | OUTPATIENT
Start: 2024-04-21 | End: 2024-05-05

## 2024-04-21 RX ADMIN — Medication 25 MILLIGRAM(S): at 21:41

## 2024-04-21 RX ADMIN — Medication 1 TABLET(S): at 11:24

## 2024-04-21 RX ADMIN — Medication 650 MILLIGRAM(S): at 11:27

## 2024-04-21 RX ADMIN — Medication 0.6 MILLIGRAM(S): at 11:24

## 2024-04-21 RX ADMIN — Medication 650 MILLIGRAM(S): at 11:25

## 2024-04-21 RX ADMIN — GABAPENTIN 100 MILLIGRAM(S): 400 CAPSULE ORAL at 11:24

## 2024-04-21 RX ADMIN — ENOXAPARIN SODIUM 40 MILLIGRAM(S): 100 INJECTION SUBCUTANEOUS at 21:41

## 2024-04-21 RX ADMIN — LOSARTAN POTASSIUM 25 MILLIGRAM(S): 100 TABLET, FILM COATED ORAL at 06:16

## 2024-04-21 RX ADMIN — PANTOPRAZOLE SODIUM 40 MILLIGRAM(S): 20 TABLET, DELAYED RELEASE ORAL at 06:16

## 2024-04-21 RX ADMIN — GABAPENTIN 300 MILLIGRAM(S): 400 CAPSULE ORAL at 21:41

## 2024-04-21 RX ADMIN — AMLODIPINE BESYLATE 10 MILLIGRAM(S): 2.5 TABLET ORAL at 06:16

## 2024-04-21 NOTE — PROGRESS NOTE ADULT - SUBJECTIVE AND OBJECTIVE BOX
Cc: Gait dysfunction    HPI: Patient seen and examined at bedside. No acute events overnight. Complains of some increased RUE pain today. No new weakness or  neuro changes. Pain otherwise controlled, no chest pain, no N/V, no Fevers/Chills. No other new ROS  Has been tolerating rehabilitation program.    acetaminophen     Tablet .. 650 milliGRAM(s) Oral every 6 hours PRN  amLODIPine   Tablet 10 milliGRAM(s) Oral daily  colchicine 0.6 milliGRAM(s) Oral daily  enoxaparin Injectable 40 milliGRAM(s) SubCutaneous every 24 hours  gabapentin 300 milliGRAM(s) Oral at bedtime  gabapentin 100 milliGRAM(s) Oral daily  losartan 25 milliGRAM(s) Oral daily  multivitamin 1 Tablet(s) Oral daily  pantoprazole   Suspension 40 milliGRAM(s) Oral before breakfast  polyethylene glycol 3350 17 Gram(s) Oral daily  senna 2 Tablet(s) Oral at bedtime  traZODone 25 milliGRAM(s) Oral at bedtime      T(C): 36.7 (04-21-24 @ 09:00), Max: 36.9 (04-20-24 @ 20:36)  HR: 83 (04-21-24 @ 09:00) (83 - 100)  BP: 116/77 (04-21-24 @ 09:00) (116/77 - 125/83)  RR: 16 (04-21-24 @ 09:00) (16 - 16)  SpO2: 98% (04-21-24 @ 09:00) (94% - 98%)        In NAD  HEENT- EOMI  Heart- S1S2  Lungs- CTA bl.  Abd- + BS, NT  Ext- No calf pain  Neuro- Exam unchanged    MR Brain 4/10/24 reviewed and interpreted by me:  left thalamocapsular hemorrhage seen    ACC: [id number] EXAM: MR BRAIN [facility] IC ORDERED BY: EARLENE CORRALES    PROCEDURE DATE: 04/10/2024        INTERPRETATION: CLINICAL INDICATION: Thalamic hemorrhage (r/o vasc malformation).    TECHNIQUE: Multi-planar multi-sequential MR imaging of the brain was performed before and after the intravenous administration of contrast. Gadavist IV contrast dose: 8 cc administered.    COMPARISON: CT head 4/7/2024.    FINDINGS:  Ongoing expected evolution of left thalamocapsular hemorrhage measuring up to 3.8 x 2.1 x 4.2 cm. There is peripheral T1 hyperintensity, but no evidence for significant true postcontrast enhancement. No associated vascular lesion is identified. Surrounding edema is again seen with partial effacement of the third ventricle. There is no hydrocephalus.    Multiple chronic microhemorrhages are noted in the right thalamus, basal ganglia, and parieto-occipital white matter on susceptibility weighted imaging.    Small acute/subacute infarcts are seen in the right caudate tail and right periatrial white matter (5-54, 50).     No extra-axial fluid collections. The skull base flow voids are present.    The visualized intraorbital contents are unremarkable. Mild mucosal thickening in the ethmoid air cells. Bilateral mastoid effusions are present. The visualized osseous structures, soft tissues and partially visualized parotid glands appear normal.    IMPRESSION:    -Ongoing expected evolution of left thalamocapsular hemorrhage measuring up to 4.2 cm. No underlying mass lesion or vascular malformation is identified.    -Chronic microhemorrhages in the right thalamus and basal ganglia, compatible with background of chronic hypertensive microangiopathy.    -Small acute infarcts in the right caudate tail and periatrial white matter.    --- End of Report ---            MARGARETH FAUST MD; Attending Radiologist  This document has been electronically signed. Apr 11 2024 1:57PM      Imp: Patient with diagnosis of IPH admitted for comprehensive acute rehabilitation.    Plan:  - Continue PT/OT/SLP as indicated  - DVT prophylaxis - Continue Lovenox  - Skin- Turn q2h, check skin daily  - Continue current medications  -Active issues-   Pain - Continue Tylenol PRN, Will increase Gabapentin to 300mg Qhs.   Poor sleep- continue Rozerem 8mg Qhs.   - Patient is stable to continue current rehabilitation program.

## 2024-04-21 NOTE — PROGRESS NOTE ADULT - SUBJECTIVE AND OBJECTIVE BOX
Hospitalist: Dexter Warren DO    CHIEF COMPLAINT: Patient is a 59y old  male who presents with a chief complaint of left Intraparenchymal Hemorrhage (21 Apr 2024 11:44)      SUBJECTIVE / OVERNIGHT EVENTS: Patient seen and examined. No acute events overnight. Pain well controlled and patient without any complaints.    MEDICATIONS  (STANDING):  amLODIPine   Tablet 10 milliGRAM(s) Oral daily  colchicine 0.6 milliGRAM(s) Oral daily  enoxaparin Injectable 40 milliGRAM(s) SubCutaneous every 24 hours  gabapentin 300 milliGRAM(s) Oral at bedtime  gabapentin 100 milliGRAM(s) Oral daily  losartan 25 milliGRAM(s) Oral daily  multivitamin 1 Tablet(s) Oral daily  pantoprazole   Suspension 40 milliGRAM(s) Oral before breakfast  polyethylene glycol 3350 17 Gram(s) Oral daily  senna 2 Tablet(s) Oral at bedtime  traZODone 25 milliGRAM(s) Oral at bedtime    MEDICATIONS  (PRN):  acetaminophen     Tablet .. 650 milliGRAM(s) Oral every 6 hours PRN Mild Pain (1 - 3)      VITALS:  T(F): 98.1 (04-21-24 @ 09:00), Max: 98.4 (04-20-24 @ 20:36)  HR: 83 (04-21-24 @ 09:00) (83 - 100)  BP: 116/77 (04-21-24 @ 09:00) (116/77 - 125/83)  RR: 16 (04-21-24 @ 09:00) (16 - 16)  SpO2: 98% (04-21-24 @ 09:00)      REVIEW OF SYSTEMS:  For ROV please refer back to H&P     PHYSICAL EXAM:  on exam aao x3, no apparant distress  both lungs clear  s1,s 2 regular  abdomen- soft  no pedal edema  Right sided hemiparesis present    RADIOLOGY & ADDITIONAL TESTS:    Imaging Personally Reviewed:    [X] Consultant(s) Notes Reviewed:  [X] Care Discussed with Consultants/Other Providers:

## 2024-04-22 LAB
ALBUMIN SERPL ELPH-MCNC: 3 G/DL — LOW (ref 3.3–5)
ALP SERPL-CCNC: 77 U/L — SIGNIFICANT CHANGE UP (ref 40–120)
ALT FLD-CCNC: 45 U/L — SIGNIFICANT CHANGE UP (ref 10–45)
ANION GAP SERPL CALC-SCNC: 5 MMOL/L — SIGNIFICANT CHANGE UP (ref 5–17)
AST SERPL-CCNC: 34 U/L — SIGNIFICANT CHANGE UP (ref 10–40)
BASOPHILS # BLD AUTO: 0.09 K/UL — SIGNIFICANT CHANGE UP (ref 0–0.2)
BASOPHILS NFR BLD AUTO: 1 % — SIGNIFICANT CHANGE UP (ref 0–2)
BILIRUB SERPL-MCNC: 0.3 MG/DL — SIGNIFICANT CHANGE UP (ref 0.2–1.2)
BUN SERPL-MCNC: 22 MG/DL — SIGNIFICANT CHANGE UP (ref 7–23)
CALCIUM SERPL-MCNC: 8.9 MG/DL — SIGNIFICANT CHANGE UP (ref 8.4–10.5)
CHLORIDE SERPL-SCNC: 102 MMOL/L — SIGNIFICANT CHANGE UP (ref 96–108)
CO2 SERPL-SCNC: 31 MMOL/L — SIGNIFICANT CHANGE UP (ref 22–31)
CREAT SERPL-MCNC: 0.83 MG/DL — SIGNIFICANT CHANGE UP (ref 0.5–1.3)
EGFR: 102 ML/MIN/1.73M2 — SIGNIFICANT CHANGE UP
EOSINOPHIL # BLD AUTO: 1.24 K/UL — HIGH (ref 0–0.5)
EOSINOPHIL NFR BLD AUTO: 14.2 % — HIGH (ref 0–6)
GLUCOSE SERPL-MCNC: 100 MG/DL — HIGH (ref 70–99)
HCT VFR BLD CALC: 42.9 % — SIGNIFICANT CHANGE UP (ref 39–50)
HGB BLD-MCNC: 14 G/DL — SIGNIFICANT CHANGE UP (ref 13–17)
IMM GRANULOCYTES NFR BLD AUTO: 0.9 % — SIGNIFICANT CHANGE UP (ref 0–0.9)
LYMPHOCYTES # BLD AUTO: 0.89 K/UL — LOW (ref 1–3.3)
LYMPHOCYTES # BLD AUTO: 10.2 % — LOW (ref 13–44)
MCHC RBC-ENTMCNC: 28.6 PG — SIGNIFICANT CHANGE UP (ref 27–34)
MCHC RBC-ENTMCNC: 32.6 GM/DL — SIGNIFICANT CHANGE UP (ref 32–36)
MCV RBC AUTO: 87.7 FL — SIGNIFICANT CHANGE UP (ref 80–100)
MONOCYTES # BLD AUTO: 0.72 K/UL — SIGNIFICANT CHANGE UP (ref 0–0.9)
MONOCYTES NFR BLD AUTO: 8.2 % — SIGNIFICANT CHANGE UP (ref 2–14)
NEUTROPHILS # BLD AUTO: 5.71 K/UL — SIGNIFICANT CHANGE UP (ref 1.8–7.4)
NEUTROPHILS NFR BLD AUTO: 65.5 % — SIGNIFICANT CHANGE UP (ref 43–77)
NRBC # BLD: 0 /100 WBCS — SIGNIFICANT CHANGE UP (ref 0–0)
PLATELET # BLD AUTO: 408 K/UL — HIGH (ref 150–400)
POTASSIUM SERPL-MCNC: 4.2 MMOL/L — SIGNIFICANT CHANGE UP (ref 3.5–5.3)
POTASSIUM SERPL-SCNC: 4.2 MMOL/L — SIGNIFICANT CHANGE UP (ref 3.5–5.3)
PROT SERPL-MCNC: 7.2 G/DL — SIGNIFICANT CHANGE UP (ref 6–8.3)
RBC # BLD: 4.89 M/UL — SIGNIFICANT CHANGE UP (ref 4.2–5.8)
RBC # FLD: 12.7 % — SIGNIFICANT CHANGE UP (ref 10.3–14.5)
SODIUM SERPL-SCNC: 138 MMOL/L — SIGNIFICANT CHANGE UP (ref 135–145)
WBC # BLD: 8.73 K/UL — SIGNIFICANT CHANGE UP (ref 3.8–10.5)
WBC # FLD AUTO: 8.73 K/UL — SIGNIFICANT CHANGE UP (ref 3.8–10.5)

## 2024-04-22 PROCEDURE — 96116 NUBHVL XM PHYS/QHP 1ST HR: CPT

## 2024-04-22 PROCEDURE — 99233 SBSQ HOSP IP/OBS HIGH 50: CPT | Mod: GC

## 2024-04-22 RX ORDER — LOSARTAN POTASSIUM 100 MG/1
25 TABLET, FILM COATED ORAL
Refills: 0 | Status: DISCONTINUED | OUTPATIENT
Start: 2024-04-23 | End: 2024-04-23

## 2024-04-22 RX ORDER — AMLODIPINE BESYLATE 2.5 MG/1
10 TABLET ORAL
Refills: 0 | Status: DISCONTINUED | OUTPATIENT
Start: 2024-04-23 | End: 2024-04-23

## 2024-04-22 RX ADMIN — GABAPENTIN 300 MILLIGRAM(S): 400 CAPSULE ORAL at 21:37

## 2024-04-22 RX ADMIN — Medication 650 MILLIGRAM(S): at 06:30

## 2024-04-22 RX ADMIN — LOSARTAN POTASSIUM 25 MILLIGRAM(S): 100 TABLET, FILM COATED ORAL at 05:50

## 2024-04-22 RX ADMIN — PANTOPRAZOLE SODIUM 40 MILLIGRAM(S): 20 TABLET, DELAYED RELEASE ORAL at 05:50

## 2024-04-22 RX ADMIN — Medication 25 MILLIGRAM(S): at 21:36

## 2024-04-22 RX ADMIN — SENNA PLUS 2 TABLET(S): 8.6 TABLET ORAL at 21:36

## 2024-04-22 RX ADMIN — Medication 650 MILLIGRAM(S): at 05:59

## 2024-04-22 RX ADMIN — Medication 1 TABLET(S): at 12:56

## 2024-04-22 RX ADMIN — Medication 0.6 MILLIGRAM(S): at 12:56

## 2024-04-22 RX ADMIN — GABAPENTIN 100 MILLIGRAM(S): 400 CAPSULE ORAL at 12:56

## 2024-04-22 RX ADMIN — ENOXAPARIN SODIUM 40 MILLIGRAM(S): 100 INJECTION SUBCUTANEOUS at 21:38

## 2024-04-22 RX ADMIN — POLYETHYLENE GLYCOL 3350 17 GRAM(S): 17 POWDER, FOR SOLUTION ORAL at 12:56

## 2024-04-22 RX ADMIN — Medication 650 MILLIGRAM(S): at 21:37

## 2024-04-22 RX ADMIN — Medication 650 MILLIGRAM(S): at 22:30

## 2024-04-22 RX ADMIN — AMLODIPINE BESYLATE 10 MILLIGRAM(S): 2.5 TABLET ORAL at 05:49

## 2024-04-22 NOTE — PROGRESS NOTE ADULT - ASSESSMENT
58y with PMhx Gout found down and revealed to have an intraparenchymal hemorrhage.  Patient now admitted for a multidisciplinary rehab program with right hemiparesis, cognitive deficits, sensory loss, Dysphagia, Dysarthria.      Intraparenchymal Hemorrhage  -MRI reviewed--- demonstrated L thalamic hemorrhage, w/ chronic microhemorrhages, and acute infarcts in the right caudate tail and periatrial white matter  -EKG w/o AF  -Echo unremarkable  - CTA w/o large occlusions  - Comprehensive Multidisciplinary Rehab Program:  -Cont comprehensive rehab program of PT/OT/SLP - 3 hours a day, 5 days a week. P&O as needed     HTN  - losartan 25mg daily  -amlodipine 10mg daily  - /80 (4/22)    Pain, neuropathic pain  -- Tylenol PRN  - gabapentin 300 mg qhs, 100mg daily     Thrombocytosis  - Platelets- 484>478>408 (4/22)    Mood / Cognition  - Neuropsychology consult appreciated  --Recreation therapy     Sleep  - Maintain quiet hours and a low stim environment.   - d/c'd melatonin 9mg   --Trial Rozerem 8mg qhs -- did not work-- dc-- started on trazodone 25 mg qhs-- helping    GI / Bowel  - Senna qHS  - Miralax Daily  - GI ppx: protonix     / Bladder  - Voiding with low PVRs  --condom catheter at night to avoid pt. be awoken to be changed.     Skin / Pressure injury  - Skin assessment on admission performed [X ] :   - Monitor Incisions:  none  - Turn q2 hours in bed while awake, air mattress  - nursing to monitor skin qShift  - soft heel protectors  - skin barrier cream PRN    Dysphagia:  -MBS 4/18-- diet consistency still minced and moist but upgraded to thin liquids  --On Dysphagia Free Water Protocol  - Supplements: multivitamin  - Aspiration Precautions  - SLP consult for swallow function evaluation and treatment  - Nutrition consult    DVT prophylaxis:   - Lovenox 40mg   - Last Doppler on 4/15--Neg.     Total time 50 mins-face to face time encounter and counseling the patient, meeting with hospitalist, nursing staff, therapists and social work to discuss medical updates and management, care coordination, reviewing chart and data

## 2024-04-22 NOTE — PROGRESS NOTE ADULT - SUBJECTIVE AND OBJECTIVE BOX
Patient is a 59y old  Male who presents with a chief complaint of left Intraparenchymal Hemorrhage (22 Apr 2024 10:00)      HPI:  58M PMHx gout found down at home on 4/2/24 by wife brought to [facility] by ambulance found to have a L thalamic hemorrhagic infarct via CT. Patient intubated for inability to protect airway. MRI demonstrated hemorrhage with anticipated evolution. Chronic microhemorrhages found in the R thalamus and BG as well as small acute infarcts in the right caudate tail and periatrial white matter.  CTA clear of major vessel occlusion or stenosis. Patient with residual R hemiparesis, dysarthria, mild aphasia, and R gaze palsy. EEG negative throughout workup. EKG without AF. TTE grossly normal. Gout flare treated by rheumatology with colchicine while inpatient. Patient received PM&R consult who recommended acute inpatient rehabilitation. Patient was medically stabilized and discharged to [facility].  (15 Apr 2024 14:16)    SUBJECTIVE: Patient seen and examined. Upset that he was woken up early by staff to take his BP.     REVIEW OF SYSTEMS  Constitutional: No fever, + fatigue  Cardio: No chest pain, No palpitations  Resp: No SOB, no respiratory distress   Neuro: No headaches +weakness    VITALS  59y  Vital Signs Last 24 Hrs  T(C): 36.7 (22 Apr 2024 08:21), Max: 36.7 (22 Apr 2024 08:21)  T(F): 98.1 (22 Apr 2024 08:21), Max: 98.1 (22 Apr 2024 08:21)  HR: 100 (22 Apr 2024 08:21) (96 - 100)  BP: 120/80 (22 Apr 2024 08:21) (113/73 - 120/80)  RR: 15 (22 Apr 2024 08:21) (15 - 16)  SpO2: 96% (22 Apr 2024 08:21) (94% - 96%)  Parameters below as of 22 Apr 2024 08:21  Patient On (Oxygen Delivery Method): room air      PHYSICAL EXAM:   Gen - NAD, Comfortable  HEENT - NCAT, EOMI  Pulm - breathing comfortably on RA  Cardiovascular - warm well perfused  Abdomen - Soft, NT/ND,   Extremities - No edema, No calf tenderness  Neuro-     Cognitive - AAOx3     Communication - Fluent, +dysarthria     Attention: Impaired, distractable, perseverative     Judgement: impaired     Cranial Nerves +eft sided facial droop, Sensation intact, Dysphagia, dysarthria     Motor -                     LEFT    UE - ShAB 5/5, EF 5/5, EE 5/5, WE 5/5,  5/5                    RIGHT UE - ShAB 0/5, EF 0/5, EE 0/5, WE 0/5,  0/5                    LEFT    LE - HF 5/5, KE 5/5, DF 5/5, PF 5/5                    RIGHT LE - HF 1/5, KE 1/5, DF 0/5, PF 0/5        Sensory - present but diminished on RUE and RLE     Reflexes - reduced reflexes on the RUE and RLE  Psychiatric - Mood stable    RECENT LABS:                        14.0   8.73  )-----------( 408      ( 22 Apr 2024 05:44 )             42.9     04-22    138  |  102  |  22  ----------------------------<  100<H>  4.2   |  31  |  0.83    Ca    8.9      22 Apr 2024 05:44    TPro  7.2  /  Alb  3.0<L>  /  TBili  0.3  /  DBili  x   /  AST  34  /  ALT  45  /  AlkPhos  77  04-22    LIVER FUNCTIONS - ( 22 Apr 2024 05:44 )  Alb: 3.0 g/dL / Pro: 7.2 g/dL / ALK PHOS: 77 U/L / ALT: 45 U/L / AST: 34 U/L / GGT: x             Urinalysis Basic - ( 22 Apr 2024 05:44 )    Color: x / Appearance: x / SG: x / pH: x  Gluc: 100 mg/dL / Ketone: x  / Bili: x / Urobili: x   Blood: x / Protein: x / Nitrite: x   Leuk Esterase: x / RBC: x / WBC x   Sq Epi: x / Non Sq Epi: x / Bacteria: x          CAPILLARY BLOOD GLUCOSE            MEDICATIONS:  MEDICATIONS  (STANDING):  amLODIPine   Tablet 10 milliGRAM(s) Oral daily  colchicine 0.6 milliGRAM(s) Oral daily  enoxaparin Injectable 40 milliGRAM(s) SubCutaneous every 24 hours  gabapentin 300 milliGRAM(s) Oral at bedtime  gabapentin 100 milliGRAM(s) Oral daily  losartan 25 milliGRAM(s) Oral daily  multivitamin 1 Tablet(s) Oral daily  pantoprazole   Suspension 40 milliGRAM(s) Oral before breakfast  polyethylene glycol 3350 17 Gram(s) Oral daily  senna 2 Tablet(s) Oral at bedtime  traZODone 25 milliGRAM(s) Oral at bedtime    MEDICATIONS  (PRN):  acetaminophen     Tablet .. 650 milliGRAM(s) Oral every 6 hours PRN Mild Pain (1 - 3)

## 2024-04-22 NOTE — CONSULT NOTE ADULT - ASSESSMENT
58y with PMH Gout found down and revealed to have an intraparenchymal hemorrhage.  Patient now admitted for a multidisciplinary rehab program with right hemiparesis    #Intraparenchymal Hemorrhage  -MRI demonstrated L thalamic hemorrhage, w/ chronic microhemorrhages, and acute infarcts in the right caudate tail and periatrial white matter  -EKG w/o AF  -Echo unremarkable  -CTA w/o large occlusions  -Start Comprehensive Multidisciplinary Rehab Program    #HTN  - losartan 25mg daily  -amlodipine 10mg daily  - Monitor BP    #Gout  -c/w colchicine     #Dysphagia:  - Diet Consistency: diet, minced and moist  - Aspiration Precautions  - SLP consult for swallow function evaluation and treatment    #DVT prophylaxis:   -lovenox  
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 Pt exhibits difficulties with concentration/working memory, long-term/declarative memory, aspects of language (naming, fluency, repetition) and executive functions (including problem solving, abstract reasoning and initiation). Pt's graphomotor skills are impeded due to right hemiparesis which also makes difficult the assessment of his visuomotor skills. Pt's affect is depressed and he reports a few adjustment symptoms in response to his current medical condition. Pt also appears to have decreased self-awareness. FIM scores: Social Interaction 5; Problem Solving 5; Memory 5.    Plan: Individual supportive psychotherapy to monitor cognition, affect/mood, and behavior.  Cognitive remediation during speech therapy and occupational therapy sessions is recommended. Participation in recreation/art therapy in order to have pleasure and mastery experiences and regain/reestablish a sense of routine.    Time spent with Pt, 35  minutes.

## 2024-04-22 NOTE — CONSULT NOTE ADULT - SUBJECTIVE AND OBJECTIVE BOX
Pt is a 59 y/o right-handed male with PMHx of gout found down at home on 4/2/24 by wife brought to [facility] by ambulance found to have a L thalamic hemorrhagic infarct via CT. Pt intubated for inability to protect airway. MRI demonstrated hemorrhage with anticipated evolution. Chronic microhemorrhages found in the R thalamus and BG as well as small acute infarcts in the right caudate tail and periatrial white matter.  CTA clear of major vessel occlusion or stenosis. Pt with residual right hemiparesis, dysarthria, mild aphasia, and R gaze palsy. EEG negative throughout workup. EKG without AF. TTE grossly normal. Gout flare treated by rheumatology with colchicine while inpatient. Pt received PM&R consult who recommended acute inpatient rehabilitation. Patient was medically stabilized and discharged to [facility]. PMHx: As noted above. Current meds: Please see list in Pt’s chart. Social Hx: Pt is  and kolb two children. Pt has master's level education ([employer]AC[employer]) and is an . Pt lacks hx of mental illness and substance abuse. Pt is [employer].  Pt enjoys shopping.    Findings: Pt was seen for an initial assessment of his cognitive and emotional functioning. The Modified MMSE (3MS) was administered; Pt’s results (67/100) were in the Severe Impairment range. His scores in standardized  mood measures suggested minimal levels of anxiety and depression (Anxiety, GAD7 = 1/21; Depression, PHQ9 = 2/27). Pt was alert, fully Ox3, and his attitude was cooperative. Attn/Conc: Simple auditory attention - intact.  Concentration/Working memory for reversed counting and spelling – mildly impaired (2/7, Pt had difficulty with reversed spelling). Language: Pt’s speech was of normal volume, pitch and pace. Naming - mildly impaired (3/5 body parts correctly named). Sentence repetition - impaired (2/5, difficulty with . Auditory Comprehension - . Reading - . Writing - . Memory: Encoding of 3 words was (/3); short-delayed verbal recall – (/3, improving to /3 with cueing); long-delayed verbal recall – (/3, improving to /3 with cueing). LTM was for US presidents (/4, improving to /4 with cueing). Visual memory –. Visuospatial: Visuomotor integration – for copy of a 2D figure, was noted. Figure-ground discrimination was (/4) for the Poppelreuter figure. Executive Functions: Motor Planning - . Organizational skills – for clock drawing on command. Abstract reasoning – for similarities. Initiation/Phonemic Fluency – (/10 four-legged animals in 30 sec). Verbal problem solving – .   Emotional functioning: Affect - 	. Mood - ; Pt reported experiencing . On mood measures s/he additionally reported .  Thought processes were.  No abnormal thought contents were observed.  Pt denied any AH/VH. Pt also denied SI/HI/I/P. Insight - WFL. Judgment - fair.  Pt's perceived psychosocial impact of his/her symptoms of Parkinson's disease during the past month (SCOPA-PS) indicates that s/he experiences: .  On a measure of the impact of disability on participation in the different life tasks (WHODAS 2.0 12-item version) Pt's endorsements indicate: .    Assessment:  FIM scores: Social Interaction ; Problem Solving ; Memory .  Plan: Individual supportive psychotherapy to monitor cognition, affect/mood, and behavior. Cognitive remediation during speech therapy sessions. Participation in recreation/art therapy in order to have pleasure and mastery experiences and regain/reestablish a sense of routine.  Time spent with Pt,  minutes.   Pt is a 57 y/o right-handed male with PMHx of gout found down at home on 4/2/24 by wife brought to [facility] by ambulance found to have a L thalamic hemorrhagic infarct via CT. Pt intubated for inability to protect airway. MRI demonstrated hemorrhage with anticipated evolution. Chronic microhemorrhages found in the R thalamus and BG as well as small acute infarcts in the right caudate tail and periatrial white matter.  CTA clear of major vessel occlusion or stenosis. Pt with residual right hemiparesis, dysarthria, mild aphasia, and R gaze palsy. EEG negative throughout workup. EKG without AF. TTE grossly normal. Gout flare treated by rheumatology with colchicine while inpatient. Pt received PM&R consult who recommended acute inpatient rehabilitation. Patient was medically stabilized and discharged to [facility]. PMHx: As noted above. Current meds: Trazodone 25 mg HS. Please see list in Pt’s chart. Social Hx: Pt is  and kolb two children. Pt has master's level education (JACQUELINE) and is an . Pt lacks hx of mental illness and substance abuse. Pt is [employer].  Pt enjoys shopping.    Findings: Pt was seen for an initial assessment of his cognitive and emotional functioning. The Modified MMSE (3MS) was administered; Pt’s results (67/100) were in the Severe Impairment range. His scores in standardized  mood measures suggested minimal levels of anxiety and depression (Anxiety, GAD7 = 1/21; Depression, PHQ9 = 2/27). Pt was alert, fully Ox3, and his attitude was cooperative. Attn/Conc: Simple auditory attention - intact.  Concentration/Working memory for reversed counting and spelling – mildly impaired (2/7, Pt had difficulty with reversed spelling). Language: Pt’s speech was of normal volume, pitch and pace. Naming - mildly impaired (3/5 body parts correctly named). Sentence repetition - impaired (2/5, difficulty with . Auditory Comprehension - mildly impaired (2/3, failed to perform 1 of a 3 step command). Reading - intact. Writing - not assessed, impeded by right hemiparesis. Memory: Encoding of 3 words was intact (3/3); short- and long-delayed verbal recall –both intact (3/3). LTM moderately impaired was for US presidents (2/4, improving to 4/4 with cueing). Visual memory – not assessed. Visuospatial: Visuomotor integration – not assessed, impeded by right hemiparesis. Figure-ground discrimination was impaired (5/4) for the Poppelreuter figure. Executive Functions: Motor Planning - mildly impaired (2/3). Organizational skills – not assessed. Abstract reasoning – moderately impaired for similarities (3/6). Initiation/Phonemic Fluency – moderately impaired (3/10 four-legged animals in 30 sec). Verbal problem solving – moderately impaired for 3 hypothetical scenarios. Emotional functioning: Affect - depressed. Mood - hungry; Pt reported experiencing insomnia. On mood measures he additionally reported depressed mood, insomnia, and difficulty relaxing. Thought processes were relatively goal-directed. No abnormal thought contents were observed.  Pt denied any AH/VH. Pt also denied SI/HI/I/P. Insight - WFL. Judgment - poor to fair.

## 2024-04-23 PROCEDURE — 99233 SBSQ HOSP IP/OBS HIGH 50: CPT | Mod: GC

## 2024-04-23 PROCEDURE — 99233 SBSQ HOSP IP/OBS HIGH 50: CPT

## 2024-04-23 RX ORDER — AMLODIPINE BESYLATE 2.5 MG/1
5 TABLET ORAL DAILY
Refills: 0 | Status: DISCONTINUED | OUTPATIENT
Start: 2024-04-24 | End: 2024-05-09

## 2024-04-23 RX ORDER — METOPROLOL TARTRATE 50 MG
12.5 TABLET ORAL
Refills: 0 | Status: DISCONTINUED | OUTPATIENT
Start: 2024-04-23 | End: 2024-04-25

## 2024-04-23 RX ADMIN — GABAPENTIN 300 MILLIGRAM(S): 400 CAPSULE ORAL at 21:23

## 2024-04-23 RX ADMIN — SENNA PLUS 2 TABLET(S): 8.6 TABLET ORAL at 21:23

## 2024-04-23 RX ADMIN — Medication 0.6 MILLIGRAM(S): at 11:44

## 2024-04-23 RX ADMIN — Medication 25 MILLIGRAM(S): at 21:23

## 2024-04-23 RX ADMIN — Medication 1 TABLET(S): at 11:44

## 2024-04-23 RX ADMIN — POLYETHYLENE GLYCOL 3350 17 GRAM(S): 17 POWDER, FOR SOLUTION ORAL at 11:44

## 2024-04-23 RX ADMIN — Medication 12.5 MILLIGRAM(S): at 17:29

## 2024-04-23 RX ADMIN — ENOXAPARIN SODIUM 40 MILLIGRAM(S): 100 INJECTION SUBCUTANEOUS at 21:23

## 2024-04-23 RX ADMIN — GABAPENTIN 100 MILLIGRAM(S): 400 CAPSULE ORAL at 11:44

## 2024-04-23 NOTE — PROGRESS NOTE ADULT - SUBJECTIVE AND OBJECTIVE BOX
Patient is a 59y old  Male who presents with a chief complaint of left Intraparenchymal Hemorrhage     HPI:  58M PMHx gout found down at home on 4/2/24 by wife brought to [facility] by ambulance found to have a L thalamic hemorrhagic infarct via CT. Patient intubated for inability to protect airway. MRI demonstrated hemorrhage with anticipated evolution. Chronic microhemorrhages found in the R thalamus and BG as well as small acute infarcts in the right caudate tail and periatrial white matter.  CTA clear of major vessel occlusion or stenosis. Patient with residual R hemiparesis, dysarthria, mild aphasia, and R gaze palsy. EEG negative throughout workup. EKG without AF. TTE grossly normal. Gout flare treated by rheumatology with colchicine while inpatient. Patient received PM&R consult who recommended acute inpatient rehabilitation. Patient was medically stabilized and discharged to [facility].  (15 Apr 2024 14:16)    SUBJECTIVE: Patient seen and examined while sitting in tilt in space WC.  Didn't get to sleep despite not being bothered by medications or VS.  Endorses that he had sharp pain to RLE that started in evening.  Educated on neuropathic pain and stroke recovery.  Gabapentin 300 only x 2 days, will consider increasing after a few more day.  Denies dizziness despite softer BP.    REVIEW OF SYSTEMS  +cough from secretion pooling in mouth (nonproductive)  +Right sided weakness  +RLE neuropathic pain (only at night)    VITALS  59y  Vital Signs Last 24 Hrs  T(C): 37.1 (04-23-24 @ 08:46), Max: 37.1 (04-23-24 @ 08:46)  T(F): 98.8 (04-23-24 @ 08:46), Max: 98.8 (04-23-24 @ 08:46)  HR: 116 (04-23-24 @ 08:46) (108 - 116)  BP: 109/72 (04-23-24 @ 08:46) (94/67 - 109/72)  RR: 16 (04-23-24 @ 08:46) (16 - 16)  SpO2: 97% (04-23-24 @ 08:46) (97% - 97%)    PHYSICAL EXAM:   Gen - NAD, Comfortable  HEENT - NCAT, EOMI  Pulm - resp nonlabored  Cardiovascular - warm well perfused  Abdomen - Soft, NT/ND,   Extremities - No edema, No calf tenderness  Neuro-     Cognitive - AAOx3     Communication - Fluent, +dysarthria     Attention: Impaired, easily distracted, perseverative     Judgement: impaired     Cranial Nerves +eft sided facial droop, Sensation intact, Dysphagia, dysarthria     Motor -                     LEFT    UE - ShAB 5/5, EF 5/5, EE 5/5, WE 5/5,  5/5                    RIGHT UE - ShAB 0/5, EF 0/5, EE 0/5, WE 0/5,  0/5                    LEFT    LE - HF 5/5, KE 5/5, DF 5/5, PF 5/5                    RIGHT LE - HF 1/5, KE 1/5, DF 0/5, PF 0/5        Sensory - present but diminished on RUE and RLE     Reflexes - reduced reflexes on the RUE and RLE  Psychiatric - Mood stable    RECENT LABS:                        14.0   8.73  )-----------( 408      ( 22 Apr 2024 05:44 )             42.9     04-22    138  |  102  |  22  ----------------------------<  100<H>  4.2   |  31  |  0.83    Ca    8.9      22 Apr 2024 05:44    TPro  7.2  /  Alb  3.0<L>  /  TBili  0.3  /  DBili  x   /  AST  34  /  ALT  45  /  AlkPhos  77  04-22    LIVER FUNCTIONS - ( 22 Apr 2024 05:44 )  Alb: 3.0 g/dL / Pro: 7.2 g/dL / ALK PHOS: 77 U/L / ALT: 45 U/L / AST: 34 U/L / GGT: x           MEDICATIONS:  MEDICATIONS  (STANDING):  amLODIPine   Tablet 10 milliGRAM(s) Oral with breakfast  colchicine 0.6 milliGRAM(s) Oral daily  enoxaparin Injectable 40 milliGRAM(s) SubCutaneous every 24 hours  gabapentin 300 milliGRAM(s) Oral at bedtime  gabapentin 100 milliGRAM(s) Oral daily  multivitamin 1 Tablet(s) Oral daily  polyethylene glycol 3350 17 Gram(s) Oral daily  senna 2 Tablet(s) Oral at bedtime  traZODone 25 milliGRAM(s) Oral at bedtime    MEDICATIONS  (PRN):  acetaminophen     Tablet .. 650 milliGRAM(s) Oral every 6 hours PRN Mild Pain (1 - 3)

## 2024-04-23 NOTE — CHART NOTE - NSCHARTNOTEFT_GEN_A_CORE
Nutrition Follow Up Note  Source: Medical Record [X] Patient [X] Family [ ]         Diet: Minced and moist, single sips only  Pt tolerating diet with fair PO intake Per nursing flowsheets, eating 50-75% of meals Per nursing flowsheets. Pt reports good PO intake. Pt needs assistance with feeding, has been eating in the restorative dining room. Denies nausea, vomiting, diarrhea, constipation.     Enteral/Parenteral Nutrition: N/A    Current Weight: 168.8 lbs (4-15)    Pertinent Medications: MEDICATIONS  (STANDING):  colchicine 0.6 milliGRAM(s) Oral daily  enoxaparin Injectable 40 milliGRAM(s) SubCutaneous every 24 hours  gabapentin 300 milliGRAM(s) Oral at bedtime  gabapentin 100 milliGRAM(s) Oral daily  metoprolol tartrate 12.5 milliGRAM(s) Oral two times a day  multivitamin 1 Tablet(s) Oral daily  polyethylene glycol 3350 17 Gram(s) Oral daily  senna 2 Tablet(s) Oral at bedtime  traZODone 25 milliGRAM(s) Oral at bedtime    MEDICATIONS  (PRN):  acetaminophen     Tablet .. 650 milliGRAM(s) Oral every 6 hours PRN Mild Pain (1 - 3)      Pertinent Labs:  04-22 Na138 mmol/L Glu 100 mg/dL<H> K+ 4.2 mmol/L Cr  0.83 mg/dL BUN 22 mg/dL 04-22 Alb 3.0 g/dL<L> 04-03 Chol 186 mg/dL LDL --    HDL 67 mg/dL Trig 64 mg/dL        Skin: bruised Per nursing flowsheets     Edema: No edema per nursing flow sheets     Last BM: on 4-20 Per nursing flowsheets     Estimated Needs:   [X] No Change since Previous Assessment  [ ] Recalculated:     Previous Nutrition Diagnosis:   Swallowing difficulty    Nutrition Diagnosis is [X] Ongoing  - on minced and moist, SLP following.        New Nutrition Diagnosis: [X] Not Applicable  [ ] Inadequate Protein Energy Intake   [ ] Inadequate Oral Intake   [ ] Excessive Energy Intake   [ ] Increased Nutrient Needs   [ ] Obesity   [ ] Altered GI Function   [ ] Unintended Weight Loss   [ ] Food & Nutrition Related Knowledge Deficit  [ ] Limited Adherence to nutrition related recommendations   [ ] Malnutrition      Interventions:   1. Recommend continuing with current plan of care  2. Follow SLP recommendations  3. Encourage PO intake    Monitoring & Evaluation:   [X] Weights   [X] PO Intake   [X] Follow Up (Per Protocol)  [X] Tolerance to Diet Prescription   [X] Other: Labs    RD Remains Available.  Nella Richter, RD

## 2024-04-23 NOTE — PROGRESS NOTE ADULT - ASSESSMENT
58y with PMhx Gout found down and revealed to have an intraparenchymal hemorrhage.  Patient now admitted for a multidisciplinary rehab program with right hemiparesis, cognitive deficits, sensory loss, Dysphagia, Dysarthria.      Intraparenchymal Hemorrhage  -MRI reviewed--- demonstrated L thalamic hemorrhage, w/ chronic microhemorrhages, and acute infarcts in the right caudate tail and periatrial white matter  -EKG w/o AF  -Echo unremarkable  - CTA w/o large occlusions  - Comprehensive Multidisciplinary Rehab Program:  -Cont comprehensive rehab program of PT/OT/SLP - 3 hours a day, 5 days a week. P&O as needed     HTN  - losartan 25mg daily - dc   - amlodipine 10mg daily  - BP 94/67 (4/23)    Pain, neuropathic pain  -- Tylenol PRN  - Neuropathic agent: gabapentin 300 mg qhs, 100mg daily     Thrombocytosis  - Platelets- 484>478>408 (4/22)    Mood / Cognition  - Neuropsychology consult appreciated  --Recreation therapy     Sleep  - Maintain quiet hours and a low stim environment.   - d/c'd melatonin 9mg   --Trial Rozerem 8mg qhs -- did not work-- dc-- started on trazodone 25 mg qhs-- helping    GI / Bowel  - Senna qHS  - Miralax Daily  - GI ppx: protonix     / Bladder  - Voiding with low PVRs  --condom catheter at night to avoid pt. be awoken to be changed.     Skin / Pressure injury  - Skin assessment on admission performed [X ] :   - Monitor Incisions:  none  - Turn q2 hours in bed while awake, air mattress  - nursing to monitor skin qShift  - soft heel protectors  - skin barrier cream PRN    Dysphagia:  -MBS 4/18-- diet consistency still minced and moist but upgraded to thin liquids  --On Dysphagia Free Water Protocol  - Supplements: multivitamin  - Aspiration Precautions  - SLP consult for swallow function evaluation and treatment  - Nutrition consult    DVT prophylaxis:   - Lovenox 40mg   - Last Doppler on 4/15--Neg.  58y with PMhx Gout found down and revealed to have an intraparenchymal hemorrhage.  Patient now admitted for a multidisciplinary rehab program with right hemiparesis, cognitive deficits, sensory loss, Dysphagia, Dysarthria.      Intraparenchymal Hemorrhage  -MRI reviewed--- demonstrated L thalamic hemorrhage, w/ chronic microhemorrhages, and acute infarcts in the right caudate tail and periatrial white matter  -EKG w/o AF  -Echo unremarkable  - CTA w/o large occlusions  - Comprehensive Multidisciplinary Rehab Program:  -Cont comprehensive rehab program of PT/OT/SLP - 3 hours a day, 5 days a week. P&O as needed     HTN  - losartan 25mg daily - dc   - amlodipine 10mg daily - held, did not meet parameter   - BP 94/67 (4/23)  Tachycardia  - 100-108 (4/23)  - likely anxiety related    Pain, neuropathic pain  -- Tylenol PRN  - Neuropathic agent: gabapentin 300 mg qhs, 100mg daily     Thrombocytosis  - Platelets- 484>478>408 (4/22)    Mood / Cognition  - Neuropsychology consult appreciated  --Recreation therapy     Sleep  - Maintain quiet hours and a low stim environment.   - d/c'd melatonin 9mg   --Trial Rozerem 8mg qhs -- did not work-- dc-- started on trazodone 25 mg qhs-- helping    GI / Bowel  - Senna qHS  - Miralax Daily  - GI ppx: protonix     / Bladder  - Voiding with low PVRs  --condom catheter at night to avoid pt. be awoken to be changed.     Skin / Pressure injury  - Skin assessment on admission performed [X ] :   - Monitor Incisions:  none  - Turn q2 hours in bed while awake, air mattress  - nursing to monitor skin qShift  - soft heel protectors  - skin barrier cream PRN    Dysphagia:  -MBS 4/18-- diet consistency still minced and moist but upgraded to thin liquids  --On Dysphagia Free Water Protocol  - Supplements: multivitamin  - Aspiration Precautions  - SLP consult for swallow function evaluation and treatment  - Nutrition consult    DVT prophylaxis:   - Lovenox 40mg   - Last Doppler on 4/15--Neg.  58y with PMhx Gout found down and revealed to have an intraparenchymal hemorrhage.  Patient now admitted for a multidisciplinary rehab program with right hemiparesis, cognitive deficits, sensory loss, Dysphagia, Dysarthria.      Intraparenchymal Hemorrhage  -MRI reviewed--- demonstrated L thalamic hemorrhage, w/ chronic microhemorrhages, and acute infarcts in the right caudate tail and periatrial white matter  -EKG w/o AF  -Echo unremarkable  - CTA w/o large occlusions  - Comprehensive Multidisciplinary Rehab Program:  -Cont comprehensive rehab program of PT/OT/SLP - 3 hours a day, 5 days a week. P&O as needed     Cardiovascular: HTN  - losartan 25mg daily - dc   - amlodipine 10mg daily - held 4/23), did not meet parameter. Dec 5mg   - BP 94/67 (4/23)  - -108 (4/23) - likely anxiety related  - Add low dose Lopressor 12.5 BID    Pain, neuropathic pain  -- Tylenol PRN  - Neuropathic agent: gabapentin 300 mg qhs, 100mg daily     Thrombocytosis  - Platelets- 484>478>408 (4/22)    Mood / Cognition  - Neuropsychology consult appreciated  --Recreation therapy     Sleep  - Maintain quiet hours and a low stim environment.   - d/c'd melatonin 9mg   --Trial Rozerem 8mg qhs -- did not work-- dc-- started on trazodone 25 mg qhs-- helping    GI / Bowel  - Senna qHS  - Miralax Daily  - GI ppx: protonix     / Bladder  - Voiding with low PVRs  --condom catheter at night to avoid pt. be awoken to be changed.     Skin / Pressure injury  - Skin assessment on admission performed [X ] :   - Monitor Incisions:  none  - Turn q2 hours in bed while awake, air mattress  - nursing to monitor skin qShift  - soft heel protectors  - skin barrier cream PRN    Dysphagia:  -MBS 4/18-- diet consistency still minced and moist but upgraded to thin liquids  --On Dysphagia Free Water Protocol  - Supplements: multivitamin  - Aspiration Precautions  - SLP consult for swallow function evaluation and treatment  - Nutrition consult    DVT prophylaxis:   - Lovenox 40mg   - Last Doppler on 4/15--Neg.  58y with PMhx Gout found down and revealed to have an intraparenchymal hemorrhage.  Patient now admitted for a multidisciplinary rehab program with right hemiparesis, cognitive deficits, sensory loss, Dysphagia, Dysarthria.      Intraparenchymal Hemorrhage  -MRI reviewed--- demonstrated L thalamic hemorrhage, w/ chronic microhemorrhages, and acute infarcts in the right caudate tail and periatrial white matter  -EKG w/o AF  -Echo unremarkable  - CTA w/o large occlusions  - Comprehensive Multidisciplinary Rehab Program:  -Cont comprehensive rehab program of PT/OT/SLP - 3 hours a day, 5 days a week. P&O as needed     Cardiovascular: HTN  - losartan 25mg daily - dc'd  - amlodipine 10mg daily - held 4/23, did not meet parameter. Dec 5mg   - BP 94/67 (4/23)  - -108 (4/23) - likely anxiety related  - Add low dose Lopressor 12.5 BID; discussed with hospitalist     Pain, neuropathic pain, ongoing  -- Tylenol PRN  - Neuropathic agent: gabapentin 300 mg qhs, 100mg daily     Thrombocytosis  - Platelets- 484>478>408 (4/22)    Mood / Cognition  - Neuropsychology consult appreciated  --Recreation therapy     Sleep  - Maintain quiet hours and a low stim environment.   - d/c'd melatonin 9mg   --Trial Rozerem 8mg qhs -- did not work-- dc-- started on trazodone 25 mg qhs-- helping    GI / Bowel  - Senna qHS  - Miralax Daily  - GI ppx: protonix     / Bladder  - Voiding with low PVRs  --condom catheter at night to avoid pt. be awoken to be changed.     Skin / Pressure injury  - Skin assessment on admission performed [X ] :   - Monitor Incisions:  none  - Turn q2 hours in bed while awake, air mattress  - nursing to monitor skin qShift  - soft heel protectors  - skin barrier cream PRN    Dysphagia:  -MBS 4/18-- diet consistency still minced and moist but upgraded to thin liquids  --On Dysphagia Free Water Protocol  - Supplements: multivitamin  - Aspiration Precautions  - SLP consult for swallow function evaluation and treatment  - Nutrition consult    DVT prophylaxis:   - Lovenox 40mg   - Last Doppler on 4/15--Neg.

## 2024-04-23 NOTE — PROGRESS NOTE ADULT - SUBJECTIVE AND OBJECTIVE BOX
Patient is a 59y old  Male who presents with a chief complaint of left Intraparenchymal Hemorrhage (23 Apr 2024 09:54)    SUBJECTIVE / OVERNIGHT EVENTS: Seen in hallway. Reports yesterday he noticed sharp pain in his right leg which was unusual since he does not have any sensation in that leg. He denies any other complaints of concerns.     MEDICATIONS  (STANDING):  colchicine 0.6 milliGRAM(s) Oral daily  enoxaparin Injectable 40 milliGRAM(s) SubCutaneous every 24 hours  gabapentin 300 milliGRAM(s) Oral at bedtime  gabapentin 100 milliGRAM(s) Oral daily  metoprolol tartrate 12.5 milliGRAM(s) Oral two times a day  multivitamin 1 Tablet(s) Oral daily  polyethylene glycol 3350 17 Gram(s) Oral daily  senna 2 Tablet(s) Oral at bedtime  traZODone 25 milliGRAM(s) Oral at bedtime    MEDICATIONS  (PRN):  acetaminophen     Tablet .. 650 milliGRAM(s) Oral every 6 hours PRN Mild Pain (1 - 3)      Vital Signs Last 24 Hrs  T(C): 37.1 (23 Apr 2024 08:46), Max: 37.1 (23 Apr 2024 08:46)  T(F): 98.8 (23 Apr 2024 08:46), Max: 98.8 (23 Apr 2024 08:46)  HR: 116 (23 Apr 2024 08:46) (108 - 116)  BP: 109/72 (23 Apr 2024 08:46) (94/67 - 109/72)  BP(mean): --  RR: 16 (23 Apr 2024 08:46) (16 - 16)  SpO2: 97% (23 Apr 2024 08:46) (97% - 97%)    Parameters below as of 23 Apr 2024 08:46  Patient On (Oxygen Delivery Method): room air    PHYSICAL EXAM:  GENERAL: NAD, well-developed  HEAD:  Atraumatic, Normocephalic  EYES: EOMI, PERRLA, conjunctiva and sclera clear  NECK: Supple, No JVD  CHEST/LUNG: Clear to auscultation bilaterally; No wheeze  HEART: Regular rate and rhythm; No murmurs, rubs, or gallops  ABDOMEN: Soft, Nontender, Nondistended; Bowel sounds present  PSYCH: AAOx3  NEUROLOGY: right hemiparesis   SKIN: No rashes or lesions      LABS:                        14.0   8.73  )-----------( 408      ( 22 Apr 2024 05:44 )             42.9     04-22    138  |  102  |  22  ----------------------------<  100<H>  4.2   |  31  |  0.83    Ca    8.9      22 Apr 2024 05:44    TPro  7.2  /  Alb  3.0<L>  /  TBili  0.3  /  DBili  x   /  AST  34  /  ALT  45  /  AlkPhos  77  04-22          Urinalysis Basic - ( 22 Apr 2024 05:44 )    Color: x / Appearance: x / SG: x / pH: x  Gluc: 100 mg/dL / Ketone: x  / Bili: x / Urobili: x   Blood: x / Protein: x / Nitrite: x   Leuk Esterase: x / RBC: x / WBC x   Sq Epi: x / Non Sq Epi: x / Bacteria: x        RADIOLOGY & ADDITIONAL TESTS:    Imaging Personally Reviewed:    Consultant(s) Notes Reviewed:      Care Discussed with Consultants/Other Providers: rehab team    Assessment and Plan:

## 2024-04-23 NOTE — PROGRESS NOTE ADULT - ASSESSMENT
58y with PMH Gout found down and revealed to have an intraparenchymal hemorrhage.  Patient now admitted for a multidisciplinary rehab program with right hemiparesis    #Intraparenchymal Hemorrhage  -MRI demonstrated L thalamic hemorrhage, w/ chronic microhemorrhages, and acute infarcts in the right caudate tail and periatrial white matter  -EKG w/o AF  -Echo unremarkable  -CTA w/o large occlusions  -Start Comprehensive Multidisciplinary Rehab Program    #HTN  -BP low normal today  -DC losartan and reduce amlodipine to 5mg  -start lopressor 12.5BID with hold parameters given tahchycardia    #Sinus tachycardia  -Patient not in pain, euvolemic, no signs of infection  -Unremarkable LE dopplers and TTE  -TSH WNL  -possible anxiety vs physiologic mediated  -trial of lopressor 12.5BID     #Gout  -c/w colchicine     #Dysphagia:  - Diet Consistency: diet, minced and moist  - Aspiration Precautions  - SLP consult for swallow function evaluation and treatment    #DVT prophylaxis:   -lovenox

## 2024-04-24 LAB
HLA-B: SIGNIFICANT CHANGE UP
HLA-B: SIGNIFICANT CHANGE UP
REF LAB TEST METHOD: SIGNIFICANT CHANGE UP

## 2024-04-24 PROCEDURE — 99233 SBSQ HOSP IP/OBS HIGH 50: CPT | Mod: GC

## 2024-04-24 RX ORDER — ESCITALOPRAM OXALATE 10 MG/1
5 TABLET, FILM COATED ORAL DAILY
Refills: 0 | Status: DISCONTINUED | OUTPATIENT
Start: 2024-04-24 | End: 2024-04-29

## 2024-04-24 RX ADMIN — POLYETHYLENE GLYCOL 3350 17 GRAM(S): 17 POWDER, FOR SOLUTION ORAL at 11:58

## 2024-04-24 RX ADMIN — Medication 12.5 MILLIGRAM(S): at 05:52

## 2024-04-24 RX ADMIN — Medication 1 TABLET(S): at 11:58

## 2024-04-24 RX ADMIN — GABAPENTIN 100 MILLIGRAM(S): 400 CAPSULE ORAL at 11:58

## 2024-04-24 RX ADMIN — GABAPENTIN 300 MILLIGRAM(S): 400 CAPSULE ORAL at 22:04

## 2024-04-24 RX ADMIN — ENOXAPARIN SODIUM 40 MILLIGRAM(S): 100 INJECTION SUBCUTANEOUS at 22:03

## 2024-04-24 RX ADMIN — Medication 0.6 MILLIGRAM(S): at 11:58

## 2024-04-24 RX ADMIN — Medication 12.5 MILLIGRAM(S): at 17:22

## 2024-04-24 RX ADMIN — Medication 25 MILLIGRAM(S): at 22:03

## 2024-04-24 RX ADMIN — ESCITALOPRAM OXALATE 5 MILLIGRAM(S): 10 TABLET, FILM COATED ORAL at 11:58

## 2024-04-24 NOTE — PROGRESS NOTE ADULT - ASSESSMENT
58y with PMhx Gout found down and revealed to have an intraparenchymal hemorrhage.  Patient now admitted for a multidisciplinary rehab program with right hemiparesis, cognitive deficits, sensory loss, Dysphagia, Dysarthria.      Intraparenchymal Hemorrhage  -MRI reviewed--- demonstrated L thalamic hemorrhage, w/ chronic microhemorrhages, and acute infarcts in the right caudate tail and periatrial white matter  -EKG w/o AF  -Echo unremarkable  - CTA w/o large occlusions  - Comprehensive Multidisciplinary Rehab Program:  -Cont comprehensive rehab program of PT/OT/SLP - 3 hours a day, 5 days a week. P&O as needed     Cardiovascular: HTN  - losartan 25mg daily - dc'd  - amlodipine 10mg daily - held 4/24, did not meet parameter. Dec 5mg   - /71 - 113/78 (4/24)  - HR 86-90 (4/24)   - Add low dose Lopressor 12.5 BID (4/23); discussed with hospitalist     Pain, neuropathic pain  -- Tylenol PRN  - Neuropathic agent: gabapentin 300 mg qhs, 100mg daily - none today (4/24)    Thrombocytosis  - Platelets- 484>478>408 (4/22)    Mood / Cognition  - Lexapro 5 - Started for dual therapy (mood and post CVA neurorecovery)  - Neuropsychology consult appreciated  --Recreation therapy     Sleep  - Maintain quiet hours and a low stim environment.   - d/c'd melatonin 9mg   --Trial Rozerem 8mg qhs -- did not work-- dc-- started on trazodone 25 mg qhs-- helping    GI / Bowel  - Senna qHS  - Miralax Daily  - GI ppx: protonix     / Bladder  - Voiding with low PVRs    Skin / Pressure injury  - Skin assessment on admission performed [X ] :   - Monitor Incisions:  none  - Turn q2 hours in bed while awake, air mattress  - nursing to monitor skin qShift  - soft heel protectors  - skin barrier cream PRN    Dysphagia:  -MBS 4/18-- diet consistency: minced and moist/thin liquids  --On Dysphagia Free Water Protocol  - Supplements: multivitamin  - Aspiration Precautions  - SLP consult for swallow function evaluation and treatment  - Nutrition     DVT prophylaxis:   - Lovenox 40mg   - Last Doppler on 4/15--Neg.

## 2024-04-24 NOTE — PROGRESS NOTE ADULT - SUBJECTIVE AND OBJECTIVE BOX
Patient is a 59y old  Male who presents with a chief complaint of left Intraparenchymal Hemorrhage     HPI:  58M PMHx gout found down at home on 4/2/24 by wife brought to [facility] by ambulance found to have a L thalamic hemorrhagic infarct via CT. Patient intubated for inability to protect airway. MRI demonstrated hemorrhage with anticipated evolution. Chronic microhemorrhages found in the R thalamus and BG as well as small acute infarcts in the right caudate tail and periatrial white matter.  CTA clear of major vessel occlusion or stenosis. Patient with residual R hemiparesis, dysarthria, mild aphasia, and R gaze palsy. EEG negative throughout workup. EKG without AF. TTE grossly normal. Gout flare treated by rheumatology with colchicine while inpatient. Patient received PM&R consult who recommended acute inpatient rehabilitation. Patient was medically stabilized and discharged to [facility].  (15 Apr 2024 14:16)    SUBJECTIVE: Patient seen and examined while sitting in reclined WC. Endorse no right sided pain - states it's "gone." Slept overnight.  Notes vision to be improving.  Perseverate on prognosis and recovery trajectory.  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 Educated on stroke recovery.  Given his stress, will start lexapro for dual therapy (mood and post CVA neuro recovery)    REVIEW OF SYSTEMS  +nonproductive cough from oral pooling  (better)  +Right sided weakness  +RLE neuropathic pain (resolved)    VITALS  59y  Vital Signs Last 24 Hrs  T(C): 36.5 (04-24-24 @ 07:41), Max: 36.5 (04-24-24 @ 07:41)  T(F): 97.7 (04-24-24 @ 07:41), Max: 97.7 (04-24-24 @ 07:41)  HR: 86 (04-24-24 @ 07:41) (86 - 115)  BP: 106/71 (04-24-24 @ 07:41) (106/71 - 114/72)  RR: 16 (04-24-24 @ 07:41) (16 - 16)  SpO2: 95% (04-24-24 @ 07:41) (95% - 96%)    PHYSICAL EXAM:   Gen - NAD, Comfortable  HEENT - NCAT, EOMI  Pulm - resp nonlabored  Cardiovascular - warm well perfused, no cyanosis  Abdomen - Soft, NT/ND,   Extremities - No edema, No calf tenderness  Neuro-     Cognitive - AAOx3, able to follow commands, content appropriate     Communication - Fluent, +mild dysarthria     Attention: Impaired, easily distracted, perseverative     Judgement: impaired     Cranial Nerves +eft sided facial droop, Sensation intact, Dysphagia, dysarthria     Motor -                     LEFT    UE - ShAB 5/5, EF 5/5, EE 5/5, WE 5/5,  5/5                    RIGHT UE - ShAB 0/5, EF 0/5, EE 0/5, WE 0/5,  0/5                    LEFT    LE - HF 5/5, KE 5/5, DF 5/5, PF 5/5                    RIGHT LE - HF 1/5, KE 1/5, DF 0/5, PF 0/5        Sensory - present but diminished right face/UE/LE  Psychiatric - Mood stable    RECENT LABS:                        14.0   8.73  )-----------( 408      ( 22 Apr 2024 05:44 )             42.9     04-22    138  |  102  |  22  ----------------------------<  100<H>  4.2   |  31  |  0.83    Ca    8.9      22 Apr 2024 05:44    TPro  7.2  /  Alb  3.0<L>  /  TBili  0.3  /  DBili  x   /  AST  34  /  ALT  45  /  AlkPhos  77  04-22    LIVER FUNCTIONS - ( 22 Apr 2024 05:44 )  Alb: 3.0 g/dL / Pro: 7.2 g/dL / ALK PHOS: 77 U/L / ALT: 45 U/L / AST: 34 U/L / GGT: x           MEDICATIONS:  MEDICATIONS  (STANDING):  amLODIPine   Tablet 5 milliGRAM(s) Oral daily  colchicine 0.6 milliGRAM(s) Oral daily  enoxaparin Injectable 40 milliGRAM(s) SubCutaneous every 24 hours  escitalopram 5 milliGRAM(s) Oral daily  gabapentin 100 milliGRAM(s) Oral daily  gabapentin 300 milliGRAM(s) Oral at bedtime  metoprolol tartrate 12.5 milliGRAM(s) Oral two times a day  multivitamin 1 Tablet(s) Oral daily  polyethylene glycol 3350 17 Gram(s) Oral daily  senna 2 Tablet(s) Oral at bedtime  traZODone 25 milliGRAM(s) Oral at bedtime    MEDICATIONS  (PRN):  acetaminophen     Tablet .. 650 milliGRAM(s) Oral every 6 hours PRN Mild Pain (1 - 3)   Patient is a 59y old  Male who presents with a chief complaint of left Intraparenchymal Hemorrhage     HPI:  59M PMHx gout found down at home on 4/2/24 by wife brought to [facility] by ambulance found to have a L thalamic hemorrhagic infarct via CT. Patient intubated for inability to protect airway. MRI demonstrated hemorrhage with anticipated evolution. Chronic microhemorrhages found in the R thalamus and BG as well as small acute infarcts in the right caudate tail and periatrial white matter.  CTA clear of major vessel occlusion or stenosis. Patient with residual R hemiparesis, dysarthria, mild aphasia, and R gaze palsy. EEG negative throughout workup. EKG without AF. TTE grossly normal. Gout flare treated by rheumatology with colchicine while inpatient. Patient received PM&R consult who recommended acute inpatient rehabilitation. Patient was medically stabilized and discharged to [facility].  (15 Apr 2024 14:16)    SUBJECTIVE: Patient seen and examined while sitting in reclined WC. Endorse no right sided pain - states it's "gone." Slept overnight.  Notes vision to be improving.  Perseverate on prognosis and recovery trajectory.  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 Educated on stroke recovery.      REVIEW OF SYSTEMS  +nonproductive cough from oral pooling  (better)  +Right sided weakness  +RLE neuropathic pain (resolved)    VITALS  59y  Vital Signs Last 24 Hrs  T(C): 36.5 (04-24-24 @ 07:41), Max: 36.5 (04-24-24 @ 07:41)  T(F): 97.7 (04-24-24 @ 07:41), Max: 97.7 (04-24-24 @ 07:41)  HR: 86 (04-24-24 @ 07:41) (86 - 115)  BP: 106/71 (04-24-24 @ 07:41) (106/71 - 114/72)  RR: 16 (04-24-24 @ 07:41) (16 - 16)  SpO2: 95% (04-24-24 @ 07:41) (95% - 96%)    PHYSICAL EXAM:   Gen - NAD, Comfortable  HEENT - NCAT, EOMI  Pulm - resp nonlabored  Cardiovascular - warm well perfused, no cyanosis  Abdomen - Soft, NT/ND,   Extremities - No edema, No calf tenderness  Neuro-     Cognitive - AAOx3, able to follow commands, content appropriate     Communication - Fluent, +mild dysarthria     Attention: Impaired, easily distracted, perseverative     Judgement: impaired     Cranial Nerves +eft sided facial droop, Sensation intact, Dysphagia, dysarthria     Motor -                     LEFT    UE - ShAB 5/5, EF 5/5, EE 5/5, WE 5/5,  5/5                    RIGHT UE - ShAB 0/5, EF 0/5, EE 0/5, WE 0/5,  0/5                    LEFT    LE - HF 5/5, KE 5/5, DF 5/5, PF 5/5                    RIGHT LE - HF 1/5, KE 1/5, DF 0/5, PF 0/5        Sensory - present but diminished right face/UE/LE  Psychiatric - Mood stable    RECENT LABS:                        14.0   8.73  )-----------( 408      ( 22 Apr 2024 05:44 )             42.9     04-22    138  |  102  |  22  ----------------------------<  100<H>  4.2   |  31  |  0.83    Ca    8.9      22 Apr 2024 05:44    TPro  7.2  /  Alb  3.0<L>  /  TBili  0.3  /  DBili  x   /  AST  34  /  ALT  45  /  AlkPhos  77  04-22    LIVER FUNCTIONS - ( 22 Apr 2024 05:44 )  Alb: 3.0 g/dL / Pro: 7.2 g/dL / ALK PHOS: 77 U/L / ALT: 45 U/L / AST: 34 U/L / GGT: x           MEDICATIONS:  MEDICATIONS  (STANDING):  amLODIPine   Tablet 5 milliGRAM(s) Oral daily  colchicine 0.6 milliGRAM(s) Oral daily  enoxaparin Injectable 40 milliGRAM(s) SubCutaneous every 24 hours  escitalopram 5 milliGRAM(s) Oral daily  gabapentin 100 milliGRAM(s) Oral daily  gabapentin 300 milliGRAM(s) Oral at bedtime  metoprolol tartrate 12.5 milliGRAM(s) Oral two times a day  multivitamin 1 Tablet(s) Oral daily  polyethylene glycol 3350 17 Gram(s) Oral daily  senna 2 Tablet(s) Oral at bedtime  traZODone 25 milliGRAM(s) Oral at bedtime    MEDICATIONS  (PRN):  acetaminophen     Tablet .. 650 milliGRAM(s) Oral every 6 hours PRN Mild Pain (1 - 3)

## 2024-04-25 LAB
ALBUMIN SERPL ELPH-MCNC: 3 G/DL — LOW (ref 3.3–5)
ALP SERPL-CCNC: 71 U/L — SIGNIFICANT CHANGE UP (ref 40–120)
ALT FLD-CCNC: 43 U/L — SIGNIFICANT CHANGE UP (ref 10–45)
ANION GAP SERPL CALC-SCNC: 8 MMOL/L — SIGNIFICANT CHANGE UP (ref 5–17)
AST SERPL-CCNC: 28 U/L — SIGNIFICANT CHANGE UP (ref 10–40)
BASOPHILS # BLD AUTO: 0.11 K/UL — SIGNIFICANT CHANGE UP (ref 0–0.2)
BASOPHILS NFR BLD AUTO: 1 % — SIGNIFICANT CHANGE UP (ref 0–2)
BILIRUB SERPL-MCNC: 0.3 MG/DL — SIGNIFICANT CHANGE UP (ref 0.2–1.2)
BUN SERPL-MCNC: 27 MG/DL — HIGH (ref 7–23)
CALCIUM SERPL-MCNC: 9 MG/DL — SIGNIFICANT CHANGE UP (ref 8.4–10.5)
CHLORIDE SERPL-SCNC: 104 MMOL/L — SIGNIFICANT CHANGE UP (ref 96–108)
CO2 SERPL-SCNC: 29 MMOL/L — SIGNIFICANT CHANGE UP (ref 22–31)
CREAT SERPL-MCNC: 0.83 MG/DL — SIGNIFICANT CHANGE UP (ref 0.5–1.3)
EGFR: 102 ML/MIN/1.73M2 — SIGNIFICANT CHANGE UP
EOSINOPHIL # BLD AUTO: 1.89 K/UL — HIGH (ref 0–0.5)
EOSINOPHIL NFR BLD AUTO: 17 % — HIGH (ref 0–6)
GLUCOSE SERPL-MCNC: 100 MG/DL — HIGH (ref 70–99)
HCT VFR BLD CALC: 39.3 % — SIGNIFICANT CHANGE UP (ref 39–50)
HGB BLD-MCNC: 12.8 G/DL — LOW (ref 13–17)
LYMPHOCYTES # BLD AUTO: 1 K/UL — SIGNIFICANT CHANGE UP (ref 1–3.3)
LYMPHOCYTES # BLD AUTO: 9 % — LOW (ref 13–44)
MANUAL SMEAR VERIFICATION: SIGNIFICANT CHANGE UP
MCHC RBC-ENTMCNC: 28.4 PG — SIGNIFICANT CHANGE UP (ref 27–34)
MCHC RBC-ENTMCNC: 32.6 GM/DL — SIGNIFICANT CHANGE UP (ref 32–36)
MCV RBC AUTO: 87.3 FL — SIGNIFICANT CHANGE UP (ref 80–100)
MONOCYTES # BLD AUTO: 0.67 K/UL — SIGNIFICANT CHANGE UP (ref 0–0.9)
MONOCYTES NFR BLD AUTO: 6 % — SIGNIFICANT CHANGE UP (ref 2–14)
NEUTROPHILS # BLD AUTO: 7.13 K/UL — SIGNIFICANT CHANGE UP (ref 1.8–7.4)
NEUTROPHILS NFR BLD AUTO: 61 % — SIGNIFICANT CHANGE UP (ref 43–77)
NEUTS BAND # BLD: 3 % — SIGNIFICANT CHANGE UP (ref 0–8)
NRBC # BLD: 0 /100 WBCS — SIGNIFICANT CHANGE UP (ref 0–0)
PLAT MORPH BLD: NORMAL — SIGNIFICANT CHANGE UP
PLATELET # BLD AUTO: 288 K/UL — SIGNIFICANT CHANGE UP (ref 150–400)
POTASSIUM SERPL-MCNC: 4.2 MMOL/L — SIGNIFICANT CHANGE UP (ref 3.5–5.3)
POTASSIUM SERPL-SCNC: 4.2 MMOL/L — SIGNIFICANT CHANGE UP (ref 3.5–5.3)
PROT SERPL-MCNC: 6.8 G/DL — SIGNIFICANT CHANGE UP (ref 6–8.3)
RBC # BLD: 4.5 M/UL — SIGNIFICANT CHANGE UP (ref 4.2–5.8)
RBC # FLD: 12.9 % — SIGNIFICANT CHANGE UP (ref 10.3–14.5)
RBC BLD AUTO: NORMAL — SIGNIFICANT CHANGE UP
SODIUM SERPL-SCNC: 141 MMOL/L — SIGNIFICANT CHANGE UP (ref 135–145)
VARIANT LYMPHS # BLD: 3 % — SIGNIFICANT CHANGE UP (ref 0–6)
WBC # BLD: 11.14 K/UL — HIGH (ref 3.8–10.5)
WBC # FLD AUTO: 11.14 K/UL — HIGH (ref 3.8–10.5)

## 2024-04-25 PROCEDURE — 99232 SBSQ HOSP IP/OBS MODERATE 35: CPT

## 2024-04-25 PROCEDURE — 99233 SBSQ HOSP IP/OBS HIGH 50: CPT | Mod: GC

## 2024-04-25 RX ORDER — COLCHICINE 0.6 MG
0.6 TABLET ORAL
Refills: 0 | Status: COMPLETED | OUTPATIENT
Start: 2024-04-25 | End: 2024-04-25

## 2024-04-25 RX ORDER — COLCHICINE 0.6 MG
0.6 TABLET ORAL ONCE
Refills: 0 | Status: COMPLETED | OUTPATIENT
Start: 2024-04-25 | End: 2024-04-25

## 2024-04-25 RX ORDER — METOPROLOL TARTRATE 50 MG
12.5 TABLET ORAL
Refills: 0 | Status: DISCONTINUED | OUTPATIENT
Start: 2024-04-25 | End: 2024-05-09

## 2024-04-25 RX ORDER — COLCHICINE 0.6 MG
0.6 TABLET ORAL
Refills: 0 | Status: DISCONTINUED | OUTPATIENT
Start: 2024-04-26 | End: 2024-04-27

## 2024-04-25 RX ADMIN — Medication 0.6 MILLIGRAM(S): at 13:14

## 2024-04-25 RX ADMIN — Medication 25 MILLIGRAM(S): at 21:01

## 2024-04-25 RX ADMIN — Medication 1 TABLET(S): at 11:13

## 2024-04-25 RX ADMIN — GABAPENTIN 100 MILLIGRAM(S): 400 CAPSULE ORAL at 11:13

## 2024-04-25 RX ADMIN — Medication 0.6 MILLIGRAM(S): at 11:12

## 2024-04-25 RX ADMIN — SENNA PLUS 2 TABLET(S): 8.6 TABLET ORAL at 00:27

## 2024-04-25 RX ADMIN — SENNA PLUS 2 TABLET(S): 8.6 TABLET ORAL at 21:01

## 2024-04-25 RX ADMIN — AMLODIPINE BESYLATE 5 MILLIGRAM(S): 2.5 TABLET ORAL at 05:34

## 2024-04-25 RX ADMIN — ESCITALOPRAM OXALATE 5 MILLIGRAM(S): 10 TABLET, FILM COATED ORAL at 11:13

## 2024-04-25 RX ADMIN — ENOXAPARIN SODIUM 40 MILLIGRAM(S): 100 INJECTION SUBCUTANEOUS at 21:01

## 2024-04-25 RX ADMIN — GABAPENTIN 300 MILLIGRAM(S): 400 CAPSULE ORAL at 21:01

## 2024-04-25 RX ADMIN — Medication 12.5 MILLIGRAM(S): at 21:01

## 2024-04-25 RX ADMIN — Medication 12.5 MILLIGRAM(S): at 05:34

## 2024-04-25 RX ADMIN — Medication 0.6 MILLIGRAM(S): at 12:38

## 2024-04-25 NOTE — PROGRESS NOTE ADULT - SUBJECTIVE AND OBJECTIVE BOX
|-------------------------------------------|                       Subjective   |-------------------------------------------|    No events overnight  No new complaints to offer me    |------------------------------------------|                       Objective  |------------------------------------------|    Vital Signs Last 24 Hrs  T(F): 97.8 (25 Apr 2024 08:04), Max: 98.2 (25 Apr 2024 05:34)  HR: 86 (25 Apr 2024 08:04) (86 - 100)  BP: 123/84 (25 Apr 2024 08:04) (114/78 - 123/91)  RR: 16 (25 Apr 2024 08:04) (16 - 16)  SpO2: 95% (25 Apr 2024 08:04) (94% - 95%)  I&O's Summary      Examination:   General: NAD, Comfortable  Pulm: CTA BL No Rhonchi Rales or wheezes  Neck: Supple, No JVD  CVS: RRR No rubs murmurs or gallops  Abdomen: Soft, Nontender, Nondistended; No masses or organomegally  Extremities: No calf tenderness, No pitting edema    Labs:                        12.8   11.14 )-----------( 288      ( 25 Apr 2024 06:56 )             39.3       04-25    141  |  104  |  27  ----------------------------<  100  4.2   |  29  |  0.83    Ca    9.0      25 Apr 2024 06:56    TPro  6.8  /  Alb  3.0  /  TBili  0.3  /  DBili  x   /  AST  28  /  ALT  43  /  AlkPhos  71  04-25

## 2024-04-25 NOTE — PROGRESS NOTE ADULT - ASSESSMENT
58y with PMhx Gout found down and revealed to have an intraparenchymal hemorrhage.  Patient now admitted for a multidisciplinary rehab program with right hemiparesis, cognitive deficits, sensory loss, Dysphagia, Dysarthria.      Intraparenchymal Hemorrhage  -MRI reviewed--- demonstrated L thalamic hemorrhage, w/ chronic microhemorrhages, and acute infarcts in the right caudate tail and periatrial white matter  -EKG w/o AF  -Echo unremarkable  - CTA w/o large occlusions  - Comprehensive Multidisciplinary Rehab Program:  -Cont comprehensive rehab program of PT/OT/SLP - 3 hours a day, 5 days a week. P&O as needed     Cardiovascular: HTN  - losartan 25mg daily - dc'd  - amlodipine 10mg daily > Dec 5mg   - /84; HR 86-92 (4/25)   - Add low dose Lopressor 12.5 BID (4/23); discussed with hospitalist     Pain, neuropathic pain  -- Tylenol PRN  - Neuropathic agent: gabapentin 300 mg qhs, 100mg daily     Gout  - Colchicine 0.6 QD  - gouty flare? discussed with Hospitalist; will evaluate and start medication is needed    #Leukocytosis - intermittent  - no overt sign of infection  - WBC: 13.94 (4/16)>12.26 (4/18)>8.73(4/22)>11.14 (4/25)    Thrombocytosis - resolved  - Platelets- 484>478>408 (4/22)> ** (4/25)    Mood / Cognition  - Lexapro 5 - Started for dual therapy (mood and post CVA neurorecovery)  - Neuropsychology consult appreciated  --Recreation therapy     Sleep  - Maintain quiet hours and a low stim environment.   - d/c'd melatonin 9mg   --Trial Rozerem 8mg qhs -- did not work-- dc-- started on trazodone 25 mg qhs    GI / Bowel  - Senna qHS  - Miralax Daily  - GI ppx: protonix     / Bladder  - Voiding with low PVRs    Skin / Pressure injury  - Skin assessment on admission performed [X ] :   - Monitor Incisions:  none  - Turn q2 hours in bed while awake, air mattress  - nursing to monitor skin qShift  - soft heel protectors  - skin barrier cream PRN    Dysphagia:  -MBS 4/18-- diet consistency: minced and moist/thin liquids  --On Dysphagia Free Water Protocol  - Supplements: multivitamin  - Aspiration Precautions  - SLP consult for swallow function evaluation and treatment  - Nutrition     DVT prophylaxis:   - Lovenox 40mg   - Last Doppler on 4/15--Neg.

## 2024-04-25 NOTE — PROGRESS NOTE ADULT - SUBJECTIVE AND OBJECTIVE BOX
Patient is a 59y old  Male who presents with a chief complaint of left Intraparenchymal Hemorrhage     HPI:  59M PMHx gout found down at home on 4/2/24 by wife brought to [facility] by ambulance found to have a L thalamic hemorrhagic infarct via CT. Patient intubated for inability to protect airway. MRI demonstrated hemorrhage with anticipated evolution. Chronic microhemorrhages found in the R thalamus and BG as well as small acute infarcts in the right caudate tail and periatrial white matter.  CTA clear of major vessel occlusion or stenosis. Patient with residual R hemiparesis, dysarthria, mild aphasia, and R gaze palsy. EEG negative throughout workup. EKG without AF. TTE grossly normal. Gout flare treated by rheumatology with colchicine while inpatient. Patient received PM&R consult who recommended acute inpatient rehabilitation. Patient was medically stabilized and discharged to [facility].  (15 Apr 2024 14:16)    SUBJECTIVE: Patient seen and examined while reclined in WC. Endorse that he started to have left foot pain, attributes it to gout (said had it for 30 years and familiar with the pain/onset.  No pain to right side    REVIEW OF SYSTEMS  +nonproductive cough from oral pooling  (better - none witnessed during encourter)  +Right sided weakness  - RLE neuropathic pain (resolved)  +Left foot pain (to heel and big toe)    VITALS  59y  Vital Signs Last 24 Hrs  T(C): 36.6 (04-25-24 @ 08:04), Max: 36.8 (04-25-24 @ 05:34)  T(F): 97.8 (04-25-24 @ 08:04), Max: 98.2 (04-25-24 @ 05:34)  HR: 86 (04-25-24 @ 08:04) (86 - 100)  BP: 123/84 (04-25-24 @ 08:04) (114/78 - 123/91)  RR: 16 (04-25-24 @ 08:04) (16 - 16)  SpO2: 95% (04-25-24 @ 08:04) (94% - 95%)    PHYSICAL EXAM:   Gen - NAD, Comfortable  HEENT - NCAT, EOMI  Pulm - resp nonlabored  Cardiovascular - warm well perfused, no cyanosis  Abdomen - Soft, NT/ND,   Extremities - left foot with minimal edema + tenderness over calcaneus and 1st DIP; No calf tenderness  Neuro-     Cognitive - AAOx3, able to follow commands, content appropriate     Communication - Fluent, +mild dysarthria     Attention: +easily distracted, +perseverative     Judgement: impaired     Cranial Nerves +right sided facial droop, Sensation intact, Dysphagia, dysarthria     Motor -                     LEFT    UE - ShAB 5/5, EF 5/5, EE 5/5, WE 5/5,  5/5                    RIGHT UE - ShAB 0/5, EF 0/5, EE 0/5, WE 0/5,  0/5                    LEFT    LE - HF 5/5, KE 5/5, DF 5/5, PF 5/5                    RIGHT LE - HF 1/5, KE 1/5, DF 0/5, PF 0/5        Sensory - present but diminished right face/UE/LE  Psychiatric - Mood stable    RECENT LABS:             12.8   11.14 )-----------( 288      ( 25 Apr 2024 06:56 )             39.3     04-25    141  |  104  |  27<H>  ----------------------------<  100<H>  4.2   |  29  |  0.83    Ca    9.0      25 Apr 2024 06:56    TPro  6.8  /  Alb  3.0<L>  /  TBili  0.3  /  DBili  x   /  AST  28  /  ALT  43  /  AlkPhos  71  04-25    LIVER FUNCTIONS - ( 25 Apr 2024 06:56 )  Alb: 3.0 g/dL / Pro: 6.8 g/dL / ALK PHOS: 71 U/L / ALT: 43 U/L / AST: 28 U/L / GGT: x           MEDICATIONS:  MEDICATIONS  (STANDING):  amLODIPine   Tablet 5 milliGRAM(s) Oral daily  colchicine 0.6 milliGRAM(s) Oral daily  enoxaparin Injectable 40 milliGRAM(s) SubCutaneous every 24 hours  escitalopram 5 milliGRAM(s) Oral daily  gabapentin 300 milliGRAM(s) Oral at bedtime  gabapentin 100 milliGRAM(s) Oral daily  metoprolol tartrate 12.5 milliGRAM(s) Oral two times a day  multivitamin 1 Tablet(s) Oral daily  polyethylene glycol 3350 17 Gram(s) Oral daily  senna 2 Tablet(s) Oral at bedtime  traZODone 25 milliGRAM(s) Oral at bedtime    MEDICATIONS  (PRN):  acetaminophen     Tablet .. 650 milliGRAM(s) Oral every 6 hours PRN Mild Pain (1 - 3)

## 2024-04-25 NOTE — PROGRESS NOTE ADULT - ASSESSMENT
58y with PMH Gout found down and revealed to have an intraparenchymal hemorrhage.  Patient now admitted for a acute rehab program     [facility]  MRI demonstrated L thalamic hemorrhage, w/ chronic microhemorrhages, and acute infarcts in the right caudate tail and periatrial white matter  EKG w/o AF  Echo unremarkable  CTA w/o large occlusions    HTN  BP was low normal earlier in admission  Losartan was held. Amlodipine was reduced to 5mg  Metoprolol was started as well    Sinus tachycardia  -Patient not in pain, euvolemic, no signs of infection  -Unremarkable LE dopplers and TTE  -TSH WNL  -possible anxiety vs physiologic mediated  -trial of lopressor 12.5BID     Gout  On Colcrys 0.6BID  PT reports worsening pain since this AM consistant with prior gout attacks  He is agreeable for another stat dose of colcrys which has worked in past    #Dysphagia:  - Diet Consistency: diet, minced and moist  - Aspiration Precautions  - SLP consult for swallow function evaluation and treatment    #DVT prophylaxis:   -lovenox

## 2024-04-26 LAB
HCT VFR BLD CALC: 39.7 % — SIGNIFICANT CHANGE UP (ref 39–50)
HGB BLD-MCNC: 12.9 G/DL — LOW (ref 13–17)
MCHC RBC-ENTMCNC: 28.4 PG — SIGNIFICANT CHANGE UP (ref 27–34)
MCHC RBC-ENTMCNC: 32.5 GM/DL — SIGNIFICANT CHANGE UP (ref 32–36)
MCV RBC AUTO: 87.4 FL — SIGNIFICANT CHANGE UP (ref 80–100)
NRBC # BLD: 0 /100 WBCS — SIGNIFICANT CHANGE UP (ref 0–0)
PLATELET # BLD AUTO: 317 K/UL — SIGNIFICANT CHANGE UP (ref 150–400)
RBC # BLD: 4.54 M/UL — SIGNIFICANT CHANGE UP (ref 4.2–5.8)
RBC # FLD: 13 % — SIGNIFICANT CHANGE UP (ref 10.3–14.5)
WBC # BLD: 11.3 K/UL — HIGH (ref 3.8–10.5)
WBC # FLD AUTO: 11.3 K/UL — HIGH (ref 3.8–10.5)

## 2024-04-26 PROCEDURE — 99233 SBSQ HOSP IP/OBS HIGH 50: CPT | Mod: GC

## 2024-04-26 PROCEDURE — 93970 EXTREMITY STUDY: CPT | Mod: 26

## 2024-04-26 RX ADMIN — GABAPENTIN 100 MILLIGRAM(S): 400 CAPSULE ORAL at 13:23

## 2024-04-26 RX ADMIN — Medication 12.5 MILLIGRAM(S): at 08:49

## 2024-04-26 RX ADMIN — ESCITALOPRAM OXALATE 5 MILLIGRAM(S): 10 TABLET, FILM COATED ORAL at 13:23

## 2024-04-26 RX ADMIN — ENOXAPARIN SODIUM 40 MILLIGRAM(S): 100 INJECTION SUBCUTANEOUS at 21:32

## 2024-04-26 RX ADMIN — AMLODIPINE BESYLATE 5 MILLIGRAM(S): 2.5 TABLET ORAL at 05:13

## 2024-04-26 RX ADMIN — Medication 12.5 MILLIGRAM(S): at 21:32

## 2024-04-26 RX ADMIN — Medication 1 TABLET(S): at 13:23

## 2024-04-26 RX ADMIN — GABAPENTIN 300 MILLIGRAM(S): 400 CAPSULE ORAL at 21:32

## 2024-04-26 RX ADMIN — Medication 0.6 MILLIGRAM(S): at 05:13

## 2024-04-26 RX ADMIN — Medication 25 MILLIGRAM(S): at 21:31

## 2024-04-26 RX ADMIN — Medication 0.6 MILLIGRAM(S): at 18:33

## 2024-04-26 NOTE — PROGRESS NOTE ADULT - ASSESSMENT
58y with PMhx Gout found down and revealed to have an intraparenchymal hemorrhage.  Patient now admitted for a multidisciplinary rehab program with right hemiparesis, cognitive deficits, sensory loss, Dysphagia, Dysarthria.      Intraparenchymal Hemorrhage  -MRI reviewed--- demonstrated L thalamic hemorrhage, w/ chronic microhemorrhages, and acute infarcts in the right caudate tail and periatrial white matter  -EKG w/o AF  -Echo unremarkable  - CTA w/o large occlusions  - Comprehensive Multidisciplinary Rehab Program:  -Cont comprehensive rehab program of PT/OT/SLP - 3 hours a day, 5 days a week. P&O as needed     Cardiovascular: HTN  - losartan 25mg daily - dc'd  - amlodipine 10mg daily > Dec 5mg   - /76; HR  (4/26)   - Add low dose Lopressor 12.5 BID (4/23); discussed with hospitalist     Pain, neuropathic pain  -- Tylenol PRN  - Neuropathic agent: gabapentin 300 mg qhs, 100mg daily     Gout  - Colchicine 0.6 QD  - gouty flare? discussed with Hospitalist; will evaluate and start medication is needed    #Leukocytosis - intermittent  - no overt sign of infection  - WBC: 13.94 (4/16)>12.26 (4/18)>8.73(4/22)>11.14 (4/25)> 11.30 (4/26)    Thrombocytosis - resolved  - Platelets- 484>478>408 (4/22)> ** (4/25)> 317 (4/26)    Mood / Cognition  - Lexapro 5 - Started for dual therapy (mood and post CVA neurorecovery)  - Neuropsychology consult appreciated  --Recreation therapy     Sleep  - Maintain quiet hours and a low stim environment.   - d/c'd melatonin 9mg   --Trial Rozerem 8mg qhs -- did not work-- dc-- started on trazodone 25 mg qhs    GI / Bowel  - Senna qHS  - Miralax Daily  - GI ppx: protonix     / Bladder  - Voiding with low PVRs    Skin / Pressure injury  - Skin assessment on admission performed [X ] :   - Monitor Incisions:  none  - Turn q2 hours in bed while awake, air mattress  - nursing to monitor skin qShift  - skin barrier cream PRN    Dysphagia:  -MBS 4/18-- diet consistency: minced and moist/thin liquids  --On Dysphagia Free Water Protocol  - Supplements: multivitamin  - Aspiration Precautions  - SLP consult for swallow function evaluation and treatment  - Nutrition     DVT prophylaxis:   - Lovenox 40mg   - Last Doppler on 4/15--Neg.  58y with PMhx Gout found down and revealed to have an intraparenchymal hemorrhage.  Patient now admitted for a multidisciplinary rehab program with right hemiparesis, cognitive deficits, sensory loss, Dysphagia, Dysarthria.      Intraparenchymal Hemorrhage  -MRI reviewed--- demonstrated L thalamic hemorrhage, w/ chronic microhemorrhages, and acute infarcts in the right caudate tail and periatrial white matter  -EKG w/o AF  -Echo unremarkable  - CTA w/o large occlusions  - Comprehensive Multidisciplinary Rehab Program:  -Cont comprehensive rehab program of PT/OT/SLP - 3 hours a day, 5 days a week. P&O as needed     Cardiovascular: HTN  - losartan 25mg daily - dc'd  - amlodipine 10mg daily > Dec 5mg  - metoprolol 12.5 BID (4/25)--discussed with hospitalist   - /76; HR  (4/26)     Pain, neuropathic pain  -- Tylenol PRN  - Neuropathic agent: gabapentin 300 mg qhs, 100mg daily     Gout  - Colchicine 0.6 QD-- made BID (4/26)  - gouty flare? discussed with Hospitalist; will evaluate and start medication is needed    #Leukocytosis - stable  - no overt sign of infection  - WBC: 13.94 (4/16)>12.26 (4/18)>8.73(4/22)>11.14 (4/25)> 11.30 (4/26)    Thrombocytosis - resolved  - Platelets- 484>478>408 (4/22)> ** (4/25)> 317 (4/26)    Mood / Cognition  - Lexapro 5 - Started for dual therapy (mood and post CVA neurorecovery)  - Neuropsychology consult appreciated  --Recreation therapy     Sleep  - Maintain quiet hours and a low stim environment.   - d/c'd melatonin 9mg   --Trial Rozerem 8mg qhs -- did not work-- dc-- started on trazodone 25 mg qhs    GI / Bowel  - Senna qHS  - Miralax Daily  - GI ppx: protonix     / Bladder  - Voiding with low PVRs    Skin / Pressure injury  - Skin assessment on admission performed [X ] :   - Monitor Incisions:  none  - Turn q2 hours in bed while awake, air mattress  - nursing to monitor skin qShift  - skin barrier cream PRN    Dysphagia:  -MBS 4/18-- diet consistency: minced and moist/thin liquids  --On Dysphagia Free Water Protocol  - Supplements: multivitamin  - Aspiration Precautions  - SLP consult for swallow function evaluation and treatment  - Nutrition     DVT prophylaxis:   - Lovenox 40mg   - Last Doppler on 4/15--Neg.

## 2024-04-26 NOTE — PROGRESS NOTE ADULT - SUBJECTIVE AND OBJECTIVE BOX
Patient is a 59y old  Male who presents with a chief complaint of left Intraparenchymal Hemorrhage     HPI:  59M PMHx gout found down at home on 4/2/24 by wife brought to [facility] by ambulance found to have a L thalamic hemorrhagic infarct via CT. Patient intubated for inability to protect airway. MRI demonstrated hemorrhage with anticipated evolution. Chronic microhemorrhages found in the R thalamus and BG as well as small acute infarcts in the right caudate tail and periatrial white matter.  CTA clear of major vessel occlusion or stenosis. Patient with residual R hemiparesis, dysarthria, mild aphasia, and R gaze palsy. EEG negative throughout workup. EKG without AF. TTE grossly normal. Gout flare treated by rheumatology with colchicine while inpatient. Patient received PM&R consult who recommended acute inpatient rehabilitation. Patient was medically stabilized and discharged to [facility].  (15 Apr 2024 14:16)    SUBJECTIVE: Patient seen and examined while reclined in WC at bedside, family (wife, son, and daughter) also present.  R sided and left foot pain resolved.  Right hand with edema and tingling when wife was massaging hand.  Educated on cause of edema - lack of mobility > encourage PROM, elevation on pillow when in bed (to level of heart), will order for edema glove.  Patient reports feeling hungry despite double portion, wife said she will bring in food as well > educated on modified diet and what is appropriate along with reviewing swallow strategies.  Further inquired about tachycardia and medications; all questions/concerns addressed    REVIEW OF SYSTEMS  +nonproductive cough from oral secretion  + Right sided weakness, +right hand tingling sensation  + right hand edema  - RLE neuropathic pain (improved with gabapentin)  - Left foot pain (to heel and big toe) attribute as gout flare (resolved)    VITALS  59y  Vital Signs Last 24 Hrs  T(C): 36.6 (04-26-24 @ 08:57), Max: 36.6 (04-25-24 @ 20:46)  T(F): 97.8 (04-26-24 @ 08:57), Max: 97.9 (04-25-24 @ 20:46)  HR: 107 (04-26-24 @ 08:57) (93 - 112)  BP: 105/76 (04-26-24 @ 08:57) (105/76 - 114/63)  RR: 16 (04-26-24 @ 08:57) (16 - 16)  SpO2: 96% (04-26-24 @ 08:57) (94% - 96%)    PHYSICAL EXAM:   Gen - NAD, Comfortable  HEENT - NCAT, EOMI  Pulm - resp nonlabored  Cardiovascular - warm well perfused, no cyanosis  Abdomen - Soft, NT/ND,   Extremities - left foot with minimal edema + tenderness over calcaneus and 1st DIP; No calf tenderness  Neuro-     Cognitive - AAOx3, able to follow commands, content appropriate     Communication - Fluent, +mild dysarthria     Attention: +easily distracted, +perseverative     Judgement: impaired     Cranial Nerves +right sided facial droop, Sensation intact, Dysphagia, dysarthria     Motor -                     LEFT    UE - ShAB 5/5, EF 5/5, EE 5/5, WE 5/5,  5/5                    RIGHT UE - ShAB 0/5, EF 0/5, EE 0/5, WE 0/5,  0/5                    LEFT    LE - HF 5/5, KE 5/5, DF 5/5, PF 5/5                    RIGHT LE - HF 1/5, KE 1/5, DF 0/5, PF 0/5        Sensory - present but diminished right face/UE/LE  Psychiatric - Mood stable    RECENT LABS:             12.9   11.30 )-----------( 317      ( 26 Apr 2024 05:29 )             39.7                12.8   11.14 )-----------( 288      ( 25 Apr 2024 06:56 )             39.3     04-25    141  |  104  |  27<H>  ----------------------------<  100<H>  4.2   |  29  |  0.83    Ca    9.0      25 Apr 2024 06:56    TPro  6.8  /  Alb  3.0<L>  /  TBili  0.3  /  DBili  x   /  AST  28  /  ALT  43  /  AlkPhos  71  04-25    LIVER FUNCTIONS - ( 25 Apr 2024 06:56 )  Alb: 3.0 g/dL / Pro: 6.8 g/dL / ALK PHOS: 71 U/L / ALT: 43 U/L / AST: 28 U/L / GGT: x           MEDICATIONS:  MEDICATIONS  (STANDING):  amLODIPine   Tablet 5 milliGRAM(s) Oral daily  colchicine 0.6 milliGRAM(s) Oral two times a day  enoxaparin Injectable 40 milliGRAM(s) SubCutaneous every 24 hours  escitalopram 5 milliGRAM(s) Oral daily  gabapentin 300 milliGRAM(s) Oral at bedtime  gabapentin 100 milliGRAM(s) Oral daily  metoprolol tartrate 12.5 milliGRAM(s) Oral <User Schedule>  multivitamin 1 Tablet(s) Oral daily  polyethylene glycol 3350 17 Gram(s) Oral daily  senna 2 Tablet(s) Oral at bedtime  traZODone 25 milliGRAM(s) Oral at bedtime    MEDICATIONS  (PRN):  acetaminophen     Tablet .. 650 milliGRAM(s) Oral every 6 hours PRN Mild Pain (1 - 3)

## 2024-04-27 DIAGNOSIS — I82.409 ACUTE EMBOLISM AND THROMBOSIS OF UNSPECIFIED DEEP VEINS OF UNSPECIFIED LOWER EXTREMITY: ICD-10-CM

## 2024-04-27 DIAGNOSIS — I10 ESSENTIAL (PRIMARY) HYPERTENSION: ICD-10-CM

## 2024-04-27 DIAGNOSIS — R00.0 TACHYCARDIA, UNSPECIFIED: ICD-10-CM

## 2024-04-27 DIAGNOSIS — I61.9 NONTRAUMATIC INTRACEREBRAL HEMORRHAGE, UNSPECIFIED: ICD-10-CM

## 2024-04-27 DIAGNOSIS — M10.9 GOUT, UNSPECIFIED: ICD-10-CM

## 2024-04-27 PROCEDURE — 99233 SBSQ HOSP IP/OBS HIGH 50: CPT

## 2024-04-27 PROCEDURE — 99232 SBSQ HOSP IP/OBS MODERATE 35: CPT

## 2024-04-27 RX ORDER — COLCHICINE 0.6 MG
0.6 TABLET ORAL DAILY
Refills: 0 | Status: DISCONTINUED | OUTPATIENT
Start: 2024-04-28 | End: 2024-04-28

## 2024-04-27 RX ADMIN — GABAPENTIN 300 MILLIGRAM(S): 400 CAPSULE ORAL at 20:53

## 2024-04-27 RX ADMIN — ESCITALOPRAM OXALATE 5 MILLIGRAM(S): 10 TABLET, FILM COATED ORAL at 11:40

## 2024-04-27 RX ADMIN — ENOXAPARIN SODIUM 40 MILLIGRAM(S): 100 INJECTION SUBCUTANEOUS at 20:53

## 2024-04-27 RX ADMIN — Medication 1 TABLET(S): at 11:40

## 2024-04-27 RX ADMIN — GABAPENTIN 100 MILLIGRAM(S): 400 CAPSULE ORAL at 11:40

## 2024-04-27 RX ADMIN — Medication 12.5 MILLIGRAM(S): at 07:30

## 2024-04-27 RX ADMIN — SENNA PLUS 2 TABLET(S): 8.6 TABLET ORAL at 20:53

## 2024-04-27 RX ADMIN — Medication 25 MILLIGRAM(S): at 20:53

## 2024-04-27 RX ADMIN — Medication 0.6 MILLIGRAM(S): at 05:58

## 2024-04-27 RX ADMIN — AMLODIPINE BESYLATE 5 MILLIGRAM(S): 2.5 TABLET ORAL at 06:04

## 2024-04-27 RX ADMIN — Medication 12.5 MILLIGRAM(S): at 20:53

## 2024-04-27 NOTE — PROGRESS NOTE ADULT - SUBJECTIVE AND OBJECTIVE BOX
Patient is a 59y old  Male who presents with a chief complaint of left Intraparenchymal Hemorrhage     HPI:  59M PMHx gout found down at home on 4/2/24 by wife brought to [facility] by ambulance found to have a L thalamic hemorrhagic infarct via CT. Patient intubated for inability to protect airway. MRI demonstrated hemorrhage with anticipated evolution. Chronic microhemorrhages found in the R thalamus and BG as well as small acute infarcts in the right caudate tail and periatrial white matter.  CTA clear of major vessel occlusion or stenosis. Patient with residual R hemiparesis, dysarthria, mild aphasia, and R gaze palsy. EEG negative throughout workup. EKG without AF. TTE grossly normal. Gout flare treated by rheumatology with colchicine while inpatient. Patient received PM&R consult who recommended acute inpatient rehabilitation. Patient was medically stabilized and discharged to [facility].  (15 Apr 2024 14:16)    ROS/SUBJECTIVE: Patient seen and examined at bedside this morning. Patient in bed in NAD, no SOB, no n/v, no pain.     REVIEW OF SYSTEMS  + Right sided weakness  - Left foot pain (to heel and big toe) attribute as gout flare (resolved)    VITALS  Vital Signs Last 24 Hrs  T(C): 36.4 (27 Apr 2024 07:32), Max: 36.4 (26 Apr 2024 20:28)  T(F): 97.5 (27 Apr 2024 07:32), Max: 97.6 (26 Apr 2024 20:28)  HR: 81 (27 Apr 2024 07:32) (80 - 91)  BP: 116/79 (27 Apr 2024 07:32) (116/79 - 134/84)  BP(mean): --  RR: 16 (27 Apr 2024 07:32) (16 - 17)  SpO2: 94% (27 Apr 2024 07:32) (94% - 96%)    Parameters below as of 27 Apr 2024 07:32  Patient On (Oxygen Delivery Method): room air        PHYSICAL EXAM:   Gen - NAD, Comfortable  HEENT - NCAT, MMM  Pulm - resp nonlabored  Cardiovascular - warm well perfused, no cyanosis  Abdomen - Soft, NT/ND,   Extremities - No calf tenderness, no pedal edema  Neuro-     Cognitive - AAOx3, able to follow commands, content appropriate     Communication - (+)mild dysarthria         Motor -                     LEFT    UE - ShAB 5/5, EF 5/5, EE 5/5, WE 5/5,  5/5                    RIGHT UE - ShAB 0/5, EF 0/5, EE 0/5, WE 0/5,  0/5                    LEFT    LE - HF 5/5, KE 5/5, DF 5/5, PF 5/5                    RIGHT LE - HF 1/5, KE 1/5, DF 0/5, PF 0/5        Sensory - present but diminished right UE/LE  Psychiatric - Mood stable    RECENT LABS:  LABS:               12.9   11.30 )-----------( 317      ( 26 Apr 2024 05:29 )             39.7                12.8   11.14 )-----------( 288      ( 25 Apr 2024 06:56 )             39.3     04-25    141  |  104  |  27<H>  ----------------------------<  100<H>  4.2   |  29  |  0.83    Ca    9.0      25 Apr 2024 06:56    TPro  6.8  /  Alb  3.0<L>  /  TBili  0.3  /  DBili  x   /  AST  28  /  ALT  43  /  AlkPhos  71  04-25    LIVER FUNCTIONS - ( 25 Apr 2024 06:56 )  Alb: 3.0 g/dL / Pro: 6.8 g/dL / ALK PHOS: 71 U/L / ALT: 43 U/L / AST: 28 U/L / GGT: x           MEDICATIONS:  MEDICATIONS  (STANDING):  amLODIPine   Tablet 5 milliGRAM(s) Oral daily  colchicine 0.6 milliGRAM(s) Oral two times a day  enoxaparin Injectable 40 milliGRAM(s) SubCutaneous every 24 hours  escitalopram 5 milliGRAM(s) Oral daily  gabapentin 300 milliGRAM(s) Oral at bedtime  gabapentin 100 milliGRAM(s) Oral daily  metoprolol tartrate 12.5 milliGRAM(s) Oral <User Schedule>  multivitamin 1 Tablet(s) Oral daily  polyethylene glycol 3350 17 Gram(s) Oral daily  senna 2 Tablet(s) Oral at bedtime  traZODone 25 milliGRAM(s) Oral at bedtime    MEDICATIONS  (PRN):  acetaminophen     Tablet .. 650 milliGRAM(s) Oral every 6 hours PRN Mild Pain (1 - 3)

## 2024-04-27 NOTE — PROGRESS NOTE ADULT - ASSESSMENT
58y with PMhx Gout found down and revealed to have an intraparenchymal hemorrhage.  Patient now admitted for a multidisciplinary rehab program with right hemiparesis, cognitive deficits, sensory loss, Dysphagia, Dysarthria.      Intraparenchymal Hemorrhage  -MRI Brain--- demonstrated L thalamic hemorrhage, w/ chronic microhemorrhages, and acute infarcts in the right caudate tail and periatrial white matter  -EKG w/o AF  -Echo unremarkable  - CTA w/o large occlusions  - Comprehensive Multidisciplinary Rehab Program:  -Cont comprehensive rehab program of PT/OT/SLP - 3 hours a day, 5 days a week. P&O as needed     Cardiovascular: HTN  - losartan 25mg daily - dc'd  - amlodipine 10mg daily > Dec to 5mg  - metoprolol 12.5 BID (4/25), Hospitalist note appreciated  - /76; HR  (4/26); (116/79 - 134/84) (4/27)    Pain, neuropathic pain  -- Tylenol PRN  - Neuropathic agent: gabapentin 300 mg qhs, 100mg daily     Gout  - Colchicine 0.6 QD-- made BID (4/26)  - gouty flare?, no pain    #Leukocytosis - stable  - no overt sign of infection  - WBC: 13.94 (4/16)>12.26 (4/18)>8.73(4/22)>11.14 (4/25)> 11.30 (4/26)    Thrombocytosis - resolved  - Platelets- 484>478>408 (4/22)> ** (4/25)> 317 (4/26)    Mood / Cognition  - Lexapro 5 - Started for dual therapy (mood and post CVA neurorecovery)  - Neuropsychology following  --Recreation therapy     Sleep  - Maintain quiet hours and a low stim environment.   - d/c'd melatonin 9mg   --Trial Rozerem 8mg qhs -- did not work-- dc-- started on trazodone 25 mg qhs    GI / Bowel  - Senna qHS  - Miralax Daily  - GI ppx: protonix     / Bladder  - Voiding with low PVRs      Dysphagia:  -MBS 4/18-- diet consistency: minced and moist/thin liquids  --On Dysphagia Free Water Protocol  - Supplements: multivitamin  - Aspiration Precautions  - SLP consult for swallow function evaluation and treatment  - Nutrition     DVT prophylaxis:   - Lovenox 40mg   - Last Doppler on 4/15--Neg.     Labs:   CBC, CMP 4/29

## 2024-04-27 NOTE — PROGRESS NOTE ADULT - SUBJECTIVE AND OBJECTIVE BOX
Patient is a 59y old  Male who presents with a chief complaint of left Intraparenchymal Hemorrhage (27 Apr 2024 10:11)      Patient seen and examined at bedside.  - no events overnight, states his ankle pain is basically gone, working will with physical therapy. no other complaints at this time, no n/v/abd pain/diarrhea/constipation.   ALLERGIES:  No Known Allergies    MEDICATIONS  (STANDING):  amLODIPine   Tablet 5 milliGRAM(s) Oral daily  enoxaparin Injectable 40 milliGRAM(s) SubCutaneous every 24 hours  escitalopram 5 milliGRAM(s) Oral daily  gabapentin 300 milliGRAM(s) Oral at bedtime  gabapentin 100 milliGRAM(s) Oral daily  metoprolol tartrate 12.5 milliGRAM(s) Oral <User Schedule>  multivitamin 1 Tablet(s) Oral daily  polyethylene glycol 3350 17 Gram(s) Oral daily  senna 2 Tablet(s) Oral at bedtime  traZODone 25 milliGRAM(s) Oral at bedtime    MEDICATIONS  (PRN):  acetaminophen     Tablet .. 650 milliGRAM(s) Oral every 6 hours PRN Mild Pain (1 - 3)    Vital Signs Last 24 Hrs  T(F): 97.5 (27 Apr 2024 07:32), Max: 97.6 (26 Apr 2024 20:28)  HR: 81 (27 Apr 2024 07:32) (80 - 91)  BP: 116/79 (27 Apr 2024 07:32) (116/79 - 134/84)  RR: 16 (27 Apr 2024 07:32) (16 - 17)  SpO2: 94% (27 Apr 2024 07:32) (94% - 96%)  I&O's Summary        PHYSICAL EXAM:  General: NAD, A/O x 3  ENT: MMM, no scleral icterus  Neck: Supple, No JVD  Lungs: Clear to auscultation bilaterally, no wheezes, rales, rhonchi  Cardio: RRR, S1/S2, No murmurs  Abdomen: Soft, Nontender, Nondistended; Bowel sounds present  Extremities: No calf tenderness, No pitting edema  Neuro RLE 2/5 in strength, rest of exam 5/5     LABS:                        12.9   11.30 )-----------( 317      ( 26 Apr 2024 05:29 )             39.7       04-25    141  |  104  |  27  ----------------------------<  100  4.2   |  29  |  0.83    Ca    9.0      25 Apr 2024 06:56    TPro  6.8  /  Alb  3.0  /  TBili  0.3  /  DBili  x   /  AST  28  /  ALT  43  /  AlkPhos  71  04-25                04-03 Chol 186 mg/dL LDL -- HDL 67 mg/dL Trig 64 mg/dL                  Urinalysis Basic - ( 25 Apr 2024 06:56 )    Color: x / Appearance: x / SG: x / pH: x  Gluc: 100 mg/dL / Ketone: x  / Bili: x / Urobili: x   Blood: x / Protein: x / Nitrite: x   Leuk Esterase: x / RBC: x / WBC x   Sq Epi: x / Non Sq Epi: x / Bacteria: x            RADIOLOGY & ADDITIONAL TESTS:    Care Discussed with Consultants/Other Providers: IDR team

## 2024-04-27 NOTE — PROGRESS NOTE ADULT - ASSESSMENT
58y with PMH Gout found down and revealed to have an intraparenchymal hemorrhage.  Patient now admitted for a acute rehab program. course complicated by acute gout flare of L posterior ankle.     #IPH  - MRI demonstrated L thalamic hemorrhage, w/ chronic microhemorrhages, and acute infarcts in the right caudate tail and periatrial white matter  - EKG w/o AF  - Echo unremarkable  - CTA w/o large occlusions    #HTN  - c/w amlodipine 5mg qD  - c/w lopressor 12.5mg qD    #Sinus tachycardia  - Patient not in pain, euvolemic, no signs of infection  - Unremarkable LE dopplers and TTE  - TSH WNL  - possible anxiety vs physiologic mediated vs pain  - trial of lopressor 12.5BID     #Gout  - received extra dose of colcichine 4/25 with improvement in gout  - will de-escalate to colcichine qD for ppx ( though this is not the best agent). will call wife Sheron at [phone] for more collateral    #DVT  - US LE 4/26 showed below the knee DVT at R soleal DVT  - c/w Lovenox 40 qD  - repeat US 5/3

## 2024-04-28 PROCEDURE — 99232 SBSQ HOSP IP/OBS MODERATE 35: CPT

## 2024-04-28 PROCEDURE — 99233 SBSQ HOSP IP/OBS HIGH 50: CPT

## 2024-04-28 RX ORDER — POLYETHYLENE GLYCOL 3350 17 G/17G
17 POWDER, FOR SOLUTION ORAL DAILY
Refills: 0 | Status: DISCONTINUED | OUTPATIENT
Start: 2024-04-28 | End: 2024-04-30

## 2024-04-28 RX ORDER — LOPERAMIDE HCL 2 MG
2 TABLET ORAL ONCE
Refills: 0 | Status: COMPLETED | OUTPATIENT
Start: 2024-04-28 | End: 2024-04-28

## 2024-04-28 RX ORDER — ALLOPURINOL 300 MG
100 TABLET ORAL DAILY
Refills: 0 | Status: DISCONTINUED | OUTPATIENT
Start: 2024-04-28 | End: 2024-05-09

## 2024-04-28 RX ADMIN — Medication 100 MILLIGRAM(S): at 14:36

## 2024-04-28 RX ADMIN — Medication 12.5 MILLIGRAM(S): at 08:17

## 2024-04-28 RX ADMIN — AMLODIPINE BESYLATE 5 MILLIGRAM(S): 2.5 TABLET ORAL at 05:57

## 2024-04-28 RX ADMIN — Medication 2 MILLIGRAM(S): at 20:10

## 2024-04-28 RX ADMIN — Medication 1 TABLET(S): at 12:19

## 2024-04-28 RX ADMIN — Medication 25 MILLIGRAM(S): at 21:42

## 2024-04-28 RX ADMIN — GABAPENTIN 100 MILLIGRAM(S): 400 CAPSULE ORAL at 12:19

## 2024-04-28 RX ADMIN — ESCITALOPRAM OXALATE 5 MILLIGRAM(S): 10 TABLET, FILM COATED ORAL at 12:19

## 2024-04-28 RX ADMIN — Medication 12.5 MILLIGRAM(S): at 20:11

## 2024-04-28 RX ADMIN — ENOXAPARIN SODIUM 40 MILLIGRAM(S): 100 INJECTION SUBCUTANEOUS at 21:42

## 2024-04-28 RX ADMIN — GABAPENTIN 300 MILLIGRAM(S): 400 CAPSULE ORAL at 21:42

## 2024-04-28 NOTE — PROGRESS NOTE ADULT - SUBJECTIVE AND OBJECTIVE BOX
Patient is a 59y old  Male who presents with a chief complaint of left Intraparenchymal Hemorrhage (28 Apr 2024 08:36)      Patient seen and examined at bedside.  - no events overnight but patient states that he had 7 loose stools on 4/27 and this morning RN reports patient had 3 loose stools. otherwise has no other complaints, denies any nausea, vomiting, headaches, shortness of breath, cough, chest pain.   ALLERGIES:  No Known Allergies    MEDICATIONS  (STANDING):  allopurinol 100 milliGRAM(s) Oral daily  amLODIPine   Tablet 5 milliGRAM(s) Oral daily  enoxaparin Injectable 40 milliGRAM(s) SubCutaneous every 24 hours  escitalopram 5 milliGRAM(s) Oral daily  gabapentin 300 milliGRAM(s) Oral at bedtime  gabapentin 100 milliGRAM(s) Oral daily  metoprolol tartrate 12.5 milliGRAM(s) Oral <User Schedule>  multivitamin 1 Tablet(s) Oral daily  traZODone 25 milliGRAM(s) Oral at bedtime    MEDICATIONS  (PRN):  acetaminophen     Tablet .. 650 milliGRAM(s) Oral every 6 hours PRN Mild Pain (1 - 3)  polyethylene glycol 3350 17 Gram(s) Oral daily PRN Constipation    Vital Signs Last 24 Hrs  T(F): 98.3 (28 Apr 2024 08:15), Max: 98.3 (28 Apr 2024 08:15)  HR: 70 (28 Apr 2024 08:15) (70 - 81)  BP: 125/79 (28 Apr 2024 08:15) (125/79 - 137/93)  RR: 16 (28 Apr 2024 08:15) (16 - 16)  SpO2: 96% (28 Apr 2024 08:15) (96% - 96%)  I&O's Summary        PHYSICAL EXAM:  General: NAD, A/O x 3  ENT: MMM, no scleral icterus  Neck: Supple, No JVD  Lungs: Clear to auscultation bilaterally, no wheezes, rales, rhonchi  Cardio: RRR, S1/S2, No murmurs  Abdomen: Soft, Nontender, Nondistended; Bowel sounds present  Extremities: No calf tenderness, No pitting edema  Neuro RLE 2/5 in strength, rest of exam 5/5     LABS:                        12.9   11.30 )-----------( 317      ( 26 Apr 2024 05:29 )             39.7                         04-03 Chol 186 mg/dL LDL -- HDL 67 mg/dL Trig 64 mg/dL                          RADIOLOGY & ADDITIONAL TESTS:    Care Discussed with Consultants/Other Providers: IDR team

## 2024-04-28 NOTE — PROGRESS NOTE ADULT - SUBJECTIVE AND OBJECTIVE BOX
Patient is a 59y old  Male who presents with a chief complaint of left Intraparenchymal Hemorrhage     HPI:  59M PMHx gout found down at home on 4/2/24 by wife brought to [facility] by ambulance found to have a L thalamic hemorrhagic infarct via CT. Patient intubated for inability to protect airway. MRI demonstrated hemorrhage with anticipated evolution. Chronic microhemorrhages found in the R thalamus and BG as well as small acute infarcts in the right caudate tail and periatrial white matter.  CTA clear of major vessel occlusion or stenosis. Patient with residual R hemiparesis, dysarthria, mild aphasia, and R gaze palsy. EEG negative throughout workup. EKG without AF. TTE grossly normal. Gout flare treated by rheumatology with colchicine while inpatient. Patient received PM&R consult who recommended acute inpatient rehabilitation. Patient was medically stabilized and discharged to [facility].  (15 Apr 2024 14:16)    ROS/SUBJECTIVE: Patient seen and examined at bedside this morning. Patient in bed in NAD, no SOB, no n/v, no pain.     REVIEW OF SYSTEMS  + Right sided weakness      VITALS  Vital Signs Last 24 Hrs  T(C): 36.6 (27 Apr 2024 20:50), Max: 36.6 (27 Apr 2024 20:50)  T(F): 97.9 (27 Apr 2024 20:50), Max: 97.9 (27 Apr 2024 20:50)  HR: 70 (28 Apr 2024 05:53) (70 - 81)  BP: 131/86 (28 Apr 2024 05:53) (131/86 - 137/93)  BP(mean): --  RR: 16 (27 Apr 2024 20:50) (16 - 16)  SpO2: 96% (27 Apr 2024 20:50) (96% - 96%)    Parameters below as of 27 Apr 2024 20:50  Patient On (Oxygen Delivery Method): room air          PHYSICAL EXAM:   Gen - NAD, Comfortable  HEENT - NCAT, MMM  Pulm - resp nonlabored  Cardiovascular - warm well perfused, no cyanosis  Abdomen - Soft, NT/ND,   Extremities - No calf tenderness, no pedal edema  Neuro-     Cognitive - AAOx3, able to follow commands, content appropriate     Communication - (+)mild dysarthria         Motor -                     LEFT    UE - ShAB 5/5, EF 5/5, EE 5/5, WE 5/5,  5/5                    RIGHT UE - ShAB 0/5, EF 0/5, EE 0/5, WE 0/5,  0/5                    LEFT    LE - HF 5/5, KE 5/5, DF 5/5, PF 5/5                    RIGHT LE - HF 1/5, KE 1/5, DF 0/5, PF 0/5        Sensory - present but diminished right UE/LE  Psychiatric - Mood stable    RECENT LABS:  LABS:  LABS:                       12.9   11.30 )-----------( 317      ( 26 Apr 2024 05:29 )             39.7                12.8   11.14 )-----------( 288      ( 25 Apr 2024 06:56 )             39.3     04-25    141  |  104  |  27<H>  ----------------------------<  100<H>  4.2   |  29  |  0.83    Ca    9.0      25 Apr 2024 06:56    TPro  6.8  /  Alb  3.0<L>  /  TBili  0.3  /  DBili  x   /  AST  28  /  ALT  43  /  AlkPhos  71  04-25    LIVER FUNCTIONS - ( 25 Apr 2024 06:56 )  Alb: 3.0 g/dL / Pro: 6.8 g/dL / ALK PHOS: 71 U/L / ALT: 43 U/L / AST: 28 U/L / GGT: x           MEDICATIONS:  MEDICATIONS  (STANDING):  amLODIPine   Tablet 5 milliGRAM(s) Oral daily  colchicine 0.6 milliGRAM(s) Oral two times a day  enoxaparin Injectable 40 milliGRAM(s) SubCutaneous every 24 hours  escitalopram 5 milliGRAM(s) Oral daily  gabapentin 300 milliGRAM(s) Oral at bedtime  gabapentin 100 milliGRAM(s) Oral daily  metoprolol tartrate 12.5 milliGRAM(s) Oral <User Schedule>  multivitamin 1 Tablet(s) Oral daily  polyethylene glycol 3350 17 Gram(s) Oral daily  senna 2 Tablet(s) Oral at bedtime  traZODone 25 milliGRAM(s) Oral at bedtime    MEDICATIONS  (PRN):  acetaminophen     Tablet .. 650 milliGRAM(s) Oral every 6 hours PRN Mild Pain (1 - 3)

## 2024-04-28 NOTE — PROGRESS NOTE ADULT - ASSESSMENT
58y with PMhx Gout found down and revealed to have an intraparenchymal hemorrhage.  Patient now admitted for a multidisciplinary rehab program with right hemiparesis, cognitive deficits, sensory loss, Dysphagia, Dysarthria.      Intraparenchymal Hemorrhage  -MRI Brain--- demonstrated L thalamic hemorrhage, w/ chronic microhemorrhages, and acute infarcts in the right caudate tail and periatrial white matter  -EKG w/o AF  -Echo unremarkable  - CTA w/o large occlusions  - Comprehensive Multidisciplinary Rehab Program:  -Cont comprehensive rehab program of PT/OT/SLP - 3 hours a day, 5 days a week. P&O as needed     Cardiovascular: HTN  - losartan 25mg daily - dc'd  - amlodipine 10mg daily > Dec to 5mg  - metoprolol 12.5 BID (4/25), Hospitalist note appreciated  - /76; HR  (4/26); (116/79 - 134/84) (4/27); (131/86 - 137/93) 4/28    Pain, neuropathic pain  -- Tylenol PRN  - Neuropathic agent: gabapentin 300 mg qhs, 100mg daily     Gout  - Colchicine 0.6 QD-- made BID (4/26), decreased to Qdaily  - gouty flare?, no pain  - Hospitalist note appreciated    #Leukocytosis - stable  - no overt sign of infection  - WBC: 13.94 (4/16)>12.26 (4/18)>8.73(4/22)>11.14 (4/25)> 11.30 (4/26)    Thrombocytosis - resolved  - Platelets- 484>478>408 (4/22)> ** (4/25)> 317 (4/26)    Mood / Cognition  - Lexapro 5 - Started for dual therapy (mood and post CVA neurorecovery)  - Neuropsychology following  --Recreation therapy     Sleep  - Maintain quiet hours and a low stim environment.   - d/c'd melatonin 9mg   --Trial Rozerem 8mg qhs -- did not work-- dc-- started on trazodone 25 mg qhs    GI / Bowel  - Senna qHS  - Miralax Daily  - GI ppx: protonix     / Bladder  - Voiding with low PVRs      Dysphagia:  -MBS 4/18-- diet consistency: minced and moist/thin liquids  --On Dysphagia Free Water Protocol  - Supplements: multivitamin  - Aspiration Precautions  - SLP consult for swallow function evaluation and treatment  - Nutrition     DVT prophylaxis:   - Lovenox 40mg   - Last Doppler on 4/15--Neg.     Labs:   CBC, CMP 4/29

## 2024-04-28 NOTE — PROGRESS NOTE ADULT - ASSESSMENT
58y with PMH Gout found down and revealed to have an intraparenchymal hemorrhage.  Patient now admitted for a acute rehab program. course complicated by acute gout flare of L posterior ankle.     #IPH  - MRI demonstrated L thalamic hemorrhage, w/ chronic microhemorrhages, and acute infarcts in the right caudate tail and periatrial white matter  - EKG w/o AF  - Echo unremarkable  - CTA w/o large occlusions    #HTN  - c/w amlodipine 5mg qD  - c/w lopressor 12.5mg qD    #Sinus tachycardia  - Patient not in pain, euvolemic, no signs of infection  - unremarkable TTE  - TSH WNL  - possible anxiety vs physiologic mediated vs pain  - trial of lopressor 12.5BID seems to be helping with the tachycardia     #Gout  - received extra dose of colcichine 4/25 with improvement in gout  - initially changed colchicine to once a day dosing, called wife Sheron at [phone] 4/28. states that he takes his gout medications as needed even though his PCP wants him to take some of them daily  - Dc'd colchicine 4/28 due to loose stools, started allopurinol 100qD 4/28    #DVT  - US LE 4/26 showed below the knee DVT at R soleal DVT ( explained to patient and wife 4/28)  - c/w Lovenox 40 qD  - repeat US 5/3

## 2024-04-29 LAB
ALBUMIN SERPL ELPH-MCNC: 3.2 G/DL — LOW (ref 3.3–5)
ALP SERPL-CCNC: 76 U/L — SIGNIFICANT CHANGE UP (ref 40–120)
ALT FLD-CCNC: 44 U/L — SIGNIFICANT CHANGE UP (ref 10–45)
ANION GAP SERPL CALC-SCNC: 7 MMOL/L — SIGNIFICANT CHANGE UP (ref 5–17)
AST SERPL-CCNC: 25 U/L — SIGNIFICANT CHANGE UP (ref 10–40)
BASOPHILS # BLD AUTO: 0.09 K/UL — SIGNIFICANT CHANGE UP (ref 0–0.2)
BASOPHILS NFR BLD AUTO: 1.1 % — SIGNIFICANT CHANGE UP (ref 0–2)
BILIRUB SERPL-MCNC: 0.5 MG/DL — SIGNIFICANT CHANGE UP (ref 0.2–1.2)
BUN SERPL-MCNC: 20 MG/DL — SIGNIFICANT CHANGE UP (ref 7–23)
CALCIUM SERPL-MCNC: 8.9 MG/DL — SIGNIFICANT CHANGE UP (ref 8.4–10.5)
CHLORIDE SERPL-SCNC: 104 MMOL/L — SIGNIFICANT CHANGE UP (ref 96–108)
CO2 SERPL-SCNC: 31 MMOL/L — SIGNIFICANT CHANGE UP (ref 22–31)
CREAT SERPL-MCNC: 0.77 MG/DL — SIGNIFICANT CHANGE UP (ref 0.5–1.3)
EGFR: 104 ML/MIN/1.73M2 — SIGNIFICANT CHANGE UP
EOSINOPHIL # BLD AUTO: 1.75 K/UL — HIGH (ref 0–0.5)
EOSINOPHIL NFR BLD AUTO: 21.1 % — HIGH (ref 0–6)
GLUCOSE SERPL-MCNC: 95 MG/DL — SIGNIFICANT CHANGE UP (ref 70–99)
HCT VFR BLD CALC: 42 % — SIGNIFICANT CHANGE UP (ref 39–50)
HGB BLD-MCNC: 14 G/DL — SIGNIFICANT CHANGE UP (ref 13–17)
IMM GRANULOCYTES NFR BLD AUTO: 0.5 % — SIGNIFICANT CHANGE UP (ref 0–0.9)
LYMPHOCYTES # BLD AUTO: 1.21 K/UL — SIGNIFICANT CHANGE UP (ref 1–3.3)
LYMPHOCYTES # BLD AUTO: 14.6 % — SIGNIFICANT CHANGE UP (ref 13–44)
MCHC RBC-ENTMCNC: 28.6 PG — SIGNIFICANT CHANGE UP (ref 27–34)
MCHC RBC-ENTMCNC: 33.3 GM/DL — SIGNIFICANT CHANGE UP (ref 32–36)
MCV RBC AUTO: 85.7 FL — SIGNIFICANT CHANGE UP (ref 80–100)
MONOCYTES # BLD AUTO: 0.58 K/UL — SIGNIFICANT CHANGE UP (ref 0–0.9)
MONOCYTES NFR BLD AUTO: 7 % — SIGNIFICANT CHANGE UP (ref 2–14)
NEUTROPHILS # BLD AUTO: 4.64 K/UL — SIGNIFICANT CHANGE UP (ref 1.8–7.4)
NEUTROPHILS NFR BLD AUTO: 55.7 % — SIGNIFICANT CHANGE UP (ref 43–77)
NRBC # BLD: 0 /100 WBCS — SIGNIFICANT CHANGE UP (ref 0–0)
PLATELET # BLD AUTO: 226 K/UL — SIGNIFICANT CHANGE UP (ref 150–400)
POTASSIUM SERPL-MCNC: 4 MMOL/L — SIGNIFICANT CHANGE UP (ref 3.5–5.3)
POTASSIUM SERPL-SCNC: 4 MMOL/L — SIGNIFICANT CHANGE UP (ref 3.5–5.3)
PROT SERPL-MCNC: 7.1 G/DL — SIGNIFICANT CHANGE UP (ref 6–8.3)
RBC # BLD: 4.9 M/UL — SIGNIFICANT CHANGE UP (ref 4.2–5.8)
RBC # FLD: 13 % — SIGNIFICANT CHANGE UP (ref 10.3–14.5)
SODIUM SERPL-SCNC: 142 MMOL/L — SIGNIFICANT CHANGE UP (ref 135–145)
WBC # BLD: 8.31 K/UL — SIGNIFICANT CHANGE UP (ref 3.8–10.5)
WBC # FLD AUTO: 8.31 K/UL — SIGNIFICANT CHANGE UP (ref 3.8–10.5)

## 2024-04-29 PROCEDURE — 99232 SBSQ HOSP IP/OBS MODERATE 35: CPT

## 2024-04-29 PROCEDURE — 74018 RADEX ABDOMEN 1 VIEW: CPT | Mod: 26

## 2024-04-29 RX ORDER — ESCITALOPRAM OXALATE 10 MG/1
10 TABLET, FILM COATED ORAL
Refills: 0 | Status: DISCONTINUED | OUTPATIENT
Start: 2024-04-29 | End: 2024-05-09

## 2024-04-29 RX ORDER — MINERAL OIL
133 OIL (ML) MISCELLANEOUS ONCE
Refills: 0 | Status: COMPLETED | OUTPATIENT
Start: 2024-04-29 | End: 2024-04-29

## 2024-04-29 RX ADMIN — GABAPENTIN 100 MILLIGRAM(S): 400 CAPSULE ORAL at 12:03

## 2024-04-29 RX ADMIN — ESCITALOPRAM OXALATE 10 MILLIGRAM(S): 10 TABLET, FILM COATED ORAL at 17:29

## 2024-04-29 RX ADMIN — Medication 133 MILLILITER(S): at 22:16

## 2024-04-29 RX ADMIN — Medication 100 MILLIGRAM(S): at 12:03

## 2024-04-29 RX ADMIN — AMLODIPINE BESYLATE 5 MILLIGRAM(S): 2.5 TABLET ORAL at 06:27

## 2024-04-29 RX ADMIN — Medication 12.5 MILLIGRAM(S): at 08:06

## 2024-04-29 RX ADMIN — Medication 25 MILLIGRAM(S): at 21:31

## 2024-04-29 RX ADMIN — Medication 1 TABLET(S): at 12:03

## 2024-04-29 RX ADMIN — Medication 12.5 MILLIGRAM(S): at 20:10

## 2024-04-29 RX ADMIN — GABAPENTIN 300 MILLIGRAM(S): 400 CAPSULE ORAL at 21:31

## 2024-04-29 RX ADMIN — ENOXAPARIN SODIUM 40 MILLIGRAM(S): 100 INJECTION SUBCUTANEOUS at 21:31

## 2024-04-29 NOTE — PROGRESS NOTE ADULT - ASSESSMENT
58y with PMhx Gout found down and revealed to have an intraparenchymal hemorrhage.  Patient now admitted for a multidisciplinary rehab program with right hemiparesis, cognitive deficits, sensory loss, Dysphagia, Dysarthria.      Intraparenchymal Hemorrhage  -MRI Brain--- demonstrated L thalamic hemorrhage, w/ chronic microhemorrhages, and acute infarcts in the right caudate tail and periatrial white matter  -EKG w/o AF  -Echo unremarkable  - CTA w/o large occlusions  - Comprehensive Multidisciplinary Rehab Program:  -Cont comprehensive rehab program of PT/OT/SLP - 3 hours a day, 5 days a week. P&O as needed     Cardiovascular: HTN  - amlodipine  5mg  - metoprolol 12.5 BID (4/25), Hospitalist note appreciated  - BP controlled    Pain, neuropathic pain  -- Tylenol PRN  - Neuropathic agent: gabapentin 300 mg qhs, 100mg daily     Gout  - Colchicine 0.6 QD-- made BID (4/26), decreased to Qdaily  - gouty flare?, no pain  - Hospitalist note appreciated    #Leukocytosis - stable  - no overt sign of infection  - WBC: 13.94 (4/16)>12.26 (4/18)>8.73(4/22)>11.14 (4/25)> 11.30 (4/26)  -- 4/29-- WNL 8.31    Thrombocytosis - resolved  - Platelets- 484>478>408 (4/22)> ** (4/25)> 317 (4/26)  --4/29-- 226    Mood / Cognition  - Lexapro 5 mg for anxiety-- Will schedule in the evening to improve sleep-- increase later in the week as tolerated.   - Neuropsychology following  --Recreation therapy     Sleep  - Maintain quiet hours and a low stim environment.   --failed melatonin 9mg & Rozerem 8mg qhs --  -- started on trazodone 25 mg qhs    GI / Bowel  - Senna qHS  - Miralax Daily  - GI ppx: protonix  --Check Abdominal Xray as may have high fecal load with multiple small BMs.      / Bladder  - Voiding with low PVRs      Dysphagia:  -MBS 4/18-- diet consistency: minced and moist/thin liquids  --On Dysphagia Free Water Protocol  - Supplements: multivitamin  - Aspiration Precautions  - SLP consult for swallow function evaluation and treatment  - Nutrition     DVT prophylaxis:   - Lovenox 40mg   - Last Doppler on 4/15--Neg.     Labs:   CBC, CMP 4/29

## 2024-04-29 NOTE — PROGRESS NOTE ADULT - ASSESSMENT
58y with PMH Gout found down and revealed to have an intraparenchymal hemorrhage.  Patient now admitted for a acute rehab program. course complicated by acute gout flare of L posterior ankle.     #IPH  - MRI demonstrated L thalamic hemorrhage, w/ chronic microhemorrhages, and acute infarcts in the right caudate tail and periatrial white matter  - EKG w/o AF  - Echo unremarkable  - CTA w/o large occlusions    #HTN  - c/w amlodipine 5mg qD  - c/w lopressor 12.5mg qD    #Sinus tachycardia  - Patient not in pain, euvolemic, no signs of infection  - unremarkable TTE  - TSH WNL  - possible anxiety vs physiologic mediated vs pain vs DVT  - trial of lopressor 12.5BID seems to be helping with the tachycardia     #Gout  - received extra dose of colcichine 4/25 with improvement in gout  - initially changed colchicine to once a day dosing, called wife Sheron at [phone] 4/28. states that he takes his gout medications as needed even though his PCP wants him to take some of them daily  - Dc'd colchicine 4/28 due to loose stools, started allopurinol 100qD 4/28. loose stools seem to have improved  - explained that he will need to take allopurinol every day     #DVT  - US LE 4/26 showed below the knee DVT at R soleal DVT ( explained to patient and wife 4/28)  - c/w Lovenox 40 qD  - repeat US 5/3

## 2024-04-29 NOTE — CHART NOTE - NSCHARTNOTEFT_GEN_A_CORE
Nutrition Follow Up Note  Source: Medical Record [X] Patient [X] Family [ ]         Diet: Minced and moist, single sips only  Pt tolerating diet with fair-good PO intake, eating % of meals Per nursing flowsheets. Denies nausea, vomiting, diarrhea, constipation.     Enteral/Parenteral Nutrition: N/A    Current Weight: 168.8 lbs (4-15)    Pertinent Medications: MEDICATIONS  (STANDING):  allopurinol 100 milliGRAM(s) Oral daily  amLODIPine   Tablet 5 milliGRAM(s) Oral daily  enoxaparin Injectable 40 milliGRAM(s) SubCutaneous every 24 hours  escitalopram 10 milliGRAM(s) Oral <User Schedule>  gabapentin 300 milliGRAM(s) Oral at bedtime  gabapentin 100 milliGRAM(s) Oral daily  metoprolol tartrate 12.5 milliGRAM(s) Oral <User Schedule>  multivitamin 1 Tablet(s) Oral daily  traZODone 25 milliGRAM(s) Oral at bedtime    MEDICATIONS  (PRN):  acetaminophen     Tablet .. 650 milliGRAM(s) Oral every 6 hours PRN Mild Pain (1 - 3)  polyethylene glycol 3350 17 Gram(s) Oral daily PRN Constipation      Pertinent Labs:  04-29 Na142 mmol/L Glu 95 mg/dL K+ 4.0 mmol/L Cr  0.77 mg/dL BUN 20 mg/dL 04-29 Alb 3.2 g/dL<L> 04-03 Chol 186 mg/dL LDL --    HDL 67 mg/dL Trig 64 mg/dL        Skin: bruised Per nursing flowsheets     Edema: DVT R upper extremity, DVT R lower extremity Per nursing flowsheets     Last BM: on 4/28 Per nursing flowsheets     Estimated Needs:   [X] No Change since Previous Assessment  [ ] Recalculated:     Previous Nutrition Diagnosis:   Swallowing difficulty    Nutrition Diagnosis is [X] Ongoing  - SLP following     New Nutrition Diagnosis: [X] Not Applicable  [ ] Inadequate Protein Energy Intake   [ ] Inadequate Oral Intake   [ ] Excessive Energy Intake   [ ] Increased Nutrient Needs   [ ] Obesity   [ ] Altered GI Function   [ ] Unintended Weight Loss   [ ] Food & Nutrition Related Knowledge Deficit  [ ] Limited Adherence to nutrition related recommendations   [ ] Malnutrition      Interventions:   1. Recommend continuing with current plan of care  2. Follow SLP recommendations  3. Obtain and honor food preferences as able    Monitoring & Evaluation:   [X] Weights   [X] PO Intake   [X] Follow Up (Per Protocol)  [X] Tolerance to Diet Prescription   [X] Other: Labs     RD Remains Available.  Nella Richter, RD

## 2024-04-29 NOTE — PROGRESS NOTE ADULT - SUBJECTIVE AND OBJECTIVE BOX
HPI:  58M PMHx gout found down at home on 4/2/24 by wife brought to [facility] by ambulance found to have a L thalamic hemorrhagic infarct via CT. Patient intubated for inability to protect airway. MRI demonstrated hemorrhage with anticipated evolution. Chronic microhemorrhages found in the R thalamus and BG as well as small acute infarcts in the right caudate tail and periatrial white matter.  CTA clear of major vessel occlusion or stenosis. Patient with residual R hemiparesis, dysarthria, mild aphasia, and R gaze palsy. EEG negative throughout workup. EKG without AF. TTE grossly normal. Gout flare treated by rheumatology with colchicine while inpatient. Patient received PM&R consult who recommended acute inpatient rehabilitation. Patient was medically stabilized and discharged to [facility].  (15 Apr 2024 14:16)          Subjective:  Seen this AM in Speech therapy.  Slept during the night.  Denies pain.  Still somewhat anxious about his recovery.  Tolerating therapy.  Nursing and pt. report multiple small BMs.  pt. denies abdominal pain.       VITALS  Vital Signs Last 24 Hrs  T(C): 36.8 (29 Apr 2024 08:10), Max: 36.9 (28 Apr 2024 20:09)  T(F): 98.2 (29 Apr 2024 08:10), Max: 98.5 (28 Apr 2024 20:09)  HR: 72 (29 Apr 2024 08:10) (72 - 89)  BP: 134/86 (29 Apr 2024 08:10) (117/77 - 134/86)  BP(mean): --  RR: 16 (29 Apr 2024 08:10) (16 - 16)  SpO2: 97% (29 Apr 2024 08:10) (94% - 97%)    Parameters below as of 29 Apr 2024 08:10  Patient On (Oxygen Delivery Method): room air        REVIEW OF SYMPTOMS  Neurological deficits      PHYSICAL EXAM  Gen - NAD, Comfortable  HEENT - NCAT, MMM  Pulm - resp nonlabored  Cardiovascular - warm well perfused, no cyanosis  Abdomen - Soft, NT/ND,   Extremities - No calf tenderness, no pedal edema  Neuro-     Cognitive - AAOx3, able to follow commands, content appropriate     Communication - (+)mild dysarthria         Motor -                     LEFT    UE - ShAB 5/5, EF 5/5, EE 5/5, WE 5/5,  5/5                    RIGHT UE - ShAB 0/5, EF 0/5, EE 0/5, WE 0/5,  0/5                    LEFT    LE - HF 5/5, KE 5/5, DF 5/5, PF 5/5                    RIGHT LE - HF 1/5, KE 1/5, DF 0/5, PF 0/5        Sensory - present but diminished right UE/LE  Psychiatric - Mood stable    RECENT LABS                        14.0   8.31  )-----------( 226      ( 29 Apr 2024 06:45 )             42.0     04-29    142  |  104  |  20  ----------------------------<  95  4.0   |  31  |  0.77    Ca    8.9      29 Apr 2024 06:45    TPro  7.1  /  Alb  3.2<L>  /  TBili  0.5  /  DBili  x   /  AST  25  /  ALT  44  /  AlkPhos  76  04-29      Urinalysis Basic - ( 29 Apr 2024 06:45 )    Color: x / Appearance: x / SG: x / pH: x  Gluc: 95 mg/dL / Ketone: x  / Bili: x / Urobili: x   Blood: x / Protein: x / Nitrite: x   Leuk Esterase: x / RBC: x / WBC x   Sq Epi: x / Non Sq Epi: x / Bacteria: x          RADIOLOGY/OTHER RESULTS      MEDICATIONS  (STANDING):  allopurinol 100 milliGRAM(s) Oral daily  amLODIPine   Tablet 5 milliGRAM(s) Oral daily  enoxaparin Injectable 40 milliGRAM(s) SubCutaneous every 24 hours  escitalopram 10 milliGRAM(s) Oral <User Schedule>  gabapentin 300 milliGRAM(s) Oral at bedtime  gabapentin 100 milliGRAM(s) Oral daily  metoprolol tartrate 12.5 milliGRAM(s) Oral <User Schedule>  multivitamin 1 Tablet(s) Oral daily  traZODone 25 milliGRAM(s) Oral at bedtime    MEDICATIONS  (PRN):  acetaminophen     Tablet .. 650 milliGRAM(s) Oral every 6 hours PRN Mild Pain (1 - 3)  polyethylene glycol 3350 17 Gram(s) Oral daily PRN Constipation

## 2024-04-29 NOTE — PROGRESS NOTE ADULT - SUBJECTIVE AND OBJECTIVE BOX
Patient is a 59y old  Male who presents with a chief complaint of left Intraparenchymal Hemorrhage (28 Apr 2024 12:00)      Patient seen and examined at bedside.  - no events overnight, seen today in the wheelchair. in good spirits, states that his loose stools have improved and stools are less frequent and more formed. otherwise no lower extremity pain, working with PT, tolerating po intake, denies any n/v/abd pain/cough/cp.    ALLERGIES:  No Known Allergies    MEDICATIONS  (STANDING):  allopurinol 100 milliGRAM(s) Oral daily  amLODIPine   Tablet 5 milliGRAM(s) Oral daily  enoxaparin Injectable 40 milliGRAM(s) SubCutaneous every 24 hours  escitalopram 5 milliGRAM(s) Oral daily  gabapentin 300 milliGRAM(s) Oral at bedtime  gabapentin 100 milliGRAM(s) Oral daily  metoprolol tartrate 12.5 milliGRAM(s) Oral <User Schedule>  multivitamin 1 Tablet(s) Oral daily  traZODone 25 milliGRAM(s) Oral at bedtime    MEDICATIONS  (PRN):  acetaminophen     Tablet .. 650 milliGRAM(s) Oral every 6 hours PRN Mild Pain (1 - 3)  polyethylene glycol 3350 17 Gram(s) Oral daily PRN Constipation    Vital Signs Last 24 Hrs  T(F): 98.2 (29 Apr 2024 08:10), Max: 98.5 (28 Apr 2024 20:09)  HR: 72 (29 Apr 2024 08:10) (72 - 89)  BP: 134/86 (29 Apr 2024 08:10) (117/77 - 134/86)  RR: 16 (29 Apr 2024 08:10) (16 - 16)  SpO2: 97% (29 Apr 2024 08:10) (94% - 97%)  I&O's Summary        PHYSICAL EXAM:  General: NAD, A/O x 3  ENT: MMM, no scleral icterus  Neck: Supple, No JVD  Lungs: Clear to auscultation bilaterally, no wheezes, rales, rhonchi  Cardio: RRR, S1/S2, No murmurs  Abdomen: Soft, Nontender, Nondistended; Bowel sounds present  Extremities: No calf tenderness, No pitting edema  Neuro RLE 2/5 in strength, rest of exam 5/5     LABS:                        14.0   8.31  )-----------( 226      ( 29 Apr 2024 06:45 )             42.0       04-29    142  |  104  |  20  ----------------------------<  95  4.0   |  31  |  0.77    Ca    8.9      29 Apr 2024 06:45    TPro  7.1  /  Alb  3.2  /  TBili  0.5  /  DBili  x   /  AST  25  /  ALT  44  /  AlkPhos  76  04-29                04-03 Chol 186 mg/dL LDL -- HDL 67 mg/dL Trig 64 mg/dL                  Urinalysis Basic - ( 29 Apr 2024 06:45 )    Color: x / Appearance: x / SG: x / pH: x  Gluc: 95 mg/dL / Ketone: x  / Bili: x / Urobili: x   Blood: x / Protein: x / Nitrite: x   Leuk Esterase: x / RBC: x / WBC x   Sq Epi: x / Non Sq Epi: x / Bacteria: x            RADIOLOGY & ADDITIONAL TESTS:    Care Discussed with Consultants/Other Providers: IDR team

## 2024-04-30 PROCEDURE — 99232 SBSQ HOSP IP/OBS MODERATE 35: CPT

## 2024-04-30 RX ORDER — MAGNESIUM HYDROXIDE 400 MG/1
30 TABLET, CHEWABLE ORAL ONCE
Refills: 0 | Status: COMPLETED | OUTPATIENT
Start: 2024-04-30 | End: 2024-04-30

## 2024-04-30 RX ORDER — POLYETHYLENE GLYCOL 3350 17 G/17G
17 POWDER, FOR SOLUTION ORAL
Refills: 0 | Status: DISCONTINUED | OUTPATIENT
Start: 2024-04-30 | End: 2024-05-09

## 2024-04-30 RX ADMIN — AMLODIPINE BESYLATE 5 MILLIGRAM(S): 2.5 TABLET ORAL at 06:15

## 2024-04-30 RX ADMIN — GABAPENTIN 300 MILLIGRAM(S): 400 CAPSULE ORAL at 21:26

## 2024-04-30 RX ADMIN — Medication 12.5 MILLIGRAM(S): at 19:23

## 2024-04-30 RX ADMIN — POLYETHYLENE GLYCOL 3350 17 GRAM(S): 17 POWDER, FOR SOLUTION ORAL at 17:34

## 2024-04-30 RX ADMIN — Medication 5 MILLIGRAM(S): at 21:26

## 2024-04-30 RX ADMIN — ENOXAPARIN SODIUM 40 MILLIGRAM(S): 100 INJECTION SUBCUTANEOUS at 21:26

## 2024-04-30 RX ADMIN — Medication 100 MILLIGRAM(S): at 11:39

## 2024-04-30 RX ADMIN — Medication 1 TABLET(S): at 11:39

## 2024-04-30 RX ADMIN — Medication 12.5 MILLIGRAM(S): at 07:22

## 2024-04-30 RX ADMIN — MAGNESIUM HYDROXIDE 30 MILLILITER(S): 400 TABLET, CHEWABLE ORAL at 17:34

## 2024-04-30 RX ADMIN — GABAPENTIN 100 MILLIGRAM(S): 400 CAPSULE ORAL at 11:39

## 2024-04-30 RX ADMIN — Medication 25 MILLIGRAM(S): at 21:27

## 2024-04-30 RX ADMIN — ESCITALOPRAM OXALATE 10 MILLIGRAM(S): 10 TABLET, FILM COATED ORAL at 17:35

## 2024-04-30 NOTE — PROGRESS NOTE ADULT - ATTENDING COMMENTS
Pt. seen with fellow.  Agree with documentation above as per fellow with amendments made as appropriate. Patient medically stable. Making progress towards rehab goals.     Left ICH  --Pt. c/o blurred vision in right eye when reading-- no diplopia, or VF deficit-- Opthalmology consult to assess for retinal or intraocular pathology-- d/w pt. and his wife that Visual processing issues are common after stroke and he will need to see a neuro-optometrist as an outpatient if it does not improve as outpatient     - 4/30: Abd Xray completed on 4/29, increased stool burden  --Received Mineral Oil Enema w/o resultant BM  -- Stool burden mainly concentrated in ascending colon-- will change Miralax to BID and add bisacodyl PO  --d/w hospitalist

## 2024-04-30 NOTE — PROGRESS NOTE ADULT - ASSESSMENT
58y with PMH Gout found down and revealed to have an intraparenchymal hemorrhage.  Patient now admitted for a acute rehab program. course complicated by acute gout flare of L posterior ankle ( resolved with colchicine)    #IPH  - MRI demonstrated L thalamic hemorrhage, w/ chronic microhemorrhages, and acute infarcts in the right caudate tail and periatrial white matter  - EKG w/o AF  - Echo unremarkable  - CTA w/o large occlusions    #Constipation  - minimal bms now, abdominal xray shows increased fecal content  - s/p mineral enema without adequate bms  - increasing bm regimen to miralax bid, bisacodyl, giving one dose of magnesium hydroxide    #HTN  - c/w amlodipine 5mg qD  - c/w lopressor 12.5mg qD    #Sinus tachycardia  - Patient not in pain, euvolemic, no signs of infection  - unremarkable TTE  - TSH WNL  - possible anxiety vs physiologic mediated vs pain vs DVT  - trial of lopressor 12.5BID seems to be helping with the tachycardia     #Gout  - received extra dose of colcichine 4/25 with improvement in gout  - initially changed colchicine to once a day dosing, called wife Sheron at [phone] 4/28. states that he takes his gout medications as needed even though his PCP wants him to take some of them daily  - Dc'd colchicine 4/28, started allopurinol 100qD 4/28  - explained that he will need to take allopurinol every day     #DVT  - US LE 4/26 showed below the knee DVT at R soleal DVT ( explained to patient and wife 4/28)  - c/w Lovenox 40 qD  - repeat US 5/3

## 2024-04-30 NOTE — PROGRESS NOTE ADULT - SUBJECTIVE AND OBJECTIVE BOX
Patient is a 59y old  Male who presents with a chief complaint of left Intraparenchymal Hemorrhage (30 Apr 2024 08:58)      Patient seen and examined at bedside.  - no events overnight, no nausea, vomiting, headaches, shortness of breath, cough. states he had not had a bm overnight despite mineral oil enema, but denies any abdominal or rectal pain.  ALLERGIES:  No Known Allergies    MEDICATIONS  (STANDING):  allopurinol 100 milliGRAM(s) Oral daily  amLODIPine   Tablet 5 milliGRAM(s) Oral daily  bisacodyl 5 milliGRAM(s) Oral at bedtime  enoxaparin Injectable 40 milliGRAM(s) SubCutaneous every 24 hours  escitalopram 10 milliGRAM(s) Oral <User Schedule>  gabapentin 300 milliGRAM(s) Oral at bedtime  gabapentin 100 milliGRAM(s) Oral daily  magnesium hydroxide Suspension 30 milliLiter(s) Oral once  metoprolol tartrate 12.5 milliGRAM(s) Oral <User Schedule>  multivitamin 1 Tablet(s) Oral daily  polyethylene glycol 3350 17 Gram(s) Oral two times a day  traZODone 25 milliGRAM(s) Oral at bedtime    MEDICATIONS  (PRN):  acetaminophen     Tablet .. 650 milliGRAM(s) Oral every 6 hours PRN Mild Pain (1 - 3)    Vital Signs Last 24 Hrs  T(F): 97.5 (30 Apr 2024 07:24), Max: 97.8 (29 Apr 2024 20:08)  HR: 68 (30 Apr 2024 07:24) (68 - 94)  BP: 123/81 (30 Apr 2024 07:24) (114/75 - 125/78)  RR: 16 (30 Apr 2024 07:24) (16 - 16)  SpO2: 96% (30 Apr 2024 07:24) (95% - 96%)  I&O's Summary        PHYSICAL EXAM:  General: NAD, A/O x 3  ENT: MMM, no scleral icterus  Neck: Supple, No JVD  Lungs: Clear to auscultation bilaterally, no wheezes, rales, rhonchi  Cardio: RRR, S1/S2, No murmurs  Abdomen: Soft, Nontender, Nondistended; Bowel sounds present  Extremities: No calf tenderness, No pitting edema  Neuro RLE 2/5 in strength, rest of exam 5/5     LABS:                        14.0   8.31  )-----------( 226      ( 29 Apr 2024 06:45 )             42.0       04-29    142  |  104  |  20  ----------------------------<  95  4.0   |  31  |  0.77    Ca    8.9      29 Apr 2024 06:45    TPro  7.1  /  Alb  3.2  /  TBili  0.5  /  DBili  x   /  AST  25  /  ALT  44  /  AlkPhos  76  04-29                04-03 Chol 186 mg/dL LDL -- HDL 67 mg/dL Trig 64 mg/dL                  Urinalysis Basic - ( 29 Apr 2024 06:45 )    Color: x / Appearance: x / SG: x / pH: x  Gluc: 95 mg/dL / Ketone: x  / Bili: x / Urobili: x   Blood: x / Protein: x / Nitrite: x   Leuk Esterase: x / RBC: x / WBC x   Sq Epi: x / Non Sq Epi: x / Bacteria: x            RADIOLOGY & ADDITIONAL TESTS:    Care Discussed with Consultants/Other Providers: IDR team

## 2024-04-30 NOTE — PROGRESS NOTE ADULT - SUBJECTIVE AND OBJECTIVE BOX
HPI:  58M PMHx gout found down at home on 4/2/24 by wife brought to [facility] by ambulance found to have a L thalamic hemorrhagic infarct via CT. Patient intubated for inability to protect airway. MRI demonstrated hemorrhage with anticipated evolution. Chronic microhemorrhages found in the R thalamus and BG as well as small acute infarcts in the right caudate tail and periatrial white matter.  CTA clear of major vessel occlusion or stenosis. Patient with residual R hemiparesis, dysarthria, mild aphasia, and R gaze palsy. EEG negative throughout workup. EKG without AF. TTE grossly normal. Gout flare treated by rheumatology with colchicine while inpatient. Patient received PM&R consult who recommended acute inpatient rehabilitation. Patient was medically stabilized and discharged to [facility].  (15 Apr 2024 14:16)          Subjective:  Seen this AM in Speech therapy.  Slept during the night.  Denies pain.  Still somewhat anxious about his recovery.  Tolerating therapy.  Nursing and pt. report multiple small BMs.  pt. denies abdominal pain.       VITALS  Vital Signs Last 24 Hrs  T(C): 36.8 (29 Apr 2024 08:10), Max: 36.9 (28 Apr 2024 20:09)  T(F): 98.2 (29 Apr 2024 08:10), Max: 98.5 (28 Apr 2024 20:09)  HR: 72 (29 Apr 2024 08:10) (72 - 89)  BP: 134/86 (29 Apr 2024 08:10) (117/77 - 134/86)  BP(mean): --  RR: 16 (29 Apr 2024 08:10) (16 - 16)  SpO2: 97% (29 Apr 2024 08:10) (94% - 97%)    Parameters below as of 29 Apr 2024 08:10  Patient On (Oxygen Delivery Method): room air        REVIEW OF SYMPTOMS  Neurological deficits      PHYSICAL EXAM  Gen - NAD, Comfortable  HEENT - NCAT, MMM  Pulm - resp nonlabored  Cardiovascular - warm well perfused, no cyanosis  Abdomen - Soft, NT/ND,   Extremities - No calf tenderness, no pedal edema  Neuro-     Cognitive - AAOx3, able to follow commands, content appropriate     Communication - (+)mild dysarthria         Motor -                     LEFT    UE - ShAB 5/5, EF 5/5, EE 5/5, WE 5/5,  5/5                    RIGHT UE - ShAB 0/5, EF 0/5, EE 0/5, WE 0/5,  0/5                    LEFT    LE - HF 5/5, KE 5/5, DF 5/5, PF 5/5                    RIGHT LE - HF 1/5, KE 1/5, DF 0/5, PF 0/5        Sensory - present but diminished right UE/LE  Psychiatric - Mood stable    RECENT LABS                        14.0   8.31  )-----------( 226      ( 29 Apr 2024 06:45 )             42.0     04-29    142  |  104  |  20  ----------------------------<  95  4.0   |  31  |  0.77    Ca    8.9      29 Apr 2024 06:45    TPro  7.1  /  Alb  3.2<L>  /  TBili  0.5  /  DBili  x   /  AST  25  /  ALT  44  /  AlkPhos  76  04-29      Urinalysis Basic - ( 29 Apr 2024 06:45 )    Color: x / Appearance: x / SG: x / pH: x  Gluc: 95 mg/dL / Ketone: x  / Bili: x / Urobili: x   Blood: x / Protein: x / Nitrite: x   Leuk Esterase: x / RBC: x / WBC x   Sq Epi: x / Non Sq Epi: x / Bacteria: x          RADIOLOGY/OTHER RESULTS      MEDICATIONS  (STANDING):  allopurinol 100 milliGRAM(s) Oral daily  amLODIPine   Tablet 5 milliGRAM(s) Oral daily  enoxaparin Injectable 40 milliGRAM(s) SubCutaneous every 24 hours  escitalopram 10 milliGRAM(s) Oral <User Schedule>  gabapentin 300 milliGRAM(s) Oral at bedtime  gabapentin 100 milliGRAM(s) Oral daily  metoprolol tartrate 12.5 milliGRAM(s) Oral <User Schedule>  multivitamin 1 Tablet(s) Oral daily  traZODone 25 milliGRAM(s) Oral at bedtime    MEDICATIONS  (PRN):  acetaminophen     Tablet .. 650 milliGRAM(s) Oral every 6 hours PRN Mild Pain (1 - 3)  polyethylene glycol 3350 17 Gram(s) Oral daily PRN Constipation      Assessment and Plan:   · Assessment	  58y with PMhx Gout found down and revealed to have an intraparenchymal hemorrhage.  Patient now admitted for a multidisciplinary rehab program with right hemiparesis, cognitive deficits, sensory loss, Dysphagia, Dysarthria.      Intraparenchymal Hemorrhage  -MRI Brain--- demonstrated L thalamic hemorrhage, w/ chronic microhemorrhages, and acute infarcts in the right caudate tail and periatrial white matter  -EKG w/o AF  -Echo unremarkable  - CTA w/o large occlusions  - Comprehensive Multidisciplinary Rehab Program:  -Cont comprehensive rehab program of PT/OT/SLP - 3 hours a day, 5 days a week. P&O as needed     Cardiovascular: HTN  - amlodipine  5mg  - metoprolol 12.5 BID (4/25), Hospitalist note appreciated  - BP controlled    Pain, neuropathic pain  -- Tylenol PRN  - Neuropathic agent: gabapentin 300 mg qhs, 100mg daily     Gout  - Colchicine 0.6 QD-- made BID (4/26), decreased to Qdaily  - gouty flare?, no pain  - Hospitalist note appreciated    #Leukocytosis - stable  - no overt sign of infection  - WBC: 13.94 (4/16)>12.26 (4/18)>8.73(4/22)>11.14 (4/25)> 11.30 (4/26)  -- 4/29-- WNL 8.31    Thrombocytosis - resolved  - Platelets- 484>478>408 (4/22)> ** (4/25)> 317 (4/26)  --4/29-- 226    Mood / Cognition  - Lexapro 5 mg for anxiety-- Will schedule in the evening to improve sleep-- increase later in the week as tolerated.   - Neuropsychology following  --Recreation therapy     Sleep  - Maintain quiet hours and a low stim environment.   --failed melatonin 9mg & Rozerem 8mg qhs --  -- started on trazodone 25 mg qhs    GI / Bowel  - Senna qHS  - Miralax Daily  - GI ppx: protonix  --Check Abdominal Xray as may have high fecal load with multiple small BMs.      / Bladder  - Voiding with low PVRs      Dysphagia:  -MBS 4/18-- diet consistency: minced and moist/thin liquids  --On Dysphagia Free Water Protocol  - Supplements: multivitamin  - Aspiration Precautions  - SLP consult for swallow function evaluation and treatment  - Nutrition     DVT prophylaxis:   - Lovenox 40mg   - Last Doppler on 4/15--Neg.     Labs:   CBC, CMP 4/29   HPI:  58M PMHx gout found down at home on 4/2/24 by wife brought to [facility] by ambulance found to have a L thalamic hemorrhagic infarct via CT. Patient intubated for inability to protect airway. MRI demonstrated hemorrhage with anticipated evolution. Chronic microhemorrhages found in the R thalamus and BG as well as small acute infarcts in the right caudate tail and periatrial white matter.  CTA clear of major vessel occlusion or stenosis. Patient with residual R hemiparesis, dysarthria, mild aphasia, and R gaze palsy. EEG negative throughout workup. EKG without AF. TTE grossly normal. Gout flare treated by rheumatology with colchicine while inpatient. Patient received PM&R consult who recommended acute inpatient rehabilitation. Patient was medically stabilized and discharged to [facility].  (15 Apr 2024 14:16)    SUBJECTIVE:   No acute overnight events. Patient seen and examined in the AM. Family by bedside. Complaints of blurry vision from the right eye. No CP/SOB/NV. No new numbness tingling or weakness. Continues to have decreased strength in the RUE/RLE. No tone noted. Working hard in therapies. Concerned about long term disability paperwork. US to monitor progression of DVT this Thursday. VSS. Afebrile.     VITALS  Vital Signs Last 24 Hrs  T(C): 36.8 (29 Apr 2024 08:10), Max: 36.9 (28 Apr 2024 20:09)  T(F): 98.2 (29 Apr 2024 08:10), Max: 98.5 (28 Apr 2024 20:09)  HR: 72 (29 Apr 2024 08:10) (72 - 89)  BP: 134/86 (29 Apr 2024 08:10) (117/77 - 134/86)  BP(mean): --  RR: 16 (29 Apr 2024 08:10) (16 - 16)  SpO2: 97% (29 Apr 2024 08:10) (94% - 97%)    Parameters below as of 29 Apr 2024 08:10  Patient On (Oxygen Delivery Method): room air    REVIEW OF SYMPTOMS  Negative except for the above mentioned.    PHYSICAL EXAM  Gen - NAD  Pulm - Breathing comfortably on RA   Cardiovascular - RRR   Abdomen - Soft, NT/ND,   Extremities - No peripheral edema   Neuro-     Cognitive - Answering questions appropriately      Communication - (+) dysarthria, (+) comprehension, (+) fluency      Motor -                     LEFT    UE - ShAB 5/5, EF 5/5, EE 5/5, WE 5/5,  5/5                    RIGHT UE - ShAB 0/5, EF 0/5, EE 0/5, WE 0/5,  0/5                    LEFT    LE - HF 5/5, KE 5/5, DF 5/5, PF 5/5                    RIGHT LE - HF 1/5, KE 1/5, DF 0/5, PF 0/5        Sensory - Diminished right UE/LE  Psychiatric - Mood stable    RECENT LABS                        14.0   8.31  )-----------( 226      ( 29 Apr 2024 06:45 )             42.0     04-29    142  |  104  |  20  ----------------------------<  95  4.0   |  31  |  0.77    Ca    8.9      29 Apr 2024 06:45    TPro  7.1  /  Alb  3.2<L>  /  TBili  0.5  /  DBili  x   /  AST  25  /  ALT  44  /  AlkPhos  76  04-29      Urinalysis Basic - ( 29 Apr 2024 06:45 )    Color: x / Appearance: x / SG: x / pH: x  Gluc: 95 mg/dL / Ketone: x  / Bili: x / Urobili: x   Blood: x / Protein: x / Nitrite: x   Leuk Esterase: x / RBC: x / WBC x   Sq Epi: x / Non Sq Epi: x / Bacteria: x    RADIOLOGY/OTHER RESULTS  Reviewed     MEDICATIONS  (STANDING):  allopurinol 100 milliGRAM(s) Oral daily  amLODIPine   Tablet 5 milliGRAM(s) Oral daily  enoxaparin Injectable 40 milliGRAM(s) SubCutaneous every 24 hours  escitalopram 10 milliGRAM(s) Oral <User Schedule>  gabapentin 300 milliGRAM(s) Oral at bedtime  gabapentin 100 milliGRAM(s) Oral daily  metoprolol tartrate 12.5 milliGRAM(s) Oral <User Schedule>  multivitamin 1 Tablet(s) Oral daily  traZODone 25 milliGRAM(s) Oral at bedtime    MEDICATIONS  (PRN):  acetaminophen     Tablet .. 650 milliGRAM(s) Oral every 6 hours PRN Mild Pain (1 - 3)  polyethylene glycol 3350 17 Gram(s) Oral daily PRN Constipation    ASSESSMENT/PLAN:   58y with PMhx Gout found down and revealed to have an intraparenchymal hemorrhage.  Patient now admitted for a multidisciplinary rehab program with right hemiparesis, cognitive deficits, sensory loss, Dysphagia, Dysarthria.      #L Thalamic IPH   -MRI Brain- L thalamic hemorrhage, w/ chronic microhemorrhages, and acute infarcts in the right caudate tail and periatrial white matter  -EKG w/o AF  -Echo unremarkable  - CTA w/o large occlusions  - 4/30: Complaints of blurry vision, likely secondary to stroke, consulted Opthalmology will follow up recommendations.     # HTN  - amlodipine  5mg  - metoprolol 12.5 BID (4/25)    # Pain   -Tylenol PRN  -Gabapentin 300 mg qhs, 100mg daily     #Gout  - DC'ed Colchicine  - Allopurinol 100mg daily    - No acute flare     #Leukocytosis - stable   - no overt sign of infection  - WBC: 13.94 (4/16)>12.26 (4/18)>8.73(4/22)>11.14 (4/25)> 11.30 (4/26)  -- 4/29-- WNL 8.31    #Mood / Cognition  - [id number] @1800     #Sleep  - Maintain quiet hours and a low stim environment  --failed melatonin 9mg & Rozerem 8mg qhs --  -Trazodone 25 mg qhs    #GI / Bowel  - Miralax PRN   - 4/30: Abd Xray completed on 4/29, increased stool burden     # / Bladder  - Voiding with low PVRs    #Dysphagia  - Diet: Minced moist (MBS 4/18)     #DVT prophylaxis   - Lovenox 40mg   - Last Doppler on 4/15--Neg.   - Next doppler on 5/2      HPI:  58M PMHx gout found down at home on 4/2/24 by wife brought to [facility] by ambulance found to have a L thalamic hemorrhagic infarct via CT. Patient intubated for inability to protect airway. MRI demonstrated hemorrhage with anticipated evolution. Chronic microhemorrhages found in the R thalamus and BG as well as small acute infarcts in the right caudate tail and periatrial white matter.  CTA clear of major vessel occlusion or stenosis. Patient with residual R hemiparesis, dysarthria, mild aphasia, and R gaze palsy. EEG negative throughout workup. EKG without AF. TTE grossly normal. Gout flare treated by rheumatology with colchicine while inpatient. Patient received PM&R consult who recommended acute inpatient rehabilitation. Patient was medically stabilized and discharged to [facility].  (15 Apr 2024 14:16)    SUBJECTIVE:   No acute overnight events. Patient seen and examined in the AM. Family by bedside. Complaints of blurry vision from the right eye when reading. No CP/SOB/NV. No new numbness tingling or weakness. Continues to have decreased strength in the RUE/RLE. No tone noted. Working hard in therapies. Concerned about long term disability paperwork. US to monitor progression of DVT this Thursday. VSS. Afebrile.  Wife present at bedside    VITALS  Vital Signs Last 24 Hrs  T(C): 36.8 (29 Apr 2024 08:10), Max: 36.9 (28 Apr 2024 20:09)  T(F): 98.2 (29 Apr 2024 08:10), Max: 98.5 (28 Apr 2024 20:09)  HR: 72 (29 Apr 2024 08:10) (72 - 89)  BP: 134/86 (29 Apr 2024 08:10) (117/77 - 134/86)  BP(mean): --  RR: 16 (29 Apr 2024 08:10) (16 - 16)  SpO2: 97% (29 Apr 2024 08:10) (94% - 97%)    Parameters below as of 29 Apr 2024 08:10  Patient On (Oxygen Delivery Method): room air    REVIEW OF SYMPTOMS  Negative except for the above mentioned.    PHYSICAL EXAM  Gen - NAD  Pulm - Breathing comfortably on RA   Cardiovascular - RRR   Abdomen - Soft, NT/ND,   Extremities - No peripheral edema   Neuro-     Cognitive - Answering questions appropriately      Communication - (+) dysarthria, (+) comprehension, (+) fluency      Motor -                     LEFT    UE - ShAB 5/5, EF 5/5, EE 5/5, WE 5/5,  5/5                    RIGHT UE - ShAB 0/5, EF 0/5, EE 0/5, WE 0/5,  0/5                    LEFT    LE - HF 5/5, KE 5/5, DF 5/5, PF 5/5                    RIGHT LE - HF 1/5, KE 1/5, DF 0/5, PF 0/5        Sensory - Diminished right UE/LE  Psychiatric - Mood stable    RECENT LABS                        14.0   8.31  )-----------( 226      ( 29 Apr 2024 06:45 )             42.0     04-29    142  |  104  |  20  ----------------------------<  95  4.0   |  31  |  0.77    Ca    8.9      29 Apr 2024 06:45    TPro  7.1  /  Alb  3.2<L>  /  TBili  0.5  /  DBili  x   /  AST  25  /  ALT  44  /  AlkPhos  76  04-29      Urinalysis Basic - ( 29 Apr 2024 06:45 )    Color: x / Appearance: x / SG: x / pH: x  Gluc: 95 mg/dL / Ketone: x  / Bili: x / Urobili: x   Blood: x / Protein: x / Nitrite: x   Leuk Esterase: x / RBC: x / WBC x   Sq Epi: x / Non Sq Epi: x / Bacteria: x    RADIOLOGY/OTHER RESULTS  Reviewed     MEDICATIONS  (STANDING):  allopurinol 100 milliGRAM(s) Oral daily  amLODIPine   Tablet 5 milliGRAM(s) Oral daily  enoxaparin Injectable 40 milliGRAM(s) SubCutaneous every 24 hours  escitalopram 10 milliGRAM(s) Oral <User Schedule>  gabapentin 300 milliGRAM(s) Oral at bedtime  gabapentin 100 milliGRAM(s) Oral daily  metoprolol tartrate 12.5 milliGRAM(s) Oral <User Schedule>  multivitamin 1 Tablet(s) Oral daily  traZODone 25 milliGRAM(s) Oral at bedtime    MEDICATIONS  (PRN):  acetaminophen     Tablet .. 650 milliGRAM(s) Oral every 6 hours PRN Mild Pain (1 - 3)  polyethylene glycol 3350 17 Gram(s) Oral daily PRN Constipation    ASSESSMENT/PLAN:   58y with PMhx Gout found down and revealed to have an intraparenchymal hemorrhage.  Patient now admitted for a multidisciplinary rehab program with right hemiparesis, cognitive deficits, sensory loss, Dysphagia, Dysarthria.      #L Thalamic IPH   -MRI Brain- L thalamic hemorrhage, w/ chronic microhemorrhages, and acute infarcts in the right caudate tail and periatrial white matter  -EKG w/o AF  -Echo unremarkable  - CTA w/o large occlusions  - 4/30: Complaints of blurry vision, likely secondary to stroke, consulted Opthalmology will follow up recommendations. d/w Pt. and his wife    # HTN  - amlodipine  5mg  - metoprolol 12.5 BID (4/25)    # Pain   -Tylenol PRN  -Gabapentin 300 mg qhs, 100mg daily     #Gout  - DC'ed Colchicine  - Allopurinol 100mg daily    - No acute flare     #Leukocytosis - stable   - no overt sign of infection  - WBC: 13.94 (4/16)>12.26 (4/18)>8.73(4/22)>11.14 (4/25)> 11.30 (4/26)  -- 4/29-- WNL 8.31    #Mood / Cognition  - [id number] @1800     #Sleep  - Maintain quiet hours and a low stim environment  --failed melatonin 9mg & Rozerem 8mg qhs --  -Trazodone 25 mg qhs    #GI / Bowel  - Miralax PRN   - 4/30: Abd Xray completed on 4/29, increased stool burden  --Received Mineral Oil Enema w/o resultant BM  -- Stool burden mainly concentrated in ascending colon-- will change Miralax to BID and add bisacodyl PO  --d/w hospitalist     # / Bladder  - Voiding with low PVRs    #Dysphagia  - Diet: Minced moist (MBS 4/18)     #DVT prophylaxis   - Lovenox 40mg   - Last Doppler on 4/15--Neg.   - Next doppler on 5/2

## 2024-05-01 PROCEDURE — 99232 SBSQ HOSP IP/OBS MODERATE 35: CPT

## 2024-05-01 PROCEDURE — 99223 1ST HOSP IP/OBS HIGH 75: CPT

## 2024-05-01 RX ADMIN — Medication 100 MILLIGRAM(S): at 11:32

## 2024-05-01 RX ADMIN — POLYETHYLENE GLYCOL 3350 17 GRAM(S): 17 POWDER, FOR SOLUTION ORAL at 18:02

## 2024-05-01 RX ADMIN — GABAPENTIN 100 MILLIGRAM(S): 400 CAPSULE ORAL at 11:32

## 2024-05-01 RX ADMIN — Medication 1 TABLET(S): at 11:31

## 2024-05-01 RX ADMIN — Medication 25 MILLIGRAM(S): at 21:42

## 2024-05-01 RX ADMIN — Medication 12.5 MILLIGRAM(S): at 08:20

## 2024-05-01 RX ADMIN — ENOXAPARIN SODIUM 40 MILLIGRAM(S): 100 INJECTION SUBCUTANEOUS at 21:41

## 2024-05-01 RX ADMIN — GABAPENTIN 300 MILLIGRAM(S): 400 CAPSULE ORAL at 21:42

## 2024-05-01 RX ADMIN — POLYETHYLENE GLYCOL 3350 17 GRAM(S): 17 POWDER, FOR SOLUTION ORAL at 05:50

## 2024-05-01 RX ADMIN — Medication 5 MILLIGRAM(S): at 21:41

## 2024-05-01 RX ADMIN — ESCITALOPRAM OXALATE 10 MILLIGRAM(S): 10 TABLET, FILM COATED ORAL at 18:02

## 2024-05-01 NOTE — CONSULT NOTE ADULT - SUBJECTIVE AND OBJECTIVE BOX
ROSALEE CARR,  59y Male  MRN: [id number]  ATTENDING: Dr. Vanessa Anderson      HPI:  58M PMHx gout treated with colchicine, s/p fall at home, found with a newly diagnosed left thalamic hemorrhagic infarct on CT head MRI confirmed chronic microhemorrhage found in the right thalamus and BG as well as small acute infarcts in the right caudate tail and periatrial white matter.  CTA clear of major vessel occlusion or stenosis.  Patient with residual right hemiparesis, dysarthria, mild aphasia and right gaze palsy.  On 10/14/2023 patient had a venous Doppler that showed no evidence of DVT in either veins.  Patient started on Eliquis 2 weeks ago due to arrhythmia.  On 4/26/2024 patient had a venous Doppler due to right lower extremity swelling and was diagnosed with right soleal vein DVT, age indeterminate.  Hematology consulted to discuss anticoagulation management.    PAST MEDICAL & SURGICAL HISTORY:  Eczema  Gout  Intraparenchymal hemorrhage of brain    MEDICATION:  allopurinol 100 milliGRAM(s) Oral daily  amLODIPine   Tablet 5 milliGRAM(s) Oral daily  bisacodyl 5 milliGRAM(s) Oral at bedtime  enoxaparin Injectable 40 milliGRAM(s) SubCutaneous every 24 hours  escitalopram 10 milliGRAM(s) Oral <User Schedule>  gabapentin 300 milliGRAM(s) Oral at bedtime  gabapentin 100 milliGRAM(s) Oral daily  metoprolol tartrate 12.5 milliGRAM(s) Oral <User Schedule>  multivitamin 1 Tablet(s) Oral daily  polyethylene glycol 3350 17 Gram(s) Oral two times a day  traZODone 25 milliGRAM(s) Oral at bedtime    ALLERGIES:  No Known Allergies    FAMILY HISTORY:  Reviewed, non-contributory: [x ]     SOCIAL HISTORY:  Tobacco: YES [ ]  ; NO [x ]; Former smoker [ ]  Alcohol:   YES [ ]  ; NO [x ]; Social alcohol user [ ]    REVIEW SYSTEMS:  Constitutional: no fever, chills, night sweats, no weight loss  HEENT: denies visual changes; no oral ulcers, dysphagia, no epistaxis;   Respiratory: no dyspnea , wheezing, cough, hemoptysis  Cardiovascular: denies acute chest pain, palpitations  GI: no loss of appetite, dark stools, or abdominal tenderness / pain; no change in bowel habits.  Musculoskeletal: no new back pain, bone/ joint pain ,no extremity swelling  Integumentary: denies pruritus; no skin rash, bruises, no  suspicious skin lesions  Neurologic: denies peripheral numbness, no dizziness, no gait problems.  Heme: no reported easy bruisability; no lymph node enlargement    VITALS:  T(C): 36.6, Max: 36.6 (05-01-24 @ 08:58)  T(F): 97.8, Max: 97.8 (05-01-24 @ 08:58)  HR: 74 (74 - 92)  BP: 119/79 (115/74 - 119/79)  SpO2: 95% (95% - 95%)    PHYSICAL EXAM:  Constitutional: alert, well developed  HEENT: normocephalic, anicteric sclerae, no mucositis or thrush  Respiratory: bilateral clear to auscultation anteriorly  Cardiovascular : S1, S2 regular, rhythmic, no murmurs, gallops or rubs  Abdomen: soft, distended, + normoactive BS, no palpable HS- megaly  Extremities: no tenderness;  -c/c/e, pulses equal bilaterally  Integumentary: no rashes, scars, or lesions suggestive of malignancy  Neurologic: no gross focal deficits    LABS:  (04-29) WBC: 8.31 K/uL,Hemoglobin: 14.0 g/dL, Hematocrit: 42.0 %,  Platelet: 226 K/uL    RADIOLOGY:  ACC: [id number] EXAM:  US DPLX LWR EXT VEINS COMPL BI   ORDERED BY:  ARIES GUZMÁN   PROCEDURE DATE:  04/26/2024    INTERPRETATION:  CLINICAL INFORMATION: CVA.  COMPARISON: 04/15/2024.  TECHNIQUE: Duplex sonography of the BILATERAL LOWER extremity veins with   color and spectral Doppler, with and without compression.  FINDINGS:  RIGHT:  Normal compressibility of the RIGHT common femoral, femoral and popliteal   veins.  Doppler examination shows normal spontaneous and phasic flow.  There is evidence of right soleal vein thrombosis.  LEFT:  Normal compressibility of the LEFT common femoral, femoral and popliteal   veins.  Doppler examination shows normal spontaneous and phasic flow.  No LEFT calf vein thrombosis is detected.    IMPRESSION:  Acute deep venous thrombosis: below the knee.  Right soleal vein DVT.

## 2024-05-01 NOTE — PROGRESS NOTE ADULT - SUBJECTIVE AND OBJECTIVE BOX
HPI:  58M PMHx gout found down at home on 4/2/24 by wife brought to [facility] by ambulance found to have a L thalamic hemorrhagic infarct via CT. Patient intubated for inability to protect airway. MRI demonstrated hemorrhage with anticipated evolution. Chronic microhemorrhages found in the R thalamus and BG as well as small acute infarcts in the right caudate tail and periatrial white matter.  CTA clear of major vessel occlusion or stenosis. Patient with residual R hemiparesis, dysarthria, mild aphasia, and R gaze palsy. EEG negative throughout workup. EKG without AF. TTE grossly normal. Gout flare treated by rheumatology with colchicine while inpatient. Patient received PM&R consult who recommended acute inpatient rehabilitation. Patient was medically stabilized and discharged to [facility].  (15 Apr 2024 14:16)    SUBJECTIVE:   No acute overnight events. Patient seen and examined in the AM. As per nursing, patient had a BM yesterday. He denies any NV/SOB/CP. He states he still has weakness in the RUE/RLE, no tone noted. He also continues to have blurred vision which has been unchanged since his stroke. Opthalmology consult placed yesterday. Explained that vision changes could be due to stroke itself and may take a while to heal, patient displayed understanding. US doppler of the LEs planned for tomorrow.     To note, patient has shown much improvement in impulsivity. He is very re-directable and understanding of his current condition/functional limitations. He is alert and oriented to person place and time. He answers all questions appropriately. He enjoys socializing in front of nurses station and watches tv to occupy his time when he is not in therapies. This is due to patient preference not necessarily impulsive behavior.      VITALS  Vital Signs Last 24 Hrs  T(C): 36.8 (29 Apr 2024 08:10), Max: 36.9 (28 Apr 2024 20:09)  T(F): 98.2 (29 Apr 2024 08:10), Max: 98.5 (28 Apr 2024 20:09)  HR: 72 (29 Apr 2024 08:10) (72 - 89)  BP: 134/86 (29 Apr 2024 08:10) (117/77 - 134/86)  BP(mean): --  RR: 16 (29 Apr 2024 08:10) (16 - 16)  SpO2: 97% (29 Apr 2024 08:10) (94% - 97%)    Parameters below as of 29 Apr 2024 08:10  Patient On (Oxygen Delivery Method): room air    REVIEW OF SYMPTOMS  Negative except for the above mentioned.    PHYSICAL EXAM  Gen - NAD  Pulm - Breathing comfortably on RA   Cardiovascular - RRR   Abdomen - Soft, NT/ND,   Extremities - No peripheral edema   Neuro-     Cognitive - Answering questions appropriately      Communication - (+) dysarthria, (+) comprehension, (+) fluency      Motor -                     LEFT    UE - ShAB 5/5, EF 5/5, EE 5/5, WE 5/5,  5/5                    RIGHT UE - ShAB 0/5, EF 0/5, EE 0/5, WE 0/5,  0/5                    LEFT    LE - HF 5/5, KE 5/5, DF 5/5, PF 5/5                    RIGHT LE - HF 1/5, KE 1/5, DF 0/5, PF 0/5        Sensory - Diminished right UE/LE  Psychiatric - Mood stable    RECENT LABS                        14.0   8.31  )-----------( 226      ( 29 Apr 2024 06:45 )             42.0     04-29    142  |  104  |  20  ----------------------------<  95  4.0   |  31  |  0.77    Ca    8.9      29 Apr 2024 06:45    TPro  7.1  /  Alb  3.2<L>  /  TBili  0.5  /  DBili  x   /  AST  25  /  ALT  44  /  AlkPhos  76  04-29      Urinalysis Basic - ( 29 Apr 2024 06:45 )    Color: x / Appearance: x / SG: x / pH: x  Gluc: 95 mg/dL / Ketone: x  / Bili: x / Urobili: x   Blood: x / Protein: x / Nitrite: x   Leuk Esterase: x / RBC: x / WBC x   Sq Epi: x / Non Sq Epi: x / Bacteria: x    RADIOLOGY/OTHER RESULTS  Reviewed     MEDICATIONS  (STANDING):  allopurinol 100 milliGRAM(s) Oral daily  amLODIPine   Tablet 5 milliGRAM(s) Oral daily  enoxaparin Injectable 40 milliGRAM(s) SubCutaneous every 24 hours  escitalopram 10 milliGRAM(s) Oral <User Schedule>  gabapentin 300 milliGRAM(s) Oral at bedtime  gabapentin 100 milliGRAM(s) Oral daily  metoprolol tartrate 12.5 milliGRAM(s) Oral <User Schedule>  multivitamin 1 Tablet(s) Oral daily  traZODone 25 milliGRAM(s) Oral at bedtime    MEDICATIONS  (PRN):  acetaminophen     Tablet .. 650 milliGRAM(s) Oral every 6 hours PRN Mild Pain (1 - 3)  polyethylene glycol 3350 17 Gram(s) Oral daily PRN Constipation    ASSESSMENT/PLAN:   58y with PMhx Gout found down and revealed to have an intraparenchymal hemorrhage.  Patient now admitted for a multidisciplinary rehab program with right hemiparesis, cognitive deficits, sensory loss, Dysphagia, Dysarthria.      #L Thalamic IPH   -MRI Brain- L thalamic hemorrhage, w/ chronic microhemorrhages, and acute infarcts in the right caudate tail and periatrial white matter  -EKG w/o AF  -Echo unremarkable  - CTA w/o large occlusions  - 4/30: Complaints of blurry vision, likely secondary to stroke, consulted Opthalmology will follow up recommendations. d/w Pt. and his wife  - 5/1: Will follow up Opthalmology recommendations     # HTN  - amlodipine  5mg  - metoprolol 12.5 BID (4/25)    # Pain   -Tylenol PRN  -Gabapentin 300 mg qhs, 100mg daily     #Gout  - DC'ed Colchicine  - Allopurinol 100mg daily    - No acute flare     #Leukocytosis - stable   - no overt sign of infection  - WBC: 13.94 (4/16)>12.26 (4/18)>8.73(4/22)>11.14 (4/25)> 11.30 (4/26)  -- 4/29-- WNL 8.31    #Mood / Cognition  - [id number] @1800     #Sleep  - Maintain quiet hours and a low stim environment  --failed melatonin 9mg & Rozerem 8mg qhs --  -Trazodone 25 mg qhs    #GI / Bowel  - Miralax PRN   - 4/30: Abd Xray completed on 4/29, increased stool burden  --Received Mineral Oil Enema w/o resultant BM  - 5/1: As per nursing, patient had BM yesterday     # / Bladder  - Voiding with low PVRs    #Dysphagia  - Diet: Minced moist ([facility] 4/18)     #DVT prophylaxis   - Lovenox 40mg   - Last Doppler on 4/15--Neg.   - Next doppler on 5/2      HPI:  58M PMHx gout found down at home on 4/2/24 by wife brought to [facility] by ambulance found to have a L thalamic hemorrhagic infarct via CT. Patient intubated for inability to protect airway. MRI demonstrated hemorrhage with anticipated evolution. Chronic microhemorrhages found in the R thalamus and BG as well as small acute infarcts in the right caudate tail and periatrial white matter.  CTA clear of major vessel occlusion or stenosis. Patient with residual R hemiparesis, dysarthria, mild aphasia, and R gaze palsy. EEG negative throughout workup. EKG without AF. TTE grossly normal. Gout flare treated by rheumatology with colchicine while inpatient. Patient received PM&R consult who recommended acute inpatient rehabilitation. Patient was medically stabilized and discharged to [facility].  (15 Apr 2024 14:16)    SUBJECTIVE:   No acute overnight events. Patient seen and examined in the AM. As per nursing, patient had a BM yesterday. He denies any NV/SOB/CP. He states he still has weakness in the RUE/RLE, no tone noted. He also continues to have blurred vision which has been unchanged since his stroke. Opthalmology consult placed yesterday. Explained that vision changes could be due to stroke itself and may take a while to heal, patient displayed understanding. US doppler of the LEs planned for tomorrow.     To note, patient has shown much improvement in impulsivity. He is very re-directable and understanding of his current condition/functional limitations. He is alert and oriented to person place and time. He answers all questions appropriately. He enjoys socializing in front of nurses station and watches tv to occupy his time when he is not in therapies. This is due to patient preference not necessarily impulsive behavior.      VITALS  Vital Signs Last 24 Hrs  T(C): 36.8 (29 Apr 2024 08:10), Max: 36.9 (28 Apr 2024 20:09)  T(F): 98.2 (29 Apr 2024 08:10), Max: 98.5 (28 Apr 2024 20:09)  HR: 72 (29 Apr 2024 08:10) (72 - 89)  BP: 134/86 (29 Apr 2024 08:10) (117/77 - 134/86)  BP(mean): --  RR: 16 (29 Apr 2024 08:10) (16 - 16)  SpO2: 97% (29 Apr 2024 08:10) (94% - 97%)    Parameters below as of 29 Apr 2024 08:10  Patient On (Oxygen Delivery Method): room air    REVIEW OF SYMPTOMS  Negative except for the above mentioned.    PHYSICAL EXAM  Gen - NAD  Pulm - Breathing comfortably on RA   Cardiovascular - RRR   Abdomen - Soft, NT/ND,   Extremities - No peripheral edema   Neuro-     Cognitive - Answering questions appropriately      Communication - (+) dysarthria, (+) comprehension, (+) fluency      Motor -                     LEFT    UE - ShAB 5/5, EF 5/5, EE 5/5, WE 5/5,  5/5                    RIGHT UE - ShAB 0/5, EF 0/5, EE 0/5, WE 0/5,  0/5                    LEFT    LE - HF 5/5, KE 5/5, DF 5/5, PF 5/5                    RIGHT LE - HF 1/5, KE 1/5, DF 0/5, PF 0/5        Sensory - Diminished right UE/LE  Psychiatric - Mood stable    RECENT LABS                        14.0   8.31  )-----------( 226      ( 29 Apr 2024 06:45 )             42.0     04-29    142  |  104  |  20  ----------------------------<  95  4.0   |  31  |  0.77    Ca    8.9      29 Apr 2024 06:45    TPro  7.1  /  Alb  3.2<L>  /  TBili  0.5  /  DBili  x   /  AST  25  /  ALT  44  /  AlkPhos  76  04-29      Urinalysis Basic - ( 29 Apr 2024 06:45 )    Color: x / Appearance: x / SG: x / pH: x  Gluc: 95 mg/dL / Ketone: x  / Bili: x / Urobili: x   Blood: x / Protein: x / Nitrite: x   Leuk Esterase: x / RBC: x / WBC x   Sq Epi: x / Non Sq Epi: x / Bacteria: x    RADIOLOGY/OTHER RESULTS  Reviewed     MEDICATIONS  (STANDING):  allopurinol 100 milliGRAM(s) Oral daily  amLODIPine   Tablet 5 milliGRAM(s) Oral daily  enoxaparin Injectable 40 milliGRAM(s) SubCutaneous every 24 hours  escitalopram 10 milliGRAM(s) Oral <User Schedule>  gabapentin 300 milliGRAM(s) Oral at bedtime  gabapentin 100 milliGRAM(s) Oral daily  metoprolol tartrate 12.5 milliGRAM(s) Oral <User Schedule>  multivitamin 1 Tablet(s) Oral daily  traZODone 25 milliGRAM(s) Oral at bedtime    MEDICATIONS  (PRN):  acetaminophen     Tablet .. 650 milliGRAM(s) Oral every 6 hours PRN Mild Pain (1 - 3)  polyethylene glycol 3350 17 Gram(s) Oral daily PRN Constipation    ASSESSMENT/PLAN:   58y with PMhx Gout found down and revealed to have an intraparenchymal hemorrhage.  Patient now admitted for a multidisciplinary rehab program with right hemiparesis, cognitive deficits, sensory loss, Dysphagia, Dysarthria.      #L Thalamic IPH   -MRI Brain- L thalamic hemorrhage, w/ chronic microhemorrhages, and acute infarcts in the right caudate tail and periatrial white matter  -EKG w/o AF  -Echo unremarkable  - CTA w/o large occlusions  - 4/30: Complaints of blurry vision, likely secondary to stroke, consulted Opthalmology will follow up recommendations. d/w Pt. and his wife  - 5/1: Will follow up Opthalmology recommendations     # HTN  - amlodipine  5mg  - metoprolol 12.5 BID (4/25)    # Pain   -Tylenol PRN  -Gabapentin 300 mg qhs, 100mg daily     #Gout  - DC'ed Colchicine  - Allopurinol 100mg daily    - No acute flare     #Leukocytosis - stable   - no overt sign of infection  - WBC: 13.94 (4/16)>12.26 (4/18)>8.73(4/22)>11.14 (4/25)> 11.30 (4/26)  -- 4/29-- WNL 8.31    #Mood / Cognition  - [id number] @1800     #Sleep  - Maintain quiet hours and a low stim environment  --failed melatonin 9mg & Rozerem 8mg qhs --  -Trazodone 25 mg qhs    #GI / Bowel  - Miralax PRN   - 4/30: Abd Xray completed on 4/29, increased stool burden  --Received Mineral Oil Enema w/o resultant BM  - 5/1: As per nursing, patient had BM yesterday     # / Bladder  - Voiding with low PVRs    #Dysphagia  - Diet: upgraded to soft and bite sized 5/1--d/w speech therapy    #DVT prophylaxis   - Lovenox 40mg   - Last Doppler on 4/15--Neg.   - Next doppler on 5/2

## 2024-05-01 NOTE — CONSULT NOTE ADULT - PROBLEM SELECTOR RECOMMENDATION 9
Right lower extremity DVT age-indeterminate.  Reviewed results of venous Doppler from October 2023 and current Doppler showing right soleal vein DVT.  Therapeutic dose of Eliquis to be continued for at least 3-month.  Hypercoagulable workup not performed during an acute thrombotic event, however patient advised to follow-up in ambulatory.  Patient and his wife's questions answered to their satisfaction.

## 2024-05-01 NOTE — PROGRESS NOTE ADULT - ASSESSMENT
58y with PMH Gout found down and revealed to have an intraparenchymal hemorrhage.  Patient now admitted for a acute rehab program. course complicated by acute gout flare of L posterior ankle ( resolved with colchicine)    #IPH  - MRI demonstrated L thalamic hemorrhage, w/ chronic microhemorrhages, and acute infarcts in the right caudate tail and periatrial white matter  - EKG w/o AF  - Echo unremarkable  - CTA w/o large occlusions    #Visual impairment   -Recommend Ophthalmology consult     #Constipation  - minimal bms now, abdominal xray shows increased fecal content  - s/p mineral enema without adequate bms  - increasing bm regimen to miralax bid, bisacodyl, giving one dose of magnesium hydroxide    #HTN  - c/w amlodipine 5mg qD  - c/w lopressor 12.5mg qD    #Sinus tachycardia  - Patient not in pain, euvolemic, no signs of infection  - unremarkable TTE  - TSH WNL  - possible anxiety vs physiologic mediated vs pain vs DVT  - trial of lopressor 12.5BID seems to be helping with the tachycardia     #Gout  - received extra dose of colcichine 4/25 with improvement in gout  - initially changed colchicine to once a day dosing, called wife Sheron at [phone] 4/28. states that he takes his gout medications as needed even though his PCP wants him to take some of them daily  - Dc'd colchicine 4/28, started allopurinol 100qD 4/28    #DVT  - US LE 4/26 showed below the knee DVT at R soleal DVT ( explained to patient and wife 4/28)  - c/w Lovenox 40 qD

## 2024-05-01 NOTE — PROGRESS NOTE ADULT - SUBJECTIVE AND OBJECTIVE BOX
Patient is a 59y old  Male who presents with a chief complaint of left Intraparenchymal Hemorrhage (30 Apr 2024 13:58)      SUBJECTIVE / OVERNIGHT EVENTS:  Pt seen and examined at bedside. No acute events overnight. Complaints of visual impairment. States hard to read or see the phone. Able to recognize and see people but from time to time its mildly blurry   Pt denies cp, palpitations, sob, abd pain, N/V, fever, chills.    ROS:  All other review of systems negative    Allergies    No Known Allergies    Intolerances        MEDICATIONS  (STANDING):  allopurinol 100 milliGRAM(s) Oral daily  amLODIPine   Tablet 5 milliGRAM(s) Oral daily  bisacodyl 5 milliGRAM(s) Oral at bedtime  enoxaparin Injectable 40 milliGRAM(s) SubCutaneous every 24 hours  escitalopram 10 milliGRAM(s) Oral <User Schedule>  gabapentin 100 milliGRAM(s) Oral daily  gabapentin 300 milliGRAM(s) Oral at bedtime  metoprolol tartrate 12.5 milliGRAM(s) Oral <User Schedule>  multivitamin 1 Tablet(s) Oral daily  polyethylene glycol 3350 17 Gram(s) Oral two times a day  traZODone 25 milliGRAM(s) Oral at bedtime    MEDICATIONS  (PRN):  acetaminophen     Tablet .. 650 milliGRAM(s) Oral every 6 hours PRN Mild Pain (1 - 3)      Vital Signs Last 24 Hrs  T(C): 36.6 (01 May 2024 08:58), Max: 36.6 (01 May 2024 08:58)  T(F): 97.8 (01 May 2024 08:58), Max: 97.8 (01 May 2024 08:58)  HR: 74 (01 May 2024 08:58) (74 - 92)  BP: 119/79 (01 May 2024 08:58) (115/74 - 119/79)  BP(mean): --  RR: 17 (01 May 2024 08:58) (16 - 17)  SpO2: 95% (01 May 2024 08:58) (95% - 95%)    Parameters below as of 01 May 2024 08:58  Patient On (Oxygen Delivery Method): room air      CAPILLARY BLOOD GLUCOSE        I&O's Summary      PHYSICAL EXAM:  GENERAL: NAD, well-developed AAOx3 male   HEAD:  Atraumatic, Normocephalic  NECK: Supple, No JVD  CHEST/LUNG: Clear to auscultation bilaterally; No wheeze, nonlabored breathing  HEART: Regular rate and rhythm; No murmurs, rubs, or gallops  ABDOMEN: Soft, Nontender, Nondistended; Bowel sounds present  EXTREMITIES: No clubbing, cyanosis, or edema  PSYCH: calm, appropriate mood    LABS:                    RADIOLOGY & ADDITIONAL TESTS:  Results Reviewed:   Imaging Personally Reviewed:  Electrocardiogram Personally Reviewed:    COORDINATION OF CARE:  Care Discussed with Consultants/Other Providers [Y/N]:  Prior or Outpatient Records Reviewed [Y/N]:

## 2024-05-01 NOTE — PROGRESS NOTE ADULT - ATTENDING COMMENTS
Pt. seen with fellow.  Agree with documentation above as per fellow with amendments made as appropriate. Patient medically stable. Making progress towards rehab goals.       Left thalamic ICH--   Doing better.   Anxiety improved on lexapro  Ophthalmology consult pending Pt. seen with fellow.  Agree with documentation above as per fellow with amendments made as appropriate. Patient medically stable. Making progress towards rehab goals.       Left thalamic ICH--   Doing better.   Anxiety improved on lexapro  Ophthalmology consult pending    - Diet: upgraded to soft and bite sized 5/1--d/w speech therapy

## 2024-05-02 LAB
ALBUMIN SERPL ELPH-MCNC: 3.1 G/DL — LOW (ref 3.3–5)
ALP SERPL-CCNC: 65 U/L — SIGNIFICANT CHANGE UP (ref 40–120)
ALT FLD-CCNC: 35 U/L — SIGNIFICANT CHANGE UP (ref 10–45)
ANION GAP SERPL CALC-SCNC: 6 MMOL/L — SIGNIFICANT CHANGE UP (ref 5–17)
AST SERPL-CCNC: 21 U/L — SIGNIFICANT CHANGE UP (ref 10–40)
BASOPHILS # BLD AUTO: 0.08 K/UL — SIGNIFICANT CHANGE UP (ref 0–0.2)
BASOPHILS NFR BLD AUTO: 1.2 % — SIGNIFICANT CHANGE UP (ref 0–2)
BILIRUB SERPL-MCNC: 0.5 MG/DL — SIGNIFICANT CHANGE UP (ref 0.2–1.2)
BUN SERPL-MCNC: 21 MG/DL — SIGNIFICANT CHANGE UP (ref 7–23)
CALCIUM SERPL-MCNC: 8.8 MG/DL — SIGNIFICANT CHANGE UP (ref 8.4–10.5)
CHLORIDE SERPL-SCNC: 105 MMOL/L — SIGNIFICANT CHANGE UP (ref 96–108)
CO2 SERPL-SCNC: 30 MMOL/L — SIGNIFICANT CHANGE UP (ref 22–31)
CREAT SERPL-MCNC: 0.78 MG/DL — SIGNIFICANT CHANGE UP (ref 0.5–1.3)
EGFR: 104 ML/MIN/1.73M2 — SIGNIFICANT CHANGE UP
EOSINOPHIL # BLD AUTO: 1.69 K/UL — HIGH (ref 0–0.5)
EOSINOPHIL NFR BLD AUTO: 25.8 % — HIGH (ref 0–6)
GLUCOSE SERPL-MCNC: 97 MG/DL — SIGNIFICANT CHANGE UP (ref 70–99)
HCT VFR BLD CALC: 37.9 % — LOW (ref 39–50)
HGB BLD-MCNC: 12.4 G/DL — LOW (ref 13–17)
IMM GRANULOCYTES NFR BLD AUTO: 0.3 % — SIGNIFICANT CHANGE UP (ref 0–0.9)
LYMPHOCYTES # BLD AUTO: 1.15 K/UL — SIGNIFICANT CHANGE UP (ref 1–3.3)
LYMPHOCYTES # BLD AUTO: 17.6 % — SIGNIFICANT CHANGE UP (ref 13–44)
MCHC RBC-ENTMCNC: 28.7 PG — SIGNIFICANT CHANGE UP (ref 27–34)
MCHC RBC-ENTMCNC: 32.7 GM/DL — SIGNIFICANT CHANGE UP (ref 32–36)
MCV RBC AUTO: 87.7 FL — SIGNIFICANT CHANGE UP (ref 80–100)
MONOCYTES # BLD AUTO: 0.54 K/UL — SIGNIFICANT CHANGE UP (ref 0–0.9)
MONOCYTES NFR BLD AUTO: 8.3 % — SIGNIFICANT CHANGE UP (ref 2–14)
NEUTROPHILS # BLD AUTO: 3.06 K/UL — SIGNIFICANT CHANGE UP (ref 1.8–7.4)
NEUTROPHILS NFR BLD AUTO: 46.8 % — SIGNIFICANT CHANGE UP (ref 43–77)
NRBC # BLD: 0 /100 WBCS — SIGNIFICANT CHANGE UP (ref 0–0)
PLATELET # BLD AUTO: 174 K/UL — SIGNIFICANT CHANGE UP (ref 150–400)
POTASSIUM SERPL-MCNC: 4.1 MMOL/L — SIGNIFICANT CHANGE UP (ref 3.5–5.3)
POTASSIUM SERPL-SCNC: 4.1 MMOL/L — SIGNIFICANT CHANGE UP (ref 3.5–5.3)
PROT SERPL-MCNC: 6.7 G/DL — SIGNIFICANT CHANGE UP (ref 6–8.3)
RBC # BLD: 4.32 M/UL — SIGNIFICANT CHANGE UP (ref 4.2–5.8)
RBC # FLD: 13.2 % — SIGNIFICANT CHANGE UP (ref 10.3–14.5)
SODIUM SERPL-SCNC: 141 MMOL/L — SIGNIFICANT CHANGE UP (ref 135–145)
WBC # BLD: 6.54 K/UL — SIGNIFICANT CHANGE UP (ref 3.8–10.5)
WBC # FLD AUTO: 6.54 K/UL — SIGNIFICANT CHANGE UP (ref 3.8–10.5)

## 2024-05-02 PROCEDURE — 99232 SBSQ HOSP IP/OBS MODERATE 35: CPT

## 2024-05-02 PROCEDURE — 99233 SBSQ HOSP IP/OBS HIGH 50: CPT

## 2024-05-02 RX ORDER — GABAPENTIN 400 MG/1
400 CAPSULE ORAL AT BEDTIME
Refills: 0 | Status: DISCONTINUED | OUTPATIENT
Start: 2024-05-02 | End: 2024-05-09

## 2024-05-02 RX ADMIN — Medication 5 MILLIGRAM(S): at 21:05

## 2024-05-02 RX ADMIN — ENOXAPARIN SODIUM 40 MILLIGRAM(S): 100 INJECTION SUBCUTANEOUS at 21:07

## 2024-05-02 RX ADMIN — Medication 1 TABLET(S): at 11:19

## 2024-05-02 RX ADMIN — Medication 25 MILLIGRAM(S): at 21:05

## 2024-05-02 RX ADMIN — GABAPENTIN 100 MILLIGRAM(S): 400 CAPSULE ORAL at 11:19

## 2024-05-02 RX ADMIN — Medication 12.5 MILLIGRAM(S): at 08:26

## 2024-05-02 RX ADMIN — GABAPENTIN 400 MILLIGRAM(S): 400 CAPSULE ORAL at 21:05

## 2024-05-02 RX ADMIN — POLYETHYLENE GLYCOL 3350 17 GRAM(S): 17 POWDER, FOR SOLUTION ORAL at 06:25

## 2024-05-02 RX ADMIN — Medication 100 MILLIGRAM(S): at 11:19

## 2024-05-02 RX ADMIN — Medication 12.5 MILLIGRAM(S): at 19:51

## 2024-05-02 RX ADMIN — ESCITALOPRAM OXALATE 10 MILLIGRAM(S): 10 TABLET, FILM COATED ORAL at 17:54

## 2024-05-02 RX ADMIN — AMLODIPINE BESYLATE 5 MILLIGRAM(S): 2.5 TABLET ORAL at 06:25

## 2024-05-02 RX ADMIN — POLYETHYLENE GLYCOL 3350 17 GRAM(S): 17 POWDER, FOR SOLUTION ORAL at 17:55

## 2024-05-02 NOTE — PROGRESS NOTE ADULT - ASSESSMENT
58y with PMH Gout found down and revealed to have an intraparenchymal hemorrhage.  Patient now admitted for a acute rehab program. course complicated by acute gout flare of L posterior ankle ( resolved with colchicine)    #IPH  - MRI demonstrated L thalamic hemorrhage, w/ chronic microhemorrhages, and acute infarcts in the right caudate tail and periatrial white matter  - EKG w/o AF  - Echo unremarkable  - CTA w/o large occlusions    #Visual impairment   -Recommend Ophthalmology consult     #Constipation  - minimal bms now, abdominal xray shows increased fecal content  - s/p mineral enema without adequate bms  - increasing bm regimen to miralax bid, bisacodyl, giving one dose of magnesium hydroxide    #HTN  - c/w amlodipine 5mg qD  - c/w lopressor 12.5mg qD    #Sinus tachycardia  - Patient not in pain, euvolemic, no signs of infection  - unremarkable TTE  - TSH WNL  - possible anxiety vs physiologic mediated vs pain vs DVT  - trial of lopressor 12.5BID seems to be helping with the tachycardia     #Gout  - received extra dose of colcichine 4/25 with improvement in gout  - initially changed colchicine to once a day dosing, called wife Sheron at [phone] 4/28. states that he takes his gout medications as needed even though his PCP wants him to take some of them daily  - Dc'd colchicine 4/28, started allopurinol 100qD 4/28    #DVT  - US LE 4/26 showed below the knee DVT at R soleal DVT ( explained to patient and wife 4/28)  - c/w Lovenox 40 qD 58y with PMH Gout found down and revealed to have an intraparenchymal hemorrhage.  Patient now admitted for a acute rehab program. course complicated by acute gout flare of L posterior ankle ( resolved with colchicine)    #IPH  - MRI demonstrated L thalamic hemorrhage, w/ chronic microhemorrhages, and acute infarcts in the right caudate tail and periatrial white matter  - EKG w/o AF  - Echo unremarkable  - CTA w/o large occlusions    #Visual impairment   - Ophthalmology consult:  No acute intraocular pathology noted, Vf gross full, no gross VF defect, EOMs w/out deficits-> recommend f/u with neuro-ophth after discharge for formal VF testing and evaluation.    (Patient lives [place]->[facility], Dr Escobar)    #Constipation  - minimal bms now, abdominal xray shows increased fecal content  - s/p mineral enema without adequate bms  - increased bm regimen to miralax bid, bisacodyl, giving one dose of magnesium hydroxide  - monitor BM    #HTN  - c/w amlodipine 5mg qD  - c/w lopressor 12.5mg qD    #Sinus tachycardia  - Patient not in pain, euvolemic, no signs of infection  - unremarkable TTE  - TSH WNL  - possible anxiety vs physiologic mediated vs pain vs DVT  - trial of lopressor 12.5BID seems to be helping with the tachycardia     #Gout  - received extra dose of colcichine 4/25 with improvement in gout  - initially changed colchicine to once a day dosing, called wife Sheron at [phone] 4/28. states that he takes his gout medications as needed even though his PCP wants him to take some of them daily  - Dc'd colchicine 4/28, started allopurinol 100qD 4/28    #DVT  - US LE 4/26 showed below the knee DVT at R soleal DVT ( explained to patient and wife 4/28)  - c/w Lovenox 40 qD      Total time spent to complete patient's bedside assessment, review medical chart, discuss medical plan of care with covering medical team was more than 35 minutes  with >50% of time spent face to face with patient, discussion with patient/family and/or coordination of care.  58y with PMH Gout found down and revealed to have an intraparenchymal hemorrhage.  Patient now admitted for a acute rehab program. course complicated by acute gout flare of L posterior ankle ( resolved with colchicine)    #IPH  - MRI demonstrated L thalamic hemorrhage, w/ chronic microhemorrhages, and acute infarcts in the right caudate tail and periatrial white matter  - EKG w/o AF  - Echo unremarkable  - CTA w/o large occlusions    #Visual impairment   - Ophthalmology consult:  No acute intraocular pathology noted, Vf gross full, no gross VF defect, EOMs w/out deficits-> recommend f/u with neuro-ophth after discharge for formal VF testing and evaluation.    (Patient lives [place]->[facility], Dr Escobar)    #Constipation  - minimal bms now, abdominal xray shows increased fecal content  - s/p mineral enema without adequate bms  - increased bm regimen to miralax bid, bisacodyl, giving one dose of magnesium hydroxide  - monitor BM    #HTN  - c/w amlodipine 5mg qD  - c/w lopressor 12.5mg qD    #Sinus tachycardia  - Patient not in pain, euvolemic, no signs of infection  - unremarkable TTE  - TSH WNL  - possible anxiety vs physiologic mediated vs pain vs DVT  - trial of lopressor 12.5BID seems to be helping with the tachycardia     #Gout  - received extra dose of colchicine 4/25 with improvement in gout  - initially changed colchicine to once a day dosing, called wife Sheron at [phone] 4/28. states that he takes his gout medications as needed even though his PCP wants him to take some of them daily  - Dc'd colchicine 4/28, started allopurinol 100qD 4/28    #DVT  - US LE 4/26 showed below the knee DVT at R soleal DVT ( explained to patient and wife 4/28)  - c/w Lovenox 40 qD      Total time spent to complete patient's bedside assessment, review medical chart, discuss medical plan of care with covering medical team was more than 35 minutes  with >50% of time spent face to face with patient, discussion with patient/family and/or coordination of care.

## 2024-05-02 NOTE — PROGRESS NOTE ADULT - PROBLEM SELECTOR PLAN 1
Age-indeterminate RLE DVT developed between October 2023 and April 2024.  Patient recently started on Eliquis due to arrhythmia.  Will defer hypercoagulable workup in the midst of an acute thrombotic event, but advised the use of Eliquis for the next 3-month and reassess with a repeat venous Doppler.  Case discussed with patient and answered multiple questions.  Will need hematologic follow-up in ambulatory.

## 2024-05-02 NOTE — PROGRESS NOTE ADULT - SUBJECTIVE AND OBJECTIVE BOX
HPI:  58M PMHx gout found down at home on 4/2/24 by wife brought to [facility] by ambulance found to have a L thalamic hemorrhagic infarct via CT. Patient intubated for inability to protect airway. MRI demonstrated hemorrhage with anticipated evolution. Chronic microhemorrhages found in the R thalamus and BG as well as small acute infarcts in the right caudate tail and periatrial white matter.  CTA clear of major vessel occlusion or stenosis. Patient with residual R hemiparesis, dysarthria, mild aphasia, and R gaze palsy. EEG negative throughout workup. EKG without AF. TTE grossly normal. Gout flare treated by rheumatology with colchicine while inpatient. Patient received PM&R consult who recommended acute inpatient rehabilitation. Patient was medically stabilized and discharged to [facility].  (15 Apr 2024 14:16)    SUBJECTIVE:   Patient seen and evaluated while reclined in .  He reports that facial tingling sensation is bothersome, denies pain with paresthesia. Right sided nerve pain and left heal pain resolved.  Reviewed his recovery and transition to [facility], would like to request for more til and to discuss with SW in regards to insurance coverage since he's to have medicaid too?      REVIEW OF SYSTEMS  + Right sided weakness   - Left foot pain (to heel and big toe) attribute as gout flare (resolved)    VITALS  Vital Signs Last 24 Hrs  T(C): 37.2 (05-02-24 @ 08:29), Max: 37.2 (05-02-24 @ 08:29)  T(F): 98.9 (05-02-24 @ 08:29), Max: 98.9 (05-02-24 @ 08:29)  HR: 84 (05-02-24 @ 08:29) (74 - 92)  BP: 120/82 (05-02-24 @ 08:29) (97/67 - 121/63)  RR: 15 (05-02-24 @ 08:29) (15 - 15)  SpO2: 96% (05-02-24 @ 08:29) (96% - 96%)    PHYSICAL EXAM  Gen - NAD  Pulm - Breathing comfortably on RA   Cardiovascular - warm and well perfused, no cyanosis or pedal edema  Abdomen - Soft, NT/ND,   Extremities - No peripheral edema  Neuro-     Cognitive - AAOx3, able to follow commands, content appropriate      Communication - fluent, mild dysarthria, comprehension     Cranial nerve -  left sided facial weakness, oral secretion pooling to right, Dysphagia       Motor -                     LEFT    UE - ShAB 5/5, EF 5/5, EE 5/5, WE 5/5,  5/5                    RIGHT UE - ShAB 0/5, EF 0/5, EE 0/5, WE 0/5,  0/5                    LEFT    LE - HF 5/5, KE 5/5, DF 5/5, PF 5/5                    RIGHT LE - HF 1/5, KE 1/5, DF 0/5, PF 0/5        Sensory - Diminished right UE/LE  Psychiatric - Mood stable    RECENT LABS                         12.4   6.54  )-----------( 174      ( 02 May 2024 06:32 )             37.9     05-02    141  |  105  |  21  ----------------------------<  97  4.1   |  30  |  0.78    Ca    8.8      02 May 2024 06:32    TPro  6.7  /  Alb  3.1<L>  /  TBili  0.5  /  DBili  x   /  AST  21  /  ALT  35  /  AlkPhos  65  05-02    LIVER FUNCTIONS - ( 02 May 2024 06:32 )  Alb: 3.1 g/dL / Pro: 6.7 g/dL / ALK PHOS: 65 U/L / ALT: 35 U/L / AST: 21 U/L / GGT: x           MEDICATIONS  (STANDING):  allopurinol 100 milliGRAM(s) Oral daily  amLODIPine   Tablet 5 milliGRAM(s) Oral daily  bisacodyl 5 milliGRAM(s) Oral at bedtime  enoxaparin Injectable 40 milliGRAM(s) SubCutaneous every 24 hours  escitalopram 10 milliGRAM(s) Oral <User Schedule>  gabapentin 300 milliGRAM(s) Oral at bedtime  gabapentin 100 milliGRAM(s) Oral daily  metoprolol tartrate 12.5 milliGRAM(s) Oral <User Schedule>  multivitamin 1 Tablet(s) Oral daily  polyethylene glycol 3350 17 Gram(s) Oral two times a day  traZODone 25 milliGRAM(s) Oral at bedtime    MEDICATIONS  (PRN):  acetaminophen     Tablet .. 650 milliGRAM(s) Oral every 6 hours PRN Mild Pain (1 - 3) HPI:  58M PMHx gout found down at home on 4/2/24 by wife brought to [facility] by ambulance found to have a L thalamic hemorrhagic infarct via CT. Patient intubated for inability to protect airway. MRI demonstrated hemorrhage with anticipated evolution. Chronic microhemorrhages found in the R thalamus and BG as well as small acute infarcts in the right caudate tail and periatrial white matter.  CTA clear of major vessel occlusion or stenosis. Patient with residual R hemiparesis, dysarthria, mild aphasia, and R gaze palsy. EEG negative throughout workup. EKG without AF. TTE grossly normal. Gout flare treated by rheumatology with colchicine while inpatient. Patient received PM&R consult who recommended acute inpatient rehabilitation. Patient was medically stabilized and discharged to [facility].  (15 Apr 2024 14:16)    SUBJECTIVE:   Patient seen and evaluated while reclined in .  He reports that facial tingling sensation is bothersome, denies pain with paresthesia. Right sided nerve pain and left heal pain resolved.  Reviewed his recovery and transition to [facility], would like to request for more til and to discuss with SW in regards to insurance coverage since he's to have medicaid too.  Pt. anxious about discharge and requesting to stay a little longer in [place].       REVIEW OF SYSTEMS  + Right sided weakness   - Left foot pain (to heel and big toe) attribute as gout flare (resolved)    VITALS  Vital Signs Last 24 Hrs  T(C): 37.2 (05-02-24 @ 08:29), Max: 37.2 (05-02-24 @ 08:29)  T(F): 98.9 (05-02-24 @ 08:29), Max: 98.9 (05-02-24 @ 08:29)  HR: 84 (05-02-24 @ 08:29) (74 - 92)  BP: 120/82 (05-02-24 @ 08:29) (97/67 - 121/63)  RR: 15 (05-02-24 @ 08:29) (15 - 15)  SpO2: 96% (05-02-24 @ 08:29) (96% - 96%)    PHYSICAL EXAM  Gen - NAD  Pulm - Breathing comfortably on RA   Cardiovascular - warm and well perfused, no cyanosis or pedal edema  Abdomen - Soft, NT/ND,   Extremities - No peripheral edema  Neuro-     Cognitive - AAOx3, able to follow commands, content appropriate      Communication - fluent, mild dysarthria, comprehension     Cranial nerve -  left sided facial weakness, oral secretion pooling to right, Dysphagia       Motor -                     LEFT    UE - ShAB 5/5, EF 5/5, EE 5/5, WE 5/5,  5/5                    RIGHT UE - ShAB 0/5, EF 0/5, EE 0/5, WE 0/5,  0/5                    LEFT    LE - HF 5/5, KE 5/5, DF 5/5, PF 5/5                    RIGHT LE - HF 1/5, KE 1/5, DF 0/5, PF 0/5        Sensory - Diminished right UE/LE  Psychiatric - Mood stable    RECENT LABS                         12.4   6.54  )-----------( 174      ( 02 May 2024 06:32 )             37.9     05-02    141  |  105  |  21  ----------------------------<  97  4.1   |  30  |  0.78    Ca    8.8      02 May 2024 06:32    TPro  6.7  /  Alb  3.1<L>  /  TBili  0.5  /  DBili  x   /  AST  21  /  ALT  35  /  AlkPhos  65  05-02    LIVER FUNCTIONS - ( 02 May 2024 06:32 )  Alb: 3.1 g/dL / Pro: 6.7 g/dL / ALK PHOS: 65 U/L / ALT: 35 U/L / AST: 21 U/L / GGT: x           RADIOLOGY--  EXAM:  US DPLX LWR EXT VEINS COMPL BI   ORDERED BY:    ARIES GUZMÁN     PROCEDURE DATE:  04/26/2024          INTERPRETATION:  CLINICAL INFORMATION: CVA.    COMPARISON: 04/15/2024.    TECHNIQUE: Duplex sonography of the BILATERAL LOWER extremity veins with   color and spectral Doppler, with and without compression.    FINDINGS:    RIGHT:  Normal compressibility of the RIGHT common femoral, femoral and popliteal veins.  Doppler examination shows normal spontaneous and phasic flow.  There is evidence of right soleal vein thrombosis.    LEFT:  Normal compressibility of the LEFT common femoral, femoral and popliteal veins.  Doppler examination shows normal spontaneous and phasic flow.  No LEFT calf vein thrombosis is detected.    IMPRESSION:  Acute deep venous thrombosis: below the knee.    Right soleal vein DVT.      MEDICATIONS  (STANDING):  allopurinol 100 milliGRAM(s) Oral daily  amLODIPine   Tablet 5 milliGRAM(s) Oral daily  bisacodyl 5 milliGRAM(s) Oral at bedtime  enoxaparin Injectable 40 milliGRAM(s) SubCutaneous every 24 hours  escitalopram 10 milliGRAM(s) Oral <User Schedule>  gabapentin 300 milliGRAM(s) Oral at bedtime  gabapentin 100 milliGRAM(s) Oral daily  metoprolol tartrate 12.5 milliGRAM(s) Oral <User Schedule>  multivitamin 1 Tablet(s) Oral daily  polyethylene glycol 3350 17 Gram(s) Oral two times a day  traZODone 25 milliGRAM(s) Oral at bedtime    MEDICATIONS  (PRN):  acetaminophen     Tablet .. 650 milliGRAM(s) Oral every 6 hours PRN Mild Pain (1 - 3)

## 2024-05-02 NOTE — CONSULT NOTE ADULT - SUBJECTIVE AND OBJECTIVE BOX
Wednesday 5/01/2024  1230pm  Ophthalmology Consult Note    58M PMHx gout found down at home on 4/2/24 by wife brought to [facility] by ambulance found to have a L thalamic hemorrhagic infarct via CT. Patient intubated for inability to protect airway. MRI demonstrated hemorrhage with anticipated evolution. Chronic microhemorrhages found in the R thalamus and BG as well as small acute infarcts in the right caudate tail and periatrial white matter.  CTA clear of major vessel occlusion or stenosis. Patient with residual R hemiparesis, dysarthria, mild aphasia, and R gaze palsy. EEG negative throughout workup. EKG without AF. TTE grossly normal. Gout flare treated by rheumatology with colchicine while inpatient. Patient received PM&R consult who recommended acute inpatient rehabilitation. Patient was medically stabilized and discharged to [facility].     PAST MEDICAL & SURGICAL HISTORY:  Eczema      Gout      Intraparenchymal hemorrhage of brain        MEDICATIONS  (STANDING):  allopurinol 100 milliGRAM(s) Oral daily  amLODIPine   Tablet 5 milliGRAM(s) Oral daily  bisacodyl 5 milliGRAM(s) Oral at bedtime  enoxaparin Injectable 40 milliGRAM(s) SubCutaneous every 24 hours  escitalopram 10 milliGRAM(s) Oral <User Schedule>  gabapentin 100 milliGRAM(s) Oral daily  gabapentin 300 milliGRAM(s) Oral at bedtime  metoprolol tartrate 12.5 milliGRAM(s) Oral <User Schedule>  multivitamin 1 Tablet(s) Oral daily  polyethylene glycol 3350 17 Gram(s) Oral two times a day  traZODone 25 milliGRAM(s) Oral at bedtime    Patient sitting in wheelchair having Lunch.  Works as an , doesnt use specs to read, vision sometimes blurry at night  "vision not the same since stroke"    PO - sees 'eye doctor" in [facility]- no problems  Gtts - none  Allergies - No Known Allergies        Vasc near card  OD 20/30  OS 20/30  OU 20/25  Pupils - 3.3/3 RLA, NO APD OU  EOMs - Full OU, unable to elicit diplopia  CVF - Full OU, No obvious VF defect    Portable SLE bedside  LLA -  WNL OU  C/S - W&Q OU  K - Clear OU  A/C - D&Q OU  Iris - Flat OU  Lens - early NS cataract OU  IOP - soft OU    DFE - Tropicamide 1%    20D indirect    Vitreous clear OU  Disc: pink and sharp, no edema OU  Macula: wnl ou  Vessels: WNL OU , No heme or occlussions  Periphery: flat OU. No tears or RD.    Imp/Plan:    1. Visual disturbance s/p LT thalamic infarct, h/o Chronic microhemorrhages found in the R thalamus and BG as well as small acute infarcts in the right caudate tail:   No acute intraocular pathology noted, Vf gross full, no gross VF defect, EOMs w/out deficits-> recommend f/u with neuro-ophth after discharge for formal VF testing and evaluation.   (Patient lives [place]->[facility] Ophthalmology, Dr Escobar)      Sukhjinder Lin MD, FACS  [phone] cell

## 2024-05-02 NOTE — PROGRESS NOTE ADULT - SUBJECTIVE AND OBJECTIVE BOX
Patient is a 59y old  Male who presents with a chief complaint of left Intraparenchymal Hemorrhage (30 Apr 2024 13:58)      SUBJECTIVE / OVERNIGHT EVENTS:  Pt seen and examined at bedside. No acute events overnight. Complaints of visual impairment. States hard to read or see the phone. Able to recognize and see people but from time to time its mildly blurry   Pt denies cp, palpitations, sob, abd pain, N/V, fever, chills.    ROS:  All other review of systems negative    Allergies    No Known Allergies    Intolerances        MEDICATIONS  (STANDING):  allopurinol 100 milliGRAM(s) Oral daily  amLODIPine   Tablet 5 milliGRAM(s) Oral daily  bisacodyl 5 milliGRAM(s) Oral at bedtime  enoxaparin Injectable 40 milliGRAM(s) SubCutaneous every 24 hours  escitalopram 10 milliGRAM(s) Oral <User Schedule>  gabapentin 100 milliGRAM(s) Oral daily  gabapentin 300 milliGRAM(s) Oral at bedtime  metoprolol tartrate 12.5 milliGRAM(s) Oral <User Schedule>  multivitamin 1 Tablet(s) Oral daily  polyethylene glycol 3350 17 Gram(s) Oral two times a day  traZODone 25 milliGRAM(s) Oral at bedtime    MEDICATIONS  (PRN):  acetaminophen     Tablet .. 650 milliGRAM(s) Oral every 6 hours PRN Mild Pain (1 - 3)      Vital Signs Last 24 Hrs  T(C): 36.6 (01 May 2024 08:58), Max: 36.6 (01 May 2024 08:58)  T(F): 97.8 (01 May 2024 08:58), Max: 97.8 (01 May 2024 08:58)  HR: 74 (01 May 2024 08:58) (74 - 92)  BP: 119/79 (01 May 2024 08:58) (115/74 - 119/79)  BP(mean): --  RR: 17 (01 May 2024 08:58) (16 - 17)  SpO2: 95% (01 May 2024 08:58) (95% - 95%)    Parameters below as of 01 May 2024 08:58  Patient On (Oxygen Delivery Method): room air      CAPILLARY BLOOD GLUCOSE        I&O's Summary      PHYSICAL EXAM:  GENERAL: NAD, well-developed AAOx3 male   HEAD:  Atraumatic, Normocephalic  NECK: Supple, No JVD  CHEST/LUNG: Clear to auscultation bilaterally; No wheeze, nonlabored breathing  HEART: Regular rate and rhythm; No murmurs, rubs, or gallops  ABDOMEN: Soft, Nontender, Nondistended; Bowel sounds present  EXTREMITIES: No clubbing, cyanosis, or edema  PSYCH: calm, appropriate mood    LABS:                    RADIOLOGY & ADDITIONAL TESTS:  Results Reviewed:   Imaging Personally Reviewed:  Electrocardiogram Personally Reviewed:    COORDINATION OF CARE:  Care Discussed with Consultants/Other Providers [Y/N]:  Prior or Outpatient Records Reviewed [Y/N]: Patient is a 59y old  Male who presents with a chief complaint of left Intraparenchymal Hemorrhage (30 Apr 2024 13:58)      SUBJECTIVE / OVERNIGHT EVENTS:  Pt seen and examined at bedside. No acute events overnight. Denies any new complaints.  Pt denies cp, palpitations, sob, abd pain, N/V, fever, chills.    ROS:  All other review of systems negative    Allergies    No Known Allergies    Intolerances      MEDICATIONS:  STANDING MEDICATIONS  allopurinol 100 milliGRAM(s) Oral daily, 04-28-24 @ 11:52  amLODIPine   Tablet 5 milliGRAM(s) Oral daily, 04-24-24 @ 00:00  bisacodyl 5 milliGRAM(s) Oral at bedtime, 04-30-24 @ 13:24  enoxaparin Injectable 40 milliGRAM(s) SubCutaneous every 24 hours, 04-15-24 @ 21:11  escitalopram 10 milliGRAM(s) Oral <User Schedule>, 04-29-24 @ 10:04  gabapentin 100 milliGRAM(s) Oral daily, 04-21-24 @ 09:57  gabapentin 300 milliGRAM(s) Oral at bedtime, 04-21-24 @ 09:58  metoprolol tartrate 12.5 milliGRAM(s) Oral <User Schedule>, 04-25-24 @ 11:12  multivitamin 1 Tablet(s) Oral daily, 04-15-24 @ 21:11  polyethylene glycol 3350 17 Gram(s) Oral two times a day, 04-30-24 @ 13:24  traZODone 25 milliGRAM(s) Oral at bedtime, 04-21-24 @ 11:03    PRN MEDICATIONS  acetaminophen     Tablet .. 650 milliGRAM(s) Oral every 6 hours, 04-15-24 @ 21:11 PRN    Diet, Minced and Moist:   Single Sips Only (04-18-24 @ 11:13) [Active]          Vital Signs Last 24 Hrs  T(C): 37.2 (02 May 2024 08:29), Max: 37.2 (02 May 2024 08:29)  T(F): 98.9 (02 May 2024 08:29), Max: 98.9 (02 May 2024 08:29)  HR: 84 (02 May 2024 08:29) (74 - 92)  BP: 120/82 (02 May 2024 08:29) (97/67 - 121/63)  RR: 15 (02 May 2024 08:29) (15 - 15)  SpO2: 96% (02 May 2024 08:29) (96% - 96%)    Parameters below as of 02 May 2024 08:29  Patient On (Oxygen Delivery Method): room air      I&Os:  05-01-24 @ 07:01  -  05-02-24 @ 07:00  --------------------------------------------------------  IN: 0 mL / OUT: 2 mL / NET: -2 mL        LABS:                        12.4   6.54  )-----------( 174      ( 02 May 2024 06:32 )             37.9     WBC trend: 6.54 <--, 8.31 <--  Hgb: 12.4 [05-02-24 @ 06:32]<--, 14.0 [04-29-24 @ 06:45]<--    05-02    141  |  105  |  21  ----------------------------<  97  4.1   |  30  |  0.78    Ca    8.8      02 May 2024 06:32    TPro  6.7  /  Alb  3.1<L>  /  TBili  0.5  /  DBili  x   /  AST  21  /  ALT  35  /  AlkPhos  65  05-02    Creatinine trend: 0.78<--, 0.77<--, 0.83<--, 0.83<--, 0.97<--, 0.76<--, 0.72<--  SODIUM TREND: Sodium 141 [05-02 @ 06:32]<--, Sodium 142 [04-29 @ 06:45]<--      POC Glucose:     Procalcitonin, Serum: 0.29 ng/mL (04-10-24 @ 20:43)    Urinalysis Basic - ( 02 May 2024 06:32 )    Color: x / Appearance: x / SG: x / pH: x  Gluc: 97 mg/dL / Ketone: x  / Bili: x / Urobili: x   Blood: x / Protein: x / Nitrite: x   Leuk Esterase: x / RBC: x / WBC x   Sq Epi: x / Non Sq Epi: x / Bacteria: x                                              -------------------------------------------------            RADIOLOGY & ADDITIONAL TESTS:  Results Reviewed:   Imaging Personally Reviewed:  Electrocardiogram Personally Reviewed:    COORDINATION OF CARE:  Care Discussed with Consultants/Other Providers [Y/N]:  Prior or Outpatient Records Reviewed [Y/N]:      PHYSICAL EXAM:  GENERAL: NAD, well-developed AAOx3 male   HEAD:  Atraumatic, Normocephalic  NECK: Supple, No JVD  CHEST/LUNG: Clear to auscultation bilaterally; No wheeze, nonlabored breathing  HEART: Regular rate and rhythm; No murmurs, rubs, or gallops  ABDOMEN: Soft, Nontender, Nondistended; Bowel sounds present  EXTREMITIES: No clubbing, cyanosis, or edema  PSYCH: calm, appropriate mood

## 2024-05-02 NOTE — PROGRESS NOTE ADULT - SUBJECTIVE AND OBJECTIVE BOX
ROSALEE CARR, 59y Male  MRN: [id number]  ATTENDING: Vanessa Anderson    HPI:  58M PMHx gout treated with colchicine, s/p fall at home, found with a newly diagnosed left thalamic hemorrhagic infarct on CT head MRI confirmed chronic microhemorrhage found in the right thalamus and BG as well as small acute infarcts in the right caudate tail and periatrial white matter.  CTA clear of major vessel occlusion or stenosis.  Patient with residual right hemiparesis, dysarthria, mild aphasia and right gaze palsy.  On 10/14/2023 patient had a venous Doppler that showed no evidence of DVT in either veins.  Patient started on Eliquis 2 weeks ago due to arrhythmia.  On 4/26/2024 patient had a venous Doppler due to right lower extremity swelling and was diagnosed with right soleal vein DVT, age indeterminate.  Hematology consulted to discuss anticoagulation management.    MEDICATIONS:  acetaminophen     Tablet .. 650 milliGRAM(s) Oral every 6 hours PRN  allopurinol 100 milliGRAM(s) Oral daily  amLODIPine   Tablet 5 milliGRAM(s) Oral daily  bisacodyl 5 milliGRAM(s) Oral at bedtime  enoxaparin Injectable 40 milliGRAM(s) SubCutaneous every 24 hours  escitalopram 10 milliGRAM(s) Oral <User Schedule>  gabapentin 300 milliGRAM(s) Oral at bedtime  gabapentin 100 milliGRAM(s) Oral daily  metoprolol tartrate 12.5 milliGRAM(s) Oral <User Schedule>  multivitamin 1 Tablet(s) Oral daily  polyethylene glycol 3350 17 Gram(s) Oral two times a day  traZODone 25 milliGRAM(s) Oral at bedtime    All other medications reviewed.    SUBJECTIVE:  Alert; offers no new complaints  VITALS:  T(C): 36.8 (05-01-24 @ 20:09), Max: 36.8 (05-01-24 @ 20:09)  T(F): 98.3 (05-01-24 @ 20:09), Max: 98.3 (05-01-24 @ 20:09)  HR: 74 (05-02-24 @ 06:27) (74 - 92)  BP: 121/63 (05-02-24 @ 06:27) (97/67 - 121/63)      PHYSICAL EXAM:  Constitutional: alert, well developed  HEENT: normocephalic, anicteric sclerae, no mucositis or thrush  Respiratory: bilateral clear to auscultation anteriorly  Cardiovascular : S1, S2 regular, rhythmic, no murmurs, gallops or rubs  Abdomen: soft, distended, + normoactive BS, no palpable HS- megaly  Extremities: no tenderness;  -c/c/e, pulses equal bilaterally    LABS:  (05-02) WBC: 6.54 K/uL,Hemoglobin: 12.4 g/dL, Hematocrit: 37.9 %,  Platelet: 174 K/uL  (05-02) Na: 141 mmol/L ; K: 4.1 mmol/L ; BUN: 21 mg/dL ; Cr: 0.78 mg/dL.    RADIOLOGY:  ACC: [id number] EXAM:  US DPLX LWR EXT VEINS COMPL BI   ORDERED BY:  ARIES GUZMÁN   PROCEDURE DATE:  04/26/2024    INTERPRETATION:  CLINICAL INFORMATION: CVA.  COMPARISON: 04/15/2024.  TECHNIQUE: Duplex sonography of the BILATERAL LOWER extremity veins with   color and spectral Doppler, with and without compression.  FINDINGS:  RIGHT:  Normal compressibility of the RIGHT common femoral, femoral and popliteal   veins.  Doppler examination shows normal spontaneous and phasic flow.  There is evidence of right soleal vein thrombosis.  LEFT:  Normal compressibility of the LEFT common femoral, femoral and popliteal   veins.  Doppler examination shows normal spontaneous and phasic flow.  No LEFT calf vein thrombosis is detected.    IMPRESSION:  Acute deep venous thrombosis: below the knee.  Right soleal vein DVT.

## 2024-05-02 NOTE — CONSULT NOTE ADULT - REASON FOR ADMISSION
left Intraparenchymal Hemorrhage

## 2024-05-03 PROCEDURE — 93970 EXTREMITY STUDY: CPT | Mod: 26

## 2024-05-03 PROCEDURE — 99232 SBSQ HOSP IP/OBS MODERATE 35: CPT

## 2024-05-03 RX ADMIN — GABAPENTIN 100 MILLIGRAM(S): 400 CAPSULE ORAL at 13:12

## 2024-05-03 RX ADMIN — Medication 12.5 MILLIGRAM(S): at 07:35

## 2024-05-03 RX ADMIN — ESCITALOPRAM OXALATE 10 MILLIGRAM(S): 10 TABLET, FILM COATED ORAL at 17:16

## 2024-05-03 RX ADMIN — Medication 25 MILLIGRAM(S): at 21:03

## 2024-05-03 RX ADMIN — AMLODIPINE BESYLATE 5 MILLIGRAM(S): 2.5 TABLET ORAL at 06:08

## 2024-05-03 RX ADMIN — Medication 12.5 MILLIGRAM(S): at 21:03

## 2024-05-03 RX ADMIN — Medication 100 MILLIGRAM(S): at 13:12

## 2024-05-03 RX ADMIN — Medication 1 TABLET(S): at 13:12

## 2024-05-03 RX ADMIN — POLYETHYLENE GLYCOL 3350 17 GRAM(S): 17 POWDER, FOR SOLUTION ORAL at 06:08

## 2024-05-03 RX ADMIN — ENOXAPARIN SODIUM 40 MILLIGRAM(S): 100 INJECTION SUBCUTANEOUS at 21:05

## 2024-05-03 RX ADMIN — Medication 5 MILLIGRAM(S): at 21:03

## 2024-05-03 RX ADMIN — GABAPENTIN 400 MILLIGRAM(S): 400 CAPSULE ORAL at 21:03

## 2024-05-03 NOTE — PROGRESS NOTE ADULT - SUBJECTIVE AND OBJECTIVE BOX
HPI:  58M PMHx gout found down at home on 4/2/24 by wife brought to [facility] by ambulance found to have a L thalamic hemorrhagic infarct via CT. Patient intubated for inability to protect airway. MRI demonstrated hemorrhage with anticipated evolution. Chronic microhemorrhages found in the R thalamus and BG as well as small acute infarcts in the right caudate tail and periatrial white matter.  CTA clear of major vessel occlusion or stenosis. Patient with residual R hemiparesis, dysarthria, mild aphasia, and R gaze palsy. EEG negative throughout workup. EKG without AF. TTE grossly normal. Gout flare treated by rheumatology with colchicine while inpatient. Patient received PM&R consult who recommended acute inpatient rehabilitation. Patient was medically stabilized and discharged to [facility].  (15 Apr 2024 14:16)    SUBJECTIVE:   Patient seen and evaluated while reclined in WC (doing restorative dining). Reports tingling to face that's bothersome at night, not noticeable during the day.  Educated on neuropathic pain and further review medication > gabapentin was just increased yesterday, can consider further increase in another 2-3 day.  Reviewed Abd xray result, states he had BM x 1 this morning.       REVIEW OF SYSTEMS  +neurocognitive impairment  + Right sided weakness   - Left foot pain (to heel and big toe) attribute as gout flare (resolved)    VITALS  Vital Signs Last 24 Hrs  T(C): 36.4 (05-03-24 @ 07:36), Max: 36.4 (05-02-24 @ 19:51)  T(F): 97.5 (05-03-24 @ 07:36), Max: 97.5 (05-02-24 @ 19:51)  HR: 72 (05-03-24 @ 07:36) (67 - 72)  BP: 137/90 (05-03-24 @ 07:36) (130/78 - 137/90)  RR: 15 (05-03-24 @ 07:36) (15 - 15)  SpO2: 96% (05-03-24 @ 07:36) (95% - 96%)    PHYSICAL EXAM  Gen - NAD  Pulm - Breathing comfortably on RA   Cardiovascular - warm and well perfused, no cyanosis or pedal edema  Abdomen - Soft, NT/ND,   Extremities - No peripheral edema  Neuro-     Cognitive - AAOx3, able to follow commands, content appropriate      Communication - fluent, mild dysarthria, comprehension     Cranial nerve -  left sided facial weakness, oral secretion pooling to right (less), Dysphagia     Motor -                     LEFT    UE - ShAB 5/5, EF 5/5, EE 5/5, WE 5/5,  5/5                    RIGHT UE - ShAB 0/5, EF 0/5, EE 0/5, WE 0/5,  0/5                    LEFT    LE - HF 5/5, KE 5/5, DF 5/5, PF 5/5                    RIGHT LE - HF 1/5, KE 1/5, DF 0/5, PF 0/5        Sensory - Diminished right UE/LE  Psychiatric - Mood stable    RECENT LABS                         12.4   6.54  )-----------( 174      ( 02 May 2024 06:32 )             37.9     05-02    141  |  105  |  21  ----------------------------<  97  4.1   |  30  |  0.78    Ca    8.8      02 May 2024 06:32    TPro  6.7  /  Alb  3.1<L>  /  TBili  0.5  /  DBili  x   /  AST  21  /  ALT  35  /  AlkPhos  65  05-02    LIVER FUNCTIONS - ( 02 May 2024 06:32 )  Alb: 3.1 g/dL / Pro: 6.7 g/dL / ALK PHOS: 65 U/L / ALT: 35 U/L / AST: 21 U/L / GGT: x           EXAM:  US DPLX LWR EXT VEINS COMPL BI    Acute deep venous thrombosis: below the knee.  Right soleal vein DVT.    MEDICATIONS  (STANDING):  allopurinol 100 milliGRAM(s) Oral daily  amLODIPine   Tablet 5 milliGRAM(s) Oral daily  bisacodyl 5 milliGRAM(s) Oral at bedtime  enoxaparin Injectable 40 milliGRAM(s) SubCutaneous every 24 hours  escitalopram 10 milliGRAM(s) Oral <User Schedule>  gabapentin 100 milliGRAM(s) Oral daily  gabapentin 400 milliGRAM(s) Oral at bedtime  metoprolol tartrate 12.5 milliGRAM(s) Oral <User Schedule>  multivitamin 1 Tablet(s) Oral daily  polyethylene glycol 3350 17 Gram(s) Oral two times a day  traZODone 25 milliGRAM(s) Oral at bedtime    MEDICATIONS  (PRN):  acetaminophen     Tablet .. 650 milliGRAM(s) Oral every 6 hours PRN Mild Pain (1 - 3)

## 2024-05-03 NOTE — PROGRESS NOTE ADULT - NS ATTEND OPT1A GEN_ALL_CORE
History/Exam/Medical decision making
Exam/Medical decision making
History/Exam/Medical decision making
History/Exam/Medical decision making
Exam/Medical decision making

## 2024-05-03 NOTE — PROGRESS NOTE ADULT - NS ATTEND AMEND GEN_ALL_CORE FT
I have personally seen and examined the patient.  I fully participated in the care of this patient.  I have made amendments to the documentation where necessary, and agree with the history, physical exam, and plan as documented above  Vitals reviewed. BP on lower side. Norvasc held this AM.  Patient seen in the wheelchair this AM, inquiring about recovery--discussed recovery with patient. Continued right sided hemiparesis. Expresses sadness that his strength is not returning. Perseverating on his work- job duties, wanting to get back to work. Provided emotional support.  Reports improvement of neuropathic pain-- seems resolved.   Total time 50 mins-face to face time encounter and counseling the patient, meeting with hospitalist, nursing staff, therapists and social work to discuss medical updates and management, care coordination, reviewing chart and data
I have personally seen and examined the patient.  I fully participated in the care of this patient.  I have made amendments to the documentation where necessary, and agree with the history, physical exam, and plan as documented above  Vitals stable, patient seen sitting in the wheelchair. Reports he feels as though he may be getting a gout attack-- reports left posterior ankle and big toe pain-- examined area--does not appear to be swollen or erythematous but is mildly tender to touch. Discussed with hospitalist--give an extra dose of colchicine. Reviewed labs--slight white count-- 11, patient is afebrile, Previously had elevated white count; thrombocytosis resolved-- platelet today- 288.  CMP with elevated BUN-- 27. Cr is 0.83-- encourage fluids (on minced and moist diet)  c/w gabapentin for neuropathic pain- reports helping  Sleep is better with trazodone.   Total time 50 mins-face to face time encounter and counseling the patient, meeting with hospitalist, nursing staff, therapists and social work to discuss medical updates and management, care coordination, reviewing chart and data
Pt. seen this AM.  Agree with documentation above as per NP with amendments made as appropriate. Patient medically stable. Making progress towards rehab goals.     Left thalamic ICH    #right soleal DVT 4/26--  - cont. Lovenox 40mg    - repeat duplex (5/3) - stable right soleal DVT, no propagation
I have personally seen and examined the patient.  I fully participated in the care of this patient.  I have made amendments to the documentation where necessary, and agree with the history, physical exam, and plan as documented above  Labs reviewed-- repeat CBC with ongoing leukocytosis, stable WBC 11.3, afebrile. Continue to monitor with next lab draw  Patient seen and examined with wife and children present. Slept overnight, notes improvement in neuropathic pain. Asking for more food, wife will bring in additional food--reviewed consistency of minced/moist.  Per dietary, patient receiving double portion food. Inquiring about recovery-- discussed recovery expectations. Right hand a bit edematous, will order edema glove to help with swelling. Right UE and LE hemiplegia. Patient expresses hope for return of function. Discussed BP medications with wife. Lopressor 12.5 BID added yesterday for elevated HR-- range   Last doppler LE reviewed 4/15 negative for DVT. Will repeat doppler LE.   Total time 50 mins-face to face time encounter and counseling the patient, meeting with hospitalist, nursing staff, therapists and social work to discuss medical updates and management, care coordination, reviewing chart and data, speaking to family. Patient strongly expresses desire to go home.
Pt. seen this AM.  Agree with documentation above as per NP with amendments made as appropriate. Patient medically stable. Making progress towards rehab goals.     right thalamic ICH  Pt. doing well-- slept During the night, bowel movements are regular, tolerating therapy    Patient's wife had questions about his right soleal DVT–spoke to her about the current treatment and follow-up lower extremity Doppler which is ordered.    Will order follow-up CT head prior to discharge.    ** Spoke with pt's  _wife, Sheron___, to provide update on pt's status,  medical update, medications, progress in therapy, Rehab plan of care, Discharge plan for next week to [facility] and discharge needs/expectations.    Discussed will inquire from insurance if patient can stay in acute rehab a few more days–patient and his wife explained that staying a week is not feasible.  Discussed options for subacute rehab.  All questions answered.

## 2024-05-03 NOTE — PROGRESS NOTE ADULT - ASSESSMENT
ASSESSMENT/PLAN:   58y with PMhx Gout found down and revealed to have an intraparenchymal hemorrhage.  Patient now admitted for a multidisciplinary rehab program with right hemiparesis, cognitive deficits, sensory loss, Dysphagia, Dysarthria.      #L Thalamic IPH   -MRI Brain- L thalamic hemorrhage, w/ chronic microhemorrhages, and acute infarcts in the right caudate tail and periatrial white matter  -EKG w/o AF  -Echo unremarkable  - CTA w/o large occlusions  - 4/30: Complaints of blurry vision, likely secondary to stroke, consulted Opthalmology will follow up recommendations. d/w Pt. and his wife  - 5/1: Will follow up Opthalmology recommendations     # HTN  - amlodipine  5mg  - metoprolol 12.5 BID (4/25)  - 137/90 (5/3)    # Pain   -Tylenol PRN  -cont. Gabapentin 400 mg qhs (inc on 5/2) &, 100mg daily     #Gout  - Allopurinol 100mg daily      #Leukocytosis - resolved  - no overt sign of infection  - WBC: 13.94 (4/16)>12.26 (4/18)>8.73(4/22)>11.14 (4/25)> 11.30 (4/26)> 4/29 WNL 8.31> 6.54 (5/2)    #Mood / Cognition  - [id number] @1800     #Sleep  - Maintain quiet hours and a low stim environment  --failed melatonin 9mg & Rozerem 8mg qhs --  -cont. Trazodone 25 mg qhs    #GI / Bowel  - 4/30: Abd Xray completed on 4/29, increased stool burden  --increased bowel meds -- Miralax bid, and bisacodyl 5mg in evening  --Pt. having regular BMs now.   --d/w pt's wife    # / Bladder  - Voiding with low PVRs    #Dysphagia  - Diet: upgraded to soft and bite sized 5/1--d/w speech therapy    #right soleal DVT 4/26--  - cont. Lovenox 40mg    - Next doppler ordered  ----d/w pt's wife     IDT 5/2--  SW: (4/18/24) Patient resides with wife and teenage children in a private house- has 1 step to enter if in basement apartment- or 7 [address]ICARD[address] if going to main level. Patient's spouse in main support, works part time. Patient was independent prior, working as a private contractor and part-time professor. Patient's wife provided transport prior.    Speech therapy--  moderate oropharyngeal dysphagia--Minced and Moist diet--  thin liquids; Moderate Dysarthria;  Severe Cognitive deficit--but improving Attention, Memory, and Executive functioning, convergent naming task with phrase completion with 87% given  moderate cues. Patient reviewed speech intelligibility strategies with 75% accuracy. Patient utilizedstrategies in 71% of opportunities in sentence creation tasks improving to 86% accuracy given moderate cues. Patient engaged in visual attention task via scanning with 92% accuracy. Patient engaged in divided attention task with 95% accuracy. patient engaged in working memory task with 67% accuracy    OT-- Mod A transfers to left & Max A transfers to right; Mod A --dressing;  Tot A--toileting; Min A--eating and grooming.  Goals -- Min A ADLs and Mod A Tx  PT-- Mod A-- BM,& transfer; Ambulation 75 ft Max A in Lite Gait.  Improving with right LE strength.     Goals:    1. TRansfer OOB and to toilet with Mod A  2. Sequence 4 step ADL task with Moderate cues.     ELOS: 5/9 [facility]     ASSESSMENT/PLAN:   58y with PMhx Gout found down and revealed to have an intraparenchymal hemorrhage.  Patient now admitted for a multidisciplinary rehab program with right hemiparesis, cognitive deficits, sensory loss, Dysphagia, Dysarthria.      #L Thalamic IPH   -MRI Brain- L thalamic hemorrhage, w/ chronic microhemorrhages, and acute infarcts in the right caudate tail and periatrial white matter  -EKG w/o AF  -Echo unremarkable  - CTA w/o large occlusions  - 4/30: Complaints of blurry vision, likely secondary to stroke, consulted Opthalmology will follow up recommendations. d/w Pt. and his wife  - 5/1: Will follow up Opthalmology recommendations     # HTN  - amlodipine  5mg  - metoprolol 12.5 BID (4/25)  - 137/90 (5/3)    # Pain   -Tylenol PRN  -cont. Gabapentin 400 mg qhs (inc on 5/2) &, 100mg daily     #Gout  - Allopurinol 100mg daily      #Leukocytosis - resolved  - no overt sign of infection  - WBC: 13.94 (4/16)>12.26 (4/18)>8.73(4/22)>11.14 (4/25)> 11.30 (4/26)> 4/29 WNL 8.31> 6.54 (5/2)    #Mood / Cognition  - [id number] @1800     #Sleep  - Maintain quiet hours and a low stim environment  --failed melatonin 9mg & Rozerem 8mg qhs --  -cont. Trazodone 25 mg qhs    #GI / Bowel  - 4/30: Abd Xray completed on 4/29, increased stool burden  --increased bowel meds -- Miralax bid, and bisacodyl 5mg in evening  --Pt. having regular BMs now.   --d/w pt's wife    # / Bladder  - Voiding with low PVRs    #Dysphagia  - Diet: upgraded to soft and bite sized 5/1--d/w speech therapy    #right soleal DVT 4/26--  - cont. Lovenox 40mg    - repeat duplex (5/3) - stable right soleal DVT, no propagation  ----d/w pt's wife     IDT 5/2--  SW: (4/18/24) Patient resides with wife and teenage children in a private house- has 1 step to enter if in basement apartment- or 7 [address]ICARDO if going to main level. Patient's spouse in main support, works part time. Patient was independent prior, working as a private contractor and part-time professor. Patient's wife provided transport prior.    Speech therapy--  moderate oropharyngeal dysphagia--Minced and Moist diet--  thin liquids; Moderate Dysarthria;  Severe Cognitive deficit--but improving Attention, Memory, and Executive functioning, convergent naming task with phrase completion with 87% given  moderate cues. Patient reviewed speech intelligibility strategies with 75% accuracy. Patient utilizedstrategies in 71% of opportunities in sentence creation tasks improving to 86% accuracy given moderate cues. Patient engaged in visual attention task via scanning with 92% accuracy. Patient engaged in divided attention task with 95% accuracy. patient engaged in working memory task with 67% accuracy    OT-- Mod A transfers to left & Max A transfers to right; Mod A --dressing;  Tot A--toileting; Min A--eating and grooming.  Goals -- Min A ADLs and Mod A Tx  PT-- Mod A-- BM,& transfer; Ambulation 75 ft Max A in Lite Gait.  Improving with right LE strength.     Goals:    1. TRansfer OOB and to toilet with Mod A  2. Sequence 4 step ADL task with Moderate cues.     ELOS: 5/9 [facility]     ASSESSMENT/PLAN:   58y with PMhx Gout found down and revealed to have an intraparenchymal hemorrhage.  Patient now admitted for a multidisciplinary rehab program with right hemiparesis, cognitive deficits, sensory loss, Dysphagia, Dysarthria.      #L Thalamic IPH   -MRI Brain- L thalamic hemorrhage, w/ chronic microhemorrhages, and acute infarcts in the right caudate tail and periatrial white matter  -EKG w/o AF  -Echo unremarkable  - CTA w/o large occlusions  - 4/30: Complaints of blurry vision, likely secondary to stroke, consulted Opthalmology will follow up recommendations. d/w Pt. and his wife  - 5/1: Will follow up Opthalmology recommendations     # HTN  - amlodipine  5mg  - metoprolol 12.5 BID (4/25)  - 137/90 (5/3)    # Pain   -Tylenol PRN  -cont. Gabapentin 400 mg qhs (inc on 5/2) &, 100mg daily     #Gout  - Allopurinol 100mg daily      #Leukocytosis - resolved  - no overt sign of infection  - WBC: 13.94 (4/16)>12.26 (4/18)>8.73(4/22)>11.14 (4/25)> 11.30 (4/26)> 4/29 WNL 8.31> 6.54 (5/2)    #Mood / Cognition  - [id number] @1800     #Sleep  - Maintain quiet hours and a low stim environment  --failed melatonin 9mg & Rozerem 8mg qhs --  -cont. Trazodone 25 mg qhs    #GI / Bowel  - 4/30: Abd Xray completed on 4/29, increased stool burden  --increased bowel meds -- Miralax bid, and bisacodyl 5mg in evening  --Pt. having regular BMs now.   --d/w pt's wife    # / Bladder  - Voiding with low PVRs    #Dysphagia  - Diet: upgraded to soft and bite sized 5/1--d/w speech therapy    #right soleal DVT 4/26--  - cont. Lovenox 40mg    - repeat duplex (5/3) - stable right soleal DVT, no propagation      IDT 5/2--  SW: (4/18/24) Patient resides with wife and teenage children in a private house- has 1 step to enter if in basement apartment- or 7 [address] if going to main level. Patient's spouse in main support, works part time. Patient was independent prior, working as a private contractor and part-time professor. Patient's wife provided transport prior.    Speech therapy--  moderate oropharyngeal dysphagia--Minced and Moist diet--  thin liquids; Moderate Dysarthria;  Severe Cognitive deficit--but improving Attention, Memory, and Executive functioning, convergent naming task with phrase completion with 87% given  moderate cues. Patient reviewed speech intelligibility strategies with 75% accuracy. Patient utilizedstrategies in 71% of opportunities in sentence creation tasks improving to 86% accuracy given moderate cues. Patient engaged in visual attention task via scanning with 92% accuracy. Patient engaged in divided attention task with 95% accuracy. patient engaged in working memory task with 67% accuracy    OT-- Mod A transfers to left & Max A transfers to right; Mod A --dressing;  Tot A--toileting; Min A--eating and grooming.  Goals -- Min A ADLs and Mod A Tx  PT-- Mod A-- BM,& transfer; Ambulation 75 ft Max A in Lite Gait.  Improving with right LE strength.     Goals:    1. TRansfer OOB and to toilet with Mod A  2. Sequence 4 step ADL task with Moderate cues.     ELOS: 5/9 [facility]

## 2024-05-04 PROCEDURE — 99231 SBSQ HOSP IP/OBS SF/LOW 25: CPT | Mod: GC

## 2024-05-04 RX ADMIN — Medication 12.5 MILLIGRAM(S): at 08:26

## 2024-05-04 RX ADMIN — POLYETHYLENE GLYCOL 3350 17 GRAM(S): 17 POWDER, FOR SOLUTION ORAL at 06:29

## 2024-05-04 RX ADMIN — Medication 650 MILLIGRAM(S): at 21:45

## 2024-05-04 RX ADMIN — Medication 100 MILLIGRAM(S): at 11:39

## 2024-05-04 RX ADMIN — Medication 12.5 MILLIGRAM(S): at 20:46

## 2024-05-04 RX ADMIN — Medication 5 MILLIGRAM(S): at 21:11

## 2024-05-04 RX ADMIN — AMLODIPINE BESYLATE 5 MILLIGRAM(S): 2.5 TABLET ORAL at 06:29

## 2024-05-04 RX ADMIN — ESCITALOPRAM OXALATE 10 MILLIGRAM(S): 10 TABLET, FILM COATED ORAL at 17:42

## 2024-05-04 RX ADMIN — GABAPENTIN 400 MILLIGRAM(S): 400 CAPSULE ORAL at 21:12

## 2024-05-04 RX ADMIN — Medication 25 MILLIGRAM(S): at 21:11

## 2024-05-04 RX ADMIN — ENOXAPARIN SODIUM 40 MILLIGRAM(S): 100 INJECTION SUBCUTANEOUS at 21:13

## 2024-05-04 RX ADMIN — Medication 650 MILLIGRAM(S): at 20:47

## 2024-05-04 RX ADMIN — GABAPENTIN 100 MILLIGRAM(S): 400 CAPSULE ORAL at 11:39

## 2024-05-04 RX ADMIN — Medication 1 TABLET(S): at 11:39

## 2024-05-04 NOTE — PROGRESS NOTE ADULT - ASSESSMENT
Imp: Patient with diagnosis of Thalamic IPH admitted for comprehensive acute rehabilitation.    Plan:  - Chart reviewed  - Continue comprehensive rehabilitation program. Patient requires active and ongoing therapeutic interventions of multiple disciplines and can tolerate 3h/d of therapies. Can actively participate and benefit from an intensive rehabilitation program. Requires supervision of a rehabilitation physician and a coordinated interdisciplinary approach to providing rehabilitation.   - DVT prophylaxis- Lovenox  - Skin- Turn q2h, check skin daily  - Continue current medications  - Medical issues:    * HTN - stable. Continue amlodipine and metoprolol    * R soleal DVT - continue lovenox. repeat duplex with stable findings.   - Discussed with resident on call and interdisciplinary team

## 2024-05-04 NOTE — PROGRESS NOTE ADULT - SUBJECTIVE AND OBJECTIVE BOX
Patient is a 59y old  Male who presents with a chief complaint of left Intraparenchymal Hemorrhage (03 May 2024 15:17)    ---------------------------------------------------------------------  SUBJECTIVE:  Seen and evaluated at bedside this AM. No acute issues overnight. Appears to be in good spirits. Nursing reports he complained of dizziness this AM. BP was within normal range. Patient also reports ongoing visual deficits. Tolerating therapy sessions.     Other ROS:  Denies: headache, CP, SOB, pain, bowel or bladder issues  ----------------------------------------------------------------------  PHYSICAL EXAM:    Vital Signs Last 24 Hrs  T(C): 36.5 (04 May 2024 08:25), Max: 36.5 (04 May 2024 08:25)  T(F): 97.7 (04 May 2024 08:25), Max: 97.7 (04 May 2024 08:25)  HR: 67 (04 May 2024 08:25) (66 - 79)  BP: 132/86 (04 May 2024 08:25) (121/79 - 132/86)  BP(mean): --  RR: 16 (04 May 2024 08:25) (16 - 16)  SpO2: 95% (04 May 2024 08:25) (95% - 96%)    Parameters below as of 04 May 2024 08:25  Patient On (Oxygen Delivery Method): room air      Daily     Daily     PHYSICAL EXAM:    General - NAD, alert, follows commands  Heart- pulse regular  Lungs- breathing comfortably without respiratory distress  Abd- no visible abdominal distension   Ext- No calf pain, extremities well perfused  Neuro- Exam unchanged; right hemiplegia  ----------------------------------------------------------------------  RECENT LABS:                CAPILLARY BLOOD GLUCOSE        ----------------------------------------------------------------------  RECENT IMAGING:    ***  ----------------------------------------------------------------------  MEDICATIONS:  MEDICATIONS  (STANDING):  allopurinol 100 milliGRAM(s) Oral daily  amLODIPine   Tablet 5 milliGRAM(s) Oral daily  bisacodyl 5 milliGRAM(s) Oral at bedtime  enoxaparin Injectable 40 milliGRAM(s) SubCutaneous every 24 hours  escitalopram 10 milliGRAM(s) Oral <User Schedule>  gabapentin 100 milliGRAM(s) Oral daily  gabapentin 400 milliGRAM(s) Oral at bedtime  metoprolol tartrate 12.5 milliGRAM(s) Oral <User Schedule>  multivitamin 1 Tablet(s) Oral daily  polyethylene glycol 3350 17 Gram(s) Oral two times a day  traZODone 25 milliGRAM(s) Oral at bedtime    MEDICATIONS  (PRN):  acetaminophen     Tablet .. 650 milliGRAM(s) Oral every 6 hours PRN Mild Pain (1 - 3)    ----------------------------------------------------------------------

## 2024-05-05 PROCEDURE — 99232 SBSQ HOSP IP/OBS MODERATE 35: CPT

## 2024-05-05 PROCEDURE — 99232 SBSQ HOSP IP/OBS MODERATE 35: CPT | Mod: GC

## 2024-05-05 RX ORDER — GABAPENTIN 400 MG/1
300 CAPSULE ORAL
Refills: 0 | Status: DISCONTINUED | OUTPATIENT
Start: 2024-05-05 | End: 2024-05-09

## 2024-05-05 RX ADMIN — Medication 12.5 MILLIGRAM(S): at 20:00

## 2024-05-05 RX ADMIN — GABAPENTIN 400 MILLIGRAM(S): 400 CAPSULE ORAL at 22:32

## 2024-05-05 RX ADMIN — Medication 25 MILLIGRAM(S): at 22:31

## 2024-05-05 RX ADMIN — ESCITALOPRAM OXALATE 10 MILLIGRAM(S): 10 TABLET, FILM COATED ORAL at 17:42

## 2024-05-05 RX ADMIN — Medication 100 MILLIGRAM(S): at 13:21

## 2024-05-05 RX ADMIN — ENOXAPARIN SODIUM 40 MILLIGRAM(S): 100 INJECTION SUBCUTANEOUS at 22:31

## 2024-05-05 RX ADMIN — Medication 12.5 MILLIGRAM(S): at 08:44

## 2024-05-05 RX ADMIN — GABAPENTIN 300 MILLIGRAM(S): 400 CAPSULE ORAL at 17:43

## 2024-05-05 RX ADMIN — AMLODIPINE BESYLATE 5 MILLIGRAM(S): 2.5 TABLET ORAL at 05:18

## 2024-05-05 RX ADMIN — Medication 5 MILLIGRAM(S): at 22:31

## 2024-05-05 RX ADMIN — Medication 1 TABLET(S): at 13:21

## 2024-05-05 NOTE — PROGRESS NOTE ADULT - ASSESSMENT
Imp: Patient with diagnosis of Thalamic IPH admitted for comprehensive acute rehabilitation.    Plan:  - Chart reviewed  - Continue comprehensive rehabilitation program. Patient requires active and ongoing therapeutic interventions of multiple disciplines and can tolerate 3h/d of therapies. Can actively participate and benefit from an intensive rehabilitation program. Requires supervision of a rehabilitation physician and a coordinated interdisciplinary approach to providing rehabilitation.   - DVT prophylaxis- Lovenox  - Skin- Turn q2h, check skin daily  - Continue current medications  - Medical issues:    * HTN - stable. Continue amlodipine and metoprolol    * R soleal DVT - continue lovenox. repeat duplex with stable findings.     * Facial dysesthesias/ neuropathic pain - increased daytime gabapentin to 300mg and changed to AM. Continue 400mg at bedtime.   - Discussed with resident on call and interdisciplinary team

## 2024-05-05 NOTE — PROGRESS NOTE ADULT - SUBJECTIVE AND OBJECTIVE BOX
Hospitalist: Dexter Warren DO    CHIEF COMPLAINT: Patient is a 59y old  male who presents with a chief complaint of left Intraparenchymal Hemorrhage (05 May 2024 11:33)      SUBJECTIVE / OVERNIGHT EVENTS: Patient seen and examined. No acute events overnight. Pain well controlled and patient without any complaints.    MEDICATIONS  (STANDING):  allopurinol 100 milliGRAM(s) Oral daily  amLODIPine   Tablet 5 milliGRAM(s) Oral daily  bisacodyl 5 milliGRAM(s) Oral at bedtime  enoxaparin Injectable 40 milliGRAM(s) SubCutaneous every 24 hours  escitalopram 10 milliGRAM(s) Oral <User Schedule>  gabapentin 300 milliGRAM(s) Oral <User Schedule>  gabapentin 400 milliGRAM(s) Oral at bedtime  metoprolol tartrate 12.5 milliGRAM(s) Oral <User Schedule>  multivitamin 1 Tablet(s) Oral daily  polyethylene glycol 3350 17 Gram(s) Oral two times a day  traZODone 25 milliGRAM(s) Oral at bedtime    MEDICATIONS  (PRN):  acetaminophen     Tablet .. 650 milliGRAM(s) Oral every 6 hours PRN Mild Pain (1 - 3)      VITALS:  T(F): 97.4 (05-05-24 @ 08:45), Max: 97.9 (05-04-24 @ 20:37)  HR: 67 (05-05-24 @ 08:45) (64 - 84)  BP: 127/85 (05-05-24 @ 08:45) (123/72 - 127/85)  RR: 16 (05-05-24 @ 08:45) (16 - 16)  SpO2: 94% (05-05-24 @ 08:45)      REVIEW OF SYSTEMS:  For ROV please refer back to H&P     PHYSICAL EXAM:  GENERAL: NAD, well-developed AAOx3 male   HEAD:  Atraumatic, Normocephalic  NECK: Supple, No JVD  CHEST/LUNG: Clear to auscultation bilaterally; No wheeze, nonlabored breathing  HEART: Regular rate and rhythm; No murmurs, rubs, or gallops  ABDOMEN: Soft, Nontender, Nondistended; Bowel sounds present  EXTREMITIES: No clubbing, cyanosis, or edema  PSYCH: calm, appropriate mood      RADIOLOGY & ADDITIONAL TESTS:    Imaging Personally Reviewed:    [X] Consultant(s) Notes Reviewed:  [X] Care Discussed with Consultants/Other Providers:

## 2024-05-05 NOTE — PROGRESS NOTE ADULT - ASSESSMENT
58y with PMH Gout found down and revealed to have an intraparenchymal hemorrhage.  Patient now admitted for a acute rehab program. course complicated by acute gout flare of L posterior ankle ( resolved with colchicine)    #IPH  - MRI demonstrated L thalamic hemorrhage, w/ chronic microhemorrhages, and acute infarcts in the right caudate tail and periatrial white matter  - EKG w/o AF  - Echo unremarkable  - CTA w/o large occlusions    #Visual impairment   - Ophthalmology consult:  No acute intraocular pathology noted, Vf gross full, no gross VF defect, EOMs w/out deficits-> recommend f/u with neuro-ophth after discharge for formal VF testing and evaluation.    (Patient lives [place]->[facility], Dr Escobar)    #Constipation  - minimal bms now, abdominal xray shows increased fecal content  - s/p mineral enema without adequate bms  - increased bm regimen to miralax bid, bisacodyl, giving one dose of magnesium hydroxide  - monitor BM    #HTN  - c/w amlodipine 5mg qD  - c/w lopressor 12.5mg qD    #Sinus tachycardia  - Patient not in pain, euvolemic, no signs of infection  - unremarkable TTE  - TSH WNL  - possible anxiety vs physiologic mediated vs pain vs DVT  - trial of lopressor 12.5BID seems to be helping with the tachycardia     #Gout  - received extra dose of colchicine 4/25 with improvement in gout  - initially changed colchicine to once a day dosing, called wife Sheron at [phone] 4/28. states that he takes his gout medications as needed even though his PCP wants him to take some of them daily  - Dc'd colchicine 4/28, started allopurinol 100qD 4/28    #DVT  - US LE 4/26 showed below the knee DVT at R soleal DVT ( explained to patient and wife 4/28)  - c/w Lovenox 40 qD      Total time spent to complete patient's bedside assessment, review medical chart, discuss medical plan of care with covering medical team was more than 35 minutes  with >50% of time spent face to face with patient, discussion with patient/family and/or coordination of care.

## 2024-05-05 NOTE — PROGRESS NOTE ADULT - SUBJECTIVE AND OBJECTIVE BOX
Patient is a 59y old  Male who presents with a chief complaint of left Intraparenchymal Hemorrhage (04 May 2024 17:36)    ---------------------------------------------------------------------  SUBJECTIVE:  Seen and evaluated at bedside this AM. No acute issues overnight. Reports right facial dysesthesias/ discomfort. Feels it is bothersome especially in the AM.     Other ROS:  Denies: headache, CP, SOB, pain, bowel or bladder issues  ----------------------------------------------------------------------  PHYSICAL EXAM:    Vital Signs Last 24 Hrs  T(C): 36.3 (05 May 2024 08:45), Max: 36.6 (04 May 2024 20:37)  T(F): 97.4 (05 May 2024 08:45), Max: 97.9 (04 May 2024 20:37)  HR: 67 (05 May 2024 08:45) (64 - 84)  BP: 127/85 (05 May 2024 08:45) (123/72 - 127/85)  BP(mean): --  RR: 16 (05 May 2024 08:45) (16 - 16)  SpO2: 94% (05 May 2024 08:45) (94% - 94%)    Parameters below as of 05 May 2024 08:45  Patient On (Oxygen Delivery Method): room air      Daily     Daily       General - NAD, alert, follows commands  Heart- pulse regular  Lungs- breathing comfortably without respiratory distress  Abd- no visible abdominal distension   Ext- No calf pain, extremities well perfused  Neuro- Exam unchanged; right hemiplegia, right facial droop  ----------------------------------------------------------------------  RECENT LABS:                CAPILLARY BLOOD GLUCOSE        ----------------------------------------------------------------------  RECENT IMAGING:    ***  ----------------------------------------------------------------------  MEDICATIONS:  MEDICATIONS  (STANDING):  allopurinol 100 milliGRAM(s) Oral daily  amLODIPine   Tablet 5 milliGRAM(s) Oral daily  bisacodyl 5 milliGRAM(s) Oral at bedtime  enoxaparin Injectable 40 milliGRAM(s) SubCutaneous every 24 hours  escitalopram 10 milliGRAM(s) Oral <User Schedule>  gabapentin 400 milliGRAM(s) Oral at bedtime  gabapentin 100 milliGRAM(s) Oral daily  metoprolol tartrate 12.5 milliGRAM(s) Oral <User Schedule>  multivitamin 1 Tablet(s) Oral daily  polyethylene glycol 3350 17 Gram(s) Oral two times a day  traZODone 25 milliGRAM(s) Oral at bedtime    MEDICATIONS  (PRN):  acetaminophen     Tablet .. 650 milliGRAM(s) Oral every 6 hours PRN Mild Pain (1 - 3)    ----------------------------------------------------------------------

## 2024-05-06 LAB
ALBUMIN SERPL ELPH-MCNC: 3.2 G/DL — LOW (ref 3.3–5)
ALP SERPL-CCNC: 65 U/L — SIGNIFICANT CHANGE UP (ref 40–120)
ALT FLD-CCNC: 25 U/L — SIGNIFICANT CHANGE UP (ref 10–45)
ANION GAP SERPL CALC-SCNC: 7 MMOL/L — SIGNIFICANT CHANGE UP (ref 5–17)
AST SERPL-CCNC: 16 U/L — SIGNIFICANT CHANGE UP (ref 10–40)
BASOPHILS # BLD AUTO: 0.05 K/UL — SIGNIFICANT CHANGE UP (ref 0–0.2)
BASOPHILS NFR BLD AUTO: 0.7 % — SIGNIFICANT CHANGE UP (ref 0–2)
BILIRUB SERPL-MCNC: 0.5 MG/DL — SIGNIFICANT CHANGE UP (ref 0.2–1.2)
BUN SERPL-MCNC: 20 MG/DL — SIGNIFICANT CHANGE UP (ref 7–23)
CALCIUM SERPL-MCNC: 9 MG/DL — SIGNIFICANT CHANGE UP (ref 8.4–10.5)
CHLORIDE SERPL-SCNC: 103 MMOL/L — SIGNIFICANT CHANGE UP (ref 96–108)
CO2 SERPL-SCNC: 32 MMOL/L — HIGH (ref 22–31)
CREAT SERPL-MCNC: 0.77 MG/DL — SIGNIFICANT CHANGE UP (ref 0.5–1.3)
EGFR: 104 ML/MIN/1.73M2 — SIGNIFICANT CHANGE UP
EOSINOPHIL # BLD AUTO: 1.31 K/UL — HIGH (ref 0–0.5)
EOSINOPHIL NFR BLD AUTO: 17.5 % — HIGH (ref 0–6)
GLUCOSE SERPL-MCNC: 99 MG/DL — SIGNIFICANT CHANGE UP (ref 70–99)
HCT VFR BLD CALC: 40 % — SIGNIFICANT CHANGE UP (ref 39–50)
HGB BLD-MCNC: 13 G/DL — SIGNIFICANT CHANGE UP (ref 13–17)
IMM GRANULOCYTES NFR BLD AUTO: 0.4 % — SIGNIFICANT CHANGE UP (ref 0–0.9)
LYMPHOCYTES # BLD AUTO: 0.92 K/UL — LOW (ref 1–3.3)
LYMPHOCYTES # BLD AUTO: 12.3 % — LOW (ref 13–44)
MCHC RBC-ENTMCNC: 28.5 PG — SIGNIFICANT CHANGE UP (ref 27–34)
MCHC RBC-ENTMCNC: 32.5 GM/DL — SIGNIFICANT CHANGE UP (ref 32–36)
MCV RBC AUTO: 87.7 FL — SIGNIFICANT CHANGE UP (ref 80–100)
MONOCYTES # BLD AUTO: 0.57 K/UL — SIGNIFICANT CHANGE UP (ref 0–0.9)
MONOCYTES NFR BLD AUTO: 7.6 % — SIGNIFICANT CHANGE UP (ref 2–14)
NEUTROPHILS # BLD AUTO: 4.6 K/UL — SIGNIFICANT CHANGE UP (ref 1.8–7.4)
NEUTROPHILS NFR BLD AUTO: 61.5 % — SIGNIFICANT CHANGE UP (ref 43–77)
NRBC # BLD: 0 /100 WBCS — SIGNIFICANT CHANGE UP (ref 0–0)
PLATELET # BLD AUTO: 162 K/UL — SIGNIFICANT CHANGE UP (ref 150–400)
POTASSIUM SERPL-MCNC: 4 MMOL/L — SIGNIFICANT CHANGE UP (ref 3.5–5.3)
POTASSIUM SERPL-SCNC: 4 MMOL/L — SIGNIFICANT CHANGE UP (ref 3.5–5.3)
PROT SERPL-MCNC: 6.8 G/DL — SIGNIFICANT CHANGE UP (ref 6–8.3)
RBC # BLD: 4.56 M/UL — SIGNIFICANT CHANGE UP (ref 4.2–5.8)
RBC # FLD: 13.8 % — SIGNIFICANT CHANGE UP (ref 10.3–14.5)
SODIUM SERPL-SCNC: 142 MMOL/L — SIGNIFICANT CHANGE UP (ref 135–145)
WBC # BLD: 7.48 K/UL — SIGNIFICANT CHANGE UP (ref 3.8–10.5)
WBC # FLD AUTO: 7.48 K/UL — SIGNIFICANT CHANGE UP (ref 3.8–10.5)

## 2024-05-06 PROCEDURE — 99232 SBSQ HOSP IP/OBS MODERATE 35: CPT

## 2024-05-06 RX ADMIN — Medication 5 MILLIGRAM(S): at 22:23

## 2024-05-06 RX ADMIN — Medication 12.5 MILLIGRAM(S): at 08:55

## 2024-05-06 RX ADMIN — Medication 100 MILLIGRAM(S): at 12:32

## 2024-05-06 RX ADMIN — Medication 12.5 MILLIGRAM(S): at 20:12

## 2024-05-06 RX ADMIN — GABAPENTIN 300 MILLIGRAM(S): 400 CAPSULE ORAL at 08:55

## 2024-05-06 RX ADMIN — Medication 25 MILLIGRAM(S): at 22:23

## 2024-05-06 RX ADMIN — ENOXAPARIN SODIUM 40 MILLIGRAM(S): 100 INJECTION SUBCUTANEOUS at 22:23

## 2024-05-06 RX ADMIN — GABAPENTIN 400 MILLIGRAM(S): 400 CAPSULE ORAL at 22:24

## 2024-05-06 RX ADMIN — Medication 1 TABLET(S): at 12:32

## 2024-05-06 RX ADMIN — AMLODIPINE BESYLATE 5 MILLIGRAM(S): 2.5 TABLET ORAL at 05:07

## 2024-05-06 NOTE — PROGRESS NOTE ADULT - SUBJECTIVE AND OBJECTIVE BOX
Patient is a 59y old  male who presents with a chief complaint of left Intraparenchymal Hemorrhage (05 May 2024 11:33)    Stated gets pain and tingling in right leg which improves with movement/ PT. also  he had multiple BM yesterday due to miralax, which he requested to discontinue.    T(C): 36.8 (05-06-24 @ 09:01), Max: 36.8 (05-06-24 @ 09:01)  T(F): 98.2 (05-06-24 @ 09:01), Max: 98.2 (05-06-24 @ 09:01)  HR: 82 (05-06-24 @ 09:01) (62 - 83)  BP: 123/79 (05-06-24 @ 09:01) (123/79 - 134/85)  ABP: --  ABP(mean): --  RR: 16 (05-06-24 @ 09:01) (16 - 16)  SpO2: 94% (05-06-24 @ 09:01) (94% - 96%)    on exam aaox3  no apparant distress  mild drooling from right side of mouth noted  both lungs clear  s1, s2, regular  abdomen- soft  b/l LE with scattered petechie like skin rash  right sided hemiparesis +    LABS:                        13.0   7.48  )-----------( 162      ( 06 May 2024 07:35 )             40.0     05-06    142  |  103  |  20  ----------------------------<  99  4.0   |  32<H>  |  0.77    Ca    9.0      06 May 2024 07:35    TPro  6.8  /  Alb  3.2<L>  /  TBili  0.5  /  DBili  x   /  AST  16  /  ALT  25  /  AlkPhos  65  05-06      Urinalysis Basic - ( 06 May 2024 07:35 )    Color: x / Appearance: x / SG: x / pH: x  Gluc: 99 mg/dL / Ketone: x  / Bili: x / Urobili: x   Blood: x / Protein: x / Nitrite: x   Leuk Esterase: x / RBC: x / WBC x   Sq Epi: x / Non Sq Epi: x / Bacteria: x       CAPILLARY BLOOD GLUCOSE            Urinalysis Basic - ( 06 May 2024 07:35 )    Color: x / Appearance: x / SG: x / pH: x  Gluc: 99 mg/dL / Ketone: x  / Bili: x / Urobili: x   Blood: x / Protein: x / Nitrite: x   Leuk Esterase: x / RBC: x / WBC x   Sq Epi: x / Non Sq Epi: x / Bacteria: x    MEDICATIONS  (STANDING):  allopurinol 100 milliGRAM(s) Oral daily  amLODIPine   Tablet 5 milliGRAM(s) Oral daily  bisacodyl 5 milliGRAM(s) Oral at bedtime  enoxaparin Injectable 40 milliGRAM(s) SubCutaneous every 24 hours  escitalopram 10 milliGRAM(s) Oral <User Schedule>  gabapentin 400 milliGRAM(s) Oral at bedtime  gabapentin 300 milliGRAM(s) Oral <User Schedule>  metoprolol tartrate 12.5 milliGRAM(s) Oral <User Schedule>  multivitamin 1 Tablet(s) Oral daily  polyethylene glycol 3350 17 Gram(s) Oral two times a day  traZODone 25 milliGRAM(s) Oral at bedtime    MEDICATIONS  (PRN):  acetaminophen     Tablet .. 650 milliGRAM(s) Oral every 6 hours PRN Mild Pain (1 - 3)

## 2024-05-06 NOTE — PROGRESS NOTE ADULT - ASSESSMENT
58y with PMhx of gout was found down and revealed to have sustained intraparenchymal hemorrhage.  Patient admitted for a multidisciplinary rehab program with right hemiparesis, cognitive deficits, sensory loss, dysphagia, dysarthria.      #L Thalamic IPH   -MRI (at presentation): L thalamic hemorrhage, w/ chronic microhemorrhages, and acute infarcts in the right caudate tail and periatrial white matter  -EKG w/o AF  -Echo unremarkable  -CTA w/o large occlusions  -For visual disturbances, Optho consulted, (Dr. Lin ) no acute intraocular pathology, outpatient f/up with neuro-opth     #Right soleal DVT, 4/26  - Cont. Lovenox 40mg    - Repeat duplex (5/3) - stable right soleal DVT, no propagation    #HTN  - Continue norvasc 5mg daily   - Continue metoprolol tartrate 12.5 BID (4/25)    # Pain   - Tylenol PRN  - Cont. Gabapentin 400 mg qhs (inc on 5/2) & 300 daily (incr 5/5)     #Gout  - Continue allopurinol 100mg daily      #Mood / Cognition  - [id number] @1800     #Sleep  - Maintain quiet hours and a low stim environment  - Was restless at night despite 9 mg melatonin and 8 mg Rozerem   - Cont. Trazodone 25 mg qhs    #GI / Bowel  - 4/30: Abd Xray completed on 4/29, increased stool burden  - Increased bowel meds -- Miralax bid, and bisacodyl 5mg in evening  - Pt. having regular BMs now    # / Bladder  - Voiding with low PVRs    #Dysphagia  - Diet: upgraded to soft and bite sized 5/1--d/w speech therapy    IDT 5/2--  SW: (4/18/24) Patient resides with wife and teenage children in a private house- has 1 step to enter if in basement apartment- or 7 RICARDO if going to main level. Patient's spouse in main support, works part time. Patient was independent prior, working as a private contractor and part-time professor. Patient's wife provided transport prior.    Speech therapy--  moderate oropharyngeal dysphagia--Minced and Moist diet--  thin liquids; Moderate Dysarthria;  Severe Cognitive deficit--but improving Attention, Memory, and Executive functioning, convergent naming task with phrase completion with 87% given  moderate cues. Patient reviewed speech intelligibility strategies with 75% accuracy. Patient utilizedstrategies in 71% of opportunities in sentence creation tasks improving to 86% accuracy given moderate cues. Patient engaged in visual attention task via scanning with 92% accuracy. Patient engaged in divided attention task with 95% accuracy. patient engaged in working memory task with 67% accuracy    OT-- Mod A transfers to left & Max A transfers to right; Mod A --dressing;  Tot A--toileting; Min A--eating and grooming.  Goals -- Min A ADLs and Mod A Tx  PT-- Mod A-- BM,& transfer; Ambulation 75 ft Max A in Lite Gait.  Improving with right LE strength.     Goals:    1. TRansfer OOB and to toilet with Mod A  2. Sequence 4 step ADL task with Moderate cues.     ELOS: 5/9 RADHA

## 2024-05-06 NOTE — PROGRESS NOTE ADULT - ATTENDING COMMENTS
Pt. seen with resident.  Agree with documentation above as per resident with amendments made as appropriate. Patient medically stable. Making progress towards rehab goals.     left thalamic ICH  Neuropathic pain improved with increase in gabapentin  Discussed visual impairment and outpatient follow up with neuro-opthalmology  Slept well. tolerating therapy

## 2024-05-06 NOTE — PROGRESS NOTE ADULT - SUBJECTIVE AND OBJECTIVE BOX
58M PMHx gout found down at home on 4/2/24 by wife brought to [facility] by ambulance found to have a L thalamic hemorrhagic infarct via CT. Patient intubated for inability to protect airway. MRI demonstrated hemorrhage with anticipated evolution. Chronic microhemorrhages found in the R thalamus and BG as well as small acute infarcts in the right caudate tail and periatrial white matter.  CTA clear of major vessel occlusion or stenosis. Patient with residual R hemiparesis, dysarthria, mild aphasia, and R gaze palsy. EEG negative throughout workup. EKG without AF. TTE grossly normal. Gout flare treated by rheumatology with colchicine while inpatient. Patient received PM&R consult who recommended acute inpatient rehabilitation. Patient was medically stabilized and discharged to [facility].  (15 Apr 2024 14:16)    SUBJECTIVE:   Patient seen and evaluated during physical therapy. Continues to endorse neuropathic pain, worse at night, discussed gabapentin adjustment. He is also having right-sided difficulty with his vision. Symptoms stable over the weekend.           REVIEW OF SYSTEMS  +neurocognitive impairment, visual disturbances, right-sided weakness     VITALS  Vital Signs Last 24 Hrs    T(C): 36.8 (05-06-24 @ 09:01), Max: 36.8 (05-06-24 @ 09:01)  HR: 82 (05-06-24 @ 09:01) (62 - 83)  BP: 123/79 (05-06-24 @ 09:01) (123/79 - 134/85)  RR: 16 (05-06-24 @ 09:01) (16 - 16)  SpO2: 94% (05-06-24 @ 09:01) (94% - 96%)  Wt(kg): --    PHYSICAL EXAM  Gen - NAD  Pulm - Breathing comfortably on RA   Cardiovascular - warm and well perfused, no cyanosis or pedal edema  Abdomen - Soft, NT/ND,   Extremities - No peripheral edema  Neuro-     Cognitive - AAOx3, able to follow commands, content appropriate      Communication - fluent, mild dysarthria, comprehension     Cranial nerve -  left sided facial weakness, oral secretion pooling to right (less), Dysphagia     Motor -                     LEFT    UE - ShAB 5/5, EF 5/5, EE 5/5, WE 5/5,  5/5                    RIGHT UE - ShAB 0/5, EF 0/5, EE 0/5, WE 0/5,  0/5                    LEFT    LE - HF 5/5, KE 5/5, DF 5/5, PF 5/5                    RIGHT LE - HF 1/5, KE 1/5, DF 0/5, PF 0/5        Sensory - Diminished right UE/LE  Psychiatric - Mood stable        LABS:                        13.0   7.48  )-----------( 162      ( 06 May 2024 07:35 )             40.0     05-06    142  |  103  |  20  ----------------------------<  99  4.0   |  32<H>  |  0.77    Ca    9.0      06 May 2024 07:35    TPro  6.8  /  Alb  3.2<L>  /  TBili  0.5  /  DBili  x   /  AST  16  /  ALT  25  /  AlkPhos  65  05-06      Urinalysis Basic - ( 06 May 2024 07:35 )    Color: x / Appearance: x / SG: x / pH: x  Gluc: 99 mg/dL / Ketone: x  / Bili: x / Urobili: x   Blood: x / Protein: x / Nitrite: x   Leuk Esterase: x / RBC: x / WBC x   Sq Epi: x / Non Sq Epi: x / Bacteria: x      MEDS  acetaminophen     Tablet .. 650 milliGRAM(s) Oral every 6 hours PRN  allopurinol 100 milliGRAM(s) Oral daily  amLODIPine   Tablet 5 milliGRAM(s) Oral daily  bisacodyl 5 milliGRAM(s) Oral at bedtime  enoxaparin Injectable 40 milliGRAM(s) SubCutaneous every 24 hours  escitalopram 10 milliGRAM(s) Oral <User Schedule>  gabapentin 400 milliGRAM(s) Oral at bedtime  gabapentin 300 milliGRAM(s) Oral <User Schedule>  metoprolol tartrate 12.5 milliGRAM(s) Oral <User Schedule>  multivitamin 1 Tablet(s) Oral daily  polyethylene glycol 3350 17 Gram(s) Oral two times a day  traZODone 25 milliGRAM(s) Oral at bedtime               58M PMHx gout found down at home on 4/2/24 by wife brought to [facility] by ambulance found to have a L thalamic hemorrhagic infarct via CT. Patient intubated for inability to protect airway. MRI demonstrated hemorrhage with anticipated evolution. Chronic microhemorrhages found in the R thalamus and BG as well as small acute infarcts in the right caudate tail and periatrial white matter.  CTA clear of major vessel occlusion or stenosis. Patient with residual R hemiparesis, dysarthria, mild aphasia, and R gaze palsy. EEG negative throughout workup. EKG without AF. TTE grossly normal. Gout flare treated by rheumatology with colchicine while inpatient. Patient received PM&R consult who recommended acute inpatient rehabilitation. Patient was medically stabilized and discharged to [facility].  (15 Apr 2024 14:16)    SUBJECTIVE:   Patient seen and evaluated during physical therapy. Continues to endorse neuropathic pain, worse at night, discussed gabapentin adjustment. Pain seems to be better today.  He is also having right-sided difficulty with his vision. Symptoms stable over the weekend.           REVIEW OF SYSTEMS  +neurocognitive impairment, visual disturbances, right-sided weakness     VITALS  Vital Signs Last 24 Hrs    T(C): 36.8 (05-06-24 @ 09:01), Max: 36.8 (05-06-24 @ 09:01)  HR: 82 (05-06-24 @ 09:01) (62 - 83)  BP: 123/79 (05-06-24 @ 09:01) (123/79 - 134/85)  RR: 16 (05-06-24 @ 09:01) (16 - 16)  SpO2: 94% (05-06-24 @ 09:01) (94% - 96%)  Wt(kg): --    PHYSICAL EXAM  Gen - NAD  Pulm - Breathing comfortably on RA   Cardiovascular - warm and well perfused, no cyanosis or pedal edema  Abdomen - Soft, NT/ND,   Extremities - No peripheral edema  Neuro-     Cognitive - AAOx3, able to follow commands, content appropriate      Communication - fluent, mild dysarthria, comprehension     Cranial nerve -  EOMI,  Left VF impairment, impaired visual acuity left eye. left sided facial weakness,  Dysphagia     Motor -                     LEFT    UE - ShAB 5/5, EF 5/5, EE 5/5, WE 5/5,  5/5                    RIGHT UE - ShAB 0/5, EF 0/5, EE 0/5, WE 0/5,  0/5                    LEFT    LE - HF 5/5, KE 5/5, DF 5/5, PF 5/5                    RIGHT LE - HF 1/5, KE 1/5, DF 0/5, PF 0/5        Sensory - Diminished right UE/LE  Psychiatric - Mood stable        LABS:                        13.0   7.48  )-----------( 162      ( 06 May 2024 07:35 )             40.0     05-06    142  |  103  |  20  ----------------------------<  99  4.0   |  32<H>  |  0.77    Ca    9.0      06 May 2024 07:35    TPro  6.8  /  Alb  3.2<L>  /  TBili  0.5  /  DBili  x   /  AST  16  /  ALT  25  /  AlkPhos  65  05-06      Urinalysis Basic - ( 06 May 2024 07:35 )    Color: x / Appearance: x / SG: x / pH: x  Gluc: 99 mg/dL / Ketone: x  / Bili: x / Urobili: x   Blood: x / Protein: x / Nitrite: x   Leuk Esterase: x / RBC: x / WBC x   Sq Epi: x / Non Sq Epi: x / Bacteria: x      MEDS  acetaminophen     Tablet .. 650 milliGRAM(s) Oral every 6 hours PRN  allopurinol 100 milliGRAM(s) Oral daily  amLODIPine   Tablet 5 milliGRAM(s) Oral daily  bisacodyl 5 milliGRAM(s) Oral at bedtime  enoxaparin Injectable 40 milliGRAM(s) SubCutaneous every 24 hours  escitalopram 10 milliGRAM(s) Oral <User Schedule>  gabapentin 400 milliGRAM(s) Oral at bedtime  gabapentin 300 milliGRAM(s) Oral <User Schedule>  metoprolol tartrate 12.5 milliGRAM(s) Oral <User Schedule>  multivitamin 1 Tablet(s) Oral daily  polyethylene glycol 3350 17 Gram(s) Oral two times a day  traZODone 25 milliGRAM(s) Oral at bedtime

## 2024-05-06 NOTE — CHART NOTE - NSCHARTNOTEFT_GEN_A_CORE
Nutrition Follow Up Note  Source: Medical Record [X] Patient [X] Family [ ]         Diet: Soft and bite-sized, single sips only  Pt tolerating diet with good PO intake, eating >75% of meals Per nursing flowsheets. Diet upgraded from minced and moist to soft and bite-sized per SLP recommendation. Pt continues to eat in the restorative dining room. Denies nausea, vomiting, diarrhea, constipation.     Enteral/Parenteral Nutrition: N/A    Current Weight: 168.8 lbs (4-15)    Pertinent Medications: MEDICATIONS  (STANDING):  allopurinol 100 milliGRAM(s) Oral daily  amLODIPine   Tablet 5 milliGRAM(s) Oral daily  bisacodyl 5 milliGRAM(s) Oral at bedtime  enoxaparin Injectable 40 milliGRAM(s) SubCutaneous every 24 hours  escitalopram 10 milliGRAM(s) Oral <User Schedule>  gabapentin 400 milliGRAM(s) Oral at bedtime  gabapentin 300 milliGRAM(s) Oral <User Schedule>  metoprolol tartrate 12.5 milliGRAM(s) Oral <User Schedule>  multivitamin 1 Tablet(s) Oral daily  polyethylene glycol 3350 17 Gram(s) Oral two times a day  traZODone 25 milliGRAM(s) Oral at bedtime    MEDICATIONS  (PRN):  acetaminophen     Tablet .. 650 milliGRAM(s) Oral every 6 hours PRN Mild Pain (1 - 3)      Pertinent Labs:  05-06 Na142 mmol/L Glu 99 mg/dL K+ 4.0 mmol/L Cr  0.77 mg/dL BUN 20 mg/dL 05-06 Alb 3.2 g/dL<L>        Skin: no pressure injuries Per nursing flowsheets     Edema: No edema per nursing flow sheets     Last BM: on 5-5 Per nursing flowsheets     Estimated Needs:   [X] No Change since Previous Assessment  [ ] Recalculated:     Previous Nutrition Diagnosis:   Swallowing difficulty    Nutrition Diagnosis is [X] Ongoing  - SLP following    New Nutrition Diagnosis: [X] Not Applicable  [ ] Inadequate Protein Energy Intake   [ ] Inadequate Oral Intake   [ ] Excessive Energy Intake   [ ] Increased Nutrient Needs   [ ] Obesity   [ ] Altered GI Function   [ ] Unintended Weight Loss   [ ] Food & Nutrition Related Knowledge Deficit  [ ] Limited Adherence to nutrition related recommendations   [ ] Malnutrition      Interventions:   1. Recommend continuing with current plan of care  2. Encourage PO intake  3. Follow SLP recommendations    Monitoring & Evaluation:   [X] Weights   [X] PO Intake   [X] Follow Up (Per Protocol)  [X] Tolerance to Diet Prescription   [X] Other: Labs    RD Remains Available.  Nella Richter, RD

## 2024-05-06 NOTE — PROGRESS NOTE ADULT - ASSESSMENT
58y with PMH Gout found down and revealed to have an intraparenchymal hemorrhage.  Patient now admitted for a acute rehab program. course complicated by acute gout flare of L posterior ankle ( resolved with colchicine)    #IPH  - MRI demonstrated L thalamic hemorrhage, w/ chronic microhemorrhages, and acute infarcts in the right caudate tail and periatrial white matter  - EKG w/o AF  - Echo unremarkable  - CTA w/o large occlusions    #Visual impairment   - Ophthalmology consult:  No acute intraocular pathology noted, Vf gross full, no gross VF defect, EOMs w/out deficits-> recommend f/u with neuro-ophth after discharge for formal VF testing and evaluation.    (Patient lives [place]->[facility], Dr Escobar)    #Constipation  - minimal bms now, abdominal xray shows increased fecal content  - s/p mineral enema without adequate bms  - increased bm regimen to miralax bid, bisacodyl, giving one dose of magnesium hydroxide  - monitor BM    #HTN  - c/w amlodipine 5mg qD  - c/w lopressor 12.5mg qD    #Sinus tachycardia  - Patient not in pain, euvolemic, no signs of infection  - unremarkable TTE  - TSH WNL  - possible anxiety vs physiologic mediated vs pain vs DVT  - trial of lopressor 12.5BID seems to be helping with the tachycardia     #Gout  - received extra dose of colchicine 4/25 with improvement in gout  - initially changed colchicine to once a day dosing, called wife Sheron at [phone] 4/28. states that he takes his gout medications as needed even though his PCP wants him to take some of them daily  - Dc'd colchicine 4/28, started allopurinol 100qD 4/28    #DVT  - US LE 4/26 showed below the knee DVT at R soleal DVT ( explained to patient and wife 4/28)  - c/w Lovenox 40 qD

## 2024-05-07 LAB — TROPONIN I, HIGH SENSITIVITY RESULT: 5.6 NG/L — SIGNIFICANT CHANGE UP

## 2024-05-07 PROCEDURE — 99232 SBSQ HOSP IP/OBS MODERATE 35: CPT

## 2024-05-07 PROCEDURE — 93010 ELECTROCARDIOGRAM REPORT: CPT

## 2024-05-07 RX ADMIN — Medication 12.5 MILLIGRAM(S): at 07:43

## 2024-05-07 RX ADMIN — Medication 25 MILLIGRAM(S): at 21:36

## 2024-05-07 RX ADMIN — Medication 5 MILLIGRAM(S): at 21:35

## 2024-05-07 RX ADMIN — Medication 1 TABLET(S): at 11:43

## 2024-05-07 RX ADMIN — Medication 650 MILLIGRAM(S): at 07:19

## 2024-05-07 RX ADMIN — AMLODIPINE BESYLATE 5 MILLIGRAM(S): 2.5 TABLET ORAL at 05:12

## 2024-05-07 RX ADMIN — GABAPENTIN 400 MILLIGRAM(S): 400 CAPSULE ORAL at 21:35

## 2024-05-07 RX ADMIN — Medication 650 MILLIGRAM(S): at 06:32

## 2024-05-07 RX ADMIN — Medication 650 MILLIGRAM(S): at 18:51

## 2024-05-07 RX ADMIN — Medication 100 MILLIGRAM(S): at 11:43

## 2024-05-07 RX ADMIN — ESCITALOPRAM OXALATE 10 MILLIGRAM(S): 10 TABLET, FILM COATED ORAL at 17:50

## 2024-05-07 RX ADMIN — ENOXAPARIN SODIUM 40 MILLIGRAM(S): 100 INJECTION SUBCUTANEOUS at 21:35

## 2024-05-07 RX ADMIN — GABAPENTIN 300 MILLIGRAM(S): 400 CAPSULE ORAL at 07:42

## 2024-05-07 NOTE — PROVIDER CONTACT NOTE (OTHER) - SITUATION
Pt complaining of moderate to severe pain radiating across right side of face.
Trouble maintaining sleep throughout the night. Pt states he is waking up at 2am.
Pt complained of left leg pain and chest pain.

## 2024-05-07 NOTE — PROGRESS NOTE ADULT - SUBJECTIVE AND OBJECTIVE BOX
Patient is a 59y old  male who presents with a chief complaint of left Intraparenchymal Hemorrhage (05 May 2024 11:33)    C/o chest pain last night. Denies any chest pain now. Sitting in chair in hallway. States appetite is good. No complaints this morning.     MEDICATIONS  (STANDING):  allopurinol 100 milliGRAM(s) Oral daily  amLODIPine   Tablet 5 milliGRAM(s) Oral daily  bisacodyl 5 milliGRAM(s) Oral at bedtime  enoxaparin Injectable 40 milliGRAM(s) SubCutaneous every 24 hours  escitalopram 10 milliGRAM(s) Oral <User Schedule>  gabapentin 400 milliGRAM(s) Oral at bedtime  gabapentin 300 milliGRAM(s) Oral <User Schedule>  metoprolol tartrate 12.5 milliGRAM(s) Oral <User Schedule>  multivitamin 1 Tablet(s) Oral daily  polyethylene glycol 3350 17 Gram(s) Oral two times a day  traZODone 25 milliGRAM(s) Oral at bedtime    MEDICATIONS  (PRN):  acetaminophen     Tablet .. 650 milliGRAM(s) Oral every 6 hours PRN Mild Pain (1 - 3)    Vital Signs Last 24 Hrs  T(C): 36.6 (06 May 2024 20:05), Max: 36.6 (06 May 2024 20:05)  T(F): 97.9 (06 May 2024 20:05), Max: 97.9 (06 May 2024 20:05)  HR: 71 (07 May 2024 07:27) (71 - 76)  BP: 129/84 (07 May 2024 07:27) (112/71 - 129/84)  BP(mean): --  RR: 16 (07 May 2024 07:27) (16 - 16)  SpO2: 95% (07 May 2024 07:27) (94% - 97%)    Parameters below as of 07 May 2024 07:27  Patient On (Oxygen Delivery Method): room air      Phyiscal exam  on exam aaox3  no apparant distress  mild drooling from right side of mouth noted  both lungs clear  s1, s2, regular  abdomen- soft  b/l LE with scattered petechie like skin rash  right sided hemiparesis +    LABS:                        13.0   7.48  )-----------( 162      ( 06 May 2024 07:35 )             40.0     05-06    142  |  103  |  20  ----------------------------<  99  4.0   |  32<H>  |  0.77    Ca    9.0      06 May 2024 07:35    TPro  6.8  /  Alb  3.2<L>  /  TBili  0.5  /  DBili  x   /  AST  16  /  ALT  25  /  AlkPhos  65  05-06      Urinalysis Basic - ( 06 May 2024 07:35 )    Color: x / Appearance: x / SG: x / pH: x  Gluc: 99 mg/dL / Ketone: x  / Bili: x / Urobili: x   Blood: x / Protein: x / Nitrite: x   Leuk Esterase: x / RBC: x / WBC x   Sq Epi: x / Non Sq Epi: x / Bacteria: x      Urinalysis Basic - ( 06 May 2024 07:35 )    Color: x / Appearance: x / SG: x / pH: x  Gluc: 99 mg/dL / Ketone: x  / Bili: x / Urobili: x   Blood: x / Protein: x / Nitrite: x   Leuk Esterase: x / RBC: x / WBC x   Sq Epi: x / Non Sq Epi: x / Bacteria: x

## 2024-05-07 NOTE — PROGRESS NOTE ADULT - ATTENDING COMMENTS
Pt. seen with resident.  Agree with documentation above as per resident with amendments made as appropriate. Patient medically stable. Making progress towards rehab goals.     left ICH  Doing well.  d/c planning to [facility]

## 2024-05-07 NOTE — PROGRESS NOTE ADULT - ASSESSMENT
58y with PMhx of gout was found down and revealed to have sustained intraparenchymal hemorrhage.  Patient admitted for a multidisciplinary rehab program with right hemiparesis, cognitive deficits, sensory loss, dysphagia, dysarthria.      #L Thalamic IPH   -MRI (at presentation): L thalamic hemorrhage, w/ chronic microhemorrhages, and acute infarcts in the right caudate tail and periatrial white matter  -EKG w/o AF  -Echo unremarkable  -CTA w/o large occlusions  -For visual disturbances, Optho consulted, (Dr. Lin ) no acute intraocular pathology, outpatient f/up with neuro-opth     #Right soleal DVT, 4/26  - Cont. Lovenox 40mg    - Repeat duplex (5/3) - stable right soleal DVT, no propagation    #HTN  - Continue norvasc 5mg daily   - Continue metoprolol tartrate 12.5 BID (4/25)    # Pain   - Tylenol PRN  - Cont. Gabapentin 400 mg qhs (inc on 5/2) & 300 daily (incr 5/5)     #Gout  - Continue allopurinol 100mg daily      #Mood / Cognition  - [id number] @1800     #Sleep  - Maintain quiet hours and a low stim environment  - Was restless at night despite 9 mg melatonin and 8 mg Rozerem   - Cont. Trazodone 25 mg qhs    #GI / Bowel  - 4/30: Abd Xray completed on 4/29, increased stool burden  - Increased bowel meds -- Miralax bid, and bisacodyl 5mg in evening  - Pt. having regular BMs now    # / Bladder  - Voiding with low PVRs    #Dysphagia  - Diet: upgraded to soft and bite sized 5/1--d/w speech therapy    IDT 5/2--  SW: (4/18/24) Patient resides with wife and teenage children in a private house- has 1 step to enter if in basement apartment- or 7 RICARDO if going to main level. Patient's spouse in main support, works part time. Patient was independent prior, working as a private contractor and part-time professor. Patient's wife provided transport prior.    Speech therapy--  moderate oropharyngeal dysphagia--Minced and Moist diet--  thin liquids; Moderate Dysarthria;  Severe Cognitive deficit--but improving Attention, Memory, and Executive functioning, convergent naming task with phrase completion with 87% given  moderate cues. Patient reviewed speech intelligibility strategies with 75% accuracy. Patient utilizedstrategies in 71% of opportunities in sentence creation tasks improving to 86% accuracy given moderate cues. Patient engaged in visual attention task via scanning with 92% accuracy. Patient engaged in divided attention task with 95% accuracy. patient engaged in working memory task with 67% accuracy    OT-- Mod A transfers to left & Max A transfers to right; Mod A --dressing;  Tot A--toileting; Min A--eating and grooming.  Goals -- Min A ADLs and Mod A Tx  PT-- Mod A-- BM,& transfer; Ambulation 75 ft Max A in Lite Gait.  Improving with right LE strength.     Goals:    1. TRansfer OOB and to toilet with Mod A  2. Sequence 4 step ADL task with Moderate cues.     ELOS: 5/9 [facility]   58y with PMhx of gout was found down and revealed to have sustained intraparenchymal hemorrhage.  Patient admitted for a multidisciplinary rehab program with right hemiparesis, cognitive deficits, sensory loss, dysphagia, dysarthria.      #L Thalamic IPH   -MRI (at presentation): L thalamic hemorrhage, w/ chronic microhemorrhages, and acute infarcts in the right caudate tail and periatrial white matter  -EKG w/o AF  -Echo unremarkable  -CTA w/o large occlusions  -For visual disturbances, Optho consulted, (Dr. Lin ) no acute intraocular pathology, outpatient f/up with neuro-opth     #Right soleal DVT, 4/26  - Cont. Lovenox 40mg    - Repeat duplex (5/3) - stable right soleal DVT, no propagation    #HTN  - Continue norvasc 5mg daily   - Continue metoprolol tartrate 12.5 BID (4/25)    # Pain   - Tylenol PRN  - Cont. Gabapentin 400 mg qhs (inc on 5/2) & 300 daily (incr 5/5)     #Gout  - Continue allopurinol 100mg daily      #Mood / Cognition  - Lexapro 10mg @1800     #Sleep  - Maintain quiet hours and a low stim environment  - Cont. Trazodone 25 mg qhs    #GI / Bowel  - 4/30: Abd Xray completed on 4/29, increased stool burden  - Increased bowel meds -- Miralax bid, and bisacodyl 5mg in evening  - Pt. having regular BMs now    # / Bladder  - Voiding with low PVRs    #Dysphagia  - Diet: upgraded to soft and bite sized 5/1--d/w speech therapy    IDT 5/2--  SW: (4/18/24) Patient resides with wife and teenage children in a private house- has 1 step to enter if in basement apartment- or 7 [address]ARD[address] if going to main level. Patient's spouse in main support, works part time. Patient was independent prior, working as a private contractor and part-time professor. Patient's wife provided transport prior.    Speech therapy--  moderate oropharyngeal dysphagia--Minced and Moist diet--  thin liquids; Moderate Dysarthria;  Severe Cognitive deficit--but improving Attention, Memory, and Executive functioning, convergent naming task with phrase completion with 87% given  moderate cues. Patient reviewed speech intelligibility strategies with 75% accuracy. Patient utilizedstrategies in 71% of opportunities in sentence creation tasks improving to 86% accuracy given moderate cues. Patient engaged in visual attention task via scanning with 92% accuracy. Patient engaged in divided attention task with 95% accuracy. patient engaged in working memory task with 67% accuracy    OT-- Mod A transfers to left & Max A transfers to right; Mod A --dressing;  Tot A--toileting; Min A--eating and grooming.  Goals -- Min A ADLs and Mod A Tx  PT-- Mod A-- BM,& transfer; Ambulation 75 ft Max A in Lite Gait.  Improving with right LE strength.     Goals:    1. TRansfer OOB and to toilet with Mod A  2. Sequence 4 step ADL task with Moderate cues.     ELOS: 5/9 [facility]

## 2024-05-07 NOTE — PROVIDER CONTACT NOTE (OTHER) - ASSESSMENT
Pt was recently changed from 9mg melatonin to Ramelteon 8mg two nights ago. Pt states he is able to fall asleep but will wake up in the middle of the night and does not know why.
Pt not descriptive with character of pain but states that it is moderate to severe in intensity. See flow sheet of vitals WDL.
Vital signs stable, no SOB, dizziness or difficulty breathing noted.

## 2024-05-07 NOTE — PROGRESS NOTE ADULT - TIME BILLING
- Ordering, reviewing, and interpreting labs, testing, and imaging.  - Independently obtaining a review of systems and performing a physical exam  - Reviewing consultant documentation/recommendations in addition to discussing plan of care with consultants.  - Counselling and educating patient and family regarding interpretation of aforementioned items and plan of care.
- Ordering, reviewing, and interpreting labs, testing, and imaging.  - Independently obtaining a review of systems and performing a physical exam  - Reviewing consultant documentation/recommendations in addition to discussing plan of care with consultants.  - Counselling and educating patient and family regarding interpretation of aforementioned items and plan of care.
Time spent includes direct patient care  (interview and examination of patient), discussion with other providers, support staff and/or patient's family members, review of medical records, ordering diagnostic tests and analyzing results, and documentation.
- Ordering, reviewing, and interpreting labs, testing, and imaging.  - Independently obtaining a review of systems and performing a physical exam  - Reviewing consultant documentation/recommendations in addition to discussing plan of care with consultants.  - Counselling and educating patient and family regarding interpretation of aforementioned items and plan of care.
This includes reviewing results/imaging and discussions with specialists, nursing, case management/social work. Further tests, medications, and procedures have been ordered as indicated. Results and the plan of care were communicated to the patient and/or their family member. Supporting documentation was completed and added to the patient's chart.
- Ordering, reviewing, and interpreting labs, testing, and imaging.  - Independently obtaining a review of systems and performing a physical exam  - Reviewing consultant documentation/recommendations in addition to discussing plan of care with consultants.  - Counselling and educating patient and family regarding interpretation of aforementioned items and plan of care.
This includes reviewing results/imaging and discussions with specialists, nursing, case management/social work. Further tests, medications, and procedures have been ordered as indicated. Results and the plan of care were communicated to the patient and/or their family member. Supporting documentation was completed and added to the patient's chart.
Review and preparation to see patient, examination and assessment of patient, obtaining nursing subjective report as pt. confused,  Discussion of managment with following consultants: Hospitalist, ....  ,  Discussion of rehabilitation plan of care during huddle meeting with SW, Speech, OT, PT and nursing.   Discussion with family, ...., to update on patient status, rehab plan of care, progress in therapy, general prognosis, ELOS, discharge planning.
Review and preparation to see patient, examination and assessment of patient, obtaining nursing subjective report as pt. confused,  Discussion of managment with following consultants: Hospitalist, ....  ,  Discussion of rehabilitation plan of care during team meeting with SW, Speech, OT, PT and nursing.  Review of labs.  Discussion with family, ..wife, Sheron.., to update on patient status, rehab plan of care, progress in therapy, general prognosis, ELOS, discharge planning.
face to face time encounter and counseling the patient, meeting with hospitalist, nursing staff, therapists and social work to discuss medical updates and management, care coordination, reviewing chart and data

## 2024-05-07 NOTE — CHART NOTE - NSCHARTNOTEFT_GEN_A_CORE
F/U Note:    59y Male admitted with    Interval Hx;  Notified by RN that patient is complaining of leg pain and chest pain .   Vital Signs Last 24 Hrs  T(C): 36.6 (06 May 2024 20:05), Max: 36.8 (06 May 2024 09:01)  T(F): 97.9 (06 May 2024 20:05), Max: 98.2 (06 May 2024 09:01)  HR: 76 (07 May 2024 05:15) (74 - 82)  BP: 123/78 (07 May 2024 05:15) (112/71 - 123/79)  BP(mean): --  RR: 16 (07 May 2024 05:15) (16 - 16)  SpO2: 94% (07 May 2024 05:15) (94% - 97%)    Parameters below as of 07 May 2024 05:15  Patient On (Oxygen Delivery Method): room air                                13.0   7.48  )-----------( 162      ( 06 May 2024 07:35 )             40.0         05-06    142  |  103  |  20  ----------------------------<  99  4.0   |  32<H>  |  0.77    Ca    9.0      06 May 2024 07:35    TPro  6.8  /  Alb  3.2<L>  /  TBili  0.5  /  DBili  x   /  AST  16  /  ALT  25  /  AlkPhos  65  05-06        .rosnrml         .normalexam F/U Note:    Patient is a 59y old  male who presents with a chief complaint of left Intraparenchymal Hemorrhage    Interval Hx;  Notified by RN that patient is complaining of leg pain and chest pain. Seen and examined patient at bedside. Per patient he stated that he developed a "funny" feeling in his chest however did not tell anyone. Currently chest pain is 5/10, is non-radiating. Patient states that he notices pain more when he coughs.  Patient denies any HA, dizziness, N/V.     Ordered ekg which shows no new changes. EKG compared from 4/02.     Vital Signs Last 24 Hrs  T(C): 36.6 (06 May 2024 20:05), Max: 36.8 (06 May 2024 09:01)  T(F): 97.9 (06 May 2024 20:05), Max: 98.2 (06 May 2024 09:01)  HR: 76 (07 May 2024 05:15) (74 - 82)  BP: 123/78 (07 May 2024 05:15) (112/71 - 123/79)  BP(mean): --  RR: 16 (07 May 2024 05:15) (16 - 16)  SpO2: 94% (07 May 2024 05:15) (94% - 97%)    Parameters below as of 07 May 2024 05:15  Patient On (Oxygen Delivery Method): room air                                13.0   7.48  )-----------( 162      ( 06 May 2024 07:35 )             40.0         05-06    142  |  103  |  20  ----------------------------<  99  4.0   |  32<H>  |  0.77    Ca    9.0      06 May 2024 07:35    TPro  6.8  /  Alb  3.2<L>  /  TBili  0.5  /  DBili  x   /  AST  16  /  ALT  25  /  AlkPhos  65  05-06      ROS: +chest pain, +left leg pain    T(C): 36.6 (05-06-24 @ 20:05), Max: 36.8 (05-06-24 @ 09:01)  HR: 76 (05-07-24 @ 05:15) (74 - 82)  BP: 123/78 (05-07-24 @ 05:15) (112/71 - 123/79)  RR: 16 (05-07-24 @ 05:15) (16 - 16)  SpO2: 94% (05-07-24 @ 05:15) (94% - 97%)    Plan:  -given tylenol for leg pain  -ordered troponin, trend  -signed out to rehab resident Dr. Garber.

## 2024-05-07 NOTE — PROVIDER CONTACT NOTE (OTHER) - ACTION/TREATMENT ORDERED:
EKG and Troponin ordered.
Provider ordered STAT 100mg gabapentin and will assess at bedside.
Trazadone ordered.

## 2024-05-07 NOTE — PROGRESS NOTE ADULT - SUBJECTIVE AND OBJECTIVE BOX
CC: [facility]     Subjective: Seen and examined in wheelchair. Patient endorsing some right-sided chest pain, intermittent, reproducible, that was worked up over night. Troponins and EKG were unremarkable. Patient states that though he still experiencing pain, it is mild, 1/10 at time of exam. No other events overnight, denies further symptoms.     Objective:   PHYSICAL EXAM  Gen - NAD  Pulm - Breathing comfortably on RA   Cardiovascular - warm and well perfused, no cyanosis or pedal edema  Abdomen - Soft, NT/ND,   Extremities - No peripheral edema  Neuro-     Cognitive - AAOx3, able to follow commands, content appropriate      Communication - fluent, mild dysarthria, comprehension     Cranial nerve -  EOMI,  Left VF impairment, impaired visual acuity left eye. left sided facial weakness,  Dysphagia     Motor -                     LEFT    UE - ShAB 5/5, EF 5/5, EE 5/5, WE 5/5,  5/5                    RIGHT UE - ShAB 0/5, EF 0/5, EE 0/5, WE 0/5,  0/5                    LEFT    LE - HF 5/5, KE 5/5, DF 5/5, PF 5/5                    RIGHT LE - HF 1/5, KE 1/5, DF 0/5, PF 0/5        Sensory - Diminished right UE/LE  Psychiatric - Mood stable    Vitals:  Vital Signs Last 24 Hrs  T(C): 36.6 (06 May 2024 20:05), Max: 36.6 (06 May 2024 20:05)  T(F): 97.9 (06 May 2024 20:05), Max: 97.9 (06 May 2024 20:05)  HR: 71 (07 May 2024 07:27) (71 - 76)  BP: 129/84 (07 May 2024 07:27) (112/71 - 129/84)  BP(mean): --  RR: 16 (07 May 2024 07:27) (16 - 16)  SpO2: 95% (07 May 2024 07:27) (94% - 97%)    Parameters below as of 07 May 2024 07:27  Patient On (Oxygen Delivery Method): room air        CBC/CMP:                        13.0   7.48  )-----------( 162      ( 06 May 2024 07:35 )             40.0     05-06    142  |  103  |  20  ----------------------------<  99  4.0   |  32<H>  |  0.77    Ca    9.0      06 May 2024 07:35    TPro  6.8  /  Alb  3.2<L>  /  TBili  0.5  /  DBili  x   /  AST  16  /  ALT  25  /  AlkPhos  65  05-06

## 2024-05-07 NOTE — PROGRESS NOTE ADULT - ASSESSMENT
58y with PMH Gout found down and revealed to have an intraparenchymal hemorrhage.  Patient now admitted for a acute rehab program. Course complicated by acute gout flare of L posterior ankle ( resolved with colchicine)    #IPH  - MRI demonstrated L thalamic hemorrhage, w/ chronic microhemorrhages, and acute infarcts in the right caudate tail and periatrial white matter  - EKG w/o AF  - Echo unremarkable  - CTA w/o large occlusions    #Visual impairment   - Ophthalmology consult:  No acute intraocular pathology noted, Vf gross full, no gross VF defect, EOMs w/out deficits-> recommend f/u with neuro-ophth after discharge for formal VF testing and evaluation.    (Patient lives [place]->[facility], Dr Escobar)    #Constipation  - minimal bms now, abdominal xray shows increased fecal content  - s/p mineral enema without adequate bms  - increased bm regimen to miralax bid, bisacodyl, giving one dose of magnesium hydroxide  - monitor BM    #HTN  - c/w amlodipine 5mg qD  - c/w lopressor 12.5mg qD    #Sinus tachycardia  - Patient not in pain, euvolemic, no signs of infection  - unremarkable TTE  - TSH WNL  - possible anxiety vs physiologic mediated vs pain vs DVT  - trial of lopressor 12.5BID seems to be helping with the tachycardia     #Gout  - received extra dose of colchicine 4/25 with improvement in gout  - initially changed colchicine to once a day dosing, called wife Sheron at [phone] 4/28. states that he takes his gout medications as needed even though his PCP wants him to take some of them daily  - Dc'd colchicine 4/28, started allopurinol 100qD 4/28    #DVT  - US LE 4/26 showed below the knee DVT at R soleal DVT ( explained to patient and wife 4/28)  - c/w Lovenox 40 qD    #Chest pain  -resolved this morning  -Troponin 5.6 WNL  -low suspicion for ACS  -monitor clinically    Plan discussed with rehab team

## 2024-05-08 PROCEDURE — 99232 SBSQ HOSP IP/OBS MODERATE 35: CPT

## 2024-05-08 RX ADMIN — Medication 650 MILLIGRAM(S): at 06:25

## 2024-05-08 RX ADMIN — Medication 1 TABLET(S): at 11:27

## 2024-05-08 RX ADMIN — Medication 5 MILLIGRAM(S): at 21:07

## 2024-05-08 RX ADMIN — ENOXAPARIN SODIUM 40 MILLIGRAM(S): 100 INJECTION SUBCUTANEOUS at 21:07

## 2024-05-08 RX ADMIN — Medication 650 MILLIGRAM(S): at 06:56

## 2024-05-08 RX ADMIN — Medication 12.5 MILLIGRAM(S): at 07:33

## 2024-05-08 RX ADMIN — Medication 25 MILLIGRAM(S): at 21:07

## 2024-05-08 RX ADMIN — ESCITALOPRAM OXALATE 10 MILLIGRAM(S): 10 TABLET, FILM COATED ORAL at 17:33

## 2024-05-08 RX ADMIN — Medication 100 MILLIGRAM(S): at 11:27

## 2024-05-08 RX ADMIN — Medication 12.5 MILLIGRAM(S): at 21:07

## 2024-05-08 RX ADMIN — GABAPENTIN 300 MILLIGRAM(S): 400 CAPSULE ORAL at 07:32

## 2024-05-08 RX ADMIN — GABAPENTIN 400 MILLIGRAM(S): 400 CAPSULE ORAL at 21:07

## 2024-05-08 NOTE — PROGRESS NOTE ADULT - SUBJECTIVE AND OBJECTIVE BOX
HPI:  58M PMHx gout found down at home on 4/2/24 by wife brought to [facility] by ambulance found to have a L thalamic hemorrhagic infarct via CT. Patient intubated for inability to protect airway. MRI demonstrated hemorrhage with anticipated evolution. Chronic microhemorrhages found in the R thalamus and BG as well as small acute infarcts in the right caudate tail and periatrial white matter.  CTA clear of major vessel occlusion or stenosis. Patient with residual R hemiparesis, dysarthria, mild aphasia, and R gaze palsy. EEG negative throughout workup. EKG without AF. TTE grossly normal. Gout flare treated by rheumatology with colchicine while inpatient. Patient received PM&R consult who recommended acute inpatient rehabilitation. Patient was medically stabilized and discharged to [facility].  (15 Apr 2024 14:16)          Subjective:  Seen this AM.  Slept during the night.  No new complaints.        VITALS  Vital Signs Last 24 Hrs  T(C): 36.3 (08 May 2024 07:34), Max: 36.9 (07 May 2024 19:48)  T(F): 97.3 (08 May 2024 07:34), Max: 98.4 (07 May 2024 19:48)  HR: 89 (08 May 2024 07:34) (89 - 100)  BP: 118/76 (08 May 2024 07:34) (103/68 - 118/76)  BP(mean): --  RR: 16 (08 May 2024 07:34) (16 - 16)  SpO2: 96% (08 May 2024 07:34) (95% - 96%)    Parameters below as of 08 May 2024 07:34  Patient On (Oxygen Delivery Method): room air        REVIEW OF SYMPTOMS  Neurological deficits      PHYSICAL EXAM  PHYSICAL EXAM  Gen - NAD  Pulm - Breathing comfortably on RA   Cardiovascular - warm and well perfused, no cyanosis or pedal edema  Abdomen - Soft, NT/ND,   Extremities - No peripheral edema  Neuro-     Cognitive - AAOx3, able to follow commands, content appropriate      Communication - fluent, mild dysarthria, comprehension     Cranial nerve -  EOMI,  Left VF impairment, impaired visual acuity left eye. left sided facial weakness,  Dysphagia     Motor -                     LEFT    UE - ShAB 5/5, EF 5/5, EE 5/5, WE 5/5,  5/5                    RIGHT UE - ShAB 0/5, EF 0/5, EE 0/5, WE 0/5,  0/5                    LEFT    LE - HF 5/5, KE 5/5, DF 5/5, PF 5/5                    RIGHT LE - HF 1/5, KE 1/5, DF 0/5, PF 0/5        Sensory - Diminished right UE/LE  Psychiatric - Mood stable        RECENT LABS                  RADIOLOGY/OTHER RESULTS      MEDICATIONS  (STANDING):  allopurinol 100 milliGRAM(s) Oral daily  amLODIPine   Tablet 5 milliGRAM(s) Oral daily  bisacodyl 5 milliGRAM(s) Oral at bedtime  enoxaparin Injectable 40 milliGRAM(s) SubCutaneous every 24 hours  escitalopram 10 milliGRAM(s) Oral <User Schedule>  gabapentin 300 milliGRAM(s) Oral <User Schedule>  gabapentin 400 milliGRAM(s) Oral at bedtime  metoprolol tartrate 12.5 milliGRAM(s) Oral <User Schedule>  multivitamin 1 Tablet(s) Oral daily  polyethylene glycol 3350 17 Gram(s) Oral two times a day  traZODone 25 milliGRAM(s) Oral at bedtime    MEDICATIONS  (PRN):  acetaminophen     Tablet .. 650 milliGRAM(s) Oral every 6 hours PRN Mild Pain (1 - 3)

## 2024-05-08 NOTE — PROGRESS NOTE ADULT - ASSESSMENT
58y with PMhx of gout was found down and revealed to have sustained intraparenchymal hemorrhage.  Patient admitted for a multidisciplinary rehab program with right hemiparesis, cognitive deficits, sensory loss, dysphagia, dysarthria.      #L Thalamic IPH   -MRI (at presentation): L thalamic hemorrhage, w/ chronic microhemorrhages, and acute infarcts in the right caudate tail and periatrial white matter  -EKG w/o AF  -Echo unremarkable  -CTA w/o large occlusions  -For visual disturbances, Optho consulted, (Dr. Lin ) no acute intraocular pathology, outpatient f/up with neuro-opth     #Right soleal DVT, 4/26  - Cont. Lovenox 40mg    - Repeat duplex (5/3) - stable right soleal DVT, no propagation    #HTN  - Continue norvasc 5mg daily   - Continue metoprolol tartrate 12.5 BID (4/25)    # Pain   - Tylenol PRN  - Cont. Gabapentin 400 mg qhs  & 300 daily     #Gout  - Continue allopurinol 100mg daily      #Mood / Cognition  - Lexapro 10mg @1800     #Sleep  - Maintain quiet hours and a low stim environment  - Cont. Trazodone 25 mg qhs    #GI / Bowel  -Cont. Miralax bid, and bisacodyl 5mg in evening      # / Bladder  - Voiding with low PVRs    #Dysphagia  - Diet: upgraded to soft and bite sized 5/1--d/w speech therapy    IDT 5/2--  SW: (4/18/24) Patient resides with wife and teenage children in a private house- has 1 step to enter if in basement apartment- or 7 RICARDO if going to main level. Patient's spouse in main support, works part time. Patient was independent prior, working as a private contractor and part-time professor. Patient's wife provided transport prior.    Speech therapy--  moderate oropharyngeal dysphagia--Minced and Moist diet--  thin liquids; Moderate Dysarthria;  Severe Cognitive deficit--but improving Attention, Memory, and Executive functioning, convergent naming task with phrase completion with 87% given  moderate cues. Patient reviewed speech intelligibility strategies with 75% accuracy. Patient utilizedstrategies in 71% of opportunities in sentence creation tasks improving to 86% accuracy given moderate cues. Patient engaged in visual attention task via scanning with 92% accuracy. Patient engaged in divided attention task with 95% accuracy. patient engaged in working memory task with 67% accuracy    OT-- Mod A transfers to left & Max A transfers to right; Mod A --dressing;  Tot A--toileting; Min A--eating and grooming.  Goals -- Min A ADLs and Mod A Tx  PT-- Mod A-- BM,& transfer; Ambulation 75 ft Max A in Lite Gait.  Improving with right LE strength.     Goals:    1. TRansfer OOB and to toilet with Mod A  2. Sequence 4 step ADL task with Moderate cues.     ELOS: 5/9 RADHA-- pt. accepted to [facility]

## 2024-05-09 ENCOUNTER — TRANSCRIPTION ENCOUNTER (OUTPATIENT)
Age: 59
End: 2024-05-09

## 2024-05-09 VITALS — SYSTOLIC BLOOD PRESSURE: 121 MMHG | DIASTOLIC BLOOD PRESSURE: 76 MMHG

## 2024-05-09 LAB
ALBUMIN SERPL ELPH-MCNC: 3 G/DL — LOW (ref 3.3–5)
ALP SERPL-CCNC: 62 U/L — SIGNIFICANT CHANGE UP (ref 40–120)
ALT FLD-CCNC: 19 U/L — SIGNIFICANT CHANGE UP (ref 10–45)
ANION GAP SERPL CALC-SCNC: 7 MMOL/L — SIGNIFICANT CHANGE UP (ref 5–17)
AST SERPL-CCNC: 17 U/L — SIGNIFICANT CHANGE UP (ref 10–40)
BASOPHILS # BLD AUTO: 0.05 K/UL — SIGNIFICANT CHANGE UP (ref 0–0.2)
BASOPHILS NFR BLD AUTO: 0.7 % — SIGNIFICANT CHANGE UP (ref 0–2)
BILIRUB SERPL-MCNC: 0.5 MG/DL — SIGNIFICANT CHANGE UP (ref 0.2–1.2)
BUN SERPL-MCNC: 26 MG/DL — HIGH (ref 7–23)
CALCIUM SERPL-MCNC: 8.8 MG/DL — SIGNIFICANT CHANGE UP (ref 8.4–10.5)
CHLORIDE SERPL-SCNC: 105 MMOL/L — SIGNIFICANT CHANGE UP (ref 96–108)
CO2 SERPL-SCNC: 28 MMOL/L — SIGNIFICANT CHANGE UP (ref 22–31)
CREAT SERPL-MCNC: 0.76 MG/DL — SIGNIFICANT CHANGE UP (ref 0.5–1.3)
EGFR: 105 ML/MIN/1.73M2 — SIGNIFICANT CHANGE UP
EOSINOPHIL # BLD AUTO: 1.46 K/UL — HIGH (ref 0–0.5)
EOSINOPHIL NFR BLD AUTO: 20.3 % — HIGH (ref 0–6)
GLUCOSE SERPL-MCNC: 90 MG/DL — SIGNIFICANT CHANGE UP (ref 70–99)
HCT VFR BLD CALC: 38.7 % — LOW (ref 39–50)
HGB BLD-MCNC: 12.7 G/DL — LOW (ref 13–17)
IMM GRANULOCYTES NFR BLD AUTO: 0.4 % — SIGNIFICANT CHANGE UP (ref 0–0.9)
LYMPHOCYTES # BLD AUTO: 0.94 K/UL — LOW (ref 1–3.3)
LYMPHOCYTES # BLD AUTO: 13.1 % — SIGNIFICANT CHANGE UP (ref 13–44)
MCHC RBC-ENTMCNC: 28.9 PG — SIGNIFICANT CHANGE UP (ref 27–34)
MCHC RBC-ENTMCNC: 32.8 GM/DL — SIGNIFICANT CHANGE UP (ref 32–36)
MCV RBC AUTO: 88 FL — SIGNIFICANT CHANGE UP (ref 80–100)
MONOCYTES # BLD AUTO: 0.67 K/UL — SIGNIFICANT CHANGE UP (ref 0–0.9)
MONOCYTES NFR BLD AUTO: 9.3 % — SIGNIFICANT CHANGE UP (ref 2–14)
NEUTROPHILS # BLD AUTO: 4.04 K/UL — SIGNIFICANT CHANGE UP (ref 1.8–7.4)
NEUTROPHILS NFR BLD AUTO: 56.2 % — SIGNIFICANT CHANGE UP (ref 43–77)
NRBC # BLD: 0 /100 WBCS — SIGNIFICANT CHANGE UP (ref 0–0)
PLATELET # BLD AUTO: 185 K/UL — SIGNIFICANT CHANGE UP (ref 150–400)
POTASSIUM SERPL-MCNC: 4 MMOL/L — SIGNIFICANT CHANGE UP (ref 3.5–5.3)
POTASSIUM SERPL-SCNC: 4 MMOL/L — SIGNIFICANT CHANGE UP (ref 3.5–5.3)
PROT SERPL-MCNC: 6.7 G/DL — SIGNIFICANT CHANGE UP (ref 6–8.3)
RBC # BLD: 4.4 M/UL — SIGNIFICANT CHANGE UP (ref 4.2–5.8)
RBC # FLD: 13.9 % — SIGNIFICANT CHANGE UP (ref 10.3–14.5)
SARS-COV-2 RNA SPEC QL NAA+PROBE: SIGNIFICANT CHANGE UP
SODIUM SERPL-SCNC: 140 MMOL/L — SIGNIFICANT CHANGE UP (ref 135–145)
WBC # BLD: 7.19 K/UL — SIGNIFICANT CHANGE UP (ref 3.8–10.5)
WBC # FLD AUTO: 7.19 K/UL — SIGNIFICANT CHANGE UP (ref 3.8–10.5)

## 2024-05-09 PROCEDURE — 92523 SPEECH SOUND LANG COMPREHEN: CPT | Mod: GN

## 2024-05-09 PROCEDURE — 92611 MOTION FLUOROSCOPY/SWALLOW: CPT | Mod: GN

## 2024-05-09 PROCEDURE — 97535 SELF CARE MNGMENT TRAINING: CPT | Mod: GO

## 2024-05-09 PROCEDURE — 87637 SARSCOV2&INF A&B&RSV AMP PRB: CPT

## 2024-05-09 PROCEDURE — 97112 NEUROMUSCULAR REEDUCATION: CPT | Mod: GO

## 2024-05-09 PROCEDURE — 97165 OT EVAL LOW COMPLEX 30 MIN: CPT | Mod: GO

## 2024-05-09 PROCEDURE — 84484 ASSAY OF TROPONIN QUANT: CPT

## 2024-05-09 PROCEDURE — 87635 SARS-COV-2 COVID-19 AMP PRB: CPT

## 2024-05-09 PROCEDURE — 92610 EVALUATE SWALLOWING FUNCTION: CPT | Mod: GN

## 2024-05-09 PROCEDURE — 93005 ELECTROCARDIOGRAM TRACING: CPT

## 2024-05-09 PROCEDURE — 97161 PT EVAL LOW COMPLEX 20 MIN: CPT | Mod: GP

## 2024-05-09 PROCEDURE — 80053 COMPREHEN METABOLIC PANEL: CPT

## 2024-05-09 PROCEDURE — 93970 EXTREMITY STUDY: CPT

## 2024-05-09 PROCEDURE — 97116 GAIT TRAINING THERAPY: CPT | Mod: GP

## 2024-05-09 PROCEDURE — 92507 TX SP LANG VOICE COMM INDIV: CPT | Mod: GN

## 2024-05-09 PROCEDURE — 99238 HOSP IP/OBS DSCHRG MGMT 30/<: CPT

## 2024-05-09 PROCEDURE — 85025 COMPLETE CBC W/AUTO DIFF WBC: CPT

## 2024-05-09 PROCEDURE — 97530 THERAPEUTIC ACTIVITIES: CPT | Mod: GP

## 2024-05-09 PROCEDURE — 36415 COLL VENOUS BLD VENIPUNCTURE: CPT

## 2024-05-09 PROCEDURE — 74018 RADEX ABDOMEN 1 VIEW: CPT

## 2024-05-09 PROCEDURE — 92526 ORAL FUNCTION THERAPY: CPT | Mod: GN

## 2024-05-09 PROCEDURE — 85027 COMPLETE CBC AUTOMATED: CPT

## 2024-05-09 PROCEDURE — 74230 X-RAY XM SWLNG FUNCJ C+: CPT

## 2024-05-09 RX ORDER — POLYETHYLENE GLYCOL 3350 17 G/17G
17 POWDER, FOR SOLUTION ORAL
Qty: 0 | Refills: 0 | DISCHARGE
Start: 2024-05-09

## 2024-05-09 RX ORDER — GABAPENTIN 400 MG/1
1 CAPSULE ORAL
Qty: 0 | Refills: 0 | DISCHARGE
Start: 2024-05-09

## 2024-05-09 RX ORDER — TRAZODONE HCL 50 MG
25 TABLET ORAL
Qty: 0 | Refills: 0 | DISCHARGE
Start: 2024-05-09

## 2024-05-09 RX ORDER — METOPROLOL TARTRATE 50 MG
12.5 TABLET ORAL
Refills: 0 | DISCHARGE
Start: 2024-05-09

## 2024-05-09 RX ORDER — ALLOPURINOL 300 MG
1 TABLET ORAL
Qty: 0 | Refills: 0 | DISCHARGE
Start: 2024-05-09

## 2024-05-09 RX ORDER — ESCITALOPRAM OXALATE 10 MG/1
1 TABLET, FILM COATED ORAL
Qty: 0 | Refills: 0 | DISCHARGE
Start: 2024-05-09

## 2024-05-09 RX ORDER — ACETAMINOPHEN 500 MG
2 TABLET ORAL
Qty: 0 | Refills: 0 | DISCHARGE
Start: 2024-05-09

## 2024-05-09 RX ORDER — AMLODIPINE BESYLATE 2.5 MG/1
1 TABLET ORAL
Qty: 0 | Refills: 0 | DISCHARGE
Start: 2024-05-09

## 2024-05-09 RX ADMIN — Medication 100 MILLIGRAM(S): at 11:43

## 2024-05-09 RX ADMIN — GABAPENTIN 300 MILLIGRAM(S): 400 CAPSULE ORAL at 07:24

## 2024-05-09 RX ADMIN — AMLODIPINE BESYLATE 5 MILLIGRAM(S): 2.5 TABLET ORAL at 06:31

## 2024-05-09 RX ADMIN — Medication 12.5 MILLIGRAM(S): at 07:58

## 2024-05-09 NOTE — PROGRESS NOTE ADULT - ASSESSMENT
58y with PMhx of gout was found down and revealed to have sustained intraparenchymal hemorrhage.  Patient admitted for a multidisciplinary rehab program with right hemiparesis, cognitive deficits, sensory loss, dysphagia, dysarthria.      Patient medically stable for discharge to [facility] today.  Discharge medications and instructions reviewed with patient's wife, Sheron, via phone.  All questions answered.     #L Thalamic IPH   -MRI (at presentation): L thalamic hemorrhage, w/ chronic microhemorrhages, and acute infarcts in the right caudate tail and periatrial white matter  -EKG w/o AF  -Echo unremarkable  -CTA w/o large occlusions  -For visual disturbances, Optho consulted, (Dr. Lin ) no acute intraocular pathology, outpatient f/up with neuro-opth--Dr. [facility] -- d/w pt's wife     #Right soleal DVT, 4/26  - Cont. Lovenox 40mg    - Repeat duplex (5/3) - stable right soleal DVT, no propagation  --f/u doppler as outpatient in 4-6 weeks.  d/w pt's wife, Sheron    #HTN  - Continue norvasc 5mg daily   - Continue metoprolol tartrate 12.5 BID (4/25)    # Pain   - Tylenol PRN  - Cont. Gabapentin 400 mg qhs  & 300 daily     #Gout  - Continue allopurinol 100mg daily      #Mood / Cognition  - Lexapro 10mg @1800     #Sleep  - Maintain quiet hours and a low stim environment  - Cont. Trazodone 25 mg qhs    #GI / Bowel  -Cont. Miralax bid, and bisacodyl 5mg in evening      # / Bladder  - Voiding with low PVRs    #Dysphagia  - Diet: upgraded to soft and bite sized 5/1--d/w speech therapy    IDT 5/2--  SW: (4/18/24) Patient resides with wife and teenage children in a private house- has 1 step to enter if in basement apartment- or 7 RICARDO if going to main level. Patient's spouse in main support, works part time. Patient was independent prior, working as a private contractor and part-time professor. Patient's wife provided transport prior.    Speech therapy--  moderate oropharyngeal dysphagia--Minced and Moist diet--  thin liquids; Moderate Dysarthria;  Severe Cognitive deficit--but improving Attention, Memory, and Executive functioning, convergent naming task with phrase completion with 87% given  moderate cues. Patient reviewed speech intelligibility strategies with 75% accuracy. Patient utilizedstrategies in 71% of opportunities in sentence creation tasks improving to 86% accuracy given moderate cues. Patient engaged in visual attention task via scanning with 92% accuracy. Patient engaged in divided attention task with 95% accuracy. patient engaged in working memory task with 67% accuracy    OT-- Mod A transfers to left & Max A transfers to right; Mod A --dressing;  Tot A--toileting; Min A--eating and grooming.  Goals -- Min A ADLs and Mod A Tx  PT-- Mod A-- BM,& transfer; Ambulation 75 ft Max A in Lite Gait.  Improving with right LE strength.     Goals:    1. TRansfer OOB and to toilet with Mod A  2. Sequence 4 step ADL task with Moderate cues.     ELOS: 5/9 RADHA-- pt. accepted to [facility]

## 2024-05-09 NOTE — DISCHARGE NOTE PROVIDER - PROVIDER TOKENS
PROVIDER:[TOKEN:[[id number]],ESTABLISHEDPATIENT:[T]],PROVIDER:[TOKEN:[[id number]],ESTABLISHEDPATIENT:[T]],PROVIDER:[TOKEN:[[id number]],ESTABLISHEDPATIENT:[T]] PROVIDER:[TOKEN:[[id number]],ESTABLISHEDPATIENT:[T]],PROVIDER:[TOKEN:[[id number]],ESTABLISHEDPATIENT:[T]],PROVIDER:[TOKEN:[[id number]],FOLLOWUP:[1 month]]

## 2024-05-09 NOTE — DISCHARGE NOTE PROVIDER - CARE PROVIDERS DIRECT ADDRESSES
,[email or web address] ,[email or web address]_[email or web address]

## 2024-05-09 NOTE — DISCHARGE NOTE PROVIDER - NSDCFUADDINST_GEN_ALL_CORE_FT
Call To schedule appointment with Dr. Anderson's office once you have discharge date for Subacute.

## 2024-05-09 NOTE — PROGRESS NOTE ADULT - SUBJECTIVE AND OBJECTIVE BOX
HPI:  58M PMHx gout found down at home on 4/2/24 by wife brought to [facility] by ambulance found to have a L thalamic hemorrhagic infarct via CT. Patient intubated for inability to protect airway. MRI demonstrated hemorrhage with anticipated evolution. Chronic microhemorrhages found in the R thalamus and BG as well as small acute infarcts in the right caudate tail and periatrial white matter.  CTA clear of major vessel occlusion or stenosis. Patient with residual R hemiparesis, dysarthria, mild aphasia, and R gaze palsy. EEG negative throughout workup. EKG without AF. TTE grossly normal. Gout flare treated by rheumatology with colchicine while inpatient. Patient received PM&R consult who recommended acute inpatient rehabilitation. Patient was medically stabilized and discharged to [facility].  (15 Apr 2024 14:16)          Subjective:  Seen this AM.  Slept during the night.  No new complaints.  Discharge to [facility] today.        VITALS  Vital Signs Last 24 Hrs  T(C): 36.9 (08 May 2024 21:05), Max: 36.9 (08 May 2024 21:05)  T(F): 98.4 (08 May 2024 21:05), Max: 98.4 (08 May 2024 21:05)  HR: 78 (09 May 2024 08:43) (78 - 94)  BP: 121/76 (09 May 2024 13:13) (118/75 - 125/79)  BP(mean): --  RR: 15 (09 May 2024 08:43) (15 - 16)  SpO2: 95% (09 May 2024 08:43) (94% - 95%)    Parameters below as of 09 May 2024 08:43  Patient On (Oxygen Delivery Method): room air        REVIEW OF SYMPTOMS  Neurological deficits      PHYSICAL EXAM  Gen - NAD  Pulm - Breathing comfortably on RA   Cardiovascular - warm and well perfused,   Abdomen - Soft, NT/ND,   Extremities - No peripheral edema  Neuro-     Cognitive - AAOx3, able to follow commands, content appropriate      Communication - fluent, mild dysarthria, comprehension     Cranial nerve -  EOMI,  Left VF impairment, impaired visual acuity left eye. left sided facial weakness,  Dysphagia     Motor -                     LEFT    UE - ShAB 5/5, EF 5/5, EE 5/5, WE 5/5,  5/5                    RIGHT UE - ShAB 0/5, EF 0/5, EE 0/5, WE 0/5,  0/5                    LEFT    LE - HF 5/5, KE 5/5, DF 5/5, PF 5/5                    RIGHT LE - HF 1/5, KE 1/5, DF 0/5, PF 0/5        Sensory - Diminished right UE/LE  Psychiatric - Mood stable    RECENT LABS                        12.7   7.19  )-----------( 185      ( 09 May 2024 06:46 )             38.7     05-09    140  |  105  |  26<H>  ----------------------------<  90  4.0   |  28  |  0.76    Ca    8.8      09 May 2024 06:46    TPro  6.7  /  Alb  3.0<L>  /  TBili  0.5  /  DBili  x   /  AST  17  /  ALT  19  /  AlkPhos  62  05-09      Urinalysis Basic - ( 09 May 2024 06:46 )    Color: x / Appearance: x / SG: x / pH: x  Gluc: 90 mg/dL / Ketone: x  / Bili: x / Urobili: x   Blood: x / Protein: x / Nitrite: x   Leuk Esterase: x / RBC: x / WBC x   Sq Epi: x / Non Sq Epi: x / Bacteria: x          RADIOLOGY/OTHER RESULTS      MEDICATIONS  (STANDING):  allopurinol 100 milliGRAM(s) Oral daily  amLODIPine   Tablet 5 milliGRAM(s) Oral daily  bisacodyl 5 milliGRAM(s) Oral at bedtime  enoxaparin Injectable 40 milliGRAM(s) SubCutaneous every 24 hours  escitalopram 10 milliGRAM(s) Oral <User Schedule>  gabapentin 400 milliGRAM(s) Oral at bedtime  gabapentin 300 milliGRAM(s) Oral <User Schedule>  metoprolol tartrate 12.5 milliGRAM(s) Oral <User Schedule>  multivitamin 1 Tablet(s) Oral daily  polyethylene glycol 3350 17 Gram(s) Oral two times a day  traZODone 25 milliGRAM(s) Oral at bedtime    MEDICATIONS  (PRN):  acetaminophen     Tablet .. 650 milliGRAM(s) Oral every 6 hours PRN Mild Pain (1 - 3)

## 2024-05-09 NOTE — DISCHARGE NOTE NURSING/CASE MANAGEMENT/SOCIAL WORK - NSDCPEPTSTRK_GEN_ALL_CORE
2 Call 911 for stroke/Need for follow up after discharge/Prescribed medications/Risk factors for stroke/Stroke education booklet/Stroke support groups for patients, families, and friends/Stroke warning signs and symptoms/Signs and symptoms of stroke

## 2024-05-09 NOTE — DISCHARGE NOTE PROVIDER - NSDCCPCAREPLAN_GEN_ALL_CORE_FT
PRINCIPAL DISCHARGE DIAGNOSIS  Diagnosis: Intraparenchymal hemorrhage of brain  Assessment and Plan of Treatment: Deemed 2/2 uncontrolled HTN   MRI 4/10/24:  IMPRESSION:  -Ongoing expected evolution of left thalamocapsular hemorrhage measuring  up to 4.2 cm. No underlying mass lesion or vascular malformation is identified.  -Chronic microhemorrhages in the right thalamus and basal ganglia,  compatible with background of chronic hypertensive microangiopathy.  -Small acute infarcts in the right caudate tail and periatrial white matter.  Assessment:  -EKG w/o AF  -Echo unremarkable  -CTA w/o large occlusions  Plan:  - Continue BP management  - Ok for Lovenox to treat DVT, below         SECONDARY DISCHARGE DIAGNOSES  Diagnosis: DVT, lower extremity  Assessment and Plan of Treatment: - Cont. Lovenox 40mg    - Repeat duplex (5/3) - stable right soleal DVT, no propagation    Diagnosis: Gout  Assessment and Plan of Treatment: - Continue allopurinol 100mg daily    Diagnosis: Hypertension  Assessment and Plan of Treatment: - Continue norvasc 5mg daily   - Continue metoprolol tartrate 12.5 BID (4/25)    Diagnosis: Depressed mood  Assessment and Plan of Treatment: - Lexapro 10mg @1800     PRINCIPAL DISCHARGE DIAGNOSIS  Diagnosis: Intraparenchymal hemorrhage of brain  Assessment and Plan of Treatment: Deemed 2/2 uncontrolled HTN   MRI 4/10/24:  IMPRESSION:  -Ongoing expected evolution of left thalamocapsular hemorrhage measuring  up to 4.2 cm. No underlying mass lesion or vascular malformation is identified.  -Chronic microhemorrhages in the right thalamus and basal ganglia,  compatible with background of chronic hypertensive microangiopathy.  -Small acute infarcts in the right caudate tail and periatrial white matter.  Assessment:  -EKG w/o AF  -Echo unremarkable  -CTA w/o large occlusions  Plan:  - Continue BP management  - Ok for Lovenox to treat DVT, below         SECONDARY DISCHARGE DIAGNOSES  Diagnosis: Hypertension  Assessment and Plan of Treatment: - Continue norvasc 5mg daily   - Continue metoprolol tartrate 12.5 BID (4/25)    Diagnosis: DVT, lower extremity  Assessment and Plan of Treatment: - Cont. Lovenox 40mg    - Repeat duplex (5/3) - stable right soleal DVT, no propagation  --f/u Doppler in 4-6 weeks.    Diagnosis: Gout  Assessment and Plan of Treatment: - Continue allopurinol 100mg daily    Diagnosis: Depressed mood  Assessment and Plan of Treatment: - Lexapro 10mg @1800

## 2024-05-09 NOTE — DISCHARGE NOTE PROVIDER - NSDCMRMEDTOKEN_GEN_ALL_CORE_FT
acetaminophen 325 mg oral tablet: 2 tab(s) orally every 6 hours As needed Mild Pain (1 - 3)  allopurinol 100 mg oral tablet: 1 tab(s) orally once a day  amLODIPine 5 mg oral tablet: 1 tab(s) orally once a day  enoxaparin: 40 milligram(s) subcutaneous once a day (at bedtime)  escitalopram 10 mg oral tablet: 1 tab(s) orally once a day  metoprolol: 12.5 milligram(s) orally 2 times a day 8 AM and 8 PM  Multiple Vitamins oral tablet: 1 tab(s) orally once a day  Neurontin 400 mg oral capsule: 1 cap(s) orally once a day (at bedtime)  polyethylene glycol 3350 oral powder for reconstitution: 17 gram(s) orally 2 times a day  traZODone: 25 milligram(s) orally once a day (at bedtime)   acetaminophen 325 mg oral tablet: 2 tab(s) orally every 6 hours As needed Mild Pain (1 - 3)  allopurinol 100 mg oral tablet: 1 tab(s) orally once a day  amLODIPine 5 mg oral tablet: 1 tab(s) orally once a day  enoxaparin: 40 milligram(s) subcutaneous once a day (at bedtime)  escitalopram 10 mg oral tablet: 1 tab(s) orally once a day (in the evening)  gabapentin 300 mg oral capsule: 1 cap(s) orally once a day with breakfast  gabapentin 400 mg oral capsule: 1 cap(s) orally once a day (at bedtime)  metoprolol: 12.5 milligram(s) orally 2 times a day 8 AM and 8 PM  Multiple Vitamins oral tablet: 1 tab(s) orally once a day  polyethylene glycol 3350 oral powder for reconstitution: 17 gram(s) orally 2 times a day  traZODone: 25 milligram(s) orally once a day (at bedtime)

## 2024-05-09 NOTE — DISCHARGE NOTE PROVIDER - HOSPITAL COURSE
58M PMHx gout found down at home on 4/2/24 by wife brought to [facility] by ambulance found to have a L thalamic hemorrhagic infarct via CT. Patient intubated for inability to protect airway. MRI demonstrated hemorrhage with anticipated evolution. Chronic microhemorrhages found in the R thalamus and BG as well as small acute infarcts in the right caudate tail and periatrial white matter.  CTA clear of major vessel occlusion or stenosis. Patient with residual R hemiparesis, dysarthria, mild aphasia, and R gaze palsy. EEG negative throughout workup. EKG without AF. TTE grossly normal. Gout flare treated by rheumatology with colchicine while inpatient. Patient received PM&R consult who recommended acute inpatient rehabilitation. Patient was medically stabilized and discharged to [facility] 4/15/24.       Rehab course significant for development of below-knee DVT, seen on 4/26/24. Otherwise, all medical co-morbidities were stable. Patient tolerated course of inpatient PT/OT/SLP rehab with significant improvements and met rehab goals prior to discharge. Discharge instructions were discussed with patient and family. All questions answered and all concerns addressed. Patient was medically cleared for discharge to [facility].      Functional Status:  OT-- Mod A transfers to left & Max A transfers to right; Mod A --dressing;  Tot A--toileting; Min A--eating and grooming.  PT-- Mod A-- BM,& transfer; Ambulation 75 ft Max A in Lite Gait    All other medical co-morbidities were stable. Patient tolerated course of inpatient PT/OT/SLP rehab with significant improvements and met rehab goals prior to discharge. Discharge instructions were discussed with patient and family. All questions answered and all concerns addressed. Patient was medically cleared for discharge to ___.     Outpatient Follow-ups:  Vanessa Anderson  Physical/Rehab Medicine  [address] [place] [address]  Phone: [phone]  Fax: [phone]  Established Patient  Follow Up Time:     Sukhjinder Lin  Ophthalmology  [address] [place] [address], [address]  [place] [address]  Phone: [phone]  Fax: [phone]  Established Patient  Follow Up Time:     Outpatient Follow-up:  Farnaz Scott  NP in [address], [address]  [place] [address]  Phone [phone]  Fax: [phone]    Yancy Lugo  Neurology  [address] [facility] [address] [facility] [facility] [address]  [place] [address]  Phone: [phone]  Fax: [phone]  Established Patient  Follow Up Time: 58M PMHx gout found down at home on 4/2/24 by wife brought to [facility] by ambulance found to have a L thalamic hemorrhagic infarct via CT. Patient intubated for inability to protect airway. MRI demonstrated hemorrhage with anticipated evolution. Chronic microhemorrhages found in the R thalamus and BG as well as small acute infarcts in the right caudate tail and periatrial white matter.  CTA clear of major vessel occlusion or stenosis. Patient with residual R hemiparesis, dysarthria, mild aphasia, and R gaze palsy. EEG negative throughout workup. EKG without AF. TTE grossly normal. Gout flare treated by rheumatology with colchicine while inpatient. Patient received PM&R consult who recommended acute inpatient rehabilitation. Patient was medically stabilized and discharged to [facility] 4/15/24.       Rehab course significant for development of below-knee DVT, seen on 4/26/24. Otherwise, all medical co-morbidities were stable. Patient tolerated course of inpatient PT/OT/SLP rehab with significant improvements and met rehab goals prior to discharge. Discharge instructions were discussed with patient and family. All questions answered and all concerns addressed. Patient was medically cleared for discharge to [facility].      Functional Status:  OT-- Mod A transfers to left & Mod to Max A transfers to right; Min-Mod A --dressing;  Tot A--toileting; Min A--eating and grooming.  PT-- Mod A-- BM,& transfer; Ambulation 15' with Left hallway handrail w/ Mod/Max A x 1 and close wheelchair follow  Speech: participated well in tx program however sessions are significant for impulsivity in task completions, reduced mental flexibility, and  tangential verbose discourse. Patient initially presented with moderate cognitive deficits and a moderate oropharyngeal dysphagia. Patient has  demonstrated with improvements across domains and received an objective test during stay. Patient continues to present with mild oropharyngeal dysphagia,  mild expressive language deficits, mild dysarthria and mild cognitive impairments.    All other medical co-morbidities were stable. Patient tolerated course of inpatient PT/OT/SLP rehab with significant improvements and met rehab goals prior to discharge. Discharge instructions were discussed with patient and family. All questions answered and all concerns addressed. Patient was medically cleared for discharge to ___.     Outpatient Follow-ups:  Vanessa Andesron  Physical/Rehab Medicine  [address] [place] [address]  Phone: [phone]  Fax: [phone]  Established Patient  Follow Up Time:     Sukhjinder Lin  Ophthalmology  [address] [place] [address], [address]  [place] [address]  Phone: [phone]  Fax: [phone]  Established Patient  Follow Up Time:     Outpatient Follow-up:  Farnaz Scott  NP in [address], [facility] [address]  [place] [address]  Phone [phone]  Fax: [phone]    Yancy Lugo  Neurology  [address] [place] [address], [address]  [place] [address]  Phone: [phone]  Fax: [phone]  Established Patient  Follow Up Time: 58M PMHx gout found down at home on 4/2/24 by wife brought to [facility] by ambulance found to have a L thalamic hemorrhagic infarct via CT. Patient intubated for inability to protect airway. MRI demonstrated hemorrhage with anticipated evolution. Chronic microhemorrhages found in the R thalamus and BG as well as small acute infarcts in the right caudate tail and periatrial white matter.  CTA clear of major vessel occlusion or stenosis. Patient with residual R hemiparesis, dysarthria, mild aphasia, and R gaze palsy. EEG negative throughout workup. EKG without AF. TTE grossly normal. Gout flare treated by rheumatology with colchicine while inpatient. Patient received PM&R consult who recommended acute inpatient rehabilitation. Patient was medically stabilized and discharged to [facility] 4/15/24.       Rehab course significant for development of below-knee DVT, seen on 4/26/24. Otherwise, all medical co-morbidities were stable. Patient tolerated course of inpatient PT/OT/SLP rehab with significant improvements and met rehab goals prior to discharge. Discharge instructions were discussed with patient and family. All questions answered and all concerns addressed. Patient was medically cleared for discharge to [facility].      Functional Status:  OT-- Mod A transfers to left & Mod to Max A transfers to right; Min-Mod A --dressing;  Tot A--toileting; Min A--eating and grooming.  PT-- Mod A-- BM,& transfer; Ambulation 15' with Left hallway handrail w/ Mod/Max A x 1 and close wheelchair follow  Speech: participated well in tx program however sessions are significant for impulsivity in task completions, reduced mental flexibility, and  tangential verbose discourse. Patient initially presented with moderate cognitive deficits and a moderate oropharyngeal dysphagia. Patient has  demonstrated with improvements across domains and received an objective test during stay. Patient continues to present with mild oropharyngeal dysphagia,  mild expressive language deficits, mild dysarthria and mild cognitive impairments.    All other medical co-morbidities were stable. Patient tolerated course of inpatient PT/OT/SLP rehab with significant improvements and met rehab goals prior to discharge. Discharge instructions were discussed with patient and family. All questions answered and all concerns addressed. Patient was medically cleared for discharge to ___.

## 2024-05-09 NOTE — PROGRESS NOTE ADULT - REASON FOR ADMISSION
left Intraparenchymal Hemorrhage

## 2024-05-09 NOTE — PROGRESS NOTE ADULT - PROVIDER SPECIALTY LIST ADULT
Hospitalist
Internal Medicine
Physiatry
Physiatry
Rehab Medicine
Rehab Medicine
Hospitalist
Rehab Medicine
Hospitalist
Neuropsychology
Physiatry
Rehab Medicine
Rehab Medicine
Hospitalist
Hospitalist
Physiatry
Rehab Medicine
Rehab Medicine
Heme/Onc
Hospitalist
Hospitalist

## 2024-05-09 NOTE — DISCHARGE NOTE NURSING/CASE MANAGEMENT/SOCIAL WORK - NSDCPEFALRISK_GEN_ALL_CORE
For information on Fall & Injury Prevention, visit: [email or web address] OR  [email or web address] OR  https://www.cdc.gov/steadi/patient.html

## 2024-05-09 NOTE — DISCHARGE NOTE NURSING/CASE MANAGEMENT/SOCIAL WORK - PATIENT PORTAL LINK FT
You can access the FollowMyHealth Patient Portal offered by [facility] by registering at the following website: [email or web address]. By joining [email or web address] FollowMyHealth portal, you will also be able to view your health information using other applications (apps) compatible with our system.

## 2024-05-09 NOTE — DISCHARGE NOTE PROVIDER - CARE PROVIDER_API CALL
Sukhjinder Lin  Ophthalmology  [address], [address]  [place] [address]  Phone: [phone]  Fax: [phone]  Established Patient  Follow Up Time:     Yancy Lugo  Neurology  [address], [address]  [place] [address]  Phone: [phone]  Fax: [phone]  Established Patient  Follow Up Time:     Vanessa Anderson  Physical/Rehab Medicine  [address] [place] [address]  Phone: [phone]  Fax: [phone]  Established Patient  Follow Up Time:    Yancy Lugo  Neurology  [address] [facility], [address]  [place] [address]  Phone: [phone]  Fax: [phone]  Established Patient  Follow Up Time:     Vanessa Anderson  Physical/Rehab Medicine  [address] [place] [address]  Phone: [phone]  Fax: [phone]  Established Patient  Follow Up Time:     Eduard Escobar  Ophthalmology  [address], [address]  [place] [address]  Phone: [phone]  Fax: [phone]  Follow Up Time: 1 month

## 2024-05-30 ENCOUNTER — INPATIENT (INPATIENT)
Facility: HOSPITAL | Age: 59
LOS: 14 days | Discharge: HOME HEALTH | DRG: 556 | End: 2024-06-14
Attending: HOSPITALIST | Admitting: HOSPITALIST
Payer: COMMERCIAL

## 2024-05-30 VITALS
HEIGHT: 66 IN | TEMPERATURE: 99 F | DIASTOLIC BLOOD PRESSURE: 85 MMHG | SYSTOLIC BLOOD PRESSURE: 124 MMHG | RESPIRATION RATE: 20 BRPM | OXYGEN SATURATION: 95 % | HEART RATE: 77 BPM | WEIGHT: 169.98 LBS

## 2024-05-30 DIAGNOSIS — M79.606 PAIN IN LEG, UNSPECIFIED: ICD-10-CM

## 2024-05-30 PROBLEM — I61.9 NONTRAUMATIC INTRACEREBRAL HEMORRHAGE, UNSPECIFIED: Chronic | Status: ACTIVE | Noted: 2024-04-15

## 2024-05-30 LAB
ALBUMIN SERPL ELPH-MCNC: 4 G/DL — SIGNIFICANT CHANGE UP (ref 3.3–5)
ALP SERPL-CCNC: 85 U/L — SIGNIFICANT CHANGE UP (ref 40–120)
ALT FLD-CCNC: 15 U/L — SIGNIFICANT CHANGE UP (ref 10–45)
ANION GAP SERPL CALC-SCNC: 12 MMOL/L — SIGNIFICANT CHANGE UP (ref 5–17)
APPEARANCE UR: CLEAR — SIGNIFICANT CHANGE UP
APTT BLD: 33 SEC — SIGNIFICANT CHANGE UP (ref 24.5–35.6)
AST SERPL-CCNC: 20 U/L — SIGNIFICANT CHANGE UP (ref 10–40)
BACTERIA # UR AUTO: NEGATIVE /HPF — SIGNIFICANT CHANGE UP
BASOPHILS # BLD AUTO: 0.09 K/UL — SIGNIFICANT CHANGE UP (ref 0–0.2)
BASOPHILS NFR BLD AUTO: 0.9 % — SIGNIFICANT CHANGE UP (ref 0–2)
BILIRUB SERPL-MCNC: 0.5 MG/DL — SIGNIFICANT CHANGE UP (ref 0.2–1.2)
BILIRUB UR-MCNC: NEGATIVE — SIGNIFICANT CHANGE UP
BUN SERPL-MCNC: 18 MG/DL — SIGNIFICANT CHANGE UP (ref 7–23)
CALCIUM SERPL-MCNC: 9.7 MG/DL — SIGNIFICANT CHANGE UP (ref 8.4–10.5)
CAST: 6 /LPF — HIGH (ref 0–4)
CHLORIDE SERPL-SCNC: 102 MMOL/L — SIGNIFICANT CHANGE UP (ref 96–108)
CO2 SERPL-SCNC: 26 MMOL/L — SIGNIFICANT CHANGE UP (ref 22–31)
COLOR SPEC: SIGNIFICANT CHANGE UP
CREAT SERPL-MCNC: 0.71 MG/DL — SIGNIFICANT CHANGE UP (ref 0.5–1.3)
DIFF PNL FLD: NEGATIVE — SIGNIFICANT CHANGE UP
EGFR: 107 ML/MIN/1.73M2 — SIGNIFICANT CHANGE UP
EOSINOPHIL # BLD AUTO: 2.26 K/UL — HIGH (ref 0–0.5)
EOSINOPHIL NFR BLD AUTO: 23.3 % — HIGH (ref 0–6)
GLUCOSE SERPL-MCNC: 87 MG/DL — SIGNIFICANT CHANGE UP (ref 70–99)
GLUCOSE UR QL: NEGATIVE MG/DL — SIGNIFICANT CHANGE UP
HCT VFR BLD CALC: 45.2 % — SIGNIFICANT CHANGE UP (ref 39–50)
HGB BLD-MCNC: 14.6 G/DL — SIGNIFICANT CHANGE UP (ref 13–17)
HYALINE CASTS # UR AUTO: PRESENT
INR BLD: 1.05 RATIO — SIGNIFICANT CHANGE UP (ref 0.85–1.18)
KETONES UR-MCNC: NEGATIVE MG/DL — SIGNIFICANT CHANGE UP
LEUKOCYTE ESTERASE UR-ACNC: NEGATIVE — SIGNIFICANT CHANGE UP
LYMPHOCYTES # BLD AUTO: 0.84 K/UL — LOW (ref 1–3.3)
LYMPHOCYTES # BLD AUTO: 8.6 % — LOW (ref 13–44)
MANUAL SMEAR VERIFICATION: SIGNIFICANT CHANGE UP
MCHC RBC-ENTMCNC: 27.7 PG — SIGNIFICANT CHANGE UP (ref 27–34)
MCHC RBC-ENTMCNC: 32.3 GM/DL — SIGNIFICANT CHANGE UP (ref 32–36)
MCV RBC AUTO: 85.8 FL — SIGNIFICANT CHANGE UP (ref 80–100)
MONOCYTES # BLD AUTO: 0.33 K/UL — SIGNIFICANT CHANGE UP (ref 0–0.9)
MONOCYTES NFR BLD AUTO: 3.4 % — SIGNIFICANT CHANGE UP (ref 2–14)
NEUTROPHILS # BLD AUTO: 6.19 K/UL — SIGNIFICANT CHANGE UP (ref 1.8–7.4)
NEUTROPHILS NFR BLD AUTO: 62.1 % — SIGNIFICANT CHANGE UP (ref 43–77)
NEUTS BAND # BLD: 1.7 % — SIGNIFICANT CHANGE UP (ref 0–8)
NITRITE UR-MCNC: NEGATIVE — SIGNIFICANT CHANGE UP
PH UR: 5.5 — SIGNIFICANT CHANGE UP (ref 5–8)
PLAT MORPH BLD: NORMAL — SIGNIFICANT CHANGE UP
PLATELET # BLD AUTO: 253 K/UL — SIGNIFICANT CHANGE UP (ref 150–400)
POTASSIUM SERPL-MCNC: 4.4 MMOL/L — SIGNIFICANT CHANGE UP (ref 3.5–5.3)
POTASSIUM SERPL-SCNC: 4.4 MMOL/L — SIGNIFICANT CHANGE UP (ref 3.5–5.3)
PROT SERPL-MCNC: 7.8 G/DL — SIGNIFICANT CHANGE UP (ref 6–8.3)
PROT UR-MCNC: NEGATIVE MG/DL — SIGNIFICANT CHANGE UP
PROTHROM AB SERPL-ACNC: 11.5 SEC — SIGNIFICANT CHANGE UP (ref 9.5–13)
RBC # BLD: 5.27 M/UL — SIGNIFICANT CHANGE UP (ref 4.2–5.8)
RBC # FLD: 13.5 % — SIGNIFICANT CHANGE UP (ref 10.3–14.5)
RBC BLD AUTO: NORMAL — SIGNIFICANT CHANGE UP
RBC CASTS # UR COMP ASSIST: 2 /HPF — SIGNIFICANT CHANGE UP (ref 0–4)
REVIEW: SIGNIFICANT CHANGE UP
SODIUM SERPL-SCNC: 140 MMOL/L — SIGNIFICANT CHANGE UP (ref 135–145)
SP GR SPEC: 1.02 — SIGNIFICANT CHANGE UP (ref 1–1.03)
SQUAMOUS # UR AUTO: 5 /HPF — SIGNIFICANT CHANGE UP (ref 0–5)
UROBILINOGEN FLD QL: 1 MG/DL — SIGNIFICANT CHANGE UP (ref 0.2–1)
WBC # BLD: 9.71 K/UL — SIGNIFICANT CHANGE UP (ref 3.8–10.5)
WBC # FLD AUTO: 9.71 K/UL — SIGNIFICANT CHANGE UP (ref 3.8–10.5)
WBC UR QL: 1 /HPF — SIGNIFICANT CHANGE UP (ref 0–5)

## 2024-05-30 PROCEDURE — 70450 CT HEAD/BRAIN W/O DYE: CPT | Mod: 26,MC

## 2024-05-30 PROCEDURE — 73560 X-RAY EXAM OF KNEE 1 OR 2: CPT | Mod: 26,RT

## 2024-05-30 PROCEDURE — 73552 X-RAY EXAM OF FEMUR 2/>: CPT | Mod: 26,RT

## 2024-05-30 PROCEDURE — 73590 X-RAY EXAM OF LOWER LEG: CPT | Mod: 26,RT

## 2024-05-30 PROCEDURE — 99285 EMERGENCY DEPT VISIT HI MDM: CPT | Mod: 25

## 2024-05-30 PROCEDURE — 93970 EXTREMITY STUDY: CPT | Mod: 26

## 2024-05-30 PROCEDURE — 71045 X-RAY EXAM CHEST 1 VIEW: CPT | Mod: 26

## 2024-05-30 RX ORDER — GABAPENTIN 400 MG/1
400 CAPSULE ORAL AT BEDTIME
Refills: 0 | Status: DISCONTINUED | OUTPATIENT
Start: 2024-05-30 | End: 2024-06-14

## 2024-05-30 RX ORDER — ALLOPURINOL 300 MG
100 TABLET ORAL DAILY
Refills: 0 | Status: DISCONTINUED | OUTPATIENT
Start: 2024-05-30 | End: 2024-06-14

## 2024-05-30 RX ORDER — KETOROLAC TROMETHAMINE 30 MG/ML
15 SYRINGE (ML) INJECTION ONCE
Refills: 0 | Status: DISCONTINUED | OUTPATIENT
Start: 2024-05-30 | End: 2024-05-30

## 2024-05-30 RX ORDER — ESCITALOPRAM OXALATE 10 MG/1
10 TABLET, FILM COATED ORAL DAILY
Refills: 0 | Status: DISCONTINUED | OUTPATIENT
Start: 2024-05-30 | End: 2024-06-14

## 2024-05-30 RX ORDER — ACETAMINOPHEN 500 MG
1000 TABLET ORAL ONCE
Refills: 0 | Status: COMPLETED | OUTPATIENT
Start: 2024-05-30 | End: 2024-05-30

## 2024-05-30 RX ORDER — TRAZODONE HCL 50 MG
25 TABLET ORAL AT BEDTIME
Refills: 0 | Status: DISCONTINUED | OUTPATIENT
Start: 2024-05-30 | End: 2024-06-14

## 2024-05-30 RX ORDER — GABAPENTIN 400 MG/1
300 CAPSULE ORAL DAILY
Refills: 0 | Status: DISCONTINUED | OUTPATIENT
Start: 2024-05-30 | End: 2024-06-14

## 2024-05-30 RX ORDER — ACETAMINOPHEN 500 MG
650 TABLET ORAL EVERY 6 HOURS
Refills: 0 | Status: DISCONTINUED | OUTPATIENT
Start: 2024-05-30 | End: 2024-06-14

## 2024-05-30 RX ORDER — POLYETHYLENE GLYCOL 3350 17 G/17G
17 POWDER, FOR SOLUTION ORAL
Refills: 0 | Status: DISCONTINUED | OUTPATIENT
Start: 2024-05-30 | End: 2024-06-14

## 2024-05-30 RX ORDER — ENOXAPARIN SODIUM 100 MG/ML
40 INJECTION SUBCUTANEOUS EVERY 24 HOURS
Refills: 0 | Status: DISCONTINUED | OUTPATIENT
Start: 2024-05-30 | End: 2024-06-14

## 2024-05-30 RX ORDER — AMLODIPINE BESYLATE 2.5 MG/1
5 TABLET ORAL DAILY
Refills: 0 | Status: DISCONTINUED | OUTPATIENT
Start: 2024-05-30 | End: 2024-06-14

## 2024-05-30 RX ORDER — METOPROLOL TARTRATE 50 MG
12.5 TABLET ORAL
Refills: 0 | Status: DISCONTINUED | OUTPATIENT
Start: 2024-05-30 | End: 2024-06-14

## 2024-05-30 RX ADMIN — GABAPENTIN 400 MILLIGRAM(S): 400 CAPSULE ORAL at 21:15

## 2024-05-30 RX ADMIN — Medication 400 MILLIGRAM(S): at 06:46

## 2024-05-30 RX ADMIN — ESCITALOPRAM OXALATE 10 MILLIGRAM(S): 10 TABLET, FILM COATED ORAL at 17:47

## 2024-05-30 RX ADMIN — Medication 15 MILLIGRAM(S): at 11:00

## 2024-05-30 RX ADMIN — ENOXAPARIN SODIUM 40 MILLIGRAM(S): 100 INJECTION SUBCUTANEOUS at 17:46

## 2024-05-30 RX ADMIN — Medication 12.5 MILLIGRAM(S): at 21:15

## 2024-05-30 RX ADMIN — Medication 25 MILLIGRAM(S): at 21:15

## 2024-05-30 RX ADMIN — Medication 650 MILLIGRAM(S): at 19:30

## 2024-05-30 NOTE — ED PROVIDER NOTE - SHIFT CHANGE DETAILS
Attending MD Aldana: 59M from rehab s/p CVA 4/2, R residual, RLE DVT on Lovenox, since dx'ed Tramadol, RLE tenderness, US for propagation, XR, no concern for infection

## 2024-05-30 NOTE — PATIENT PROFILE ADULT - FALL HARM RISK - HARM RISK INTERVENTIONS

## 2024-05-30 NOTE — ED PROVIDER NOTE - OBJECTIVE STATEMENT
60 y/o male, hx of CVA 4/24, with right sided residual deficits, recent diagnosis of RLE DVT, on Lovenox,  presenting from [facility] for RLE pain. states since his diagnosis of DVT has been experiencing RLE pain. states he has been taking Tramadol.  states over the past two days has been having increased RLE pain and Tramadol has not been helping. patient denies f/n/v/d, CP, SOB, HA, dizziness, abdominal pain. denies fall or trauma. denies cough

## 2024-05-30 NOTE — ED PROVIDER NOTE - PHYSICAL EXAMINATION
MSK: patient is minimally able to move RLE. +ttp to right RLE. no erythema, warmth or swelling present. unable to move RUE secondary to CVA. LUE and LLE winl. no ttp.   neuro: AOx3. patient full sensation intact. right sided weakness due to recent CVA

## 2024-05-30 NOTE — H&P ADULT - ASSESSMENT
60 y/o M PMHx gout found down at home on 4/2/24 by wife brought to [facility] by ambulance found to have a L thalamic hemorrhagic infarct via CT. Patient intubated for inability to protect airway. MRI demonstrated hemorrhage with anticipated evolution. Chronic microhemorrhages found in the R thalamus and BG as well as small acute infarcts in the right caudate tail and periatrial white matter. CTA clear of major vessel occlusion or stenosis. Patient with residual R hemiparesis, dysarthria, mild aphasia, and R gaze palsy. EEG negative throughout workup. EKG without AF. TTE grossly normal. Gout flare treated by rheumatology with colchicine while inpatient. Patient received PM&R consult who recommended acute inpatient rehabilitation. Patient was medically stabilized and discharged to [facility] 4/15/24.  Rehab course significant for development of below-knee DVT, seen on 4/26/24. Otherwise, all medical co-morbidities were stable. Patient tolerated course of inpatient PT/OT/SLP rehab with significant improvements and met rehab goals prior to discharge. Discharge instructions were discussed with patient and family. All questions answered and all concerns addressed. Patient was medically cleared for discharge to [facility].    AMS- transferred to ER. Back to baseline. CTH no acute finding except recent acute infarct.   weakness- severe R sided hemiplegia. Patient  did not improve in subacute rehab may quality for go to acute rehab. He needs PT and physiatrist re-evaluation.   L Thalamic IPH - MRI: L thalamic hemorrhage, w/ chronic microhemorrhages, and acute infarcts in the right caudate tail and periatrial white matter, EKG w/o AF, Echo unremarkable, CTA w/o large occlusions  Right soleal DVT, 4/26 - Cont. Lovenox 40mg, Repeat duplex (5/3) - stable right soleal DVT, no propagation. Repeat venous doppler L leg   HTN - Continue Norvasc, metoprolol BID.  Hyperlipidemia - elevated cholesterol and LDL. Treat with Crestor and repeat lipid profile in 2 months.  Pain management - Tylenol PRN, Gabapentin, Tramadol.  Gout - Continue allopurinol  Mood / Cognition - Lexapro, f/u with psych.  Sleep - on Trazodone qhs  GI / Bowel -Cont. MiraLAX and bisacodyl  Dysphagia - upgraded to soft and bite sized, monitor for aspiration.  S/p stroke/Left leg muscle spasm- Flexeril 10mg q8h PRN & Tramadol

## 2024-05-30 NOTE — H&P ADULT - HISTORY OF PRESENT ILLNESS
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 Patient intubated for inability to protect airway. MRI demonstrated hemorrhage with anticipated evolution. Chronic microhemorrhages found in the R thalamus and BG as well as small acute infarcts in the right caudate tail and periatrial white matter. CTA clear of major vessel occlusion or stenosis. Patient with residual R hemiparesis, dysarthria, mild aphasia, and R gaze palsy. EEG negative throughout workup. EKG without AF. TTE grossly normal. Gout flare treated by rheumatology with colchicine while inpatient. Patient received PM&R consult who recommended acute inpatient rehabilitation. Patient was medically stabilized and discharged to [facility] 4/15/24.  Rehab course significant for development of below-knee DVT, seen on 4/26/24. Otherwise, all medical co-morbidities were stable. Patient tolerated course of inpatient PT/OT/SLP rehab with significant improvements and met rehab goals prior to discharge. Discharge instructions were discussed with patient and family. All questions answered and all concerns addressed. Patient was medically cleared for discharge to [facility].    Patient was transferred to ER due to AMS according to wife as well as L leg pain. In ER he is back to normal base mental status. CT head no acute finding.

## 2024-05-30 NOTE — H&P ADULT - TIME BILLING
Discussed with ER doctor and patient. Discussed with patient  regarding advanced care planning  for at least 30 minutes. Reviewed MOLST form and decided to keep it as  full code.

## 2024-05-30 NOTE — ED ADULT NURSE REASSESSMENT NOTE - NS ED NURSE REASSESS COMMENT FT1
pt voided 400ml dark phan urine specimen to lab as ordered pt remains pending med bed assignment remains alert verbal no acute distress

## 2024-05-30 NOTE — ED ADULT NURSE NOTE - NSFALLRISKINTERV_ED_ALL_ED
Assistance with ambulation/Communicate fall risk and risk factors to all staff, patient, and family/Monitor gait and stability/Provide visual cue: yellow wristband, yellow gown, etc/Bed in lowest position, wheels locked, appropriate side rails in place/Call bell, personal items and telephone in reach/Instruct patient to call for assistance before getting out of bed/chair/stretcher/Physically safe environment - no spills, clutter or unnecessary equipment/Purposeful Proactive Rounding/Room/bathroom lighting operational, light cord in reach

## 2024-05-30 NOTE — PATIENT PROFILE ADULT - DO YOU NEED ADDITIONAL SERVICES TO MANAGE ANY OF THESE MEDICAL CONDITIONS AT HOME?
Hi, the patient called and canceled her appointment with me today.  She was supposed to see me at 1140.  So she asked to go back to [facility].  Her 2 sisters have been treated by her and she feels comfortable to go back to her.  So I never got a chance to meet her. no

## 2024-05-30 NOTE — ED ADULT NURSE REASSESSMENT NOTE - NS ED NURSE REASSESS COMMENT FT1
labs and ct scan reviewed and pt admitted  per md pt and wife seem not happy with [facility] rehab pt given lunch tray vitals stable pending bed assignment

## 2024-05-30 NOTE — ED ADULT NURSE REASSESSMENT NOTE - NS ED NURSE REASSESS COMMENT FT1
pt received in [facility] resting comfortably pt pending dopler of Lower extremity pt states he has had discomfort since cva to leg

## 2024-05-30 NOTE — ED ADULT NURSE NOTE - NSFALLDEVICES_ED_ALL_ED
None

## 2024-05-30 NOTE — ED ADULT NURSE REASSESSMENT NOTE - NS ED NURSE REASSESS COMMENT FT1
wife at bedside speaking with resident reguarding pt mental status pt is answering and verbalizing in er appropriately

## 2024-05-30 NOTE — ED PROVIDER NOTE - PROGRESS NOTE DETAILS
Fantasma Wan PGY2:  Spoke with Dr. Morrow and Pt's wife who is bedside. Pt was sent into the ED for AMS. Wife reports yesterday he was very lethargic and tried compared to his baseline. Currently in the ED, he is a&Ox4 however wife feels he is still slightly more tried than he usual is and is concerned given his recent CVA. Will add on blood work, UA, chest xray, CT head noncon to assess for other metabolic/infectious causes of AMS. Fantasma Wan PGY2:  Pt being evaluated by Dr. Morrow at bedside. Wife is concerned about Pt as she feels he is still slightly slow to respond. Pt will be admitted to Dr. Morrow. Attending MD Aldana: Labs and imaging reviewed, wife and PMD reporting concern for AMS, admitted to Dr. Morrow.

## 2024-05-30 NOTE — ED ADULT NURSE NOTE - OBJECTIVE STATEMENT
58 y/o M A&Ox4 with PMH of Stroke on 4/1/24 with R sided deficit presents to the ED c/o leg pain. Pt reports constant R leg pain since the stroke however, reports pain usually improves with Tramdol; endorses medication has not been helping as much with pain relief recently. Pt presents from [facility] rehab facility where he was been receiving PT and OT. Denies chest pain, SOB, n/v/d, lightheadedness, dizziness, fever, chills. IV access established; 20 G L AC. Patient safety maintained, bed is in lowest position, wheels locked, and side rails raised.

## 2024-05-30 NOTE — ED ADULT TRIAGE NOTE - MEANS OF ARRIVAL
Patient Education       Well Child Exam 2 Months   About this topic   Your baby's 2-month well child exam is a visit with the doctor to check your baby's health. The doctor measures your child's weight, height, and head size. The doctor plots these numbers on a growth curve. The growth curve gives a picture of your baby's growth at each visit. The doctor may listen to your baby's heart, lungs, and belly. Your doctor will do a full exam of your baby from the head to the toes.  Your baby may also need shots or blood tests during this visit.  General   Growth and Development   Your doctor will ask you how your baby is developing. The doctor will focus on the skills that most children your child's age are expected to do. During the first months of your child's life, here are some things you can expect.  Movement - Your baby may:  Lift the head up when lying on the belly  Hold a small toy or rattle when you place it in the hand  Hearing, seeing, and talking - Your baby will likely:  Know your face and voice  Enjoy hearing you sing or talk  Start to smile at people  Begin making cooing sounds  Start to follow things with the eyes  Still have their eyes cross or wander from time to time  Act fussy if bored or activity doesnt change  Feeding - Your baby:  Needs breast milk or formula for nutrition. Always hold your baby when feeding. Do not prop a bottle. Propping the bottle makes it easier for your baby to choke and get ear infections.  Should not yet have baby cereal, juice, cows milk, or other food unless instructed by your doctor. Your baby's body is not ready for these foods yet. Your baby does not need to have water.  May needed burped often if your baby has problems with spitting up. Hold your baby upright for about an hour after feeding to help with spitting up.  May put hands in the mouth, root, or suck to show hunger  Should not be overfed. Turning away, closing the mouth, and relaxing arms are signs your baby  is full.  Sleep - Your child:  Sleeps for about 2 to 4 hours at a time. May start to sleep for longer stretches of time at night.  Is likely sleeping about 14 to 16 hours total out of each day, with 4 to 5 daytime naps.  May sleep better when swaddled. Monitor your baby when swaddled. Check to make sure your baby has not rolled over. Also, make sure the swaddle blanket has not come loose. Keep the swaddle blanket loose around your babys hips. Stop swaddling your baby before your baby starts to roll over. Most times, you will need to stop swaddling your baby by 2 months of age.  Should always sleep on the back, in your child's own bed, on a firm mattress  Vaccines - It is important for your baby to get vaccines on time. This protects from very serious illnesses like lung infections, meningitis, or infections that damage their nervous system. Most vaccines are given by shot, and others are given orally as a drink or pill. Your baby may need:  DTaP or diphtheria, tetanus, and pertussis vaccine  Hib or Haemophilus influenzae type b vaccine  IPV or polio vaccine  PCV or pneumococcal conjugate vaccine  RV or rotavirus vaccine  Hep B or hepatitis B vaccine  Some of these vaccines may be given as combined vaccines. This means your child may get fewer shots.  Help for Parents   Develop bathing, sleeping, feeding, napping, and playing routines.  Play with your baby.  Keep doing tummy time a few times each day while your baby is awake. Lie your baby on your chest and talk or sing to your baby. Put toys in front of your baby when lying on the tummy. This will encourage your baby to raise the head.  Talk or sing to your baby often. Respond when your baby makes sounds.  Use an infant gym or hold a toy slightly out of your baby's reach. This lets your baby look at it and reach for the toy.  Gently, clap your baby's hands or feet together. Rub them over different kinds of materials.  Slowly, move a toy in front of your baby's eyes  so your baby can follow the toy.  Here are some things you can do to help keep your baby safe and healthy.  Learn CPR and basic first aid.  Do not allow anyone to smoke in your home or around your baby. Second hand smoke can harm your baby.  Have the right size car seat for your baby and use it every time your baby is in the car. Your baby should be rear facing until 2 years of age.  Always place your baby on the back for sleep. Keep soft bedding, bumpers, loose blankets, and toys out of your baby's bed.  Keep one hand on your baby whenever you are changing a diaper or clothes to prevent falls.  Keep small toys and objects away from your baby.  Never leave your baby alone in the bath.  Keep your baby in the shade, rather than in the sun. Doctors do not recommend sunscreen until children are 6 months and older.  Parents need to think about:  A plan for going back to work or school  A reliable  or  provider  How to handle bouts of crying or colic. It is normal for your baby to have times that are hard to console. You need a plan for what to do if you are frustrated because it is never OK to shake a baby.  Making a routine for bedtime for your baby  The next well child visit will most likely be when your baby is 4 months old. At this visit your doctor may:  Do a full check up on your baby  Talk about how your baby is sleeping, if your baby has colic, teething, and how well you are coping with your baby  Give your baby the next set of shots       When do I need to call the doctor?   Fever of 100.4°F (38°C) or higher  Problems eating or spits up a lot  Legs and arms are very loose or floppy all the time  Legs and arms are very stiff  Won't stop crying  Doesn't blink or startle with loud sounds  Where can I learn more?   American Academy of Pediatrics  https://www.healthychildren.org/English/ages-stages/toddler/Pages/Milestones-During-The-First-2-Years.aspx   American Academy of  Pediatrics  https://www.healthychildren.org/English/ages-stages/baby/Pages/Hearing-and-Making-Sounds.aspx   Centers for Disease Control and Prevention  https://www.cdc.gov/ncbddd/actearly/milestones/   KidsHealth  https://kidshealth.org/en/parents/growth-2mos.html?ref=search   Last Reviewed Date   2021-05-06  Consumer Information Use and Disclaimer   This information is not specific medical advice and does not replace information you receive from your health care provider. This is only a brief summary of general information. It does NOT include all information about conditions, illnesses, injuries, tests, procedures, treatments, therapies, discharge instructions or life-style choices that may apply to you. You must talk with your health care provider for complete information about your health and treatment options. This information should not be used to decide whether or not to accept your health care providers advice, instructions or recommendations. Only your health care provider has the knowledge and training to provide advice that is right for you.  Copyright   Copyright © 2021 UpToDate, Inc. and its affiliates and/or licensors. All rights reserved.    Children under the age of 2 years will be restrained in a rear facing child safety seat.   If you have an active [facility] account, please look for your well child questionnaire to come to your [facility] account before your next well child visit.   stretcher

## 2024-05-30 NOTE — PATIENT PROFILE ADULT - NSPROIMPLANTSMEDDEV_GEN_A_NUR
[facility] ambulatory encounter    URGENT CARE INITIAL EVALUATION    CHIEF COMPLAINT:    Chief Complaint   Patient presents with   • Headache     room 2       SUBJECTIVE:  Candelaria Guillen is a 15 year old female, who presents with a complaint of cough, slight headache, tightness of breathing, nasal congestion for the past 3-4 days, patient denies any fever, she is able to speak in full sentences, no history of asthma. Patient appears in no acute distress. Denies any chest pain.    REVIEW OF SYSTEMS:  A review of systems was performed and findings relevant to this complaint are included in the HPI.    HISTORIES:  ALLERGIES:  No Known Allergies    MEDICATIONS:  Current Outpatient Prescriptions   Medication Sig   • promethazine-dextromethorphan (PROMETHAZINE-DM) 6.25-15 MG/5ML syrup Take 5 mLs by mouth 4 times daily as needed for Cough.   • albuterol 108 (90 Base) MCG/ACT inhaler Inhale 2 puffs into the lungs every 4 hours as needed for Shortness of Breath or Wheezing.     No current facility-administered medications for this visit.      Administrations This Visit     albuterol-ipratropium 2.5 mg/0.5 mg (DUONEB) nebulizer solution 3 mL     Admin Date  02/03/2018 Action  Given Dose  3 mL Route  Nebulization Administered By  Sola Ivy RN                No past medical history on file.  No past surgical history on file.    OBJECTIVE:  PHYSICAL EXAM:  Visit Vitals  /63 (BP Location: [facility], Patient Position: Sitting, Cuff Size: Regular)   Pulse 92   Temp 98.2 °F (36.8 °C) (Oral)   Resp 20   Ht 5' 2.5\" (1.588 m)   Wt 49.4 kg   SpO2 97%   BMI 19.62 kg/m²     General:  Well hydrated female who appears in no acute distress.  Eyes:  No jaundice, injection or drainage.  Ears:  Normal canals.  Normal tympanic membranes.   No external tenderness.  Oral Cavity:  No trismus.  Good dentition.  No lesions of gums or hard palate.  Neck:  Anterior and posterior lymphadenopathy.  Supple.  Lungs:  Mild wheezing noted on initial  examination, subsided after breathing treatment.  Cardiac:  Regular rate and rhythm.  No murmur.  Abdomen:  Soft.  Nontender.  Nondistended.  No hepatomegaly, no splenomegaly.  No rash.    URGENT CARE COURSE:  He shouldn't was improvement after breathing treatment, she was diagnosed with viral URI with bronchospasm, instructed follow-up with PCP, provided with Rx for albuterol inhaler and cough syrup.     ASSESSMENT:  1. Viral URI with cough    2. Bronchospasm        PLAN:  Orders Placed This Encounter   • albuterol-ipratropium 2.5 mg/0.5 mg (DUONEB) nebulizer solution 3 mL   • promethazine-dextromethorphan (PROMETHAZINE-DM) 6.25-15 MG/5ML syrup   • albuterol 108 (90 Base) MCG/ACT inhaler       FOLLOW-UP:  With PCP.     PATIENT INSTRUCTIONS:      The patient was advised to follow up with primary physician or to recheck with the urgent care clinic sooner if symptoms get worse or if new symptoms appear.    The patient and mother indicated understanding of the diagnosis and agreed with the plan of care.       None

## 2024-05-30 NOTE — ED PROVIDER NOTE - CLINICAL SUMMARY MEDICAL DECISION MAKING FREE TEXT BOX
Dr. Major's Note: Pt with hx of recent stroke, known R leg DVT on lovenox, here with worsening R leg pain. Pt has had pain there since the DVT dx but it acutely worsened over last few days. pt denies new trauma or rashes. on exam pt exhibits tenderness over R calf but no edema, no deformities, no erythema, no warmth. Clinically there is low concern for cellulitis or infection. will obtain DVT to r/o propagation of DVT, xrays to r/o trauma. will otherwise treat symptomatically for pain.

## 2024-05-30 NOTE — H&P ADULT - NSHPPHYSICALEXAM_GEN_ALL_CORE
PHYSICAL EXAM    Constitutional: NAD, well-groomed, well-developed  HEENT: PERRLA, EOMI, Normal Hearing, MMM  Neck: No LAD, No JVD  Back: Normal spine flexure, No CVA tenderness  Respiratory: CTAB/L   Cardiovascular: S1 and S2, RRR, no M/G/R  Gastrointestinal: BS+, soft, NT/ND  Extremities: No peripheral edema  Vascular: 2+ peripheral pulses  Neurological: A/O x 3, right hemiplegia and facial droop.   Skin: No rashes

## 2024-05-31 LAB
CULTURE RESULTS: SIGNIFICANT CHANGE UP
SPECIMEN SOURCE: SIGNIFICANT CHANGE UP

## 2024-05-31 RX ORDER — ACETAMINOPHEN 500 MG
650 TABLET ORAL ONCE
Refills: 0 | Status: COMPLETED | OUTPATIENT
Start: 2024-05-31 | End: 2024-05-31

## 2024-05-31 RX ORDER — TRAMADOL HYDROCHLORIDE 50 MG/1
25 TABLET ORAL ONCE
Refills: 0 | Status: DISCONTINUED | OUTPATIENT
Start: 2024-05-31 | End: 2024-05-31

## 2024-05-31 RX ADMIN — Medication 25 MILLIGRAM(S): at 22:41

## 2024-05-31 RX ADMIN — AMLODIPINE BESYLATE 5 MILLIGRAM(S): 2.5 TABLET ORAL at 05:23

## 2024-05-31 RX ADMIN — POLYETHYLENE GLYCOL 3350 17 GRAM(S): 17 POWDER, FOR SOLUTION ORAL at 18:22

## 2024-05-31 RX ADMIN — ESCITALOPRAM OXALATE 10 MILLIGRAM(S): 10 TABLET, FILM COATED ORAL at 11:59

## 2024-05-31 RX ADMIN — ENOXAPARIN SODIUM 40 MILLIGRAM(S): 100 INJECTION SUBCUTANEOUS at 18:23

## 2024-05-31 RX ADMIN — Medication 12.5 MILLIGRAM(S): at 18:23

## 2024-05-31 RX ADMIN — TRAMADOL HYDROCHLORIDE 25 MILLIGRAM(S): 50 TABLET ORAL at 23:23

## 2024-05-31 RX ADMIN — TRAMADOL HYDROCHLORIDE 25 MILLIGRAM(S): 50 TABLET ORAL at 22:58

## 2024-05-31 RX ADMIN — GABAPENTIN 400 MILLIGRAM(S): 400 CAPSULE ORAL at 22:41

## 2024-05-31 RX ADMIN — Medication 1 TABLET(S): at 11:59

## 2024-05-31 RX ADMIN — Medication 12.5 MILLIGRAM(S): at 05:24

## 2024-05-31 RX ADMIN — GABAPENTIN 300 MILLIGRAM(S): 400 CAPSULE ORAL at 10:18

## 2024-05-31 RX ADMIN — Medication 100 MILLIGRAM(S): at 11:59

## 2024-05-31 NOTE — PHYSICAL THERAPY INITIAL EVALUATION ADULT - PASSIVE RANGE OF MOTION EXAMINATION, REHAB EVAL
hamstring tightness noted RLE/Right UE Passive ROM was WFL (within functional limits)/Right LE Passive ROM was WFL (within functional limits)

## 2024-05-31 NOTE — DIETITIAN INITIAL EVALUATION ADULT - ORAL INTAKE PTA/DIET HISTORY
Patient reports good appetite and PO intake PTA. Reports no dietary restrictions followed PTA. Confirms no known food allergies.   Patient presents from rehab facility, per transfer papers patient ordered for Heart Healthy / No Added Salt (DEWEY) diet - regular textures and thin liquids, no oral nutrition supplements noted.

## 2024-05-31 NOTE — PHYSICAL THERAPY INITIAL EVALUATION ADULT - ADDITIONAL COMMENTS
Pt admitted from [facility]), Before [facility] admission, the patient was at [facility] acute rehab for one month, requiring complete care for ADLs and a mechanical lift for transfers. The patient has not returned home since the initial CVA diagnosis.    Prior to CVA-  pt was independent with all functional mobility & ADLs, working.

## 2024-05-31 NOTE — DIETITIAN INITIAL EVALUATION ADULT - OTHER INFO
Patient reports UBW 170lb, reports no recent changes in weight PTA.  Dosing weight: 170lb (x/x). RD unable to obtain bed weight at visit.  Dosing height 5'6 however per previous RD notes 04/2024, patient and wife reported patient height is 5'7.  IBW: 148lb, %IBW: 115% based on dosing weight.  Weight history per [facility]: 168.8lb (4/15/24), 170.9lb (4/5/24), 170.8lb (4/2/24), 165lb (1/11/20).  Weight appears stable. RD to continue to monitor weight trends as available/able.     -Noted A1C 5.8% from 4/13/24 in pre-DM range.  -Ordered for Multivitamin.

## 2024-05-31 NOTE — DIETITIAN INITIAL EVALUATION ADULT - NSFNSPHYEXAMSKINFT_GEN_A_CORE
Spoke to mom Anival Rodrigez, she states pt has been having a temp of 100.6F on and off since yesterday, no there sx reported. Pt is being treated for ear infection with abx and today it will be the last dose.  Pt has appt tomorrow at 11 am. Please advise if there i per flowsheets:   Pressure Injury 1: Bilateral:, buttocks, Suspected deep tissue injury  Pressure Injury 2: coccyx, Stage II

## 2024-05-31 NOTE — PHYSICAL THERAPY INITIAL EVALUATION ADULT - ACTIVE RANGE OF MOTION EXAMINATION, REHAB EVAL
There are no signs of ischemia, WPW, ARVD, long or short QT, HOCM or brugada on the Emergency Department EKG. RUE & RLE grossly 0 degrees ROM in all planes/Left UE Active ROM was WFL (within functional limits)/Left LE Active ROM was WFL (within functional limits)

## 2024-05-31 NOTE — PHYSICAL THERAPY INITIAL EVALUATION ADULT - IMPAIRMENTS CONTRIBUTING IMPAIRED BED MOBILITY, REHAB EVAL
impaired balance/impaired coordination/decreased flexibility/impaired motor control/abnormal muscle tone/decreased strength

## 2024-05-31 NOTE — DIETITIAN INITIAL EVALUATION ADULT - NSFNSGIIOFT_GEN_A_CORE
Patient reports no nausea/vomiting; no diarrhea or constipation; last BM today per flowsheets; bowel regimen: miralax

## 2024-05-31 NOTE — DIETITIAN INITIAL EVALUATION ADULT - PERTINENT LABORATORY DATA
05-30    140  |  102  |  18  ----------------------------<  87  4.4   |  26  |  0.71    Ca    9.7      30 May 2024 08:36    TPro  7.8  /  Alb  4.0  /  TBili  0.5  /  DBili  x   /  AST  20  /  ALT  15  /  AlkPhos  85  05-30  A1C with Estimated Average Glucose Result: 5.8 % (04-03-24 @ 04:46)  A1C with Estimated Average Glucose Result: 5.8 % (04-03-24 @ 00:12)

## 2024-05-31 NOTE — DIETITIAN INITIAL EVALUATION ADULT - ADD RECOMMEND
-Continue diet free of therapeutic restriction, diet texture per SLP/team.  -Recommend swallow evaluation to determine appropriate diet texture/consistency for patient as patient currently ordered for a texture-modified diet but ordered for regular textures PTA and reported no current difficulty chewing/swallowing.   -RD to add High Protein Gelatin 1x/day (18 Gm protein) to assist with protein-energy intake for wound healing.  -Recommend adding multivitamin and vitamin C for wound healing pending no medical contraindications.  -RD remains available upon request and will follow up per protocol.  -Continue diet free of therapeutic restriction, diet texture per SLP/team.  -Recommend swallow evaluation to determine appropriate diet texture/consistency for patient as patient currently ordered for a texture-modified diet but ordered for regular textures PTA and reported no current difficulty chewing/swallowing.   -RD to add High Protein Gelatin 1x/day (18 Gm protein) to assist with protein-energy intake for wound healing.  -Recommend adding vitamin C for wound healing pending no medical contraindications, continue Multivitamin.  -RD remains available upon request and will follow up per protocol.

## 2024-05-31 NOTE — DIETITIAN INITIAL EVALUATION ADULT - PERTINENT MEDS FT
MEDICATIONS  (STANDING):  allopurinol 100 milliGRAM(s) Oral daily  amLODIPine   Tablet 5 milliGRAM(s) Oral daily  enoxaparin Injectable 40 milliGRAM(s) SubCutaneous every 24 hours  escitalopram 10 milliGRAM(s) Oral daily  gabapentin 300 milliGRAM(s) Oral daily  gabapentin 400 milliGRAM(s) Oral at bedtime  metoprolol tartrate 12.5 milliGRAM(s) Oral two times a day  multivitamin 1 Tablet(s) Oral daily  polyethylene glycol 3350 17 Gram(s) Oral two times a day  traZODone 25 milliGRAM(s) Oral at bedtime    MEDICATIONS  (PRN):  acetaminophen     Tablet .. 650 milliGRAM(s) Oral every 6 hours PRN Temp greater or equal to 38C (100.4F), Mild Pain (1 - 3)

## 2024-05-31 NOTE — DIETITIAN INITIAL EVALUATION ADULT - OTHER CALCULATIONS
Defer fluid needs to team.  Estimated nutrient needs based on dosing weight 170lb, with consideration for skin integrity

## 2024-05-31 NOTE — PHYSICAL THERAPY INITIAL EVALUATION ADULT - TRANSFER TRAINING, PT EVAL
GOAL: Pt will perform ALL transfers with modA, w/use of appropriate assistive device as needed, in 4 weeks.

## 2024-05-31 NOTE — PHYSICAL THERAPY INITIAL EVALUATION ADULT - NSPTDISCHREC_GEN_A_CORE
Acute Rehab; if home, pt would require assist with ALL ADL's and functional mobility and home PT. Pt would require assistance to get into home.

## 2024-05-31 NOTE — DIETITIAN INITIAL EVALUATION ADULT - REASON INDICATOR FOR ASSESSMENT
RD assessment warranted for: Consult for Pressure injury stage 2 or >. Chart reviewed, events noted.  Source: medical record, patient, rehab transfer papers, previous RD notes.

## 2024-05-31 NOTE — PHARMACOTHERAPY INTERVENTION NOTE - COMMENTS
Medication Reconciliation completed for patient.  These were confirmed with [facility].  Updated medications are reflected in Outpatient Medication Review.     Home Medications:  acetaminophen 325 mg oral tablet: 2 tab(s) orally every 6 hours As needed Mild Pain (1 - 3)   allopurinol 100 mg oral tablet: 1 tab(s) orally once a day   amLODIPine 5 mg oral tablet: 1 tab(s) orally once a day   Crestor 10 mg oral tablet: 1 tab(s) orally once a day (at bedtime)  DermaMed Topical Ointment: Apply to sacral/coccyx area BID   Dulcolax Laxative 10 mg rectal suppository: 1 suppository(ies) rectally every 48 hours prn   enoxaparin: 40 milligram(s) subcutaneous once a day (at bedtime)   escitalopram 10 mg oral tablet: 1 tab(s) orally once a day (in the evening)   Flexeril 10 mg oral tablet: 1 tab(s) orally 3 times a day prn   gabapentin 400 mg oral capsule: 1 cap(s) orally once a day (at bedtime)   metoprolol: 12.5 milligram(s) orally 2 times a day 8 AM and 8 PM   Milk of Magnesia 1200 mg/15 mL oral liquid: 30 milliliter(s) orally every 48 hours prn  Multiple Vitamins oral tablet: 1 tab(s) orally once a day   polyethylene glycol 3350 oral powder for reconstitution: 17 gram(s) orally 2 times a day  traMADol 50 mg oral tablet: 1 tab(s) orally every 6 hours prn   traZODone: 25 milligram(s) orally once a day (at bedtime)     Ranjana Stephens [facility] Student   Medication Reconciliation completed for patient.  These were confirmed with [facility].  Updated medications are reflected in Outpatient Medication Review.     Home Medications:  acetaminophen 325 mg oral tablet: 2 tab(s) orally every 6 hours As needed Mild Pain (1 - 3)   allopurinol 100 mg oral tablet: 1 tab(s) orally once a day   amLODIPine 5 mg oral tablet: 1 tab(s) orally once a day   Crestor 10 mg oral tablet: 1 tab(s) orally once a day (at bedtime)  DermaMed Topical Ointment: Apply to sacral/coccyx area BID   Dulcolax Laxative 10 mg rectal suppository: 1 suppository(ies) rectally every 48 hours prn   enoxaparin: 40 milligram(s) subcutaneous once a day (at bedtime)   escitalopram 10 mg oral tablet: 1 tab(s) orally once a day (in the evening)   Flexeril 10 mg oral tablet: 1 tab(s) orally 3 times a day prn   gabapentin 300mg oral capsule: 1 cap orally once a day   gabapentin 400 mg oral capsule: 1 cap(s) orally once a day (at bedtime)   metoprolol: 12.5 milligram(s) orally 2 times a day 8 AM and 8 PM   Milk of Magnesia 1200 mg/15 mL oral liquid: 30 milliliter(s) orally every 48 hours prn  Multiple Vitamins oral tablet: 1 tab(s) orally once a day   polyethylene glycol 3350 oral powder for reconstitution: 17 gram(s) orally 2 times a day  traMADol 50 mg oral tablet: 1 tab(s) orally every 6 hours prn   traZODone: 25 milligram(s) orally once a day (at bedtime)     Ranjana Stephens, [facility] Student

## 2024-05-31 NOTE — PHYSICAL THERAPY INITIAL EVALUATION ADULT - PERTINENT HX OF CURRENT PROBLEM, REHAB EVAL
60 y/o M PMHx gout found down at home on 4/2/24 by wife brought to [facility] by ambulance found to have a L thalamic hemorrhagic infarct via CT. Patient intubated for inability to protect airway. MRI demonstrated hemorrhage with anticipated evolution. Chronic microhemorrhages found in the R thalamus and BG as well as small acute infarcts in the right caudate tail and periatrial white matter. CTA clear of major vessel occlusion or stenosis. Patient with residual R hemiparesis, dysarthria, mild aphasia, and R gaze palsy. EEG negative throughout workup. EKG without AF. TTE grossly normal. Gout flare treated by rheumatology with colchicine while inpatient. Patient received PM&R consult who recommended acute inpatient rehabilitation. Patient was medically stabilized and discharged to [facility] 4/15/24.  Rehab course significant for development of below-knee DVT, seen on 4/26/24. Otherwise, all medical co-morbidities were stable. Patient tolerated course of inpatient PT/OT/SLP rehab with significant improvements and met rehab goals prior to discharge. Discharge instructions were discussed with patient and family. All questions answered and all concerns addressed. Patient was medically cleared for discharge to [facility].    CT head 5/30: Previously noted area of acute parenchymal hemorrhage has demonstrated expected evolutionary changes.  -VA duplex BLE 5/30: No acute DVT of the lower extremities. Right soleal vein demonstrates patency and compressibility.  -CXR 5/30: Clear lungs.  -Xray R tib, fib, femur, knee: No fracture or dislocation. Alignment is anatomic. Joint spaces are preserved. Small knee joint effusion.

## 2024-05-31 NOTE — DIETITIAN INITIAL EVALUATION ADULT - PERSON TAUGHT/METHOD
Educated pt on the importance of consuming a diet adequate in protein and micronutrients for wound healing/skin integrity. Patient without specific food preferences for RD at this time. Patient made aware RD to remain available./verbal instruction/patient instructed

## 2024-05-31 NOTE — PHYSICAL THERAPY INITIAL EVALUATION ADULT - PHYSICAL ASSIST/NONPHYSICAL ASSIST: SCOOT/BRIDGE, REHAB EVAL
"NICU  Progress Note    Micaela Matthews                           Baby's First Name =  Joseph    YOB: 2019 Gender: male   At Birth: Gestational Age: 31w5d BW: 3 lb 6.3 oz (1540 g)   Age today :  25 days Obstetrician: MARQUISE FLORES      Corrected GA: 35w2d            OVERVIEW     Patient was born at Gestational Age: 31w5d via  section due to eclampsia.   Admitted to NICU for management of prematurity and respiratory distress.           MATERNAL / PREGNANCY / L&D INFORMATION     REFER TO NICU ADMISSION NOTE           INFORMATION     Vital Signs Temp:  [98.1 °F (36.7 °C)-99 °F (37.2 °C)] 98.3 °F (36.8 °C)  Pulse:  [152-176] 165  Resp:  [44-56] 51  BP: (76-77)/(36-42) 76/36  SpO2 Percentage    07/10/21 1000 07/10/21 1100 07/10/21 1200   SpO2: 95% 94% 96%          Birth Length: (inches)  Current Length:   16  Height: 40.7 cm (16.04\")   Birth OFC:  Current OFC: Head Circumference: 11.42\" (29 cm)  Head Circumference: 11.61\" (29.5 cm)     Birth Weight:                                              1540 g (3 lb 6.3 oz)  Current Weight: Weight: (!) 1962 g (4 lb 5.2 oz)   Weight change from Birth Weight: 27%           PHYSICAL EXAMINATION     General appearance Resting comfortably in no distress.    Skin  No rashes   Pink and well perfused.   HEENT: AFSF. NGT in place.    Chest Breath sounds clear bilaterally.  No tachypnea or retractions.     Heart  Normal rate and rhythm.  No murmur   Normal pulses.    Abdomen Active BS.  Soft, non-tender. No mass/HSM   Genitalia   male  Patent anus   Trunk and Spine Spine normal and intact.  No atypical dimpling   Extremities  Moving extremities equally   Neuro Normal tone and activity for gestational age             LABORATORY AND RADIOLOGY RESULTS     No results found for this or any previous visit (from the past 24 hour(s)).    I have reviewed the most recent lab results and radiology imaging results. The pertinent findings are reviewed in the " Diagnosis/Daily Assessment/Plan of Treatment.             MEDICATIONS      Scheduled Meds:Cholecalciferol, 200 Units, Oral, Daily  Poly-Vitamin/Iron, 0.5 mL, Oral, Daily      Continuous Infusions:   PRN Meds:.hepatitis B vaccine (recombinant)  •  sucrose             DIAGNOSES / DAILY ASSESSMENT / PLAN OF TREATMENT            ACTIVE DIAGNOSES     ___________________________________________________________     INFANT  R/O Penoscrotal webbing    HISTORY:   Gestational Age: 31w5d at birth.  male; Vertex  , Low Transverse;   Mild penoscrotal webbing noted on exam.    CONSULTS: Physical Therapy    BED TYPE:  Open crib     PLAN:   PT following  [facility]  Re-evaluate webbing prior to circumcision  ___________________________________________________________    NUTRITIONAL SUPPORT  MILD HYPERMAGNESEMIA (DUE TO MATERNAL MAG ON L&D) --- Resolved  INFANT OF A DIABETIC MOTHER    HISTORY:  Mother plans to Breastfeed.  Consent for DBM obtained  BW: 3 lb 6.3 oz (1540 g)  Birth Measurements ([place] Chart): WT 30%ile, Length 35%ile, HC 46 %ile  Return to BW (DOL) : 15  Transitioned off DBM to SC24HP -7/3    Mother with presumed gestational diabetes. Failed 1 hour, but unable to complete 3 hour GTT.   Admission magnesium level 2.8 and down to 2.2 on  >>> Resolved    Probiotics started  for weight < 1500 g --- d/c'd due to unavailable  Sodium supp  -     CONSULTS: Lactation Nurse, SLP, RD  PROCEDURES: [facility] [place]  -     DAILY ASSESSMENT:  Today's Weight: (!) 1962 g (4 lb 5.2 oz)      Weight change: 60 g (2.1 oz)  Growth chart reviewed on :  Weight 5%, Length 3.6%, and HC 9%.  Weight up 25 g/kg/day over 5 days (7/3-)    Feeds currently at 36 mL/feed NS24 - 147 ml/kg/day  Took 68% PO over last 24 hrs, however last 2 feeds given all via NGT      Intake & Output (last day)       701 - 07/10 0700 07/10 0701 -  0700    P.O. 194     NG/GT 91 72    Total  Intake(mL/kg) 285 (185.06) 72 (46.75)    Net +285 +72          Urine Unmeasured Occurrence 8 x 1 x    Stool Unmeasured Occurrence 1 x     Emesis Unmeasured Occurrence 1 x         PLAN:  Continue 24 kaleigh Neosure  Probiotics on hold due to supply shortage  Monitor daily weights/weekly growth curve & maximize nutrition  RD consult   SLP consult per IDF protocol  Combine MVI & Fe   Continue Vit D till ~ 2.5 kg  ___________________________________________________________    Respiratory Distress Syndrome:  Resolved 6/25  Pulmonary Insufficiency:  6/25-present    HISTORY:  RDS treated with CPAP.  Changed to NIPPV shortly after arrival to NICU due to recurrent apnea.  Improved and changed back to CPAP on 6/17  Changed to HFNC 6/27  Off NC as of 7/9  Pulmicort nebs d/c'ed 7/9    RESPIRATORY SUPPORT HISTORY:   NIPPV: 6/15-6/17  CPAP: 6/17- 6/27  HFNC 6/27 - 7/9    PROCEDURES:   Intubation for curosurf 6/15    DAILY ASSESSMENT:  Current Respiratory Support: None  RA trial since yesterday  No recent events  Breathing comfortably    PLAN:  Continue RA trial  Monitor off budesonide nebs  Monitor WOB and O2 Sat's  ___________________________________________________________    AT RISK FOR RSV    HISTORY:  Follow 2018 NPA Guidelines As Follows:  28 0/7 - 32 0/7 weeks qualifies for Synagis if less than 6 months at start of RSV season    PLAN:  Provide monthly Synagis during the upcoming RSV season - Per PCP  ___________________________________________________________    APNEA OF PREMATURITY     HISTORY:  Off Caffeine since 7/7  No events off caffeine    PLAN:  Continue Cardio-respiratory monitoring  ___________________________________________________________    AT RISK FOR ANEMIA OF PREMATURITY    HISTORY:  Cord milking was performed  Admission Hematocrit = 41.5%  6/16 Hct = 59.7%  Iron supplementation started 6/24 6/28 HCT 46.9, retic 1.16%    PLAN:  H/H, retic periodically- ~ 7/12  Continue iron supplementation    ___________________________________________________________    AT RISK FOR IVH    HISTORY:  Candidate for cranial u.s. Screening due to </= 32 0/7 weeks    Head Ult: No intraventricular hemorrhage identified. Asymmetric ventricles, left greater than right    PLAN:  Repeat cranial u.s. when nearing d/c-- Rx'ed for   ___________________________________________________________    SOCIAL/PARENTAL SUPPORT    HISTORY:  Social history:  30yo G1. No social concerns.   FOB involved.   Cordstat = Negative    CONSULTS: MSW -  attempted to visit twice, parents not available. NICU support information left for parents    PLAN:  Parental support as indicated  ___________________________________________________________      RESOLVED DIAGNOSES   ___________________________________________________________    OBSERVATION FOR SEPSIS    HISTORY:  Notable Hx/Risk Factors: none  Maternal GBS Culture: Not done    for eclampsia  ROM was 0h 01m   Admission CBC/diff = WBC 4,470 with 3 bands, SMI=701, Hct 41.5%, Plt 171K  F/U CBC/difff = WBC 5,490 with 1 band, AQH=2559, Hct 59.7%, Plt 157K  Repeat CBC (): WBC=5.54k, 2% bands, XCL=6883, Wjq=555a  Admission Blood culture sent placenta = no growth at 5 days (Final)  Repeat CBC (): WBC=4.40k, 1% bands, ANC = 1360, Plt = 143k  Repeat CBC (): WBC = 6.82k, 0% bands, ANC = 1890, Plt 162  ___________________________________________________________    JAUNDICE OF PREMATURITY     HISTORY:  MBT= O+  BBT = A+, DC neg  Peak T bili 8.6 on   Last T bili 6 on   Direct bili's all normal    PHOTOTHERAPY:  -   ___________________________________________________________    SCREENING FOR CONGENITAL CMV INFECTION     HISTORY:  Notable Prenatal Hx, Ultrasound, and/or lab findings:none  Routine CMV testing sent on admission to NICU = Negative    PLAN:  F/U Screening CMV test  Consult with [facility] if positive  results    ___________________________________________________________    R/O ABNORMAL  METABOLIC SCREEN     HISTORY:  [place] Screen sent on 21: Elevated Methionine (likely TPN &/or prematurity related). All Else Normal.  Repeat screen sent   = normal    ___________________________________________________________                                                                              DISCHARGE PLANNING           HEALTHCARE MAINTENANCE     CCHD     Car Seat Challenge Test     [place] Hearing Screen     [place] Screen 1st Screen  - elevated Methionine.  Repeat screen sent : normal               IMMUNIZATIONS     PLAN:  HBV at 30 days of age for first in series (7/15)  2 month immunizations due ~ Aug 15    ADMINISTERED:    There is no immunization history for the selected administration types on file for this patient.            FOLLOW UP APPOINTMENTS     1) PCP: ALMITA  2)  DEVELOPMENTAL CLINIC FOLLOW UP  3) OPHTHALMOLOGY            PENDING TEST  RESULTS AT THE TIME OF DISCHARGE    TEST  RESULTS AT TIME OF DISCHARGE            PARENT UPDATES      Most Recent:    : Dr. Ugarte updated [facility] at bedside.  Questions addressed.   7/3: Dr. Weeks called [facility] at [phone] to update.  No answer, left voicemail for returned call if questions.  : SERGIO Vigil updated [facility] via phone. Discussed plan of care. Questions answered.          ATTESTATION      Intensive cardiac and respiratory monitoring, continuous and/or frequent vital sign monitoring in NICU is indicated.      Shahla Jacobson MD  2021  12:36 EDT     verbal cues/nonverbal cues (demo/gestures)/1 person assist

## 2024-06-01 RX ORDER — TRAMADOL HYDROCHLORIDE 50 MG/1
25 TABLET ORAL EVERY 6 HOURS
Refills: 0 | Status: DISCONTINUED | OUTPATIENT
Start: 2024-06-01 | End: 2024-06-01

## 2024-06-01 RX ORDER — TRAMADOL HYDROCHLORIDE 50 MG/1
25 TABLET ORAL EVERY 6 HOURS
Refills: 0 | Status: DISCONTINUED | OUTPATIENT
Start: 2024-06-01 | End: 2024-06-04

## 2024-06-01 RX ORDER — ROSUVASTATIN CALCIUM 5 MG/1
10 TABLET ORAL AT BEDTIME
Refills: 0 | Status: DISCONTINUED | OUTPATIENT
Start: 2024-06-01 | End: 2024-06-14

## 2024-06-01 RX ADMIN — ENOXAPARIN SODIUM 40 MILLIGRAM(S): 100 INJECTION SUBCUTANEOUS at 17:28

## 2024-06-01 RX ADMIN — TRAMADOL HYDROCHLORIDE 25 MILLIGRAM(S): 50 TABLET ORAL at 10:46

## 2024-06-01 RX ADMIN — Medication 100 MILLIGRAM(S): at 10:47

## 2024-06-01 RX ADMIN — TRAMADOL HYDROCHLORIDE 25 MILLIGRAM(S): 50 TABLET ORAL at 17:30

## 2024-06-01 RX ADMIN — AMLODIPINE BESYLATE 5 MILLIGRAM(S): 2.5 TABLET ORAL at 05:40

## 2024-06-01 RX ADMIN — Medication 25 MILLIGRAM(S): at 21:12

## 2024-06-01 RX ADMIN — ESCITALOPRAM OXALATE 10 MILLIGRAM(S): 10 TABLET, FILM COATED ORAL at 10:47

## 2024-06-01 RX ADMIN — POLYETHYLENE GLYCOL 3350 17 GRAM(S): 17 POWDER, FOR SOLUTION ORAL at 17:28

## 2024-06-01 RX ADMIN — GABAPENTIN 300 MILLIGRAM(S): 400 CAPSULE ORAL at 10:46

## 2024-06-01 RX ADMIN — Medication 12.5 MILLIGRAM(S): at 17:28

## 2024-06-01 RX ADMIN — GABAPENTIN 400 MILLIGRAM(S): 400 CAPSULE ORAL at 21:12

## 2024-06-01 RX ADMIN — Medication 1 TABLET(S): at 10:47

## 2024-06-01 RX ADMIN — POLYETHYLENE GLYCOL 3350 17 GRAM(S): 17 POWDER, FOR SOLUTION ORAL at 05:43

## 2024-06-01 RX ADMIN — TRAMADOL HYDROCHLORIDE 25 MILLIGRAM(S): 50 TABLET ORAL at 18:33

## 2024-06-01 RX ADMIN — Medication 12.5 MILLIGRAM(S): at 05:40

## 2024-06-01 RX ADMIN — TRAMADOL HYDROCHLORIDE 25 MILLIGRAM(S): 50 TABLET ORAL at 11:45

## 2024-06-01 RX ADMIN — ROSUVASTATIN CALCIUM 10 MILLIGRAM(S): 5 TABLET ORAL at 21:12

## 2024-06-02 RX ADMIN — TRAMADOL HYDROCHLORIDE 25 MILLIGRAM(S): 50 TABLET ORAL at 12:09

## 2024-06-02 RX ADMIN — ESCITALOPRAM OXALATE 10 MILLIGRAM(S): 10 TABLET, FILM COATED ORAL at 12:04

## 2024-06-02 RX ADMIN — Medication 12.5 MILLIGRAM(S): at 05:05

## 2024-06-02 RX ADMIN — ENOXAPARIN SODIUM 40 MILLIGRAM(S): 100 INJECTION SUBCUTANEOUS at 16:47

## 2024-06-02 RX ADMIN — TRAMADOL HYDROCHLORIDE 25 MILLIGRAM(S): 50 TABLET ORAL at 13:15

## 2024-06-02 RX ADMIN — TRAMADOL HYDROCHLORIDE 25 MILLIGRAM(S): 50 TABLET ORAL at 21:51

## 2024-06-02 RX ADMIN — AMLODIPINE BESYLATE 5 MILLIGRAM(S): 2.5 TABLET ORAL at 05:05

## 2024-06-02 RX ADMIN — Medication 1 TABLET(S): at 12:02

## 2024-06-02 RX ADMIN — GABAPENTIN 300 MILLIGRAM(S): 400 CAPSULE ORAL at 12:02

## 2024-06-02 RX ADMIN — Medication 25 MILLIGRAM(S): at 21:01

## 2024-06-02 RX ADMIN — POLYETHYLENE GLYCOL 3350 17 GRAM(S): 17 POWDER, FOR SOLUTION ORAL at 16:47

## 2024-06-02 RX ADMIN — Medication 12.5 MILLIGRAM(S): at 16:47

## 2024-06-02 RX ADMIN — TRAMADOL HYDROCHLORIDE 25 MILLIGRAM(S): 50 TABLET ORAL at 05:05

## 2024-06-02 RX ADMIN — TRAMADOL HYDROCHLORIDE 25 MILLIGRAM(S): 50 TABLET ORAL at 21:01

## 2024-06-02 RX ADMIN — GABAPENTIN 400 MILLIGRAM(S): 400 CAPSULE ORAL at 21:01

## 2024-06-02 RX ADMIN — Medication 100 MILLIGRAM(S): at 12:03

## 2024-06-02 RX ADMIN — ROSUVASTATIN CALCIUM 10 MILLIGRAM(S): 5 TABLET ORAL at 21:01

## 2024-06-02 NOTE — OCCUPATIONAL THERAPY INITIAL EVALUATION ADULT - PERTINENT HX OF CURRENT PROBLEM, REHAB EVAL
59y old  Male who presents with a chief complaint of AMS and leg pain. In april 2024 pt +L thalamic hemorrhagic infarct. Patient was medically stabilized and discharged to [facility] 4/15/24. Patient was medically cleared for discharge to [facility]. At [facility] pt p/w AMS- transferred to ER. Back to baseline. CTH no acute finding except recent acute infarct.

## 2024-06-02 NOTE — OCCUPATIONAL THERAPY INITIAL EVALUATION ADULT - ADDITIONAL COMMENTS
Pt was previously in [facility] where he required assist for all mobility/ADLs. Prior to initial hospitalization pt was fully independent working as an

## 2024-06-03 LAB
ANION GAP SERPL CALC-SCNC: 15 MMOL/L — SIGNIFICANT CHANGE UP (ref 5–17)
BUN SERPL-MCNC: 20 MG/DL — SIGNIFICANT CHANGE UP (ref 7–23)
CALCIUM SERPL-MCNC: 9.9 MG/DL — SIGNIFICANT CHANGE UP (ref 8.4–10.5)
CHLORIDE SERPL-SCNC: 102 MMOL/L — SIGNIFICANT CHANGE UP (ref 96–108)
CO2 SERPL-SCNC: 22 MMOL/L — SIGNIFICANT CHANGE UP (ref 22–31)
CREAT SERPL-MCNC: 0.66 MG/DL — SIGNIFICANT CHANGE UP (ref 0.5–1.3)
EGFR: 109 ML/MIN/1.73M2 — SIGNIFICANT CHANGE UP
GLUCOSE SERPL-MCNC: 99 MG/DL — SIGNIFICANT CHANGE UP (ref 70–99)
HCT VFR BLD CALC: 41.5 % — SIGNIFICANT CHANGE UP (ref 39–50)
HGB BLD-MCNC: 13.7 G/DL — SIGNIFICANT CHANGE UP (ref 13–17)
MCHC RBC-ENTMCNC: 27.9 PG — SIGNIFICANT CHANGE UP (ref 27–34)
MCHC RBC-ENTMCNC: 33 GM/DL — SIGNIFICANT CHANGE UP (ref 32–36)
MCV RBC AUTO: 84.5 FL — SIGNIFICANT CHANGE UP (ref 80–100)
NRBC # BLD: 0 /100 WBCS — SIGNIFICANT CHANGE UP (ref 0–0)
PLATELET # BLD AUTO: 244 K/UL — SIGNIFICANT CHANGE UP (ref 150–400)
POTASSIUM SERPL-MCNC: 4.2 MMOL/L — SIGNIFICANT CHANGE UP (ref 3.5–5.3)
POTASSIUM SERPL-SCNC: 4.2 MMOL/L — SIGNIFICANT CHANGE UP (ref 3.5–5.3)
RBC # BLD: 4.91 M/UL — SIGNIFICANT CHANGE UP (ref 4.2–5.8)
RBC # FLD: 13 % — SIGNIFICANT CHANGE UP (ref 10.3–14.5)
SODIUM SERPL-SCNC: 139 MMOL/L — SIGNIFICANT CHANGE UP (ref 135–145)
WBC # BLD: 7.01 K/UL — SIGNIFICANT CHANGE UP (ref 3.8–10.5)
WBC # FLD AUTO: 7.01 K/UL — SIGNIFICANT CHANGE UP (ref 3.8–10.5)

## 2024-06-03 PROCEDURE — 99222 1ST HOSP IP/OBS MODERATE 55: CPT

## 2024-06-03 RX ADMIN — GABAPENTIN 400 MILLIGRAM(S): 400 CAPSULE ORAL at 21:43

## 2024-06-03 RX ADMIN — TRAMADOL HYDROCHLORIDE 25 MILLIGRAM(S): 50 TABLET ORAL at 18:35

## 2024-06-03 RX ADMIN — ESCITALOPRAM OXALATE 10 MILLIGRAM(S): 10 TABLET, FILM COATED ORAL at 12:15

## 2024-06-03 RX ADMIN — TRAMADOL HYDROCHLORIDE 25 MILLIGRAM(S): 50 TABLET ORAL at 12:45

## 2024-06-03 RX ADMIN — POLYETHYLENE GLYCOL 3350 17 GRAM(S): 17 POWDER, FOR SOLUTION ORAL at 05:22

## 2024-06-03 RX ADMIN — ROSUVASTATIN CALCIUM 10 MILLIGRAM(S): 5 TABLET ORAL at 21:42

## 2024-06-03 RX ADMIN — Medication 100 MILLIGRAM(S): at 12:15

## 2024-06-03 RX ADMIN — TRAMADOL HYDROCHLORIDE 25 MILLIGRAM(S): 50 TABLET ORAL at 05:25

## 2024-06-03 RX ADMIN — Medication 12.5 MILLIGRAM(S): at 17:10

## 2024-06-03 RX ADMIN — GABAPENTIN 300 MILLIGRAM(S): 400 CAPSULE ORAL at 12:15

## 2024-06-03 RX ADMIN — TRAMADOL HYDROCHLORIDE 25 MILLIGRAM(S): 50 TABLET ORAL at 12:15

## 2024-06-03 RX ADMIN — Medication 25 MILLIGRAM(S): at 21:42

## 2024-06-03 RX ADMIN — Medication 12.5 MILLIGRAM(S): at 05:22

## 2024-06-03 RX ADMIN — ENOXAPARIN SODIUM 40 MILLIGRAM(S): 100 INJECTION SUBCUTANEOUS at 17:10

## 2024-06-03 RX ADMIN — Medication 1 TABLET(S): at 12:15

## 2024-06-03 RX ADMIN — AMLODIPINE BESYLATE 5 MILLIGRAM(S): 2.5 TABLET ORAL at 05:22

## 2024-06-03 NOTE — CONSULT NOTE ADULT - SUBJECTIVE AND OBJECTIVE BOX
HPI:  60 y/o M PMHx gout found down at home on 4/2/24 by wife brought to [facility] by ambulance found to have a L thalamic hemorrhagic infarct via CT. Patient intubated for inability to protect airway. MRI demonstrated hemorrhage with anticipated evolution. Chronic microhemorrhages found in the R thalamus and BG as well as small acute infarcts in the right caudate tail and periatrial white matter. CTA clear of major vessel occlusion or stenosis. Patient with residual R hemiparesis, dysarthria, mild aphasia, and R gaze palsy. EEG negative throughout workup. EKG without AF. TTE grossly normal. Gout flare treated by rheumatology with colchicine while inpatient. Patient received PM&R consult who recommended acute inpatient rehabilitation. Patient was medically stabilized and discharged to [facility] 4/15/24.  Rehab course significant for development of below-knee DVT, seen on 4/26/24. Otherwise, all medical co-morbidities were stable. Patient tolerated course of inpatient PT/OT/SLP rehab with significant improvements and met rehab goals prior to discharge. Discharge instructions were discussed with patient and family. All questions answered and all concerns addressed. Patient was medically cleared for discharge to [facility].    Patient was transferred to ER due to AMS according to wife as well as L leg pain. In ER he is back to normal base mental status. CT head no acute finding.  (30 May 2024 12:32)    Patient was admitted on 5/30, seen today, continues to have left leg pain, same x weeks to months, frustrated with slow pace of recovery.     REVIEW OF SYSTEMS  Denies chest pain, SOB, N/V, F/C, abdominal pain     VITALS  T(C): 36.3 (06-03-24 @ 04:27), Max: 36.8 (06-02-24 @ 14:17)  HR: 73 (06-03-24 @ 05:17) (73 - 90)  BP: 118/71 (06-03-24 @ 05:17) (95/66 - 118/71)  RR: 18 (06-03-24 @ 04:27) (18 - 18)  SpO2: 94% (06-03-24 @ 04:27) (93% - 95%)  Wt(kg): --    PAST MEDICAL & SURGICAL HISTORY  Eczema    Gout    Intraparenchymal hemorrhage of brain    No significant past surgical history        FUNCTIONAL HISTORY  Lives with family, admitted from [facility]   Independent prior to CVA, now requires assist with all mobility/ADLs     CURRENT FUNCTIONAL STATUS  PT  5/31  bed mobility max assist   transfers max assist with non mech lift   gait unable     OT 6/2  bed mobility max assist   transfers  max assist with non mech lift   poor standing balance     RECENT LABS/IMAGING  CBC Full  -  ( 03 Jun 2024 06:33 )  WBC Count : 7.01 K/uL  RBC Count : 4.91 M/uL  Hemoglobin : 13.7 g/dL  Hematocrit : 41.5 %  Platelet Count - Automated : 244 K/uL  Mean Cell Volume : 84.5 fl  Mean Cell Hemoglobin : 27.9 pg  Mean Cell Hemoglobin Concentration : 33.0 gm/dL  Auto Neutrophil # : x  Auto Lymphocyte # : x  Auto Monocyte # : x  Auto Eosinophil # : x  Auto Basophil # : x  Auto Neutrophil % : x  Auto Lymphocyte % : x  Auto Monocyte % : x  Auto Eosinophil % : x  Auto Basophil % : x    06-03    139  |  102  |  20  ----------------------------<  99  4.2   |  22  |  0.66    Ca    9.9      03 Jun 2024 06:33      Urinalysis Basic - ( 03 Jun 2024 06:33 )    Color: x / Appearance: x / SG: x / pH: x  Gluc: 99 mg/dL / Ketone: x  / Bili: x / Urobili: x   Blood: x / Protein: x / Nitrite: x   Leuk Esterase: x / RBC: x / WBC x   Sq Epi: x / Non Sq Epi: x / Bacteria: x    < from: CT Head No Cont (05.30.24 @ 10:16) >    IMPRESSION: Previously noted area of acute parenchymal hemorrhage has   demonstrated expected evolutionary changes.      < end of copied text >    < from: [facility] Duplex Lower Ext Vein Scan, Bilat (05.30.24 @ 09:48) >    IMPRESSION:    No acute DVT of the lower extremities. Right soleal vein demonstrates   patency and compressibility.      < end of copied text >      ALLERGIES  No Known Allergies      MEDICATIONS   acetaminophen     Tablet .. 650 milliGRAM(s) Oral every 6 hours PRN  allopurinol 100 milliGRAM(s) Oral daily  amLODIPine   Tablet 5 milliGRAM(s) Oral daily  enoxaparin Injectable 40 milliGRAM(s) SubCutaneous every 24 hours  escitalopram 10 milliGRAM(s) Oral daily  gabapentin 300 milliGRAM(s) Oral daily  gabapentin 400 milliGRAM(s) Oral at bedtime  metoprolol tartrate 12.5 milliGRAM(s) Oral two times a day  multivitamin 1 Tablet(s) Oral daily  polyethylene glycol 3350 17 Gram(s) Oral two times a day  rosuvastatin 10 milliGRAM(s) Oral at bedtime  traMADol 25 milliGRAM(s) Oral every 6 hours PRN  traZODone 25 milliGRAM(s) Oral at bedtime      ----------------------------------------------------------------------------------------  PHYSICAL EXAM  Constitutional - NAD, Comfortable, in bed   Chest - Breathing comfortably  Cardiovascular - S1S2   Abdomen - Soft   Extremities - No C/C/E, No calf tenderness   Neurologic Exam -   follows commands                 Cognitive - Awake, Alert, AAO to self, place, date, year, situation     Communication - Fluent, No dysarthria        Motor - right hemiplegia         Psychiatric - Mood stable, Affect WNL  ----------------------------------------------------------------------------------------  ASSESSMENT/PLAN  59yMale h/o gout, recent left thalamic hemorrhage with right hemiplegia, RLE DVT  with functional deficits   patient was discharged from [place] to [place] [facility] on 4/15, then [facility] on 5/9  CT head with no acute findings  LE doppler negative for acute DVT  Pain - Tylenol gabapentin, tramadol consider increasing Gabapentin, add midday dose  DVT PPX - SCDs lovenox   Rehab - Will continue to follow for ongoing rehab needs and recommendations.    patient requires max assist with mobility, needs continued inpatient rehabilitation   he is not able to return to Acute rehab with same diagnosis of left thalamic IP   recommend [facility] for ADL and mobility restoration in a supervised setting.

## 2024-06-04 RX ORDER — TRAMADOL HYDROCHLORIDE 50 MG/1
25 TABLET ORAL EVERY 6 HOURS
Refills: 0 | Status: DISCONTINUED | OUTPATIENT
Start: 2024-06-04 | End: 2024-06-07

## 2024-06-04 RX ADMIN — Medication 1 TABLET(S): at 12:33

## 2024-06-04 RX ADMIN — ENOXAPARIN SODIUM 40 MILLIGRAM(S): 100 INJECTION SUBCUTANEOUS at 17:42

## 2024-06-04 RX ADMIN — ROSUVASTATIN CALCIUM 10 MILLIGRAM(S): 5 TABLET ORAL at 21:38

## 2024-06-04 RX ADMIN — Medication 12.5 MILLIGRAM(S): at 17:42

## 2024-06-04 RX ADMIN — Medication 12.5 MILLIGRAM(S): at 05:28

## 2024-06-04 RX ADMIN — ESCITALOPRAM OXALATE 10 MILLIGRAM(S): 10 TABLET, FILM COATED ORAL at 12:33

## 2024-06-04 RX ADMIN — Medication 100 MILLIGRAM(S): at 12:33

## 2024-06-04 RX ADMIN — AMLODIPINE BESYLATE 5 MILLIGRAM(S): 2.5 TABLET ORAL at 05:28

## 2024-06-04 RX ADMIN — GABAPENTIN 300 MILLIGRAM(S): 400 CAPSULE ORAL at 12:32

## 2024-06-04 RX ADMIN — TRAMADOL HYDROCHLORIDE 25 MILLIGRAM(S): 50 TABLET ORAL at 16:57

## 2024-06-04 RX ADMIN — TRAMADOL HYDROCHLORIDE 25 MILLIGRAM(S): 50 TABLET ORAL at 05:28

## 2024-06-04 RX ADMIN — GABAPENTIN 400 MILLIGRAM(S): 400 CAPSULE ORAL at 21:37

## 2024-06-05 ENCOUNTER — TRANSCRIPTION ENCOUNTER (OUTPATIENT)
Age: 59
End: 2024-06-05

## 2024-06-05 RX ADMIN — TRAMADOL HYDROCHLORIDE 25 MILLIGRAM(S): 50 TABLET ORAL at 05:12

## 2024-06-05 RX ADMIN — ROSUVASTATIN CALCIUM 10 MILLIGRAM(S): 5 TABLET ORAL at 21:56

## 2024-06-05 RX ADMIN — TRAMADOL HYDROCHLORIDE 25 MILLIGRAM(S): 50 TABLET ORAL at 21:56

## 2024-06-05 RX ADMIN — TRAMADOL HYDROCHLORIDE 25 MILLIGRAM(S): 50 TABLET ORAL at 06:07

## 2024-06-05 RX ADMIN — Medication 1 TABLET(S): at 12:07

## 2024-06-05 RX ADMIN — GABAPENTIN 400 MILLIGRAM(S): 400 CAPSULE ORAL at 21:56

## 2024-06-05 RX ADMIN — TRAMADOL HYDROCHLORIDE 25 MILLIGRAM(S): 50 TABLET ORAL at 22:56

## 2024-06-05 RX ADMIN — Medication 12.5 MILLIGRAM(S): at 05:12

## 2024-06-05 RX ADMIN — AMLODIPINE BESYLATE 5 MILLIGRAM(S): 2.5 TABLET ORAL at 05:12

## 2024-06-05 RX ADMIN — Medication 100 MILLIGRAM(S): at 12:07

## 2024-06-05 RX ADMIN — Medication 12.5 MILLIGRAM(S): at 17:18

## 2024-06-05 RX ADMIN — TRAMADOL HYDROCHLORIDE 25 MILLIGRAM(S): 50 TABLET ORAL at 12:07

## 2024-06-05 RX ADMIN — GABAPENTIN 300 MILLIGRAM(S): 400 CAPSULE ORAL at 12:07

## 2024-06-05 RX ADMIN — ENOXAPARIN SODIUM 40 MILLIGRAM(S): 100 INJECTION SUBCUTANEOUS at 17:18

## 2024-06-05 NOTE — DISCHARGE NOTE PROVIDER - NSDCCPCAREPLAN_GEN_ALL_CORE_FT
PRINCIPAL DISCHARGE DIAGNOSIS  Diagnosis: Leg pain  Assessment and Plan of Treatment: You were found to have leg pain upon admission. Xray imaging was negative for fracture. Ultrasound of lower extremities was negative for DVT. Now resolved. Follow up with your PCP in 1-2 weeks. 	     PRINCIPAL DISCHARGE DIAGNOSIS  Diagnosis: AMS (altered mental status)  Assessment and Plan of Treatment: AMS - transferred to ER & back to baseline. CTH no acute finding except recent acute infarct.   Weakness- severe R sided hemiplegia. Patient  did not improve in subacute rehab may quality for go to acute rehab. He needs PT and physiatrist re-evaluation.         SECONDARY DISCHARGE DIAGNOSES  Diagnosis: Weakness  Assessment and Plan of Treatment: Weakness- severe R sided hemiplegia. Patient  did not improve in subacute rehab may quality for go to acute rehab. He needs PT and physiatrist re-evaluation.       Diagnosis: CVA (cerebral vascular accident)  Assessment and Plan of Treatment: L Thalamic [facility] - 4/24  - MRI: L thalamic hemorrhage, w/ chronic microhemorrhages, and acute infarcts in the right caudate tail and periatrial white matter, EKG w/o AF, Echo unremarkable, CTA w/o large occlusions    Diagnosis: Deep vein thrombosis (DVT)  Assessment and Plan of Treatment: Right soleal DVT, 4/26 - Cont. Lovenox 40mg, Repeat duplex (5/3) - stable right soleal DVT, no propagation. Repeat venous doppler L leg    Diagnosis: HTN (hypertension)  Assessment and Plan of Treatment: HTN - Continue Norvasc, metoprolol BID.

## 2024-06-05 NOTE — DISCHARGE NOTE PROVIDER - NSDCMRMEDTOKEN_GEN_ALL_CORE_FT
acetaminophen 325 mg oral tablet: 2 tab(s) orally every 6 hours As needed Mild Pain (1 - 3)  allopurinol 100 mg oral tablet: 1 tab(s) orally once a day  amLODIPine 5 mg oral tablet: 1 tab(s) orally once a day  Crestor 10 mg oral tablet: 1 tab(s) orally once a day (at bedtime)  DermaMed Topical Ointment: Apply to sacral/coccyx area BID  Dulcolax Laxative 10 mg rectal suppository: 1 suppository(ies) rectally every 48 hours prn  enoxaparin: 40 milligram(s) subcutaneous once a day (at bedtime)  escitalopram 10 mg oral tablet: 1 tab(s) orally once a day (in the evening)  Flexeril 10 mg oral tablet: 1 tab(s) orally 3 times a day prn  gabapentin 300 mg oral capsule: 1 cap(s) orally once a day  gabapentin 400 mg oral capsule: 1 cap(s) orally once a day (at bedtime)  metoprolol: 12.5 milligram(s) orally 2 times a day 8 AM and 8 PM  Milk of Magnesia 1200 mg/15 mL oral liquid: 30 milliliter(s) orally every 48 hours prn  Multiple Vitamins oral tablet: 1 tab(s) orally once a day  polyethylene glycol 3350 oral powder for reconstitution: 17 gram(s) orally 2 times a day  traMADol 50 mg oral tablet: 1 tab(s) orally every 6 hours prn  traZODone: 25 milligram(s) orally once a day (at bedtime)   acetaminophen 325 mg oral tablet: 2 tab(s) orally every 6 hours As needed Temp greater or equal to 38C (100.4F), Mild Pain (1 - 3)  allopurinol 100 mg oral tablet: 1 tab(s) orally once a day  amLODIPine 5 mg oral tablet: 1 tab(s) orally once a day  Crestor 10 mg oral tablet: 1 tab(s) orally once a day (at bedtime)  Dulcolax Laxative 10 mg rectal suppository: 1 suppository(ies) rectally every 48 hours prn  enoxaparin: 40 milligram(s) subcutaneous once a day (at bedtime)  escitalopram 10 mg oral tablet: 1 tab(s) orally once a day (in the evening)  Flexeril 10 mg oral tablet: 1 tab(s) orally 3 times a day prn  gabapentin 300 mg oral capsule: 1 cap(s) orally once a day (in the morning)  gabapentin 400 mg oral capsule: 1 cap(s) orally once a day (at bedtime)  Milk of Magnesia 1200 mg/15 mL oral liquid: 30 milliliter(s) orally every 48 hours prn  Multiple Vitamins oral tablet: 1 tab(s) orally once a day  polyethylene glycol 3350 oral powder for reconstitution: 17 gram(s) orally 2 times a day  traMADol 50 mg oral tablet: 1 tab(s) orally every 6 hours prn  traZODone: 25 milligram(s) orally once a day (at bedtime)   acetaminophen 325 mg oral tablet: 2 tab(s) orally every 6 hours As needed Temp greater or equal to 38C (100.4F), Mild Pain (1 - 3)  allopurinol 100 mg oral tablet: 1 tab(s) orally once a day  amLODIPine 5 mg oral tablet: 1 tab(s) orally once a day  Crestor 10 mg oral tablet: 1 tab(s) orally once a day (at bedtime)  Dulcolax Laxative 10 mg rectal suppository: 1 suppository(ies) rectally every 48 hours prn  enoxaparin 40 mg/0.4 mL injectable solution: 40 milligram(s) subcutaneously once a day As directed  / wife will administer  escitalopram 10 mg oral tablet: 1 tab(s) orally once a day (in the evening)  Flexeril 10 mg oral tablet: 1 tab(s) orally 3 times a day prn  gabapentin 300 mg oral capsule: 1 cap(s) orally once a day (in the morning)  gabapentin 400 mg oral capsule: 1 cap(s) orally once a day (at bedtime)  Milk of Magnesia 1200 mg/15 mL oral liquid: 30 milliliter(s) orally every 48 hours prn  Multiple Vitamins oral tablet: 1 tab(s) orally once a day  polyethylene glycol 3350 oral powder for reconstitution: 17 gram(s) orally 2 times a day  traMADol 50 mg oral tablet: 1 tab(s) orally every 6 hours prn  traZODone: 25 milligram(s) orally once a day (at bedtime)   acetaminophen 325 mg oral tablet: 2 tab(s) orally every 6 hours As needed Temp greater or equal to 38C (100.4F), Mild Pain (1 - 3)  allopurinol 100 mg oral tablet: 1 tab(s) orally once a day as directed  amLODIPine 5 mg oral tablet: 1 tab(s) orally once a day as directed  Crestor 10 mg oral tablet: 1 tab(s) orally once a day (at bedtime) as directed  cyclobenzaprine 10 mg oral tablet: 1 tab(s) orally 3 times a day as needed for  moderate pain as directed MDD: 3  Dulcolax Laxative 10 mg rectal suppository: 1 suppository(ies) rectally every 48 hours as needed for  constipation prn  enoxaparin 40 mg/0.4 mL injectable solution: 40 milligram(s) subcutaneously once a day As directed  / wife will administer  escitalopram 10 mg oral tablet: 1 tab(s) orally once a day (in the evening)  Flexeril 10 mg oral tablet: 1 tab(s) orally 3 times a day prn  gabapentin 300 mg oral capsule: 1 cap(s) orally once a day (in the morning)  gabapentin 400 mg oral capsule: 1 cap(s) orally once a day (at bedtime)  Lexapro 10 mg oral tablet: 1 tab(s) orally once a day as directed - in the evening  metoprolol tartrate 25 mg oral tablet: 0.5 tab(s) orally 2 times a day Take at 8AM and 8PM  Milk of Magnesia 1200 mg/15 mL oral liquid: 30 milliliter(s) orally every 48 hours prn  Multiple Vitamins oral tablet: 1 tab(s) orally once a day  polyethylene glycol 3350 oral powder for reconstitution: 17 gram(s) orally 2 times a day as needed for  constipation  traMADol 50 mg oral tablet: 1 tab(s) orally every 6 hours prn  traZODone 50 mg oral tablet: 0.5 tablet orally once a day (at bedtime) as directed MDD: 0.5   acetaminophen 325 mg oral tablet: 2 tab(s) orally every 6 hours As needed Temp greater or equal to 38C (100.4F), Mild Pain (1 - 3)  allopurinol 100 mg oral tablet: 1 tab(s) orally once a day as directed  amLODIPine 5 mg oral tablet: 1 tab(s) orally once a day as directed  Crestor 10 mg oral tablet: 1 tab(s) orally once a day (at bedtime) as directed  cyclobenzaprine 10 mg oral tablet: 1 tab(s) orally 3 times a day as needed for  moderate pain as directed MDD: 3  Dulcolax Laxative 10 mg rectal suppository: 1 suppository(ies) rectally every 48 hours as needed for  constipation prn  enoxaparin 40 mg/0.4 mL injectable solution: 40 milligram(s) subcutaneously once a day As directed  / wife will administer  gabapentin 300 mg oral capsule: 1 cap(s) orally once a day (in the morning) as directed  - once in the am MDD: 1  gabapentin 400 mg oral capsule: 1 cap(s) orally once a day (at bedtime) as directed MDD: 1  Lexapro 10 mg oral tablet: 1 tab(s) orally once a day as directed - in the evening  metoprolol tartrate 25 mg oral tablet: 0.5 tab(s) orally 2 times a day Take at 8AM and 8PM  Milk of Magnesia 1200 mg/15 mL oral liquid: 30 milliliter(s) orally every 48 hours prn  Multiple Vitamins oral tablet: 1 tab(s) orally once a day  polyethylene glycol 3350 oral powder for reconstitution: 17 gram(s) orally 2 times a day as needed for  constipation  traMADol 50 mg oral tablet: 1 tab(s) orally every 6 hours prn  traZODone 50 mg oral tablet: 0.5 tablet orally once a day (at bedtime) as directed MDD: 0.5

## 2024-06-05 NOTE — DISCHARGE NOTE PROVIDER - HOSPITAL COURSE
HPI:  60 y/o M PMHx gout found down at home on 4/2/24 by wife brought to [facility] by ambulance found to have a L thalamic hemorrhagic infarct via CT. Patient intubated for inability to protect airway. MRI demonstrated hemorrhage with anticipated evolution. Chronic microhemorrhages found in the R thalamus and BG as well as small acute infarcts in the right caudate tail and periatrial white matter. CTA clear of major vessel occlusion or stenosis. Patient with residual R hemiparesis, dysarthria, mild aphasia, and R gaze palsy. EEG negative throughout workup. EKG without AF. TTE grossly normal. Gout flare treated by rheumatology with colchicine while inpatient. Patient received PM&R consult who recommended acute inpatient rehabilitation. Patient was medically stabilized and discharged to [facility] 4/15/24.  Rehab course significant for development of below-knee DVT, seen on 4/26/24. Otherwise, all medical co-morbidities were stable. Patient tolerated course of inpatient PT/OT/SLP rehab with significant improvements and met rehab goals prior to discharge. Discharge instructions were discussed with patient and family. All questions answered and all concerns addressed. Patient was medically cleared for discharge to [facility].    Patient was transferred to ER due to AMS according to wife as well as L leg pain. In ER he is back to normal base mental status. CT head no acute finding.  (30 May 2024 12:32)    Hospital Course: Patient was admitted for further medical management. Xray of Right femur, knee, tib/fib negative for acute findings. LE duplex revealed stable right soleal vein DVT. CT head revealed "previously noted area of acute parenchymal hemorrhage has demonstrated expected evolutionary changes."         Important Medication Changes and Reason:    Active or Pending Issues Requiring Follow-up:    Advanced Directives:   [ ] Full code  [ ] DNR  [ ] Hospice    Discharge Diagnoses:         HPI:  60 y/o M PMHx gout found down at home on 4/2/24 by wife brought to [facility] by ambulance found to have a L thalamic hemorrhagic infarct via CT. Patient intubated for inability to protect airway. MRI demonstrated hemorrhage with anticipated evolution. Chronic microhemorrhages found in the R thalamus and BG as well as small acute infarcts in the right caudate tail and periatrial white matter. CTA clear of major vessel occlusion or stenosis. Patient with residual R hemiparesis, dysarthria, mild aphasia, and R gaze palsy. EEG negative throughout workup. EKG without AF. TTE grossly normal. Gout flare treated by rheumatology with colchicine while inpatient. Patient received PM&R consult who recommended acute inpatient rehabilitation. Patient was medically stabilized and discharged to [facility] 4/15/24.  Rehab course significant for development of below-knee DVT, seen on 4/26/24. Otherwise, all medical co-morbidities were stable. Patient tolerated course of inpatient PT/OT/SLP rehab with significant improvements and met rehab goals prior to discharge. Discharge instructions were discussed with patient and family. All questions answered and all concerns addressed. Patient was medically cleared for discharge to [facility].    Patient was transferred to ER due to AMS according to wife as well as L leg pain. In ER he is back to normal base mental status. CT head no acute finding.  (30 May 2024 12:32)    Hospital Course: Patient was admitted for further medical management. Xray of Right femur, knee, tib/fib negative for acute findings. LE duplex revealed stable right soleal vein DVT. CT head revealed "previously noted area of acute parenchymal hemorrhage has demonstrated expected evolutionary changes."     Admitted for RLE pain/weakness. CTH neg; xray R femur, knee, tib-fib neg, US LE No acute DVT Right soleal vein stable. PT recc acute rehab however family opted for home w/ homecare.     Important Medication Changes and Reason:    Active or Pending Issues Requiring Follow-up:  F/u PCP  Advanced Directives:   [ ] Full code  [ ] DNR  [ ] Hospice    Discharge Diagnoses:  leg pain     HPI:  58 y/o M PMHx gout found down at home on 4/2/24 by wife brought to [facility] by ambulance found to have a L thalamic hemorrhagic infarct via CT. Patient intubated for inability to protect airway. MRI demonstrated hemorrhage with anticipated evolution. Chronic microhemorrhages found in the R thalamus and BG as well as small acute infarcts in the right caudate tail and periatrial white matter. CTA clear of major vessel occlusion or stenosis. Patient with residual R hemiparesis, dysarthria, mild aphasia, and R gaze palsy. EEG negative throughout workup. EKG without AF. TTE grossly normal. Gout flare treated by rheumatology with colchicine while inpatient. Patient received PM&R consult who recommended acute inpatient rehabilitation. Patient was medically stabilized and discharged to [facility] 4/15/24.  Rehab course significant for development of below-knee DVT, seen on 4/26/24. Otherwise, all medical co-morbidities were stable. Patient tolerated course of inpatient PT/OT/SLP rehab with significant improvements and met rehab goals prior to discharge. Discharge instructions were discussed with patient and family. All questions answered and all concerns addressed. Patient was medically cleared for discharge to [facility].    Patient was transferred to ER due to AMS according to wife as well as L leg pain. In ER he is back to normal base mental status. CT head no acute finding.  (30 May 2024 12:32)    Hospital Course:   AMS - transferred to ER & back to baseline. CTH no acute finding except recent acute infarct.     Weakness- severe R sided hemiplegia. Patient  did not improve in subacute rehab may quality for go to acute rehab. He needs PT and physiatrist re-evaluation.     L Thalamic IPH - MRI: L thalamic hemorrhage, w/ chronic microhemorrhages, and acute infarcts in the right caudate tail and periatrial white matter, EKG w/o AF, Echo unremarkable, CTA w/o large occlusions    Right soleal DVT, 4/26 - Cont. Lovenox 40mg, Repeat duplex (5/3) - stable right soleal DVT, no propagation. Repeat venous doppler L leg     HTN - Continue Norvasc, metoprolol BID.    Hyperlipidemia - elevated cholesterol and LDL. Treat with Crestor and repeat lipid profile in 2 months.    Pain management - Tylenol PRN, Gabapentin, Tramadol.    Gout - Continue allopurinol    Mood / Cognition - Lexapro, f/u with psych.    Sleep - on Trazodone qhs    GI / Bowel -Cont. MiraLAX and bisacodyl    Patient was admitted for further medical management. Xray of Right femur, knee, tib/fib negative for acute findings. LE duplex revealed stable right soleal vein DVT. CT head revealed "previously noted area of acute parenchymal hemorrhage has demonstrated expected evolutionary changes."     Admitted for RLE pain/weakness. CTH neg; xray R femur, knee, tib-fib neg, US LE No acute DVT Right soleal vein stable. PT recc acute rehab however family opted for home w/ homecare.     Important Medication Changes and Reason: Home meds     Active or Pending Issues Requiring Follow-up:  F/u Home and will f/u with PCP  Advanced Directives:   [X] Full code  [ ] DNR  [ ] Hospice    Discharge Diagnoses:  AMS   Right hemiparesis   Right leg pain     HPI:  58 y/o M PMHx gout found down at home on 4/2/24 by wife brought to [facility] by ambulance found to have a L thalamic hemorrhagic infarct via CT. Patient intubated for inability to protect airway. MRI demonstrated hemorrhage with anticipated evolution. Chronic microhemorrhages found in the R thalamus and BG as well as small acute infarcts in the right caudate tail and periatrial white matter. CTA clear of major vessel occlusion or stenosis. Patient with residual R hemiparesis, dysarthria, mild aphasia, and R gaze palsy. EEG negative throughout workup. EKG without AF. TTE grossly normal. Gout flare treated by rheumatology with colchicine while inpatient. Patient received PM&R consult who recommended acute inpatient rehabilitation. Patient was medically stabilized and discharged to [facility] 4/15/24.  Rehab course significant for development of below-knee DVT, seen on 4/26/24. Otherwise, all medical co-morbidities were stable. Patient tolerated course of inpatient PT/OT/SLP rehab with significant improvements and met rehab goals prior to discharge. Discharge instructions were discussed with patient and family. All questions answered and all concerns addressed. Patient was medically cleared for discharge to [facility].    Patient was transferred to ER due to AMS according to wife as well as L leg pain. In ER he is back to normal base mental status. CT head no acute finding.  (30 May 2024 12:32)    Hospital Course:   AMS - transferred to ER & back to baseline. CTH no acute finding except recent acute infarct.     Weakness- severe R sided hemiplegia. Patient  did not improve in subacute rehab may quality for go to acute rehab. He needs PT and physiatrist re-evaluation.     L Thalamic IPH - MRI: L thalamic hemorrhage, w/ chronic microhemorrhages, and acute infarcts in the right caudate tail and periatrial white matter, EKG w/o AF, Echo unremarkable, CTA w/o large occlusions    Right soleal DVT, 4/26 - Cont. Lovenox 40mg, Repeat duplex (5/3) - stable right soleal DVT, no propagation. Repeat venous doppler L leg in 1 month    HTN - Continue Norvasc, metoprolol BID.    Hyperlipidemia - elevated cholesterol and LDL. Treat with Crestor and repeat lipid profile in 2 months.    Pain management - Tylenol PRN, Gabapentin, Tramadol.    Gout - Continue allopurinol    Mood / Cognition - Lexapro, f/u with psych.    Sleep - on Trazodone qhs    GI / Bowel -Cont. MiraLAX and bisacodyl    Patient was admitted for further medical management. Xray of Right femur, knee, tib/fib negative for acute findings. LE duplex revealed stable right soleal vein DVT. CT head revealed "previously noted area of acute parenchymal hemorrhage has demonstrated expected evolutionary changes."     Admitted for RLE pain/weakness. CTH neg; xray R femur, knee, tib-fib neg, US LE No acute DVT Right soleal vein stable. PT recc acute rehab however family opted for home w/ homecare.     Important Medication Changes and Reason: Home meds  / Continue with Lovenox 40 daily and f/u in 1 month with repeat doppler     Active or Pending Issues Requiring Follow-up:  F/u Home and will f/u with PCP  Advanced Directives:   [X] Full code  [ ] DNR  [ ] Hospice    Discharge Diagnoses:  AMS   Right hemiparesis   Right leg pain  + soleal DVT

## 2024-06-05 NOTE — DISCHARGE NOTE PROVIDER - CARE PROVIDER_API CALL
Elfego Rogel  Geriatric Medicine  [address] [place] [address], [facility] [address]  [place] [address]  Phone: [phone]  Fax: [phone]  Follow Up Time:    Jeffery Lang  [address] [place] [address]  [id number]  [facility] [address]  Phone: [phone]  Fax: (   )    -  Follow Up Time:

## 2024-06-05 NOTE — DISCHARGE NOTE PROVIDER - REASON FOR ADMISSION
AMS and leg pain Minocycline Pregnancy And Lactation Text: This medication is Pregnancy Category D and not consider safe during pregnancy. It is also excreted in breast milk.

## 2024-06-05 NOTE — DISCHARGE NOTE PROVIDER - PROVIDER TOKENS
PROVIDER:[TOKEN:[[id number]]] FREE:[LAST:[Dr Morrow],FIRST:[Jeffery],PHONE:[phone]],FAX:[(   )    -],ADDRESS:[[address] [place] [address] [address] [place] [address]]]

## 2024-06-06 RX ORDER — METOPROLOL TARTRATE 50 MG
0.5 TABLET ORAL
Qty: 30 | Refills: 0
Start: 2024-06-06 | End: 2024-07-05

## 2024-06-06 RX ORDER — CHLORHEXIDINE GLUCONATE 213 G/1000ML
1 SOLUTION TOPICAL
Refills: 0 | Status: DISCONTINUED | OUTPATIENT
Start: 2024-06-06 | End: 2024-06-14

## 2024-06-06 RX ORDER — ACETAMINOPHEN 500 MG
2 TABLET ORAL
Qty: 0 | Refills: 0 | DISCHARGE
Start: 2024-06-06

## 2024-06-06 RX ADMIN — Medication 1000 MILLIGRAM(S): at 07:35

## 2024-06-06 RX ADMIN — Medication 1 TABLET(S): at 11:05

## 2024-06-06 RX ADMIN — TRAMADOL HYDROCHLORIDE 25 MILLIGRAM(S): 50 TABLET ORAL at 07:37

## 2024-06-06 RX ADMIN — Medication 100 MILLIGRAM(S): at 11:05

## 2024-06-06 RX ADMIN — TRAMADOL HYDROCHLORIDE 25 MILLIGRAM(S): 50 TABLET ORAL at 05:24

## 2024-06-06 RX ADMIN — TRAMADOL HYDROCHLORIDE 25 MILLIGRAM(S): 50 TABLET ORAL at 13:17

## 2024-06-06 RX ADMIN — Medication 12.5 MILLIGRAM(S): at 17:33

## 2024-06-06 RX ADMIN — Medication 25 MILLIGRAM(S): at 22:13

## 2024-06-06 RX ADMIN — GABAPENTIN 300 MILLIGRAM(S): 400 CAPSULE ORAL at 11:05

## 2024-06-06 RX ADMIN — TRAMADOL HYDROCHLORIDE 25 MILLIGRAM(S): 50 TABLET ORAL at 06:31

## 2024-06-06 RX ADMIN — AMLODIPINE BESYLATE 5 MILLIGRAM(S): 2.5 TABLET ORAL at 05:24

## 2024-06-06 RX ADMIN — ENOXAPARIN SODIUM 40 MILLIGRAM(S): 100 INJECTION SUBCUTANEOUS at 17:33

## 2024-06-06 RX ADMIN — Medication 12.5 MILLIGRAM(S): at 05:25

## 2024-06-06 RX ADMIN — ROSUVASTATIN CALCIUM 10 MILLIGRAM(S): 5 TABLET ORAL at 22:13

## 2024-06-06 RX ADMIN — TRAMADOL HYDROCHLORIDE 25 MILLIGRAM(S): 50 TABLET ORAL at 12:17

## 2024-06-06 RX ADMIN — Medication 650 MILLIGRAM(S): at 07:37

## 2024-06-06 RX ADMIN — Medication 15 MILLIGRAM(S): at 07:36

## 2024-06-06 RX ADMIN — TRAMADOL HYDROCHLORIDE 25 MILLIGRAM(S): 50 TABLET ORAL at 19:20

## 2024-06-06 RX ADMIN — ESCITALOPRAM OXALATE 10 MILLIGRAM(S): 10 TABLET, FILM COATED ORAL at 11:05

## 2024-06-06 RX ADMIN — TRAMADOL HYDROCHLORIDE 25 MILLIGRAM(S): 50 TABLET ORAL at 18:20

## 2024-06-06 RX ADMIN — TRAMADOL HYDROCHLORIDE 25 MILLIGRAM(S): 50 TABLET ORAL at 07:38

## 2024-06-06 RX ADMIN — GABAPENTIN 400 MILLIGRAM(S): 400 CAPSULE ORAL at 22:13

## 2024-06-06 NOTE — SPEECH LANGUAGE PATHOLOGY EVALUATION - COMMENTS
hx con't  5/30 imaging:  CXR clear lungs  Xray R tib, fib, femur, knee: No fracture or dislocation. Alignment is anatomic. Joint spaces are preserved. Small knee joint effusion    Known to this service from admission in April after L thalamic hemorrhage with x1 speech language exam, x2 bedside swallow exams, and x1 MBS. MBS conducted on 4/11/24, notable for silent aspiration of thin liquids via straw and penetration to the vocal cords w/o retrieval with teaspoon ; silent penetration of mildly thick liquids via straw w/o retrieval, laryngeal penetration with retrieval with moderately thick with straw and cup. Most recent MBS in PACS performed at [facility] 4/18/24. Exam details obtained in HIE; recommendations made for minced-moist and thin liquids with SINGLE SIPS only. Per SLP notes from  in HIE pt was upgraded to soft-bite-sized solids in 5/1. Pt also noted to be impulsive w/ rushing thoughts and has been noted in prior SLP exams w/ component of impulsivity - pt often rushing to answer questions and then when incorrect, and asked to slow down, pt able to achieve correct response.  Pt w/ difficulty with simple daily math problems  Clock drawing activity: numbers askew by x1 placement after 5. Pt able to correct after prompting and placed numbers in correct place. He reports reduced visual skills, however, given that pt was fully able to write numbers in correct place and able to read text and see picture details earlier in exam, suspect primarily reduced organizational skills. Cognition: 1. Pt will improve cognitive-linguistic skills for functional communication. WFL

## 2024-06-06 NOTE — SPEECH LANGUAGE PATHOLOGY EVALUATION - SLP VISUAL PERCEPTION AND PROCESSING
pt reports blurry vision w/ more fine print and writing, however, functionally able to complete picture task and read larger font text

## 2024-06-06 NOTE — SPEECH LANGUAGE PATHOLOGY EVALUATION - SLP GENERAL OBSERVATIONS
Pt received upright in [facility], on room air, Aox4, following all directives, verbally making wants/needs known in English. Pt reports he used to work as an  prior to stroke. Reports no dysphagia; reporting that thoughts are "less sharp" than before and he feels his cognitive function is between 80-90% as compared to prior.

## 2024-06-06 NOTE — SPEECH LANGUAGE PATHOLOGY EVALUATION - H & P REVIEW
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 Patient intubated for inability to protect airway. MRI demonstrated hemorrhage with anticipated evolution. Chronic microhemorrhages found in the R thalamus and BG as well as small acute infarcts in the right caudate tail and periatrial white matter. CTA clear of major vessel occlusion or stenosis. Patient with residual R hemiparesis, dysarthria, mild aphasia, and R gaze palsy. EEG negative throughout workup. EKG without AF. TTE grossly normal. Gout flare treated by rheumatology with colchicine while inpatient. Patient received PM&R consult who recommended acute inpatient rehabilitation. Patient was medically stabilized and discharged to [facility] 4/15/24. Rehab course significant for development of below-knee DVT, seen on 4/26/24. Otherwise, all medical co-morbidities were stable. Patient tolerated course of inpatient PT/OT/SLP rehab with significant improvements and met rehab goals prior to discharge. Discharge instructions were discussed with patient and family. All questions answered and all concerns addressed. Patient was medically cleared for discharge to [facility]. Patient was transferred to ER due to AMS according to wife as well as L leg pain. In ER he is back to normal base mental status. CT head no acute finding/yes

## 2024-06-06 NOTE — SPEECH LANGUAGE PATHOLOGY EVALUATION - SLP DIAGNOSIS
Pt is a 59yoM w/ recent L thalamic hemorrhage (4/2/24) presenting from rehab with L leg pain. Pt p/w intact speech (no dysarthria) and receptive-expressive language skills (no aphasia), but with primarily cognitive-linguistic impairments with component of mental impulsivity/rushing. Pt able to follow directives and make wants/needs known. Cognitive-linguistic impairments primarily noted in short term memory, organizational and complex problem solving tasks, and mental flexibility.

## 2024-06-07 LAB
MRSA PCR RESULT.: SIGNIFICANT CHANGE UP
S AUREUS DNA NOSE QL NAA+PROBE: SIGNIFICANT CHANGE UP

## 2024-06-07 RX ORDER — TRAMADOL HYDROCHLORIDE 50 MG/1
25 TABLET ORAL ONCE
Refills: 0 | Status: DISCONTINUED | OUTPATIENT
Start: 2024-06-07 | End: 2024-06-07

## 2024-06-07 RX ADMIN — Medication 12.5 MILLIGRAM(S): at 17:13

## 2024-06-07 RX ADMIN — GABAPENTIN 300 MILLIGRAM(S): 400 CAPSULE ORAL at 11:22

## 2024-06-07 RX ADMIN — AMLODIPINE BESYLATE 5 MILLIGRAM(S): 2.5 TABLET ORAL at 05:14

## 2024-06-07 RX ADMIN — Medication 25 MILLIGRAM(S): at 21:41

## 2024-06-07 RX ADMIN — TRAMADOL HYDROCHLORIDE 25 MILLIGRAM(S): 50 TABLET ORAL at 09:19

## 2024-06-07 RX ADMIN — Medication 650 MILLIGRAM(S): at 11:22

## 2024-06-07 RX ADMIN — Medication 12.5 MILLIGRAM(S): at 05:13

## 2024-06-07 RX ADMIN — Medication 100 MILLIGRAM(S): at 11:22

## 2024-06-07 RX ADMIN — TRAMADOL HYDROCHLORIDE 25 MILLIGRAM(S): 50 TABLET ORAL at 14:29

## 2024-06-07 RX ADMIN — TRAMADOL HYDROCHLORIDE 25 MILLIGRAM(S): 50 TABLET ORAL at 13:29

## 2024-06-07 RX ADMIN — Medication 650 MILLIGRAM(S): at 12:22

## 2024-06-07 RX ADMIN — TRAMADOL HYDROCHLORIDE 25 MILLIGRAM(S): 50 TABLET ORAL at 05:23

## 2024-06-07 RX ADMIN — ESCITALOPRAM OXALATE 10 MILLIGRAM(S): 10 TABLET, FILM COATED ORAL at 11:22

## 2024-06-07 RX ADMIN — GABAPENTIN 400 MILLIGRAM(S): 400 CAPSULE ORAL at 21:42

## 2024-06-07 RX ADMIN — ROSUVASTATIN CALCIUM 10 MILLIGRAM(S): 5 TABLET ORAL at 21:42

## 2024-06-07 RX ADMIN — Medication 1 TABLET(S): at 11:22

## 2024-06-07 RX ADMIN — ENOXAPARIN SODIUM 40 MILLIGRAM(S): 100 INJECTION SUBCUTANEOUS at 17:13

## 2024-06-07 RX ADMIN — CHLORHEXIDINE GLUCONATE 1 APPLICATION(S): 213 SOLUTION TOPICAL at 05:14

## 2024-06-08 RX ORDER — TRAMADOL HYDROCHLORIDE 50 MG/1
25 TABLET ORAL EVERY 8 HOURS
Refills: 0 | Status: DISCONTINUED | OUTPATIENT
Start: 2024-06-08 | End: 2024-06-11

## 2024-06-08 RX ORDER — TRAMADOL HYDROCHLORIDE 50 MG/1
25 TABLET ORAL ONCE
Refills: 0 | Status: DISCONTINUED | OUTPATIENT
Start: 2024-06-08 | End: 2024-06-08

## 2024-06-08 RX ADMIN — ROSUVASTATIN CALCIUM 10 MILLIGRAM(S): 5 TABLET ORAL at 22:04

## 2024-06-08 RX ADMIN — GABAPENTIN 300 MILLIGRAM(S): 400 CAPSULE ORAL at 12:05

## 2024-06-08 RX ADMIN — TRAMADOL HYDROCHLORIDE 25 MILLIGRAM(S): 50 TABLET ORAL at 05:49

## 2024-06-08 RX ADMIN — Medication 25 MILLIGRAM(S): at 22:04

## 2024-06-08 RX ADMIN — TRAMADOL HYDROCHLORIDE 25 MILLIGRAM(S): 50 TABLET ORAL at 12:59

## 2024-06-08 RX ADMIN — TRAMADOL HYDROCHLORIDE 25 MILLIGRAM(S): 50 TABLET ORAL at 21:03

## 2024-06-08 RX ADMIN — GABAPENTIN 400 MILLIGRAM(S): 400 CAPSULE ORAL at 22:04

## 2024-06-08 RX ADMIN — Medication 12.5 MILLIGRAM(S): at 17:44

## 2024-06-08 RX ADMIN — TRAMADOL HYDROCHLORIDE 25 MILLIGRAM(S): 50 TABLET ORAL at 06:49

## 2024-06-08 RX ADMIN — ENOXAPARIN SODIUM 40 MILLIGRAM(S): 100 INJECTION SUBCUTANEOUS at 17:44

## 2024-06-08 RX ADMIN — POLYETHYLENE GLYCOL 3350 17 GRAM(S): 17 POWDER, FOR SOLUTION ORAL at 17:44

## 2024-06-08 RX ADMIN — Medication 100 MILLIGRAM(S): at 12:05

## 2024-06-08 RX ADMIN — Medication 12.5 MILLIGRAM(S): at 05:27

## 2024-06-08 RX ADMIN — CHLORHEXIDINE GLUCONATE 1 APPLICATION(S): 213 SOLUTION TOPICAL at 05:27

## 2024-06-08 RX ADMIN — AMLODIPINE BESYLATE 5 MILLIGRAM(S): 2.5 TABLET ORAL at 05:27

## 2024-06-08 RX ADMIN — Medication 1 TABLET(S): at 12:05

## 2024-06-08 RX ADMIN — TRAMADOL HYDROCHLORIDE 25 MILLIGRAM(S): 50 TABLET ORAL at 22:05

## 2024-06-08 RX ADMIN — ESCITALOPRAM OXALATE 10 MILLIGRAM(S): 10 TABLET, FILM COATED ORAL at 12:05

## 2024-06-08 RX ADMIN — TRAMADOL HYDROCHLORIDE 25 MILLIGRAM(S): 50 TABLET ORAL at 13:03

## 2024-06-09 RX ADMIN — ESCITALOPRAM OXALATE 10 MILLIGRAM(S): 10 TABLET, FILM COATED ORAL at 10:00

## 2024-06-09 RX ADMIN — TRAMADOL HYDROCHLORIDE 25 MILLIGRAM(S): 50 TABLET ORAL at 06:42

## 2024-06-09 RX ADMIN — TRAMADOL HYDROCHLORIDE 25 MILLIGRAM(S): 50 TABLET ORAL at 17:28

## 2024-06-09 RX ADMIN — TRAMADOL HYDROCHLORIDE 25 MILLIGRAM(S): 50 TABLET ORAL at 18:30

## 2024-06-09 RX ADMIN — CHLORHEXIDINE GLUCONATE 1 APPLICATION(S): 213 SOLUTION TOPICAL at 06:17

## 2024-06-09 RX ADMIN — AMLODIPINE BESYLATE 5 MILLIGRAM(S): 2.5 TABLET ORAL at 06:17

## 2024-06-09 RX ADMIN — Medication 12.5 MILLIGRAM(S): at 17:24

## 2024-06-09 RX ADMIN — TRAMADOL HYDROCHLORIDE 25 MILLIGRAM(S): 50 TABLET ORAL at 07:56

## 2024-06-09 RX ADMIN — Medication 25 MILLIGRAM(S): at 22:15

## 2024-06-09 RX ADMIN — ENOXAPARIN SODIUM 40 MILLIGRAM(S): 100 INJECTION SUBCUTANEOUS at 17:24

## 2024-06-09 RX ADMIN — Medication 12.5 MILLIGRAM(S): at 06:17

## 2024-06-09 RX ADMIN — Medication 1 TABLET(S): at 10:00

## 2024-06-09 RX ADMIN — Medication 100 MILLIGRAM(S): at 10:00

## 2024-06-09 RX ADMIN — GABAPENTIN 300 MILLIGRAM(S): 400 CAPSULE ORAL at 10:00

## 2024-06-09 RX ADMIN — GABAPENTIN 400 MILLIGRAM(S): 400 CAPSULE ORAL at 22:15

## 2024-06-09 RX ADMIN — ROSUVASTATIN CALCIUM 10 MILLIGRAM(S): 5 TABLET ORAL at 22:15

## 2024-06-10 RX ORDER — DIPHENHYDRAMINE HCL 50 MG
25 CAPSULE ORAL ONCE
Refills: 0 | Status: COMPLETED | OUTPATIENT
Start: 2024-06-10 | End: 2024-06-10

## 2024-06-10 RX ORDER — PETROLATUM,WHITE
1 JELLY (GRAM) TOPICAL
Refills: 0 | Status: DISCONTINUED | OUTPATIENT
Start: 2024-06-10 | End: 2024-06-14

## 2024-06-10 RX ADMIN — TRAMADOL HYDROCHLORIDE 25 MILLIGRAM(S): 50 TABLET ORAL at 06:00

## 2024-06-10 RX ADMIN — Medication 25 MILLIGRAM(S): at 23:54

## 2024-06-10 RX ADMIN — TRAMADOL HYDROCHLORIDE 25 MILLIGRAM(S): 50 TABLET ORAL at 16:20

## 2024-06-10 RX ADMIN — Medication 12.5 MILLIGRAM(S): at 05:25

## 2024-06-10 RX ADMIN — CHLORHEXIDINE GLUCONATE 1 APPLICATION(S): 213 SOLUTION TOPICAL at 05:25

## 2024-06-10 RX ADMIN — TRAMADOL HYDROCHLORIDE 25 MILLIGRAM(S): 50 TABLET ORAL at 05:25

## 2024-06-10 RX ADMIN — ENOXAPARIN SODIUM 40 MILLIGRAM(S): 100 INJECTION SUBCUTANEOUS at 17:52

## 2024-06-10 RX ADMIN — Medication 25 MILLIGRAM(S): at 22:36

## 2024-06-10 RX ADMIN — TRAMADOL HYDROCHLORIDE 25 MILLIGRAM(S): 50 TABLET ORAL at 15:22

## 2024-06-10 RX ADMIN — Medication 100 MILLIGRAM(S): at 10:59

## 2024-06-10 RX ADMIN — GABAPENTIN 300 MILLIGRAM(S): 400 CAPSULE ORAL at 10:58

## 2024-06-10 RX ADMIN — ESCITALOPRAM OXALATE 10 MILLIGRAM(S): 10 TABLET, FILM COATED ORAL at 10:59

## 2024-06-10 RX ADMIN — Medication 1 DROP(S): at 23:54

## 2024-06-10 RX ADMIN — ROSUVASTATIN CALCIUM 10 MILLIGRAM(S): 5 TABLET ORAL at 22:36

## 2024-06-10 RX ADMIN — Medication 1 TABLET(S): at 10:58

## 2024-06-10 RX ADMIN — AMLODIPINE BESYLATE 5 MILLIGRAM(S): 2.5 TABLET ORAL at 05:25

## 2024-06-10 RX ADMIN — GABAPENTIN 400 MILLIGRAM(S): 400 CAPSULE ORAL at 22:36

## 2024-06-10 RX ADMIN — Medication 12.5 MILLIGRAM(S): at 17:53

## 2024-06-10 RX ADMIN — Medication 1 DROP(S): at 17:52

## 2024-06-11 RX ORDER — TRAMADOL HYDROCHLORIDE 50 MG/1
25 TABLET ORAL EVERY 8 HOURS
Refills: 0 | Status: DISCONTINUED | OUTPATIENT
Start: 2024-06-11 | End: 2024-06-14

## 2024-06-11 RX ADMIN — Medication 12.5 MILLIGRAM(S): at 06:00

## 2024-06-11 RX ADMIN — CHLORHEXIDINE GLUCONATE 1 APPLICATION(S): 213 SOLUTION TOPICAL at 06:01

## 2024-06-11 RX ADMIN — TRAMADOL HYDROCHLORIDE 25 MILLIGRAM(S): 50 TABLET ORAL at 22:03

## 2024-06-11 RX ADMIN — Medication 12.5 MILLIGRAM(S): at 17:22

## 2024-06-11 RX ADMIN — TRAMADOL HYDROCHLORIDE 25 MILLIGRAM(S): 50 TABLET ORAL at 00:06

## 2024-06-11 RX ADMIN — TRAMADOL HYDROCHLORIDE 25 MILLIGRAM(S): 50 TABLET ORAL at 12:06

## 2024-06-11 RX ADMIN — TRAMADOL HYDROCHLORIDE 25 MILLIGRAM(S): 50 TABLET ORAL at 13:00

## 2024-06-11 RX ADMIN — Medication 1 APPLICATION(S): at 17:22

## 2024-06-11 RX ADMIN — Medication 1 DROP(S): at 23:59

## 2024-06-11 RX ADMIN — AMLODIPINE BESYLATE 5 MILLIGRAM(S): 2.5 TABLET ORAL at 06:01

## 2024-06-11 RX ADMIN — Medication 100 MILLIGRAM(S): at 11:58

## 2024-06-11 RX ADMIN — ENOXAPARIN SODIUM 40 MILLIGRAM(S): 100 INJECTION SUBCUTANEOUS at 17:21

## 2024-06-11 RX ADMIN — ESCITALOPRAM OXALATE 10 MILLIGRAM(S): 10 TABLET, FILM COATED ORAL at 11:58

## 2024-06-11 RX ADMIN — Medication 1 DROP(S): at 17:22

## 2024-06-11 RX ADMIN — TRAMADOL HYDROCHLORIDE 25 MILLIGRAM(S): 50 TABLET ORAL at 00:53

## 2024-06-11 RX ADMIN — ROSUVASTATIN CALCIUM 10 MILLIGRAM(S): 5 TABLET ORAL at 22:03

## 2024-06-11 RX ADMIN — Medication 1 DROP(S): at 06:01

## 2024-06-11 RX ADMIN — Medication 1 APPLICATION(S): at 06:01

## 2024-06-11 RX ADMIN — GABAPENTIN 300 MILLIGRAM(S): 400 CAPSULE ORAL at 11:57

## 2024-06-11 RX ADMIN — GABAPENTIN 400 MILLIGRAM(S): 400 CAPSULE ORAL at 22:03

## 2024-06-11 RX ADMIN — Medication 25 MILLIGRAM(S): at 22:03

## 2024-06-11 RX ADMIN — TRAMADOL HYDROCHLORIDE 25 MILLIGRAM(S): 50 TABLET ORAL at 22:40

## 2024-06-11 RX ADMIN — Medication 1 TABLET(S): at 11:58

## 2024-06-11 RX ADMIN — Medication 1 DROP(S): at 11:58

## 2024-06-12 RX ADMIN — TRAMADOL HYDROCHLORIDE 25 MILLIGRAM(S): 50 TABLET ORAL at 21:36

## 2024-06-12 RX ADMIN — GABAPENTIN 300 MILLIGRAM(S): 400 CAPSULE ORAL at 09:58

## 2024-06-12 RX ADMIN — Medication 1 APPLICATION(S): at 05:50

## 2024-06-12 RX ADMIN — Medication 1 DROP(S): at 18:15

## 2024-06-12 RX ADMIN — Medication 1 DROP(S): at 05:51

## 2024-06-12 RX ADMIN — Medication 1 APPLICATION(S): at 17:42

## 2024-06-12 RX ADMIN — Medication 1 TABLET(S): at 13:08

## 2024-06-12 RX ADMIN — TRAMADOL HYDROCHLORIDE 25 MILLIGRAM(S): 50 TABLET ORAL at 07:05

## 2024-06-12 RX ADMIN — Medication 1 DROP(S): at 13:08

## 2024-06-12 RX ADMIN — ESCITALOPRAM OXALATE 10 MILLIGRAM(S): 10 TABLET, FILM COATED ORAL at 13:08

## 2024-06-12 RX ADMIN — GABAPENTIN 400 MILLIGRAM(S): 400 CAPSULE ORAL at 21:36

## 2024-06-12 RX ADMIN — TRAMADOL HYDROCHLORIDE 25 MILLIGRAM(S): 50 TABLET ORAL at 14:06

## 2024-06-12 RX ADMIN — Medication 1 DROP(S): at 23:23

## 2024-06-12 RX ADMIN — TRAMADOL HYDROCHLORIDE 25 MILLIGRAM(S): 50 TABLET ORAL at 06:07

## 2024-06-12 RX ADMIN — ROSUVASTATIN CALCIUM 10 MILLIGRAM(S): 5 TABLET ORAL at 21:37

## 2024-06-12 RX ADMIN — ENOXAPARIN SODIUM 40 MILLIGRAM(S): 100 INJECTION SUBCUTANEOUS at 18:14

## 2024-06-12 RX ADMIN — TRAMADOL HYDROCHLORIDE 25 MILLIGRAM(S): 50 TABLET ORAL at 15:06

## 2024-06-12 RX ADMIN — Medication 12.5 MILLIGRAM(S): at 18:15

## 2024-06-12 RX ADMIN — CHLORHEXIDINE GLUCONATE 1 APPLICATION(S): 213 SOLUTION TOPICAL at 05:51

## 2024-06-12 RX ADMIN — AMLODIPINE BESYLATE 5 MILLIGRAM(S): 2.5 TABLET ORAL at 05:51

## 2024-06-12 RX ADMIN — Medication 100 MILLIGRAM(S): at 13:08

## 2024-06-12 RX ADMIN — Medication 12.5 MILLIGRAM(S): at 05:51

## 2024-06-12 RX ADMIN — Medication 25 MILLIGRAM(S): at 21:37

## 2024-06-13 RX ADMIN — Medication 1 DROP(S): at 05:42

## 2024-06-13 RX ADMIN — TRAMADOL HYDROCHLORIDE 25 MILLIGRAM(S): 50 TABLET ORAL at 11:44

## 2024-06-13 RX ADMIN — Medication 12.5 MILLIGRAM(S): at 05:42

## 2024-06-13 RX ADMIN — CHLORHEXIDINE GLUCONATE 1 APPLICATION(S): 213 SOLUTION TOPICAL at 05:42

## 2024-06-13 RX ADMIN — GABAPENTIN 300 MILLIGRAM(S): 400 CAPSULE ORAL at 10:40

## 2024-06-13 RX ADMIN — Medication 25 MILLIGRAM(S): at 21:18

## 2024-06-13 RX ADMIN — TRAMADOL HYDROCHLORIDE 25 MILLIGRAM(S): 50 TABLET ORAL at 10:44

## 2024-06-13 RX ADMIN — Medication 1 TABLET(S): at 12:12

## 2024-06-13 RX ADMIN — ESCITALOPRAM OXALATE 10 MILLIGRAM(S): 10 TABLET, FILM COATED ORAL at 12:13

## 2024-06-13 RX ADMIN — GABAPENTIN 400 MILLIGRAM(S): 400 CAPSULE ORAL at 21:18

## 2024-06-13 RX ADMIN — Medication 1 APPLICATION(S): at 18:45

## 2024-06-13 RX ADMIN — Medication 12.5 MILLIGRAM(S): at 18:38

## 2024-06-13 RX ADMIN — AMLODIPINE BESYLATE 5 MILLIGRAM(S): 2.5 TABLET ORAL at 05:42

## 2024-06-13 RX ADMIN — ROSUVASTATIN CALCIUM 10 MILLIGRAM(S): 5 TABLET ORAL at 21:19

## 2024-06-13 RX ADMIN — Medication 1 DROP(S): at 12:12

## 2024-06-13 RX ADMIN — Medication 1 APPLICATION(S): at 05:42

## 2024-06-13 RX ADMIN — Medication 100 MILLIGRAM(S): at 12:12

## 2024-06-13 RX ADMIN — Medication 1 DROP(S): at 18:37

## 2024-06-13 RX ADMIN — ENOXAPARIN SODIUM 40 MILLIGRAM(S): 100 INJECTION SUBCUTANEOUS at 16:51

## 2024-06-13 NOTE — CHART NOTE - NSCHARTNOTEFT_GEN_A_CORE
Pt c/o facial rash. Pt seen and examined at bedside actively itching their face. Pt states this has been going on for about a month and appeared after he had his stroke. States he has been moisturizing the area but nothing has helped w/ the rash or itchiness. Rash appears erythematous, mainly on the left side of the face and around the mouth on the right side. Appears dry and flaky. Recommended keeping the area moisturized. Benadryl 25 mg PO x1 given for pruritis. Would recommend dermatology consult to assess for further recommendations. Will endorse to day team in AM, attending to follow.    Bryce Vicente PA-C  Department of Medicine
Medicine NP note     Patient will need a reclining back wheel chair du to pain lower extremity (M79.606). The beneficiary has a mobility limitation that significantly impairs his ability to participate in one or more MRADL's such as toileting, feeding, dressing, grooming, and bathing in customary locations in the home. The patient's mobility limitation can not be sufficiently resolved by the use of an appropriately fitted cane or walker. Use of a manual wheelchair will significantly improve the beneficiary's ability to participate MRADL's and the beneficiary will use it on a regular basis in the home. The beneficiary is able and willing to use the wheelchair in the home. The beneficiary has sufficient upper extremity function and other physical and mental capabilities needed to safely self-propel the manual wheelchair that is provided in the home during a typical day.    SYED Sifuentes, MATTHEW - BC   [phone]
Reclining Wheelchair  Patient will require a rolling walker at home due to dx of pain of lower extremity to help complete their MRADLs.    Commode  The beneficiary is confined to one level of the home environment and there is no toilet on that level.     Hospital Bed  Based on patients ongoing issues with deconditioning and generalized weakness secondary to patients diagnosis of pain of lower extremity. Patient will require a semi electric hospital bed. This is necessary to achieve positioning, elevation and head of bed needs to be elevated at least 30 degrees most of the time. Bed pillows and wedges are not effective. Patient requires positioning of the body in ways not feasible with an ordinary bed. Patient requires frequent repositioning to alleviate pain. The member can independently affect the adjustment by operating the control.     Gel overlay  Patient will need a gel overlay to help with healing of current skin breakdown and to prevent futher skin breakdown. The beneficiary has limited mobility.  It is medically necessary that patient receives this gel overlay due to the  medical conditions of : pain of lower extremity.     Stephane lift   Patient requires a patient lift for transfers between bed and chair. Without the use of a lift, the patient would be bed confined.     Footrest/leg rest  The beneficiary has a musculoskeletal condition which prevents 90 degree flexion at the knee.

## 2024-06-13 NOTE — PROGRESS NOTE ADULT - REASON FOR ADMISSION
AMS and leg pain

## 2024-06-13 NOTE — PROGRESS NOTE ADULT - TIME BILLING
Discussed with  and PA
Discussed with PA for discharge
Discussed with  and patient
Discussed with nurses
Discussed with  for discharge
Discussed with PA

## 2024-06-13 NOTE — PROGRESS NOTE ADULT - ASSESSMENT
58 y/o M PMHx gout found down at home on 4/2/24 by wife brought to [facility] by ambulance found to have a L thalamic hemorrhagic infarct via CT. Patient intubated for inability to protect airway. MRI demonstrated hemorrhage with anticipated evolution. Chronic microhemorrhages found in the R thalamus and BG as well as small acute infarcts in the right caudate tail and periatrial white matter. CTA clear of major vessel occlusion or stenosis. Patient with residual R hemiparesis, dysarthria, mild aphasia, and R gaze palsy. EEG negative throughout workup. EKG without AF. TTE grossly normal. Gout flare treated by rheumatology with colchicine while inpatient. Patient received PM&R consult who recommended acute inpatient rehabilitation. Patient was medically stabilized and discharged to [facility] 4/15/24.  Rehab course significant for development of below-knee DVT, seen on 4/26/24. Otherwise, all medical co-morbidities were stable. Patient tolerated course of inpatient PT/OT/SLP rehab with significant improvements and met rehab goals prior to discharge. Discharge instructions were discussed with patient and family. All questions answered and all concerns addressed. Patient was medically cleared for discharge to [facility].    AMS- transferred to ER. Back to baseline. CTH no acute finding except recent acute infarct.   weakness- severe R sided hemiplegia. Patient  did not improve in subacute rehab may quality for go to acute rehab. He needs PT and physiatrist re-evaluation.   L Thalamic IPH - MRI: L thalamic hemorrhage, w/ chronic microhemorrhages, and acute infarcts in the right caudate tail and periatrial white matter, EKG w/o AF, Echo unremarkable, CTA w/o large occlusions  Right soleal DVT, 4/26 - Cont. Lovenox 40mg, Repeat duplex (5/3) - stable right soleal DVT, no propagation. Repeat venous doppler L leg   HTN - Continue Norvasc, metoprolol BID.  Hyperlipidemia - elevated cholesterol and LDL. Treat with Crestor and repeat lipid profile in 2 months.  Pain management - Tylenol PRN, Gabapentin, Tramadol.  Gout - Continue allopurinol  Mood / Cognition - Lexapro, f/u with psych.  Sleep - on Trazodone qhs  GI / Bowel -Cont. MiraLAX and bisacodyl  Dysphagia - upgraded to soft and bite sized, monitor for aspiration.  S/p stroke/Left leg muscle spasm- Flexeril 10mg q8h PRN & Tramadol
58 y/o M PMHx gout found down at home on 4/2/24 by wife brought to [facility] by ambulance found to have a L thalamic hemorrhagic infarct via CT. Patient intubated for inability to protect airway. MRI demonstrated hemorrhage with anticipated evolution. Chronic microhemorrhages found in the R thalamus and BG as well as small acute infarcts in the right caudate tail and periatrial white matter. CTA clear of major vessel occlusion or stenosis. Patient with residual R hemiparesis, dysarthria, mild aphasia, and R gaze palsy. EEG negative throughout workup. EKG without AF. TTE grossly normal. Gout flare treated by rheumatology with colchicine while inpatient. Patient received PM&R consult who recommended acute inpatient rehabilitation. Patient was medically stabilized and discharged to [facility] 4/15/24.  Rehab course significant for development of below-knee DVT, seen on 4/26/24. Otherwise, all medical co-morbidities were stable. Patient tolerated course of inpatient PT/OT/SLP rehab with significant improvements and met rehab goals prior to discharge. Discharge instructions were discussed with patient and family. All questions answered and all concerns addressed. Patient was medically cleared for discharge to [facility].    AMS- transferred to ER. Back to baseline. CTH no acute finding except recent acute infarct.   weakness- severe R sided hemiplegia. Patient  did not improve in subacute rehab may quality for go to acute rehab. He needs PT and physiatrist re-evaluation.   L Thalamic IPH - MRI: L thalamic hemorrhage, w/ chronic microhemorrhages, and acute infarcts in the right caudate tail and periatrial white matter, EKG w/o AF, Echo unremarkable, CTA w/o large occlusions  Right soleal DVT, 4/26 - Cont. Lovenox 40mg, Repeat duplex (5/3) - stable right soleal DVT, no propagation. Repeat venous doppler L leg   HTN - Continue Norvasc, metoprolol BID.  Hyperlipidemia - elevated cholesterol and LDL. Treat with Crestor and repeat lipid profile in 2 months.  Pain management - Tylenol PRN, Gabapentin, Tramadol.  Gout - Continue allopurinol  Mood / Cognition - Lexapro, f/u with psych.  Sleep - on Trazodone qhs  GI / Bowel -Cont. MiraLAX and bisacodyl  Dysphagia - upgraded to soft and bite sized, monitor for aspiration.  S/p stroke/Left leg muscle spasm- Flexeril 10mg q8h PRN & Tramadol
58 y/o M PMHx gout found down at home on 4/2/24 by wife brought to [facility] by ambulance found to have a L thalamic hemorrhagic infarct via CT. Patient intubated for inability to protect airway. MRI demonstrated hemorrhage with anticipated evolution. Chronic microhemorrhages found in the R thalamus and BG as well as small acute infarcts in the right caudate tail and periatrial white matter. CTA clear of major vessel occlusion or stenosis. Patient with residual R hemiparesis, dysarthria, mild aphasia, and R gaze palsy. EEG negative throughout workup. EKG without AF. TTE grossly normal. Gout flare treated by rheumatology with colchicine while inpatient. Patient received PM&R consult who recommended acute inpatient rehabilitation. Patient was medically stabilized and discharged to [facility] 4/15/24.  Rehab course significant for development of below-knee DVT, seen on 4/26/24. Otherwise, all medical co-morbidities were stable. Patient tolerated course of inpatient PT/OT/SLP rehab with significant improvements and met rehab goals prior to discharge. Discharge instructions were discussed with patient and family. All questions answered and all concerns addressed. Patient was medically cleared for discharge to [facility].    AMS- transferred to ER. Back to baseline. CTH no acute finding except recent acute infarct.   weakness- severe R sided hemiplegia. Patient  did not improve in subacute rehab may quality for go to acute rehab. He needs PT and physiatrist re-evaluation.   L Thalamic IPH - MRI: L thalamic hemorrhage, w/ chronic microhemorrhages, and acute infarcts in the right caudate tail and periatrial white matter, EKG w/o AF, Echo unremarkable, CTA w/o large occlusions  Right soleal DVT, 4/26 - Cont. Lovenox 40mg, Repeat duplex (5/3) - stable right soleal DVT, no propagation. Repeat venous doppler L leg   HTN - Continue Norvasc, metoprolol BID.  Hyperlipidemia - elevated cholesterol and LDL. Treat with Crestor and repeat lipid profile in 2 months.  Pain management - Tylenol PRN, Gabapentin, Tramadol.  Gout - Continue allopurinol  Mood / Cognition - Lexapro, f/u with psych.  Sleep - on Trazodone qhs  GI / Bowel -Cont. MiraLAX and bisacodyl  Dysphagia - upgraded to soft and bite sized, monitor for aspiration.  S/p stroke/Left leg muscle spasm- Flexeril 10mg q8h PRN & Tramadol
58 y/o M PMHx gout found down at home on 4/2/24 by wife brought to [facility] by ambulance found to have a L thalamic hemorrhagic infarct via CT. Patient intubated for inability to protect airway. MRI demonstrated hemorrhage with anticipated evolution. Chronic microhemorrhages found in the R thalamus and BG as well as small acute infarcts in the right caudate tail and periatrial white matter. CTA clear of major vessel occlusion or stenosis. Patient with residual R hemiparesis, dysarthria, mild aphasia, and R gaze palsy. EEG negative throughout workup. EKG without AF. TTE grossly normal. Gout flare treated by rheumatology with colchicine while inpatient. Patient received PM&R consult who recommended acute inpatient rehabilitation. Patient was medically stabilized and discharged to [facility] 4/15/24.  Rehab course significant for development of below-knee DVT, seen on 4/26/24. Otherwise, all medical co-morbidities were stable. Patient tolerated course of inpatient PT/OT/SLP rehab with significant improvements and met rehab goals prior to discharge. Discharge instructions were discussed with patient and family. All questions answered and all concerns addressed. Patient was medically cleared for discharge to [facility].    AMS- transferred to ER. Back to baseline. CTH no acute finding except recent acute infarct.   weakness- severe R sided hemiplegia. Patient  did not improve in subacute rehab may quality for go to acute rehab. He needs PT and physiatrist re-evaluation.   L Thalamic IPH - MRI: L thalamic hemorrhage, w/ chronic microhemorrhages, and acute infarcts in the right caudate tail and periatrial white matter, EKG w/o AF, Echo unremarkable, CTA w/o large occlusions  Right soleal DVT, 4/26 - Cont. Lovenox 40mg, Repeat duplex (5/3) - stable right soleal DVT, no propagation. Repeat venous doppler L leg   HTN - Continue Norvasc, metoprolol BID.  Hyperlipidemia - elevated cholesterol and LDL. Treat with Crestor and repeat lipid profile in 2 months.  Pain management - Tylenol PRN, Gabapentin, Tramadol.  Gout - Continue allopurinol  Mood / Cognition - Lexapro, f/u with psych.  Sleep - on Trazodone qhs  GI / Bowel -Cont. MiraLAX and bisacodyl  Dysphagia - upgraded to soft and bite sized, monitor for aspiration.  S/p stroke/Left leg muscle spasm- Flexeril 10mg q8h PRN & Tramadol
60 y/o M PMHx gout found down at home on 4/2/24 by wife brought to [facility] by ambulance found to have a L thalamic hemorrhagic infarct via CT. Patient intubated for inability to protect airway. MRI demonstrated hemorrhage with anticipated evolution. Chronic microhemorrhages found in the R thalamus and BG as well as small acute infarcts in the right caudate tail and periatrial white matter. CTA clear of major vessel occlusion or stenosis. Patient with residual R hemiparesis, dysarthria, mild aphasia, and R gaze palsy. EEG negative throughout workup. EKG without AF. TTE grossly normal. Gout flare treated by rheumatology with colchicine while inpatient. Patient received PM&R consult who recommended acute inpatient rehabilitation. Patient was medically stabilized and discharged to [facility] 4/15/24.  Rehab course significant for development of below-knee DVT, seen on 4/26/24. Otherwise, all medical co-morbidities were stable. Patient tolerated course of inpatient PT/OT/SLP rehab with significant improvements and met rehab goals prior to discharge. Discharge instructions were discussed with patient and family. All questions answered and all concerns addressed. Patient was medically cleared for discharge to [facility].    AMS- transferred to ER. Back to baseline. CTH no acute finding except recent acute infarct.   weakness- severe R sided hemiplegia. Patient  did not improve in subacute rehab may quality for go to acute rehab. He needs PT and physiatrist re-evaluation.   L Thalamic IPH - MRI: L thalamic hemorrhage, w/ chronic microhemorrhages, and acute infarcts in the right caudate tail and periatrial white matter, EKG w/o AF, Echo unremarkable, CTA w/o large occlusions  Right soleal DVT, 4/26 - Cont. Lovenox 40mg, Repeat duplex (5/3) - stable right soleal DVT, no propagation. Repeat venous doppler L leg   HTN - Continue Norvasc, metoprolol BID.  Hyperlipidemia - elevated cholesterol and LDL. Treat with Crestor and repeat lipid profile in 2 months.  Pain management - Tylenol PRN, Gabapentin, Tramadol.  Gout - Continue allopurinol  Mood / Cognition - Lexapro, f/u with psych.  Sleep - on Trazodone qhs  GI / Bowel -Cont. MiraLAX and bisacodyl  Dysphagia - upgraded to soft and bite sized, monitor for aspiration.  S/p stroke/Left leg muscle spasm- Flexeril 10mg q8h PRN & Tramadol
58 y/o M PMHx gout found down at home on 4/2/24 by wife brought to [facility] by ambulance found to have a L thalamic hemorrhagic infarct via CT. Patient intubated for inability to protect airway. MRI demonstrated hemorrhage with anticipated evolution. Chronic microhemorrhages found in the R thalamus and BG as well as small acute infarcts in the right caudate tail and periatrial white matter. CTA clear of major vessel occlusion or stenosis. Patient with residual R hemiparesis, dysarthria, mild aphasia, and R gaze palsy. EEG negative throughout workup. EKG without AF. TTE grossly normal. Gout flare treated by rheumatology with colchicine while inpatient. Patient received PM&R consult who recommended acute inpatient rehabilitation. Patient was medically stabilized and discharged to [facility] 4/15/24.  Rehab course significant for development of below-knee DVT, seen on 4/26/24. Otherwise, all medical co-morbidities were stable. Patient tolerated course of inpatient PT/OT/SLP rehab with significant improvements and met rehab goals prior to discharge. Discharge instructions were discussed with patient and family. All questions answered and all concerns addressed. Patient was medically cleared for discharge to [facility].    AMS- transferred to ER. Back to baseline. CTH no acute finding except recent acute infarct.   weakness- severe R sided hemiplegia. Patient  did not improve in subacute rehab may quality for go to acute rehab. He needs PT and physiatrist re-evaluation.   L Thalamic IPH - MRI: L thalamic hemorrhage, w/ chronic microhemorrhages, and acute infarcts in the right caudate tail and periatrial white matter, EKG w/o AF, Echo unremarkable, CTA w/o large occlusions  Right soleal DVT, 4/26 - Cont. Lovenox 40mg, Repeat duplex (5/3) - stable right soleal DVT, no propagation. Repeat venous doppler L leg   HTN - Continue Norvasc, metoprolol BID.  Hyperlipidemia - elevated cholesterol and LDL. Treat with Crestor and repeat lipid profile in 2 months.  Pain management - Tylenol PRN, Gabapentin, Tramadol.  Gout - Continue allopurinol  Mood / Cognition - Lexapro, f/u with psych.  Sleep - on Trazodone qhs  GI / Bowel -Cont. MiraLAX and bisacodyl  Dysphagia - upgraded to soft and bite sized, monitor for aspiration.  S/p stroke/Left leg muscle spasm- Flexeril 10mg q8h PRN & Tramadol
58 y/o M PMHx gout found down at home on 4/2/24 by wife brought to [facility] by ambulance found to have a L thalamic hemorrhagic infarct via CT. Patient intubated for inability to protect airway. MRI demonstrated hemorrhage with anticipated evolution. Chronic microhemorrhages found in the R thalamus and BG as well as small acute infarcts in the right caudate tail and periatrial white matter. CTA clear of major vessel occlusion or stenosis. Patient with residual R hemiparesis, dysarthria, mild aphasia, and R gaze palsy. EEG negative throughout workup. EKG without AF. TTE grossly normal. Gout flare treated by rheumatology with colchicine while inpatient. Patient received PM&R consult who recommended acute inpatient rehabilitation. Patient was medically stabilized and discharged to [facility] 4/15/24.  Rehab course significant for development of below-knee DVT, seen on 4/26/24. Otherwise, all medical co-morbidities were stable. Patient tolerated course of inpatient PT/OT/SLP rehab with significant improvements and met rehab goals prior to discharge. Discharge instructions were discussed with patient and family. All questions answered and all concerns addressed. Patient was medically cleared for discharge to [facility].    AMS- transferred to ER. Back to baseline. CTH no acute finding except recent acute infarct.   weakness- severe R sided hemiplegia. Patient  did not improve in subacute rehab may quality for go to acute rehab. He needs PT and physiatrist re-evaluation.   L Thalamic IPH - MRI: L thalamic hemorrhage, w/ chronic microhemorrhages, and acute infarcts in the right caudate tail and periatrial white matter, EKG w/o AF, Echo unremarkable, CTA w/o large occlusions  Right soleal DVT, 4/26 - Cont. Lovenox 40mg, Repeat duplex (5/3) - stable right soleal DVT, no propagation. Repeat venous doppler L leg   HTN - Continue Norvasc, metoprolol BID.  Hyperlipidemia - elevated cholesterol and LDL. Treat with Crestor and repeat lipid profile in 2 months.  Pain management - Tylenol PRN, Gabapentin, Tramadol.  Gout - Continue allopurinol  Mood / Cognition - Lexapro, f/u with psych.  Sleep - on Trazodone qhs  GI / Bowel -Cont. MiraLAX and bisacodyl  Dysphagia - upgraded to soft and bite sized, monitor for aspiration.  S/p stroke/Left leg muscle spasm- Flexeril 10mg q8h PRN & Tramadol
60 y/o M PMHx gout found down at home on 4/2/24 by wife brought to [facility] by ambulance found to have a L thalamic hemorrhagic infarct via CT. Patient intubated for inability to protect airway. MRI demonstrated hemorrhage with anticipated evolution. Chronic microhemorrhages found in the R thalamus and BG as well as small acute infarcts in the right caudate tail and periatrial white matter. CTA clear of major vessel occlusion or stenosis. Patient with residual R hemiparesis, dysarthria, mild aphasia, and R gaze palsy. EEG negative throughout workup. EKG without AF. TTE grossly normal. Gout flare treated by rheumatology with colchicine while inpatient. Patient received PM&R consult who recommended acute inpatient rehabilitation. Patient was medically stabilized and discharged to [facility] 4/15/24.  Rehab course significant for development of below-knee DVT, seen on 4/26/24. Otherwise, all medical co-morbidities were stable. Patient tolerated course of inpatient PT/OT/SLP rehab with significant improvements and met rehab goals prior to discharge. Discharge instructions were discussed with patient and family. All questions answered and all concerns addressed. Patient was medically cleared for discharge to [facility].    AMS- transferred to ER. Back to baseline. CTH no acute finding except recent acute infarct.   weakness- severe R sided hemiplegia. Patient  did not improve in subacute rehab may quality for go to acute rehab. He needs PT and physiatrist re-evaluation.   L Thalamic IPH - MRI: L thalamic hemorrhage, w/ chronic microhemorrhages, and acute infarcts in the right caudate tail and periatrial white matter, EKG w/o AF, Echo unremarkable, CTA w/o large occlusions  Right soleal DVT, 4/26 - Cont. Lovenox 40mg, Repeat duplex (5/3) - stable right soleal DVT, no propagation. Repeat venous doppler L leg   HTN - Continue Norvasc, metoprolol BID.  Hyperlipidemia - elevated cholesterol and LDL. Treat with Crestor and repeat lipid profile in 2 months.  Pain management - Tylenol PRN, Gabapentin, Tramadol.  Gout - Continue allopurinol  Mood / Cognition - Lexapro, f/u with psych.  Sleep - on Trazodone qhs  GI / Bowel -Cont. MiraLAX and bisacodyl  Dysphagia - upgraded to soft and bite sized, monitor for aspiration.  S/p stroke/Left leg muscle spasm- Flexeril 10mg q8h PRN & Tramadol

## 2024-06-13 NOTE — PROGRESS NOTE ADULT - SUBJECTIVE AND OBJECTIVE BOX
Date of Service: 06-12-24 @ 19:29           CARDIOLOGY     PROGRESS  NOTE   ________________________________________________    CHIEF COMPLAINT:Patient is a 59y old  Male who presents with a chief complaint of AMS and leg pain (10 Ziggy 2024 17:43)  no complain  	  REVIEW OF SYSTEMS:  CONSTITUTIONAL: No fever, weight loss, or fatigue  EYES: No eye pain, visual disturbances, or discharge  ENT:  No difficulty hearing, tinnitus, vertigo; No sinus or throat pain  NECK: No pain or stiffness  RESPIRATORY: No cough, wheezing, chills or hemoptysis; No Shortness of Breath  CARDIOVASCULAR: No chest pain, palpitations, passing out, dizziness, or leg swelling  GASTROINTESTINAL: No abdominal or epigastric pain. No nausea, vomiting, or hematemesis; No diarrhea or constipation. No melena or hematochezia.  GENITOURINARY: No dysuria, frequency, hematuria, or incontinence  NEUROLOGICAL: No headaches, memory loss, loss of strength, numbness, or tremors  SKIN: No itching, burning, rashes, or lesions   LYMPH Nodes: No enlarged glands  ENDOCRINE: No heat or cold intolerance; No hair loss  MUSCULOSKELETAL: No joint pain or swelling; No muscle, back, or extremity pain  PSYCHIATRIC: No depression, anxiety, mood swings, or difficulty sleeping  HEME/LYMPH: No easy bruising, or bleeding gums  ALLERGY AND IMMUNOLOGIC: No hives or eczema	    [x ] All others negative	  [ ] Unable to obtain    PHYSICAL EXAM:  T(C): 36.7 (06-12-24 @ 13:14), Max: 36.8 (06-11-24 @ 21:12)  HR: 91 (06-12-24 @ 18:18) (66 - 91)  BP: 118/62 (06-12-24 @ 18:18) (102/66 - 118/62)  RR: 18 (06-12-24 @ 18:18) (18 - 18)  SpO2: 94% (06-12-24 @ 18:18) (93% - 96%)  Wt(kg): --  I&O's Summary    11 Jun 2024 07:01  -  12 Jun 2024 07:00  --------------------------------------------------------  IN: 240 mL / OUT: 1450 mL / NET: -1210 mL        Appearance: Normal	  HEENT:   Normal oral mucosa, PERRL, EOMI	  Lymphatic: No lymphadenopathy  Cardiovascular: Normal S1 S2, No JVD, + murmurs, No edema  Respiratory: rhonchi  Psychiatry: A & O x 3, Mood & affect appropriate  Gastrointestinal:  Soft, Non-tender, + BS	  Skin: No rashes, No ecchymoses, No cyanosis	  Extremities: Normal range of motion, No clubbing, cyanosis or edema  Vascular: Peripheral pulses palpable 2+ bilaterally    MEDICATIONS  (STANDING):  allopurinol 100 milliGRAM(s) Oral daily  amLODIPine   Tablet 5 milliGRAM(s) Oral daily  AQUAPHOR (petrolatum Ointment) 1 Application(s) Topical two times a day  artificial tears (preservative free) Ophthalmic Solution 1 Drop(s) Left EYE four times a day  chlorhexidine 2% Cloths 1 Application(s) Topical <User Schedule>  enoxaparin Injectable 40 milliGRAM(s) SubCutaneous every 24 hours  escitalopram 10 milliGRAM(s) Oral daily  gabapentin 300 milliGRAM(s) Oral daily  gabapentin 400 milliGRAM(s) Oral at bedtime  metoprolol tartrate 12.5 milliGRAM(s) Oral two times a day  multivitamin 1 Tablet(s) Oral daily  polyethylene glycol 3350 17 Gram(s) Oral two times a day  rosuvastatin 10 milliGRAM(s) Oral at bedtime  traZODone 25 milliGRAM(s) Oral at bedtime      TELEMETRY: 	    ECG:  	  RADIOLOGY:  OTHER: 	  	  LABS:	 	    CARDIAC MARKERS:      proBNP:   Lipid Profile:   HgA1c:   TSH:     < from: 12 Lead ECG (05.07.24 @ 06:56) >  Diagnosis Line Normal sinus rhythm  Right bundle branch block  Left anterior fascicular block  *** Bifascicular block ***  Possible Lateral infarct , age undetermined  Abnormal ECG    Notes    Notes: Chart reviewed. Patient with DC plan for [facility] but patient not accepted to  any facilities due to insurance issues. Patient is in agreement that he needs  to go home with private hire, private hire list given to patient and his spouse  at bedside. Homecare and DME to be provided as well pending insurance approval.  Will remain available.      Assessment and plan  ---------------------------  58 y/o M PMHx gout found down at home on 4/2/24 by wife brought to [facility] by ambulance found to have a L thalamic hemorrhagic infarct via CT. Patient intubated for inability to protect airway. MRI demonstrated hemorrhage with anticipated evolution. Chronic microhemorrhages found in the R thalamus and BG as well as small acute infarcts in the right caudate tail and periatrial white matter. CTA clear of major vessel occlusion or stenosis. Patient with residual R hemiparesis, dysarthria, mild aphasia, and R gaze palsy. EEG negative throughout workup. EKG without AF. TTE grossly normal. Gout flare treated by rheumatology with colchicine while inpatient. Patient received PM&R consult who recommended acute inpatient rehabilitation. Patient was medically stabilized and discharged to [facility] 4/15/24.  Rehab course significant for development of below-knee DVT, seen on 4/26/24. Otherwise, all medical co-morbidities were stable. Patient tolerated course of inpatient PT/OT/SLP rehab with significant improvements and met rehab goals prior to discharge. Discharge instructions were discussed with patient and family. All questions answered and all concerns addressed. Patient was medically cleared for discharge to [facility].    AMS- transferred to ER. Back to baseline. CTH no acute finding except recent acute infarct.   weakness- severe R sided hemiplegia. Patient  did not improve in subacute rehab may quality for go to acute rehab. He needs PT and physiatrist re-evaluation.   L Thalamic IPH - MRI: L thalamic hemorrhage, w/ chronic microhemorrhages, and acute infarcts in the right caudate tail and periatrial white matter, EKG w/o AF, Echo unremarkable, CTA w/o large occlusions  Right soleal DVT, 4/26 - Cont. Lovenox 40mg, Repeat duplex (5/3) - stable right soleal DVT, no propagation. Repeat venous doppler L leg   HTN - Continue Norvasc, metoprolol BID.  Hyperlipidemia - elevated cholesterol and LDL. Treat with Crestor and repeat lipid profile in 2 months.  Pain management - Tylenol PRN, Gabapentin, Tramadol.  Gout - Continue allopurinol  Mood / Cognition - Lexapro, f/u with psych.  Sleep - on Trazodone qhs  GI / Bowel -Cont. MiraLAX and bisacodyl  Dysphagia - upgraded to soft and bite sized, monitor for aspiration.  S/p stroke/Left leg muscle spasm- Flexeril 10mg q8h PRN & Tramadol  awaiting dc planning    	        
Patient is a 59y old  Male who presents with a chief complaint of AMS and leg pain (13 Jun 2024 07:30)      INTERVAL HPI/OVERNIGHT EVENTS: Patient medically clear. Waiting for placement.     Pain Location & Control:     MEDICATIONS  (STANDING):  allopurinol 100 milliGRAM(s) Oral daily  amLODIPine   Tablet 5 milliGRAM(s) Oral daily  AQUAPHOR (petrolatum Ointment) 1 Application(s) Topical two times a day  artificial tears (preservative free) Ophthalmic Solution 1 Drop(s) Left EYE four times a day  chlorhexidine 2% Cloths 1 Application(s) Topical <User Schedule>  enoxaparin Injectable 40 milliGRAM(s) SubCutaneous every 24 hours  escitalopram 10 milliGRAM(s) Oral daily  gabapentin 300 milliGRAM(s) Oral daily  gabapentin 400 milliGRAM(s) Oral at bedtime  metoprolol tartrate 12.5 milliGRAM(s) Oral two times a day  multivitamin 1 Tablet(s) Oral daily  polyethylene glycol 3350 17 Gram(s) Oral two times a day  rosuvastatin 10 milliGRAM(s) Oral at bedtime  traZODone 25 milliGRAM(s) Oral at bedtime    MEDICATIONS  (PRN):  acetaminophen     Tablet .. 650 milliGRAM(s) Oral every 6 hours PRN Temp greater or equal to 38C (100.4F), Mild Pain (1 - 3)  traMADol 25 milliGRAM(s) Oral every 8 hours PRN Moderate Pain (4 - 6)      Allergies    No Known Allergies    Intolerances        REVIEW OF SYSTEMS:  CONSTITUTIONAL: No fever, weight loss, or fatigue  EYES: No eye pain, visual disturbances, or discharge  ENMT:  No difficulty hearing, tinnitus, vertigo; No sinus or throat pain  NECK: No pain or stiffness  BREASTS: No pain, masses, or nipple discharge  RESPIRATORY: No cough, wheezing, chills or hemoptysis; No shortness of breath  CARDIOVASCULAR: No chest pain, palpitations, dizziness, or leg swelling  GASTROINTESTINAL: No abdominal or epigastric pain. No nausea, vomiting, or hematemesis; No diarrhea or constipation. No melena or hematochezia.  GENITOURINARY: No dysuria, frequency, hematuria, or incontinence  NEUROLOGICAL: No headaches, memory loss, loss of strength, numbness, or tremors  SKIN: No itching, burning, rashes, or lesions   LYMPH NODES: No enlarged glands  ENDOCRINE: No heat or cold intolerance; No hair loss; No polydipsia or polyuria  MUSCULOSKELETAL: No back pain  PSYCHIATRIC: No depression, anxiety, mood swings, or difficulty sleeping  HEME/LYMPH: No easy bruising, or bleeding gums  ALLERGY AND IMMUNOLOGIC: No hives or eczema    Vital Signs Last 24 Hrs  T(C): 36.5 (13 Jun 2024 13:27), Max: 36.9 (13 Jun 2024 05:35)  T(F): 97.7 (13 Jun 2024 13:27), Max: 98.4 (13 Jun 2024 05:35)  HR: 78 (13 Jun 2024 18:33) (65 - 78)  BP: 108/71 (13 Jun 2024 18:33) (108/71 - 121/85)  BP(mean): --  RR: 18 (13 Jun 2024 13:27) (18 - 18)  SpO2: 93% (13 Jun 2024 13:27) (93% - 97%)    Parameters below as of 13 Jun 2024 13:27  Patient On (Oxygen Delivery Method): room air        PHYSICAL EXAM:  GENERAL: NAD, well-groomed, well-developed  HEAD:  Atraumatic, Normocephalic  EYES: EOMI, PERRLA, conjunctiva and sclera clear  ENMT: No tonsillar erythema, exudates, or enlargement; Moist mucous membranes, Good dentition, No lesions  NECK: Supple, No JVD, Normal thyroid  NERVOUS SYSTEM:  Alert & Oriented X3,  R sided hemiplegia and R facial droop.   CHEST/LUNG: Clear to auscultation bilaterally; No rales, rhonchi, wheezing, or rubs  HEART: Regular rate and rhythm; No murmurs, rubs, or gallops  ABDOMEN: Soft, Nontender, Nondistended; Bowel sounds present  EXTREMITIES:  2+ Peripheral Pulses, No clubbing or cyanosis  LYMPH: No lymphadenopathy noted  SKIN: No rashes or lesions      LABS:              CAPILLARY BLOOD GLUCOSE            Cultures      RADIOLOGY & ADDITIONAL TESTS:    Imaging Personally Reviewed:  [X ] YES  [ ] NO    Consultant(s) Notes Reviewed:  [ X] YES  [ ] NO    Care Discussed with Consultants/Other Providers [X ] YES  [ ] NO
Date of Service: 06-01-24 @ 15:10           CARDIOLOGY     PROGRESS  NOTE   ________________________________________________    CHIEF COMPLAINT:Patient is a 59y old  Male who presents with a chief complaint of AMS and leg pain (31 May 2024 23:27)  doing better, no complain  	  REVIEW OF SYSTEMS:  CONSTITUTIONAL: No fever, weight loss, or fatigue  EYES: No eye pain, visual disturbances, or discharge  ENT:  No difficulty hearing, tinnitus, vertigo; No sinus or throat pain  NECK: No pain or stiffness  RESPIRATORY: No cough, wheezing, chills or hemoptysis; No Shortness of Breath  CARDIOVASCULAR: No chest pain, palpitations, passing out, dizziness, or leg swelling  GASTROINTESTINAL: No abdominal or epigastric pain. No nausea, vomiting, or hematemesis; No diarrhea or constipation. No melena or hematochezia.  GENITOURINARY: No dysuria, frequency, hematuria, or incontinence  NEUROLOGICAL: No headaches, memory loss, loss of strength, numbness, or tremors  SKIN: No itching, burning, rashes, or lesions   LYMPH Nodes: No enlarged glands  ENDOCRINE: No heat or cold intolerance; No hair loss  MUSCULOSKELETAL: No joint pain or swelling; No muscle, back, or extremity pain  PSYCHIATRIC: No depression, anxiety, mood swings, or difficulty sleeping  HEME/LYMPH: No easy bruising, or bleeding gums  ALLERGY AND IMMUNOLOGIC: No hives or eczema	    [x ] All others negative	  [ ] Unable to obtain    PHYSICAL EXAM:  T(C): 36.6 (06-01-24 @ 13:13), Max: 37 (05-31-24 @ 17:54)  HR: 84 (06-01-24 @ 13:13) (71 - 88)  BP: 130/86 (06-01-24 @ 13:13) (112/74 - 130/86)  RR: 17 (06-01-24 @ 13:13) (17 - 18)  SpO2: 94% (06-01-24 @ 13:13) (92% - 98%)  Wt(kg): --  I&O's Summary    31 May 2024 07:01  -  01 Jun 2024 07:00  --------------------------------------------------------  IN: 1045 mL / OUT: 2000 mL / NET: -955 mL        Appearance: Normal	  HEENT:   Normal oral mucosa, PERRL, EOMI	  Lymphatic: No lymphadenopathy  Cardiovascular: Normal S1 S2, No JVD, N+ murmurs, No edema  Respiratory: Lungs clear to auscultation	  Gastrointestinal:  Soft, Non-tender, + BS	  Skin: No rashes, No ecchymoses, No cyanosis	  Neurologic: can not asses  Extremities: Normal range of motion, No clubbing, cyanosis or edema  Vascular: Peripheral pulses palpable 2+ bilaterally    MEDICATIONS  (STANDING):  allopurinol 100 milliGRAM(s) Oral daily  amLODIPine   Tablet 5 milliGRAM(s) Oral daily  enoxaparin Injectable 40 milliGRAM(s) SubCutaneous every 24 hours  escitalopram 10 milliGRAM(s) Oral daily  gabapentin 300 milliGRAM(s) Oral daily  gabapentin 400 milliGRAM(s) Oral at bedtime  metoprolol tartrate 12.5 milliGRAM(s) Oral two times a day  multivitamin 1 Tablet(s) Oral daily  polyethylene glycol 3350 17 Gram(s) Oral two times a day  traZODone 25 milliGRAM(s) Oral at bedtime      TELEMETRY: 	    ECG:  	  RADIOLOGY:  OTHER: 	  	  LABS:	 	    CARDIAC MARKERS:    proBNP:   Lipid Profile:   HgA1c:   TSH:         Assessment and plan  ---------------------------  60 y/o M PMHx gout found down at home on 4/2/24 by wife brought to [facility] by ambulance found to have a L thalamic hemorrhagic infarct via CT. Patient intubated for inability to protect airway. MRI demonstrated hemorrhage with anticipated evolution. Chronic microhemorrhages found in the R thalamus and BG as well as small acute infarcts in the right caudate tail and periatrial white matter. CTA clear of major vessel occlusion or stenosis. Patient with residual R hemiparesis, dysarthria, mild aphasia, and R gaze palsy. EEG negative throughout workup. EKG without AF. TTE grossly normal. Gout flare treated by rheumatology with colchicine while inpatient. Patient received PM&R consult who recommended acute inpatient rehabilitation. Patient was medically stabilized and discharged to [facility] 4/15/24.  Rehab course significant for development of below-knee DVT, seen on 4/26/24. Otherwise, all medical co-morbidities were stable. Patient tolerated course of inpatient PT/OT/SLP rehab with significant improvements and met rehab goals prior to discharge. Discharge instructions were discussed with patient and family. All questions answered and all concerns addressed. Patient was medically cleared for discharge to [facility].  AMS- transferred to ER. Back to baseline. CTH no acute finding except recent acute infarct.   weakness- severe R sided hemiplegia. Patient  did not improve in subacute rehab may quality for go to acute rehab. He needs PT and physiatrist re-evaluation.   L Thalamic IPH - MRI: L thalamic hemorrhage, w/ chronic microhemorrhages, and acute infarcts in the right caudate tail and periatrial white matter, EKG w/o AF, Echo unremarkable, CTA w/o large occlusions  Right soleal DVT, 4/26 - Cont. Lovenox 40mg, Repeat duplex (5/3) - stable right soleal DVT, no propagation. Repeat venous doppler L leg   HTN - Continue Norvasc, metoprolol BID.  Hyperlipidemia - elevated cholesterol and LDL. Treat with Crestor and repeat lipid profile in 2 months.  Pain management - Tylenol PRN, Gabapentin, Tramadol.  Gout - Continue allopurinol  Mood / Cognition - Lexapro, f/u with psych.  Sleep - on Trazodone qhs  GI / Bowel -Cont. MiraLAX and bisacodyl  Dysphagia - upgraded to soft and bite sized, monitor for aspiration.  S/p stroke/Left leg muscle spasm- Flexeril 10mg q8h PRN & Tramadol  awaiting acute rehab    	        
Date of Service: 06-02-24 @ 10:14           CARDIOLOGY     PROGRESS  NOTE   ________________________________________________    CHIEF COMPLAINT:Patient is a 59y old  Male who presents with a chief complaint of AMS and leg pain (01 Jun 2024 15:10)  no complain,  	  REVIEW OF SYSTEMS:  CONSTITUTIONAL: No fever, weight loss, or fatigue  EYES: No eye pain, visual disturbances, or discharge  ENT:  No difficulty hearing, tinnitus, vertigo; No sinus or throat pain  NECK: No pain or stiffness  RESPIRATORY: No cough, wheezing, chills or hemoptysis; No Shortness of Breath  CARDIOVASCULAR: No chest pain, palpitations, passing out, dizziness, or leg swelling  GASTROINTESTINAL: No abdominal or epigastric pain. No nausea, vomiting, or hematemesis; No diarrhea or constipation. No melena or hematochezia.  GENITOURINARY: No dysuria, frequency, hematuria, or incontinence  NEUROLOGICAL: No headaches, memory loss, loss of strength, numbness, or tremors  SKIN: No itching, burning, rashes, or lesions   LYMPH Nodes: No enlarged glands  ENDOCRINE: No heat or cold intolerance; No hair loss  MUSCULOSKELETAL: No joint pain or swelling; No muscle, back, or extremity pain  PSYCHIATRIC: No depression, anxiety, mood swings, or difficulty sleeping  HEME/LYMPH: No easy bruising, or bleeding gums  ALLERGY AND IMMUNOLOGIC: No hives or eczema	    [ ] All others negative	  [ x] Unable to obtain    PHYSICAL EXAM:  T(C): 36.8 (06-02-24 @ 05:06), Max: 36.8 (06-02-24 @ 05:06)  HR: 79 (06-02-24 @ 05:06) (72 - 84)  BP: 121/81 (06-02-24 @ 05:06) (121/81 - 130/86)  RR: 18 (06-02-24 @ 05:06) (17 - 18)  SpO2: 95% (06-02-24 @ 05:06) (94% - 95%)  Wt(kg): --  I&O's Summary    01 Jun 2024 07:01  -  02 Jun 2024 07:00  --------------------------------------------------------  IN: 0 mL / OUT: 750 mL / NET: -750 mL        Appearance: Normal	  HEENT:   Normal oral mucosa, PERRL, EOMI	  Lymphatic: No lymphadenopathy  Cardiovascular: Normal S1 S2, No JVD, + murmurs, No edema  Respiratory: rhonchi  Psychiatry: A & O x 3, Mood & affect appropriate  Gastrointestinal:  Soft, Non-tender, + BS	  Skin: No rashes, No ecchymoses, No cyanosis	  Extremities: Normal range of motion, No clubbing, cyanosis or edema  Vascular: Peripheral pulses palpable 2+ bilaterally    MEDICATIONS  (STANDING):  allopurinol 100 milliGRAM(s) Oral daily  amLODIPine   Tablet 5 milliGRAM(s) Oral daily  enoxaparin Injectable 40 milliGRAM(s) SubCutaneous every 24 hours  escitalopram 10 milliGRAM(s) Oral daily  gabapentin 300 milliGRAM(s) Oral daily  gabapentin 400 milliGRAM(s) Oral at bedtime  metoprolol tartrate 12.5 milliGRAM(s) Oral two times a day  multivitamin 1 Tablet(s) Oral daily  polyethylene glycol 3350 17 Gram(s) Oral two times a day  rosuvastatin 10 milliGRAM(s) Oral at bedtime  traZODone 25 milliGRAM(s) Oral at bedtime      TELEMETRY: 	    ECG:  	  RADIOLOGY:  OTHER: 	  	  LABS:	 	    CARDIAC MARKERS:    proBNP:   Lipid Profile:   HgA1c:   TSH:       < from: [facility] Duplex Lower Ext Vein Scan, Bilat (05.30.24 @ 09:48) >  RIGHT:  Normal compressibility of the RIGHT common femoral, femoral and popliteal   veins. Doppler examination shows normal spontaneous and phasic flow. No   RIGHT calf vein thrombosis is detected. Right soleal vein demonstrates   patency and compressibility.    LEFT:  Normal compressibility of the LEFT common femoral, femoral and popliteal   veins. Doppler examination shows normal spontaneous and phasic flow. No   LEFT calf vein thrombosis is detected.    IMPRESSION:    No acute DVT of the lower extremities. Right soleal vein demonstrates   patency and compressibility.        Assessment and plan  ---------------------------  58 y/o M PMHx gout found down at home on 4/2/24 by wife brought to [facility] by ambulance found to have a L thalamic hemorrhagic infarct via CT. Patient intubated for inability to protect airway. MRI demonstrated hemorrhage with anticipated evolution. Chronic microhemorrhages found in the R thalamus and BG as well as small acute infarcts in the right caudate tail and periatrial white matter. CTA clear of major vessel occlusion or stenosis. Patient with residual R hemiparesis, dysarthria, mild aphasia, and R gaze palsy. EEG negative throughout workup. EKG without AF. TTE grossly normal. Gout flare treated by rheumatology with colchicine while inpatient. Patient received PM&R consult who recommended acute inpatient rehabilitation. Patient was medically stabilized and discharged to [facility] 4/15/24.  Rehab course significant for development of below-knee DVT, seen on 4/26/24. Otherwise, all medical co-morbidities were stable. Patient tolerated course of inpatient PT/OT/SLP rehab with significant improvements and met rehab goals prior to discharge. Discharge instructions were discussed with patient and family. All questions answered and all concerns addressed. Patient was medically cleared for discharge to [facility].  AMS- transferred to ER. Back to baseline. CTH no acute finding except recent acute infarct.   weakness- severe R sided hemiplegia. Patient  did not improve in subacute rehab may quality for go to acute rehab. He needs PT and physiatrist re-evaluation.   L Thalamic IPH - MRI: L thalamic hemorrhage, w/ chronic microhemorrhages, and acute infarcts in the right caudate tail and periatrial white matter, EKG w/o AF, Echo unremarkable, CTA w/o large occlusions  Right soleal DVT, 4/26 - Cont. Lovenox 40mg, Repeat duplex (5/3) - stable right soleal DVT, no propagation. Repeat venous doppler L leg   HTN - Continue Norvasc, metoprolol BID.  Hyperlipidemia - elevated cholesterol and LDL. Treat with Crestor and repeat lipid profile in 2 months.  Pain management - Tylenol PRN, Gabapentin, Tramadol.  Gout - Continue allopurinol  Mood / Cognition - Lexapro, f/u with psych.  Sleep - on Trazodone qhs  GI / Bowel -Cont. MiraLAX and bisacodyl  Dysphagia - upgraded to soft and bite sized, monitor for aspiration.  S/p stroke/Left leg muscle spasm- Flexeril 10mg q8h PRN & Tramadol  awaiting acute rehab  spoke to the wife    	        
Date of Service: 06-13-24 @ 07:33           CARDIOLOGY     PROGRESS  NOTE   ________________________________________________    CHIEF COMPLAINT:Patient is a 59y old  Male who presents with a chief complaint of AMS and leg pain (12 Jun 2024 19:28)  no complain  	  REVIEW OF SYSTEMS:  CONSTITUTIONAL: No fever, weight loss, or fatigue  EYES: No eye pain, visual disturbances, or discharge  ENT:  No difficulty hearing, tinnitus, vertigo; No sinus or throat pain  NECK: No pain or stiffness  RESPIRATORY: No cough, wheezing, chills or hemoptysis; No Shortness of Breath  CARDIOVASCULAR: No chest pain, palpitations, passing out, dizziness, or leg swelling  GASTROINTESTINAL: No abdominal or epigastric pain. No nausea, vomiting, or hematemesis; No diarrhea or constipation. No melena or hematochezia.  GENITOURINARY: No dysuria, frequency, hematuria, or incontinence  NEUROLOGICAL: No headaches, memory loss, loss of strength, numbness, or tremors  SKIN: No itching, burning, rashes, or lesions   LYMPH Nodes: No enlarged glands  ENDOCRINE: No heat or cold intolerance; No hair loss  MUSCULOSKELETAL: No joint pain or swelling; No muscle, back, or extremity pain  PSYCHIATRIC: No depression, anxiety, mood swings, or difficulty sleeping  HEME/LYMPH: No easy bruising, or bleeding gums  ALLERGY AND IMMUNOLOGIC: No hives or eczema	    [ x] All others negative	  [ ] Unable to obtain    PHYSICAL EXAM:  T(C): 36.9 (06-13-24 @ 05:35), Max: 37.2 (06-12-24 @ 20:37)  HR: 78 (06-13-24 @ 05:35) (76 - 91)  BP: 120/70 (06-13-24 @ 05:35) (102/66 - 120/70)  RR: 18 (06-13-24 @ 05:35) (18 - 18)  SpO2: 97% (06-13-24 @ 05:35) (93% - 97%)  Wt(kg): --  I&O's Summary      Appearance: Normal	  HEENT:   Normal oral mucosa, PERRL, EOMI	  Lymphatic: No lymphadenopathy  Cardiovascular: Normal S1 S2, No JVD,+ murmurs, No edema  Respiratory: rhonchi  Gastrointestinal:  Soft, Non-tender, + BS	  Skin: No rashes, No ecchymoses, No cyanosis	  Extremities: Normal range of motion, No clubbing, cyanosis or edema  Vascular: Peripheral pulses palpable 2+ bilaterally    MEDICATIONS  (STANDING):  allopurinol 100 milliGRAM(s) Oral daily  amLODIPine   Tablet 5 milliGRAM(s) Oral daily  AQUAPHOR (petrolatum Ointment) 1 Application(s) Topical two times a day  artificial tears (preservative free) Ophthalmic Solution 1 Drop(s) Left EYE four times a day  chlorhexidine 2% Cloths 1 Application(s) Topical <User Schedule>  enoxaparin Injectable 40 milliGRAM(s) SubCutaneous every 24 hours  escitalopram 10 milliGRAM(s) Oral daily  gabapentin 300 milliGRAM(s) Oral daily  gabapentin 400 milliGRAM(s) Oral at bedtime  metoprolol tartrate 12.5 milliGRAM(s) Oral two times a day  multivitamin 1 Tablet(s) Oral daily  polyethylene glycol 3350 17 Gram(s) Oral two times a day  rosuvastatin 10 milliGRAM(s) Oral at bedtime  traZODone 25 milliGRAM(s) Oral at bedtime      TELEMETRY: 	    ECG:  	  RADIOLOGY:  OTHER: 	  	  LABS:	 	    CARDIAC MARKERS:    proBNP:   Lipid Profile:   HgA1c:   TSH:         Assessment and plan  ---------------------------  60 y/o M PMHx gout found down at home on 4/2/24 by wife brought to [facility] by ambulance found to have a L thalamic hemorrhagic infarct via CT. Patient intubated for inability to protect airway. MRI demonstrated hemorrhage with anticipated evolution. Chronic microhemorrhages found in the R thalamus and BG as well as small acute infarcts in the right caudate tail and periatrial white matter. CTA clear of major vessel occlusion or stenosis. Patient with residual R hemiparesis, dysarthria, mild aphasia, and R gaze palsy. EEG negative throughout workup. EKG without AF. TTE grossly normal. Gout flare treated by rheumatology with colchicine while inpatient. Patient received PM&R consult who recommended acute inpatient rehabilitation. Patient was medically stabilized and discharged to [facility] 4/15/24.  Rehab course significant for development of below-knee DVT, seen on 4/26/24. Otherwise, all medical co-morbidities were stable. Patient tolerated course of inpatient PT/OT/SLP rehab with significant improvements and met rehab goals prior to discharge. Discharge instructions were discussed with patient and family. All questions answered and all concerns addressed. Patient was medically cleared for discharge to [facility].    AMS- transferred to ER. Back to baseline. CTH no acute finding except recent acute infarct.   weakness- severe R sided hemiplegia. Patient  did not improve in subacute rehab may quality for go to acute rehab. He needs PT and physiatrist re-evaluation.   L Thalamic IPH - MRI: L thalamic hemorrhage, w/ chronic microhemorrhages, and acute infarcts in the right caudate tail and periatrial white matter, EKG w/o AF, Echo unremarkable, CTA w/o large occlusions  Right soleal DVT, 4/26 - Cont. Lovenox 40mg, Repeat duplex (5/3) - stable right soleal DVT, no propagation. Repeat venous doppler L leg   HTN - Continue Norvasc, metoprolol BID.  Hyperlipidemia - elevated cholesterol and LDL. Treat with Crestor and repeat lipid profile in 2 months.  Pain management - Tylenol PRN, Gabapentin, Tramadol.  Gout - Continue allopurinol  Mood / Cognition - Lexapro, f/u with psych.  Sleep - on Trazodone qhs  GI / Bowel -Cont. MiraLAX and bisacodyl  Dysphagia - upgraded to soft and bite sized, monitor for aspiration.  S/p stroke/Left leg muscle spasm- Flexeril 10mg q8h PRN & Tramadol  awaiting dc planning, carer coordination noted  dvt prophylqaxis  	        
Date of Service: 24 @ 10:38           CARDIOLOGY     PROGRESS  NOTE   ________________________________________________    CHIEF COMPLAINT:Patient is a 59y old  Male who presents with a chief complaint of AMS and leg pain (2024 17:49)  no complain  	  REVIEW OF SYSTEMS:  CONSTITUTIONAL: No fever, weight loss, or fatigue  EYES: No eye pain, visual disturbances, or discharge  ENT:  No difficulty hearing, tinnitus, vertigo; No sinus or throat pain  NECK: No pain or stiffness  RESPIRATORY: No cough, wheezing, chills or hemoptysis; No Shortness of Breath  CARDIOVASCULAR: No chest pain, palpitations, passing out, dizziness, or leg swelling  GASTROINTESTINAL: No abdominal or epigastric pain. No nausea, vomiting, or hematemesis; No diarrhea or constipation. No melena or hematochezia.  GENITOURINARY: No dysuria, frequency, hematuria, or incontinence  NEUROLOGICAL: No headaches, memory loss, loss of strength, numbness, or tremors  SKIN: No itching, burning, rashes, or lesions   LYMPH Nodes: No enlarged glands  ENDOCRINE: No heat or cold intolerance; No hair loss  MUSCULOSKELETAL: No joint pain or swelling; No muscle, back, or extremity pain  PSYCHIATRIC: No depression, anxiety, mood swings, or difficulty sleeping  HEME/LYMPH: No easy bruising, or bleeding gums  ALLERGY AND IMMUNOLOGIC: No hives or eczema	    [x ] All others negative	  [ ] Unable to obtain    PHYSICAL EXAM:  T(C): 37 (24 @ 04:25), Max: 37 (24 @ 04:25)  HR: 72 (24 @ 04:25) (72 - 74)  BP: 127/82 (24 @ 04:25) (108/73 - 127/82)  RR: 18 (24 @ 04:25) (18 - 18)  SpO2: 94% (24 @ 04:25) (94% - 95%)  Wt(kg): --  I&O's Summary    2024 07:01  -  2024 07:00  --------------------------------------------------------  IN: 0 mL / OUT: 700 mL / NET: -700 mL        Appearance: Normal	  HEENT:   Normal oral mucosa, PERRL, EOMI	  Lymphatic: No lymphadenopathy  Cardiovascular: Normal S1 S2, No JVD, + murmurs, No edema  Respiratory:rhonchi  Gastrointestinal:  Soft, Non-tender, + BS	  Skin: No rashes, No ecchymoses, No cyanosis	  Extremities: Normal range of motion, No clubbing, cyanosis or edema  Vascular: Peripheral pulses palpable 2+ bilaterally    MEDICATIONS  (STANDING):  allopurinol 100 milliGRAM(s) Oral daily  amLODIPine   Tablet 5 milliGRAM(s) Oral daily  chlorhexidine 2% Cloths 1 Application(s) Topical <User Schedule>  enoxaparin Injectable 40 milliGRAM(s) SubCutaneous every 24 hours  escitalopram 10 milliGRAM(s) Oral daily  gabapentin 300 milliGRAM(s) Oral daily  gabapentin 400 milliGRAM(s) Oral at bedtime  metoprolol tartrate 12.5 milliGRAM(s) Oral two times a day  multivitamin 1 Tablet(s) Oral daily  polyethylene glycol 3350 17 Gram(s) Oral two times a day  rosuvastatin 10 milliGRAM(s) Oral at bedtime  traZODone 25 milliGRAM(s) Oral at bedtime      TELEMETRY: 	    ECG:  	  RADIOLOGY:  OTHER: 	  	  LABS:	 	    CARDIAC MARKERS:    proBNP:   Lipid Profile:   HgA1c:   TSH:     Notes: Chart reviewed. Per accepting facility, [facility], still no  available bed. Per family request, Call to [facility] for  reconsideration. [facility] [place] [facility]  [place] and [place] availability. Post-acute auth .  Will obtain a new auth once medically accepted to a facility.  will  continue to follow-up and remain available.    Assessment and plan  ---------------------------  58 y/o M PMHx gout found down at home on 24 by wife brought to [facility] by ambulance found to have a L thalamic hemorrhagic infarct via CT. Patient intubated for inability to protect airway. MRI demonstrated hemorrhage with anticipated evolution. Chronic microhemorrhages found in the R thalamus and BG as well as small acute infarcts in the right caudate tail and periatrial white matter. CTA clear of major vessel occlusion or stenosis. Patient with residual R hemiparesis, dysarthria, mild aphasia, and R gaze palsy. EEG negative throughout workup. EKG without AF. TTE grossly normal. Gout flare treated by rheumatology with colchicine while inpatient. Patient received PM&R consult who recommended acute inpatient rehabilitation. Patient was medically stabilized and discharged to [facility] 4/15/24.  Rehab course significant for development of below-knee DVT, seen on 24. Otherwise, all medical co-morbidities were stable. Patient tolerated course of inpatient PT/OT/SLP rehab with significant improvements and met rehab goals prior to discharge. Discharge instructions were discussed with patient and family. All questions answered and all concerns addressed. Patient was medically cleared for discharge to [facility] [place].    AMS- transferred to ER. Back to baseline. CTH no acute finding except recent acute infarct.   weakness- severe R sided hemiplegia. Patient  did not improve in subacute rehab may quality for go to acute rehab. He needs PT and physiatrist re-evaluation.   L Thalamic IPH - MRI: L thalamic hemorrhage, w/ chronic microhemorrhages, and acute infarcts in the right caudate tail and periatrial white matter, EKG w/o AF, Echo unremarkable, CTA w/o large occlusions  Right soleal DVT,  - Cont. Lovenox 40mg, Repeat duplex (5/3) - stable right soleal DVT, no propagation. Repeat venous doppler L leg   HTN - Continue Norvasc, metoprolol BID.  Hyperlipidemia - elevated cholesterol and LDL. Treat with Crestor and repeat lipid profile in 2 months.  Pain management - Tylenol PRN, Gabapentin, Tramadol.  Gout - Continue allopurinol  Mood / Cognition - Lexapro, f/u with psych.  Sleep - on Trazodone qhs  GI / Bowel -Cont. MiraLAX and bisacodyl  Dysphagia - upgraded to soft and bite sized, monitor for aspiration.  S/p stroke/Left leg muscle spasm- Flexeril 10mg q8h PRN & Tramadol  care coordination appreciated awaiting dc      	        
Patient is a 59y old  Male who presents with a chief complaint of AMS and leg pain (06 Jun 2024 17:39)      INTERVAL HPI/OVERNIGHT EVENTS: Patient medically clear. Waiting to get bed. Not able to leave yet. No available bed in [facility]    Pain Location & Control:     MEDICATIONS  (STANDING):  allopurinol 100 milliGRAM(s) Oral daily  amLODIPine   Tablet 5 milliGRAM(s) Oral daily  chlorhexidine 2% Cloths 1 Application(s) Topical <User Schedule>  enoxaparin Injectable 40 milliGRAM(s) SubCutaneous every 24 hours  escitalopram 10 milliGRAM(s) Oral daily  gabapentin 300 milliGRAM(s) Oral daily  gabapentin 400 milliGRAM(s) Oral at bedtime  metoprolol tartrate 12.5 milliGRAM(s) Oral two times a day  multivitamin 1 Tablet(s) Oral daily  polyethylene glycol 3350 17 Gram(s) Oral two times a day  rosuvastatin 10 milliGRAM(s) Oral at bedtime  traZODone 25 milliGRAM(s) Oral at bedtime    MEDICATIONS  (PRN):  acetaminophen     Tablet .. 650 milliGRAM(s) Oral every 6 hours PRN Temp greater or equal to 38C (100.4F), Mild Pain (1 - 3)      Allergies    No Known Allergies    Intolerances        REVIEW OF SYSTEMS:  CONSTITUTIONAL: No fever, weight loss, or fatigue  EYES: No eye pain, visual disturbances, or discharge  ENMT:  No difficulty hearing, tinnitus, vertigo; No sinus or throat pain  NECK: No pain or stiffness  BREASTS: No pain, masses, or nipple discharge  RESPIRATORY: No cough, wheezing, chills or hemoptysis; No shortness of breath  CARDIOVASCULAR: No chest pain, palpitations, dizziness, or leg swelling  GASTROINTESTINAL: No abdominal or epigastric pain. No nausea, vomiting, or hematemesis; No diarrhea or constipation. No melena or hematochezia.  GENITOURINARY: No dysuria, frequency, hematuria, or incontinence  NEUROLOGICAL: No headaches, memory loss, loss of strength, numbness, or tremors  SKIN: No itching, burning, rashes, or lesions   LYMPH NODES: No enlarged glands  ENDOCRINE: No heat or cold intolerance; No hair loss; No polydipsia or polyuria  MUSCULOSKELETAL: No back pain  PSYCHIATRIC: No depression, anxiety, mood swings, or difficulty sleeping  HEME/LYMPH: No easy bruising, or bleeding gums  ALLERGY AND IMMUNOLOGIC: No hives or eczema    Vital Signs Last 24 Hrs  T(C): 36.9 (07 Jun 2024 12:22), Max: 36.9 (07 Jun 2024 12:22)  T(F): 98.5 (07 Jun 2024 12:22), Max: 98.5 (07 Jun 2024 12:22)  HR: 74 (07 Jun 2024 17:15) (73 - 77)  BP: 108/73 (07 Jun 2024 17:15) (108/73 - 128/86)  BP(mean): --  RR: 18 (07 Jun 2024 12:22) (18 - 18)  SpO2: 95% (07 Jun 2024 12:22) (93% - 95%)    Parameters below as of 07 Jun 2024 12:22  Patient On (Oxygen Delivery Method): room air        PHYSICAL EXAM:  GENERAL: NAD, well-groomed, well-developed  HEAD:  Atraumatic, Normocephalic  EYES: EOMI, PERRLA, conjunctiva and sclera clear  ENMT: No tonsillar erythema, exudates, or enlargement; Moist mucous membranes, Good dentition, No lesions  NECK: Supple, No JVD, Normal thyroid  NERVOUS SYSTEM:  Alert & Oriented X3,R sided hemiplegia   CHEST/LUNG: Clear to auscultation bilaterally; No rales, rhonchi, wheezing, or rubs  HEART: Regular rate and rhythm; No murmurs, rubs, or gallops  ABDOMEN: Soft, Nontender, Nondistended; Bowel sounds present  EXTREMITIES:  2+ Peripheral Pulses, No clubbing or cyanosis  LYMPH: No lymphadenopathy noted  SKIN: No rashes or lesions      LABS:              CAPILLARY BLOOD GLUCOSE            Cultures      RADIOLOGY & ADDITIONAL TESTS:    Imaging Personally Reviewed:  [X ] YES  [ ] NO    Consultant(s) Notes Reviewed:  [ X] YES  [ ] NO    Care Discussed with Consultants/Other Providers [X ] YES  [ ] NO
Patient is a 59y old  Male who presents with a chief complaint of AMS and leg pain (09 Jun 2024 10:36)      INTERVAL HPI/OVERNIGHT EVENTS: Medically stable and clear for discharge. None of rehab facility accepted his insurance.  and myself offered him only to go back home with aide 10-12 hours aide at home.     Pain Location & Control:     MEDICATIONS  (STANDING):  allopurinol 100 milliGRAM(s) Oral daily  amLODIPine   Tablet 5 milliGRAM(s) Oral daily  artificial tears (preservative free) Ophthalmic Solution 1 Drop(s) Left EYE four times a day  chlorhexidine 2% Cloths 1 Application(s) Topical <User Schedule>  enoxaparin Injectable 40 milliGRAM(s) SubCutaneous every 24 hours  escitalopram 10 milliGRAM(s) Oral daily  gabapentin 300 milliGRAM(s) Oral daily  gabapentin 400 milliGRAM(s) Oral at bedtime  metoprolol tartrate 12.5 milliGRAM(s) Oral two times a day  multivitamin 1 Tablet(s) Oral daily  polyethylene glycol 3350 17 Gram(s) Oral two times a day  rosuvastatin 10 milliGRAM(s) Oral at bedtime  traZODone 25 milliGRAM(s) Oral at bedtime    MEDICATIONS  (PRN):  acetaminophen     Tablet .. 650 milliGRAM(s) Oral every 6 hours PRN Temp greater or equal to 38C (100.4F), Mild Pain (1 - 3)  traMADol 25 milliGRAM(s) Oral every 8 hours PRN Moderate Pain (4 - 6)      Allergies    No Known Allergies    Intolerances        REVIEW OF SYSTEMS:  CONSTITUTIONAL: No fever, weight loss, or fatigue  EYES: No eye pain, visual disturbances, or discharge  ENMT:  No difficulty hearing, tinnitus, vertigo; No sinus or throat pain  NECK: No pain or stiffness  BREASTS: No pain, masses, or nipple discharge  RESPIRATORY: No cough, wheezing, chills or hemoptysis; No shortness of breath  CARDIOVASCULAR: No chest pain, palpitations, dizziness, or leg swelling  GASTROINTESTINAL: No abdominal or epigastric pain. No nausea, vomiting, or hematemesis; No diarrhea or constipation. No melena or hematochezia.  GENITOURINARY: No dysuria, frequency, hematuria, or incontinence  NEUROLOGICAL: No headaches, memory loss, loss of strength, numbness, or tremors  SKIN: No itching, burning, rashes, or lesions   LYMPH NODES: No enlarged glands  ENDOCRINE: No heat or cold intolerance; No hair loss; No polydipsia or polyuria  MUSCULOSKELETAL: No back pain  PSYCHIATRIC: No depression, anxiety, mood swings, or difficulty sleeping  HEME/LYMPH: No easy bruising, or bleeding gums  ALLERGY AND IMMUNOLOGIC: No hives or eczema    Vital Signs Last 24 Hrs  T(C): 36.9 (10 Ziggy 2024 12:55), Max: 37.1 (09 Jun 2024 21:45)  T(F): 98.4 (10 Ziggy 2024 12:55), Max: 98.7 (09 Jun 2024 21:45)  HR: 78 (10 Ziggy 2024 12:55) (73 - 79)  BP: 117/79 (10 Ziggy 2024 12:55) (115/80 - 119/81)  BP(mean): --  RR: 18 (10 Ziggy 2024 12:55) (18 - 18)  SpO2: 97% (10 Ziggy 2024 12:55) (95% - 97%)    Parameters below as of 10 Ziggy 2024 12:55  Patient On (Oxygen Delivery Method): room air        PHYSICAL EXAM:  GENERAL: NAD, well-groomed, well-developed  HEAD:  Atraumatic, Normocephalic  EYES: EOMI, PERRLA, conjunctiva and sclera clear  ENMT: No tonsillar erythema, exudates, or enlargement; Moist mucous membranes, Good dentition, No lesions  NECK: Supple, No JVD, Normal thyroid  NERVOUS SYSTEM:  Alert & Oriented x 3 R sided hemiplegia and facial droop  CHEST/LUNG: Clear to auscultation bilaterally; No rales, rhonchi, wheezing, or rubs  HEART: Regular rate and rhythm; No murmurs, rubs, or gallops  ABDOMEN: Soft, Nontender, Nondistended; Bowel sounds present  EXTREMITIES:  2+ Peripheral Pulses, No clubbing or cyanosis  LYMPH: No lymphadenopathy noted  SKIN: No rashes or lesions    LABS:              CAPILLARY BLOOD GLUCOSE            Cultures      RADIOLOGY & ADDITIONAL TESTS:    Imaging Personally Reviewed:  [ X] YES  [ ] NO    Consultant(s) Notes Reviewed:  [X ] YES  [ ] NO    Care Discussed with Consultants/Other Providers [X ] YES  [ ] NO
Date of Service: 06-09-24 @ 10:36           CARDIOLOGY     PROGRESS  NOTE   ________________________________________________    CHIEF COMPLAINT:Patient is a 59y old  Male who presents with a chief complaint of AMS and leg pain (08 Jun 2024 10:37)  no complain  	  REVIEW OF SYSTEMS:  CONSTITUTIONAL: No fever, weight loss, or fatigue  EYES: No eye pain, visual disturbances, or discharge  ENT:  No difficulty hearing, tinnitus, vertigo; No sinus or throat pain  NECK: No pain or stiffness  RESPIRATORY: No cough, wheezing, chills or hemoptysis; No Shortness of Breath  CARDIOVASCULAR: No chest pain, palpitations, passing out, dizziness, or leg swelling  GASTROINTESTINAL: No abdominal or epigastric pain. No nausea, vomiting, or hematemesis; No diarrhea or constipation. No melena or hematochezia.  GENITOURINARY: No dysuria, frequency, hematuria, or incontinence  NEUROLOGICAL: No headaches, memory loss, loss of strength, numbness, or tremors  SKIN: No itching, burning, rashes, or lesions   LYMPH Nodes: No enlarged glands  ENDOCRINE: No heat or cold intolerance; No hair loss  MUSCULOSKELETAL: No joint pain or swelling; No muscle, back, or extremity pain  PSYCHIATRIC: No depression, anxiety, mood swings, or difficulty sleeping  HEME/LYMPH: No easy bruising, or bleeding gums  ALLERGY AND IMMUNOLOGIC: No hives or eczema	    [x ] All others negative	  [ ] Unable to obtain    PHYSICAL EXAM:  T(C): 36.6 (06-09-24 @ 04:45), Max: 36.8 (06-08-24 @ 13:25)  HR: 78 (06-09-24 @ 04:45) (76 - 79)  BP: 106/76 (06-09-24 @ 04:45) (106/76 - 127/88)  RR: 18 (06-09-24 @ 04:45) (17 - 18)  SpO2: 92% (06-09-24 @ 04:45) (92% - 96%)  Wt(kg): --  I&O's Summary    08 Jun 2024 07:01  -  09 Jun 2024 07:00  --------------------------------------------------------  IN: 0 mL / OUT: 700 mL / NET: -700 mL        Appearance: Normal	  HEENT:   Normal oral mucosa, PERRL, EOMI	  Lymphatic: No lymphadenopathy  Cardiovascular: Normal S1 S2, No JVD, + murmurs, No edema  Respiratory: rhonchi  Gastrointestinal:  Soft, Non-tender, + BS	  Skin: No rashes, No ecchymoses, No cyanosis	  Neurologic: Non-focal  Extremities: Normal range of motion, No clubbing, cyanosis or edema  Vascular: Peripheral pulses palpable 2+ bilaterally    MEDICATIONS  (STANDING):  allopurinol 100 milliGRAM(s) Oral daily  amLODIPine   Tablet 5 milliGRAM(s) Oral daily  chlorhexidine 2% Cloths 1 Application(s) Topical <User Schedule>  enoxaparin Injectable 40 milliGRAM(s) SubCutaneous every 24 hours  escitalopram 10 milliGRAM(s) Oral daily  gabapentin 300 milliGRAM(s) Oral daily  gabapentin 400 milliGRAM(s) Oral at bedtime  metoprolol tartrate 12.5 milliGRAM(s) Oral two times a day  multivitamin 1 Tablet(s) Oral daily  polyethylene glycol 3350 17 Gram(s) Oral two times a day  rosuvastatin 10 milliGRAM(s) Oral at bedtime  traZODone 25 milliGRAM(s) Oral at bedtime      TELEMETRY: 	    ECG:  	  RADIOLOGY:  OTHER: 	  	  LABS:	 	    CARDIAC MARKERS:      proBNP:   Lipid Profile:   HgA1c:   TSH:         Assessment and plan  ---------------------------  60 y/o M PMHx gout found down at home on 4/2/24 by wife brought to [facility] by ambulance found to have a L thalamic hemorrhagic infarct via CT. Patient intubated for inability to protect airway. MRI demonstrated hemorrhage with anticipated evolution. Chronic microhemorrhages found in the R thalamus and BG as well as small acute infarcts in the right caudate tail and periatrial white matter. CTA clear of major vessel occlusion or stenosis. Patient with residual R hemiparesis, dysarthria, mild aphasia, and R gaze palsy. EEG negative throughout workup. EKG without AF. TTE grossly normal. Gout flare treated by rheumatology with colchicine while inpatient. Patient received PM&R consult who recommended acute inpatient rehabilitation. Patient was medically stabilized and discharged to [facility] 4/15/24.  Rehab course significant for development of below-knee DVT, seen on 4/26/24. Otherwise, all medical co-morbidities were stable. Patient tolerated course of inpatient PT/OT/SLP rehab with significant improvements and met rehab goals prior to discharge. Discharge instructions were discussed with patient and family. All questions answered and all concerns addressed. Patient was medically cleared for discharge to [facility].    AMS- transferred to ER. Back to baseline. CTH no acute finding except recent acute infarct.   weakness- severe R sided hemiplegia. Patient  did not improve in subacute rehab may quality for go to acute rehab. He needs PT and physiatrist re-evaluation.   L Thalamic IPH - MRI: L thalamic hemorrhage, w/ chronic microhemorrhages, and acute infarcts in the right caudate tail and periatrial white matter, EKG w/o AF, Echo unremarkable, CTA w/o large occlusions  Right soleal DVT, 4/26 - Cont. Lovenox 40mg, Repeat duplex (5/3) - stable right soleal DVT, no propagation. Repeat venous doppler L leg   HTN - Continue Norvasc, metoprolol BID.  Hyperlipidemia - elevated cholesterol and LDL. Treat with Crestor and repeat lipid profile in 2 months.  Pain management - Tylenol PRN, Gabapentin, Tramadol.  Gout - Continue allopurinol  Mood / Cognition - Lexapro, f/u with psych.  Sleep - on Trazodone qhs  GI / Bowel -Cont. MiraLAX and bisacodyl  Dysphagia - upgraded to soft and bite sized, monitor for aspiration.  S/p stroke/Left leg muscle spasm- Flexeril 10mg q8h PRN & Tramadol  care coordination appreciated awaiting dc  pt will be seen by Dr kwan in am    	        
Date of Service: 06-13-24 @ 07:53           CARDIOLOGY     PROGRESS  NOTE   ________________________________________________    CHIEF COMPLAINT:Patient is a 59y old  Male who presents with a chief complaint of AMS and leg pain (13 Jun 2024 07:30)  no complain  	  REVIEW OF SYSTEMS:  CONSTITUTIONAL: No fever, weight loss, or fatigue  EYES: No eye pain, visual disturbances, or discharge  ENT:  No difficulty hearing, tinnitus, vertigo; No sinus or throat pain  NECK: No pain or stiffness  RESPIRATORY: No cough, wheezing, chills or hemoptysis; No Shortness of Breath  CARDIOVASCULAR: No chest pain, palpitations, passing out, dizziness, or leg swelling  GASTROINTESTINAL: No abdominal or epigastric pain. No nausea, vomiting, or hematemesis; No diarrhea or constipation. No melena or hematochezia.  GENITOURINARY: No dysuria, frequency, hematuria, or incontinence  NEUROLOGICAL: No headaches, memory loss, loss of strength, numbness, or tremors  SKIN: No itching, burning, rashes, or lesions   LYMPH Nodes: No enlarged glands  ENDOCRINE: No heat or cold intolerance; No hair loss  MUSCULOSKELETAL: No joint pain or swelling; No muscle, back, or extremity pain  PSYCHIATRIC: No depression, anxiety, mood swings, or difficulty sleeping  HEME/LYMPH: No easy bruising, or bleeding gums  ALLERGY AND IMMUNOLOGIC: No hives or eczema	    [x ] All others negative	  [ ] Unable to obtain      T(C): 36.9 (11 Jun 2024 12:55), Max: 37.1 (09 Jun 2024 21:45)  T(F): 98.4 (11 Jun 2024 12:55), Max: 98.7 (09 Jun 2024 21:45)  HR: 78 (10 Ziggy 2024 12:55) (73 - 79)  BP: 117/79 (10 Ziggy 2024 12:55) (115/80 - 119/81)  BP(mean): --  RR: 18 (11 Jun 2024 12:55) (18 - 18)  SpO2: 97% (11 Jun 2024 12:55) (95% - 97%)    Appearance: Normal	  HEENT:   Normal oral mucosa, PERRL, EOMI	  Lymphatic: No lymphadenopathy  Cardiovascular: Normal S1 S2, No JVD, + murmurs, No edema  Respiratory: Lungs clear to auscultation	  Gastrointestinal:  Soft, Non-tender, + BS	  Skin: No rashes, No ecchymoses, No cyanosis	  Neurologic: Non-focal  Extremities: Normal range of motion, No clubbing, cyanosis or edema  Vascular: Peripheral pulses palpable 2+ bilaterally    MEDICATIONS  (STANDING):  allopurinol 100 milliGRAM(s) Oral daily  amLODIPine   Tablet 5 milliGRAM(s) Oral daily  AQUAPHOR (petrolatum Ointment) 1 Application(s) Topical two times a day  artificial tears (preservative free) Ophthalmic Solution 1 Drop(s) Left EYE four times a day  chlorhexidine 2% Cloths 1 Application(s) Topical <User Schedule>  enoxaparin Injectable 40 milliGRAM(s) SubCutaneous every 24 hours  escitalopram 10 milliGRAM(s) Oral daily  gabapentin 300 milliGRAM(s) Oral daily  gabapentin 400 milliGRAM(s) Oral at bedtime  metoprolol tartrate 12.5 milliGRAM(s) Oral two times a day  multivitamin 1 Tablet(s) Oral daily  polyethylene glycol 3350 17 Gram(s) Oral two times a day  rosuvastatin 10 milliGRAM(s) Oral at bedtime  traZODone 25 milliGRAM(s) Oral at bedtime      TELEMETRY: 	    ECG:  	  RADIOLOGY:  OTHER: 	  	  LABS:	 	    CARDIAC MARKERS:    proBNP:   Lipid Profile:   HgA1c:   TSH:         Assessment and plan  ---------------------------  60 y/o M PMHx gout found down at home on 4/2/24 by wife brought to [facility] by ambulance found to have a L thalamic hemorrhagic infarct via CT. Patient intubated for inability to protect airway. MRI demonstrated hemorrhage with anticipated evolution. Chronic microhemorrhages found in the R thalamus and BG as well as small acute infarcts in the right caudate tail and periatrial white matter. CTA clear of major vessel occlusion or stenosis. Patient with residual R hemiparesis, dysarthria, mild aphasia, and R gaze palsy. EEG negative throughout workup. EKG without AF. TTE grossly normal. Gout flare treated by rheumatology with colchicine while inpatient. Patient received PM&R consult who recommended acute inpatient rehabilitation. Patient was medically stabilized and discharged to [facility] 4/15/24.  Rehab course significant for development of below-knee DVT, seen on 4/26/24. Otherwise, all medical co-morbidities were stable. Patient tolerated course of inpatient PT/OT/SLP rehab with significant improvements and met rehab goals prior to discharge. Discharge instructions were discussed with patient and family. All questions answered and all concerns addressed. Patient was medically cleared for discharge to [facility].    AMS- transferred to ER. Back to baseline. CTH no acute finding except recent acute infarct.   weakness- severe R sided hemiplegia. Patient  did not improve in subacute rehab may quality for go to acute rehab. He needs PT and physiatrist re-evaluation.   L Thalamic IPH - MRI: L thalamic hemorrhage, w/ chronic microhemorrhages, and acute infarcts in the right caudate tail and periatrial white matter, EKG w/o AF, Echo unremarkable, CTA w/o large occlusions  Right soleal DVT, 4/26 - Cont. Lovenox 40mg, Repeat duplex (5/3) - stable right soleal DVT, no propagation. Repeat venous doppler L leg   HTN - Continue Norvasc, metoprolol BID.  Hyperlipidemia - elevated cholesterol and LDL. Treat with Crestor and repeat lipid profile in 2 months.  Pain management - Tylenol PRN, Gabapentin, Tramadol.  Gout - Continue allopurinol  Mood / Cognition - Lexapro, f/u with psych.  Sleep - on Trazodone qhs  GI / Bowel -Cont. MiraLAX and bisacodyl  Dysphagia - upgraded to soft and bite sized, monitor for aspiration.  S/p stroke/Left leg muscle spasm- Flexeril 10mg q8h PRN & Tramadol  awaiting dc planning    	        
Patient is a 59y old  Male who presents with a chief complaint of AMS and leg pain (03 Jun 2024 17:19)      INTERVAL HPI/OVERNIGHT EVENTS: Medically stable. He has some rash and erythema on  his facial skin. Started on  moisturizer. Still waiting for placement.     Pain Location & Control:     MEDICATIONS  (STANDING):  allopurinol 100 milliGRAM(s) Oral daily  amLODIPine   Tablet 5 milliGRAM(s) Oral daily  enoxaparin Injectable 40 milliGRAM(s) SubCutaneous every 24 hours  escitalopram 10 milliGRAM(s) Oral daily  gabapentin 300 milliGRAM(s) Oral daily  gabapentin 400 milliGRAM(s) Oral at bedtime  metoprolol tartrate 12.5 milliGRAM(s) Oral two times a day  multivitamin 1 Tablet(s) Oral daily  polyethylene glycol 3350 17 Gram(s) Oral two times a day  rosuvastatin 10 milliGRAM(s) Oral at bedtime  traZODone 25 milliGRAM(s) Oral at bedtime    MEDICATIONS  (PRN):  acetaminophen     Tablet .. 650 milliGRAM(s) Oral every 6 hours PRN Temp greater or equal to 38C (100.4F), Mild Pain (1 - 3)  traMADol 25 milliGRAM(s) Oral every 6 hours PRN Moderate Pain (4 - 6)      Allergies    No Known Allergies    Intolerances        REVIEW OF SYSTEMS:  CONSTITUTIONAL: No fever, weight loss, or fatigue  EYES: No eye pain, visual disturbances, or discharge  ENMT:  No difficulty hearing, tinnitus, vertigo; No sinus or throat pain  NECK: No pain or stiffness  BREASTS: No pain, masses, or nipple discharge  RESPIRATORY: No cough, wheezing, chills or hemoptysis; No shortness of breath  CARDIOVASCULAR: No chest pain, palpitations, dizziness, or leg swelling  GASTROINTESTINAL: No abdominal or epigastric pain. No nausea, vomiting, or hematemesis; No diarrhea or constipation. No melena or hematochezia.  GENITOURINARY: No dysuria, frequency, hematuria, or incontinence  NEUROLOGICAL: No headaches, memory loss, loss of strength, numbness, or tremors  SKIN: No itching, burning, rashes, or lesions   LYMPH NODES: No enlarged glands  ENDOCRINE: No heat or cold intolerance; No hair loss; No polydipsia or polyuria  MUSCULOSKELETAL: No back pain  PSYCHIATRIC: No depression, anxiety, mood swings, or difficulty sleeping  HEME/LYMPH: No easy bruising, or bleeding gums  ALLERGY AND IMMUNOLOGIC: No hives or eczema    Vital Signs Last 24 Hrs  T(C): 36.8 (04 Jun 2024 20:36), Max: 36.9 (04 Jun 2024 13:28)  T(F): 98.3 (04 Jun 2024 20:36), Max: 98.4 (04 Jun 2024 13:28)  HR: 92 (04 Jun 2024 20:36) (81 - 92)  BP: 113/79 (04 Jun 2024 20:36) (110/74 - 113/79)  BP(mean): --  RR: 18 (04 Jun 2024 20:36) (18 - 18)  SpO2: 99% (04 Jun 2024 20:36) (94% - 99%)    Parameters below as of 04 Jun 2024 20:36  Patient On (Oxygen Delivery Method): room air        PHYSICAL EXAM:  GENERAL: NAD, well-groomed, well-developed  HEAD:  Atraumatic, Normocephalic  EYES: EOMI, PERRLA, conjunctiva and sclera clear  ENMT: No tonsillar erythema, exudates, or enlargement; Moist mucous membranes, Good dentition, No lesions  NECK: Supple, No JVD, Normal thyroid  NERVOUS SYSTEM:  Alert & Oriented X3,  R sided hemiplegia  CHEST/LUNG: Clear to auscultation bilaterally; No rales, rhonchi, wheezing, or rubs  HEART: Regular rate and rhythm; No murmurs, rubs, or gallops  ABDOMEN: Soft, Nontender, Nondistended; Bowel sounds present  EXTREMITIES:  2+ Peripheral Pulses, No clubbing or cyanosis  LYMPH: No lymphadenopathy noted  SKIN: No rashes or lesions      LABS:      Ca    9.9        03 Jun 2024 06:33        Urinalysis Basic - ( 03 Jun 2024 06:33 )    Color: x / Appearance: x / SG: x / pH: x  Gluc: 99 mg/dL / Ketone: x  / Bili: x / Urobili: x   Blood: x / Protein: x / Nitrite: x   Leuk Esterase: x / RBC: x / WBC x   Sq Epi: x / Non Sq Epi: x / Bacteria: x      CAPILLARY BLOOD GLUCOSE            Cultures  Culture Results:   <10,000 CFU/mL Normal Urogenital Danae (05-30-24 @ 15:40)      RADIOLOGY & ADDITIONAL TESTS:    Imaging Personally Reviewed:  [X ] YES  [ ] NO    Consultant(s) Notes Reviewed:  [ X] YES  [ ] NO    Care Discussed with Consultants/Other Providers [ X ] YES  [ ] NO
Patient is a 59y old  Male who presents with a chief complaint of AMS and leg pain (05 Jun 2024 17:45)      INTERVAL HPI/OVERNIGHT EVENTS: Medically stable. Waiting for placement.      Pain Location & Control:     MEDICATIONS  (STANDING):  allopurinol 100 milliGRAM(s) Oral daily  amLODIPine   Tablet 5 milliGRAM(s) Oral daily  chlorhexidine 2% Cloths 1 Application(s) Topical <User Schedule>  enoxaparin Injectable 40 milliGRAM(s) SubCutaneous every 24 hours  escitalopram 10 milliGRAM(s) Oral daily  gabapentin 300 milliGRAM(s) Oral daily  gabapentin 400 milliGRAM(s) Oral at bedtime  metoprolol tartrate 12.5 milliGRAM(s) Oral two times a day  multivitamin 1 Tablet(s) Oral daily  polyethylene glycol 3350 17 Gram(s) Oral two times a day  rosuvastatin 10 milliGRAM(s) Oral at bedtime  traZODone 25 milliGRAM(s) Oral at bedtime    MEDICATIONS  (PRN):  acetaminophen     Tablet .. 650 milliGRAM(s) Oral every 6 hours PRN Temp greater or equal to 38C (100.4F), Mild Pain (1 - 3)  traMADol 25 milliGRAM(s) Oral every 6 hours PRN Moderate Pain (4 - 6)      Allergies    No Known Allergies    Intolerances        REVIEW OF SYSTEMS:  CONSTITUTIONAL: No fever, weight loss, or fatigue  EYES: No eye pain, visual disturbances, or discharge  ENMT:  No difficulty hearing, tinnitus, vertigo; No sinus or throat pain  NECK: No pain or stiffness  BREASTS: No pain, masses, or nipple discharge  RESPIRATORY: No cough, wheezing, chills or hemoptysis; No shortness of breath  CARDIOVASCULAR: No chest pain, palpitations, dizziness, or leg swelling  GASTROINTESTINAL: No abdominal or epigastric pain. No nausea, vomiting, or hematemesis; No diarrhea or constipation. No melena or hematochezia.  GENITOURINARY: No dysuria, frequency, hematuria, or incontinence  NEUROLOGICAL: No headaches, memory loss, loss of strength, numbness, or tremors  SKIN: No itching, burning, rashes, or lesions   LYMPH NODES: No enlarged glands  ENDOCRINE: No heat or cold intolerance; No hair loss; No polydipsia or polyuria  MUSCULOSKELETAL: No back pain  PSYCHIATRIC: No depression, anxiety, mood swings, or difficulty sleeping  HEME/LYMPH: No easy bruising, or bleeding gums  ALLERGY AND IMMUNOLOGIC: No hives or eczema    Vital Signs Last 24 Hrs  T(C): 36.8 (06 Jun 2024 11:43), Max: 37.2 (05 Jun 2024 21:11)  T(F): 98.2 (06 Jun 2024 11:43), Max: 99 (05 Jun 2024 21:11)  HR: 75 (06 Jun 2024 14:55) (75 - 83)  BP: 120/82 (06 Jun 2024 14:55) (106/71 - 121/85)  BP(mean): --  RR: 18 (06 Jun 2024 11:43) (18 - 18)  SpO2: 94% (06 Jun 2024 14:55) (94% - 95%)    Parameters below as of 06 Jun 2024 14:55  Patient On (Oxygen Delivery Method): room air        PHYSICAL EXAM:  GENERAL: NAD, well-groomed, well-developed  HEAD:  Atraumatic, Normocephalic  EYES: EOMI, PERRLA, conjunctiva and sclera clear  ENMT: No tonsillar erythema, exudates, or enlargement; Moist mucous membranes, Good dentition, No lesions  NECK: Supple, No JVD, Normal thyroid  NERVOUS SYSTEM:  Alert & Oriented X3,  R sided hemiplegia and facial droop  CHEST/LUNG: Clear to auscultation bilaterally; No rales, rhonchi, wheezing, or rubs  HEART: Regular rate and rhythm; No murmurs, rubs, or gallops  ABDOMEN: Soft, Nontender, Nondistended; Bowel sounds present  EXTREMITIES:  2+ Peripheral Pulses, No clubbing or cyanosis  LYMPH: No lymphadenopathy noted  SKIN: No rashes or lesions      LABS:              CAPILLARY BLOOD GLUCOSE            Cultures      RADIOLOGY & ADDITIONAL TESTS:    Imaging Personally Reviewed:  [X ] YES  [ ] NO    Consultant(s) Notes Reviewed:  [ X] YES  [ ] NO    Care Discussed with Consultants/Other Providers [X ] YES  [ ] NO
Patient is a 59y old  Male who presents with a chief complaint of AMS and leg pain (03 Jun 2024 10:13)      INTERVAL HPI/OVERNIGHT EVENTS: Patient medically stable and clear. Waiting for placement.     Pain Location & Control:     MEDICATIONS  (STANDING):  allopurinol 100 milliGRAM(s) Oral daily  amLODIPine   Tablet 5 milliGRAM(s) Oral daily  enoxaparin Injectable 40 milliGRAM(s) SubCutaneous every 24 hours  escitalopram 10 milliGRAM(s) Oral daily  gabapentin 300 milliGRAM(s) Oral daily  gabapentin 400 milliGRAM(s) Oral at bedtime  metoprolol tartrate 12.5 milliGRAM(s) Oral two times a day  multivitamin 1 Tablet(s) Oral daily  polyethylene glycol 3350 17 Gram(s) Oral two times a day  rosuvastatin 10 milliGRAM(s) Oral at bedtime  traZODone 25 milliGRAM(s) Oral at bedtime    MEDICATIONS  (PRN):  acetaminophen     Tablet .. 650 milliGRAM(s) Oral every 6 hours PRN Temp greater or equal to 38C (100.4F), Mild Pain (1 - 3)  traMADol 25 milliGRAM(s) Oral every 6 hours PRN Moderate Pain (4 - 6)      Allergies    No Known Allergies    Intolerances        REVIEW OF SYSTEMS:  CONSTITUTIONAL: No fever, weight loss, or fatigue  EYES: No eye pain, visual disturbances, or discharge  ENMT:  No difficulty hearing, tinnitus, vertigo; No sinus or throat pain  NECK: No pain or stiffness  BREASTS: No pain, masses, or nipple discharge  RESPIRATORY: No cough, wheezing, chills or hemoptysis; No shortness of breath  CARDIOVASCULAR: No chest pain, palpitations, dizziness, or leg swelling  GASTROINTESTINAL: No abdominal or epigastric pain. No nausea, vomiting, or hematemesis; No diarrhea or constipation. No melena or hematochezia.  GENITOURINARY: No dysuria, frequency, hematuria, or incontinence  NEUROLOGICAL: No headaches, memory loss, loss of strength, numbness, or tremors  SKIN: No itching, burning, rashes, or lesions   LYMPH NODES: No enlarged glands  ENDOCRINE: No heat or cold intolerance; No hair loss; No polydipsia or polyuria  MUSCULOSKELETAL: No back pain  PSYCHIATRIC: No depression, anxiety, mood swings, or difficulty sleeping  HEME/LYMPH: No easy bruising, or bleeding gums  ALLERGY AND IMMUNOLOGIC: No hives or eczema    Vital Signs Last 24 Hrs  T(C): 36.4 (03 Jun 2024 13:26), Max: 36.6 (02 Jun 2024 21:53)  T(F): 97.6 (03 Jun 2024 13:26), Max: 97.8 (02 Jun 2024 21:53)  HR: 79 (03 Jun 2024 13:26) (73 - 79)  BP: 128/93 (03 Jun 2024 13:26) (103/70 - 128/93)  BP(mean): --  RR: 18 (03 Jun 2024 13:26) (18 - 18)  SpO2: 93% (03 Jun 2024 13:26) (93% - 94%)    Parameters below as of 03 Jun 2024 13:26  Patient On (Oxygen Delivery Method): room air        PHYSICAL EXAM:  GENERAL: NAD, well-groomed, well-developed  HEAD:  Atraumatic, Normocephalic, facial droop   EYES: EOMI, PERRLA, conjunctiva and sclera clear  ENMT: No tonsillar erythema, exudates, or enlargement; Moist mucous membranes, Good dentition, No lesions  NECK: Supple, No JVD, Normal thyroid  NERVOUS SYSTEM:  Alert & Oriented X3, R sided hemiplegia   CHEST/LUNG: Clear to auscultation bilaterally; No rales, rhonchi, wheezing, or rubs  HEART: Regular rate and rhythm; No murmurs, rubs, or gallops  ABDOMEN: Soft, Nontender, Nondistended; Bowel sounds present  EXTREMITIES:  2+ Peripheral Pulses, No clubbing or cyanosis  LYMPH: No lymphadenopathy noted  SKIN: No rashes or lesions      LABS:                        13.7   7.01  )-----------( 244      ( 03 Jun 2024 06:33 )             41.5     03 Jun 2024 06:33    139    |  102    |  20     ----------------------------<  99     4.2     |  22     |  0.66     Ca    9.9        03 Jun 2024 06:33        Urinalysis Basic - ( 03 Jun 2024 06:33 )    Color: x / Appearance: x / SG: x / pH: x  Gluc: 99 mg/dL / Ketone: x  / Bili: x / Urobili: x   Blood: x / Protein: x / Nitrite: x   Leuk Esterase: x / RBC: x / WBC x   Sq Epi: x / Non Sq Epi: x / Bacteria: x      CAPILLARY BLOOD GLUCOSE            Cultures  Culture Results:   <10,000 CFU/mL Normal Urogenital Danae (05-30-24 @ 15:40)      RADIOLOGY & ADDITIONAL TESTS:    Imaging Personally Reviewed:  [X ] YES  [ ] NO    Consultant(s) Notes Reviewed:  [ X] YES  [ ] NO    Care Discussed with Consultants/Other Providers [X ] YES  [ ] NO
Patient is a 59y old  Male who presents with a chief complaint of AMS and leg pain (05 Jun 2024 09:46)      INTERVAL HPI/OVERNIGHT EVENTS: Patient medically stable for discharge     Pain Location & Control:     MEDICATIONS  (STANDING):  allopurinol 100 milliGRAM(s) Oral daily  amLODIPine   Tablet 5 milliGRAM(s) Oral daily  enoxaparin Injectable 40 milliGRAM(s) SubCutaneous every 24 hours  escitalopram 10 milliGRAM(s) Oral daily  gabapentin 300 milliGRAM(s) Oral daily  gabapentin 400 milliGRAM(s) Oral at bedtime  metoprolol tartrate 12.5 milliGRAM(s) Oral two times a day  multivitamin 1 Tablet(s) Oral daily  polyethylene glycol 3350 17 Gram(s) Oral two times a day  rosuvastatin 10 milliGRAM(s) Oral at bedtime  traZODone 25 milliGRAM(s) Oral at bedtime    MEDICATIONS  (PRN):  acetaminophen     Tablet .. 650 milliGRAM(s) Oral every 6 hours PRN Temp greater or equal to 38C (100.4F), Mild Pain (1 - 3)  traMADol 25 milliGRAM(s) Oral every 6 hours PRN Moderate Pain (4 - 6)      Allergies    No Known Allergies    Intolerances        REVIEW OF SYSTEMS:  CONSTITUTIONAL: No fever, weight loss, or fatigue  EYES: No eye pain, visual disturbances, or discharge  ENMT:  No difficulty hearing, tinnitus, vertigo; No sinus or throat pain  NECK: No pain or stiffness  BREASTS: No pain, masses, or nipple discharge  RESPIRATORY: No cough, wheezing, chills or hemoptysis; No shortness of breath  CARDIOVASCULAR: No chest pain, palpitations, dizziness, or leg swelling  GASTROINTESTINAL: No abdominal or epigastric pain. No nausea, vomiting, or hematemesis; No diarrhea or constipation. No melena or hematochezia.  GENITOURINARY: No dysuria, frequency, hematuria, or incontinence  NEUROLOGICAL: No headaches, memory loss, loss of strength, numbness, or tremors  SKIN: No itching, burning, rashes, or lesions   LYMPH NODES: No enlarged glands  ENDOCRINE: No heat or cold intolerance; No hair loss; No polydipsia or polyuria  MUSCULOSKELETAL: No back pain  PSYCHIATRIC: No depression, anxiety, mood swings, or difficulty sleeping  HEME/LYMPH: No easy bruising, or bleeding gums  ALLERGY AND IMMUNOLOGIC: No hives or eczema    Vital Signs Last 24 Hrs  T(C): 37 (05 Jun 2024 12:35), Max: 37 (05 Jun 2024 12:35)  T(F): 98.6 (05 Jun 2024 12:35), Max: 98.6 (05 Jun 2024 12:35)  HR: 73 (05 Jun 2024 12:35) (73 - 92)  BP: 118/83 (05 Jun 2024 12:35) (113/79 - 118/83)  BP(mean): --  RR: 18 (05 Jun 2024 12:35) (18 - 18)  SpO2: 94% (05 Jun 2024 12:35) (93% - 99%)    Parameters below as of 05 Jun 2024 12:35  Patient On (Oxygen Delivery Method): room air        PHYSICAL EXAM:  GENERAL: NAD, well-groomed, well-developed  HEAD:  Atraumatic, Normocephalic  EYES: EOMI, PERRLA, conjunctiva and sclera clear  ENMT: No tonsillar erythema, exudates, or enlargement; Moist mucous membranes, Good dentition, No lesions  NECK: Supple, No JVD, Normal thyroid  NERVOUS SYSTEM:  Alert & Oriented X3, Good concentration; Motor Strength 5/5 B/L upper and lower extremities; DTRs 2+ intact and symmetric  CHEST/LUNG: Clear to auscultation bilaterally; No rales, rhonchi, wheezing, or rubs  HEART: Regular rate and rhythm; No murmurs, rubs, or gallops  ABDOMEN: Soft, Nontender, Nondistended; Bowel sounds present  EXTREMITIES:  2+ Peripheral Pulses, No clubbing or cyanosis  LYMPH: No lymphadenopathy noted  SKIN: No rashes or lesions      LABS:              CAPILLARY BLOOD GLUCOSE            Cultures  Culture Results:   <10,000 CFU/mL Normal Urogenital Danae (05-30-24 @ 15:40)      RADIOLOGY & ADDITIONAL TESTS:    Imaging Personally Reviewed:  [X ] YES  [ ] NO    Consultant(s) Notes Reviewed:  [ X] YES  [ ] NO    Care Discussed with Consultants/Other Providers [X ] YES  [ ] NO
Patient is a 59y old  Male who presents with a chief complaint of Pain of lower extremity     (31 May 2024 11:31)      INTERVAL HPI/OVERNIGHT EVENTS: Medically stable. F/u PT to evaluate him. May consider to send him to acute rehab, patient did not have any improvement in subacute rehab over last  month.     Pain Location & Control:     MEDICATIONS  (STANDING):  allopurinol 100 milliGRAM(s) Oral daily  amLODIPine   Tablet 5 milliGRAM(s) Oral daily  enoxaparin Injectable 40 milliGRAM(s) SubCutaneous every 24 hours  escitalopram 10 milliGRAM(s) Oral daily  gabapentin 300 milliGRAM(s) Oral daily  gabapentin 400 milliGRAM(s) Oral at bedtime  metoprolol tartrate 12.5 milliGRAM(s) Oral two times a day  multivitamin 1 Tablet(s) Oral daily  polyethylene glycol 3350 17 Gram(s) Oral two times a day  traZODone 25 milliGRAM(s) Oral at bedtime    MEDICATIONS  (PRN):  acetaminophen     Tablet .. 650 milliGRAM(s) Oral every 6 hours PRN Temp greater or equal to 38C (100.4F), Mild Pain (1 - 3)      Allergies    No Known Allergies    Intolerances        REVIEW OF SYSTEMS:  CONSTITUTIONAL: No fever, weight loss, or fatigue  EYES: No eye pain, visual disturbances, or discharge  ENMT:  No difficulty hearing, tinnitus, vertigo; No sinus or throat pain  NECK: No pain or stiffness  BREASTS: No pain, masses, or nipple discharge  RESPIRATORY: No cough, wheezing, chills or hemoptysis; No shortness of breath  CARDIOVASCULAR: No chest pain, palpitations, dizziness, or leg swelling  GASTROINTESTINAL: No abdominal or epigastric pain. No nausea, vomiting, or hematemesis; No diarrhea or constipation. No melena or hematochezia.  GENITOURINARY: No dysuria, frequency, hematuria, or incontinence  NEUROLOGICAL: No headaches, memory loss, loss of strength, numbness, or tremors  SKIN: No itching, burning, rashes, or lesions   LYMPH NODES: No enlarged glands  ENDOCRINE: No heat or cold intolerance; No hair loss; No polydipsia or polyuria  MUSCULOSKELETAL: No back pain  PSYCHIATRIC: No depression, anxiety, mood swings, or difficulty sleeping  HEME/LYMPH: No easy bruising, or bleeding gums  ALLERGY AND IMMUNOLOGIC: No hives or eczema    Vital Signs Last 24 Hrs  T(C): 36.9 (31 May 2024 21:19), Max: 37 (31 May 2024 17:54)  T(F): 98.5 (31 May 2024 21:19), Max: 98.6 (31 May 2024 17:54)  HR: 76 (31 May 2024 21:19) (76 - 99)  BP: 112/74 (31 May 2024 21:19) (112/74 - 129/90)  BP(mean): --  RR: 17 (31 May 2024 21:19) (17 - 18)  SpO2: 94% (31 May 2024 21:19) (94% - 98%)    Parameters below as of 31 May 2024 21:19  Patient On (Oxygen Delivery Method): room air        PHYSICAL EXAM:  GENERAL: NAD, well-groomed, well-developed  HEAD:  Atraumatic, Normocephalic  EYES: EOMI, PERRLA, conjunctiva and sclera clear  ENMT: No tonsillar erythema, exudates, or enlargement; Moist mucous membranes, Good dentition, No lesions  NECK: Supple, No JVD, Normal thyroid  NERVOUS SYSTEM:  Alert & Oriented X3,  R sided hemiplegia.   CHEST/LUNG: Clear to auscultation bilaterally; No rales, rhonchi, wheezing, or rubs  HEART: Regular rate and rhythm; No murmurs, rubs, or gallops  ABDOMEN: Soft, Nontender, Nondistended; Bowel sounds present  EXTREMITIES:  2+ Peripheral Pulses, No clubbing or cyanosis  LYMPH: No lymphadenopathy noted  SKIN: No rashes or lesions      LABS:      Ca    9.7        30 May 2024 08:36      PT/INR - ( 30 May 2024 08:36 )   PT: 11.5 sec;   INR: 1.05 ratio         PTT - ( 30 May 2024 08:36 )  PTT:33.0 sec  Urinalysis Basic - ( 30 May 2024 15:40 )    Color: Dark Yellow / Appearance: Clear / S.021 / pH: x  Gluc: x / Ketone: Negative mg/dL  / Bili: Negative / Urobili: 1.0 mg/dL   Blood: x / Protein: Negative mg/dL / Nitrite: Negative   Leuk Esterase: Negative / RBC: 2 /HPF / WBC 1 /HPF   Sq Epi: x / Non Sq Epi: 5 /HPF / Bacteria: Negative /HPF      CAPILLARY BLOOD GLUCOSE            Cultures  Culture Results:   <10,000 CFU/mL Normal Urogenital Danae (24 @ 15:40)      RADIOLOGY & ADDITIONAL TESTS:    Imaging Personally Reviewed:  [X ] YES  [ ] NO    Consultant(s) Notes Reviewed:  [ X] YES  [ ] NO    Care Discussed with Consultants/Other Providers [X ] YES  [ ] NO

## 2024-06-14 ENCOUNTER — TRANSCRIPTION ENCOUNTER (OUTPATIENT)
Age: 59
End: 2024-06-14

## 2024-06-14 VITALS
SYSTOLIC BLOOD PRESSURE: 138 MMHG | HEART RATE: 93 BPM | OXYGEN SATURATION: 95 % | DIASTOLIC BLOOD PRESSURE: 81 MMHG | TEMPERATURE: 99 F | RESPIRATION RATE: 19 BRPM

## 2024-06-14 DIAGNOSIS — I10 ESSENTIAL (PRIMARY) HYPERTENSION: ICD-10-CM

## 2024-06-14 PROCEDURE — 87640 STAPH A DNA AMP PROBE: CPT

## 2024-06-14 PROCEDURE — 73552 X-RAY EXAM OF FEMUR 2/>: CPT

## 2024-06-14 PROCEDURE — 97530 THERAPEUTIC ACTIVITIES: CPT

## 2024-06-14 PROCEDURE — 97166 OT EVAL MOD COMPLEX 45 MIN: CPT

## 2024-06-14 PROCEDURE — 87086 URINE CULTURE/COLONY COUNT: CPT

## 2024-06-14 PROCEDURE — 87641 MR-STAPH DNA AMP PROBE: CPT

## 2024-06-14 PROCEDURE — 97162 PT EVAL MOD COMPLEX 30 MIN: CPT

## 2024-06-14 PROCEDURE — 92523 SPEECH SOUND LANG COMPREHEN: CPT

## 2024-06-14 PROCEDURE — 85610 PROTHROMBIN TIME: CPT

## 2024-06-14 PROCEDURE — 71045 X-RAY EXAM CHEST 1 VIEW: CPT

## 2024-06-14 PROCEDURE — 70450 CT HEAD/BRAIN W/O DYE: CPT | Mod: MC

## 2024-06-14 PROCEDURE — 85730 THROMBOPLASTIN TIME PARTIAL: CPT

## 2024-06-14 PROCEDURE — 97110 THERAPEUTIC EXERCISES: CPT

## 2024-06-14 PROCEDURE — 73560 X-RAY EXAM OF KNEE 1 OR 2: CPT

## 2024-06-14 PROCEDURE — 36415 COLL VENOUS BLD VENIPUNCTURE: CPT

## 2024-06-14 PROCEDURE — 99285 EMERGENCY DEPT VISIT HI MDM: CPT

## 2024-06-14 PROCEDURE — 73590 X-RAY EXAM OF LOWER LEG: CPT

## 2024-06-14 PROCEDURE — 82962 GLUCOSE BLOOD TEST: CPT

## 2024-06-14 PROCEDURE — 93970 EXTREMITY STUDY: CPT

## 2024-06-14 PROCEDURE — 80048 BASIC METABOLIC PNL TOTAL CA: CPT

## 2024-06-14 PROCEDURE — 81001 URINALYSIS AUTO W/SCOPE: CPT

## 2024-06-14 PROCEDURE — 97535 SELF CARE MNGMENT TRAINING: CPT

## 2024-06-14 PROCEDURE — 80053 COMPREHEN METABOLIC PANEL: CPT

## 2024-06-14 PROCEDURE — 85027 COMPLETE CBC AUTOMATED: CPT

## 2024-06-14 PROCEDURE — 85025 COMPLETE CBC W/AUTO DIFF WBC: CPT

## 2024-06-14 RX ORDER — ESCITALOPRAM OXALATE 10 MG/1
1 TABLET, FILM COATED ORAL
Qty: 30 | Refills: 0
Start: 2024-06-14 | End: 2024-07-13

## 2024-06-14 RX ORDER — ROSUVASTATIN CALCIUM 5 MG/1
1 TABLET ORAL
Refills: 0 | DISCHARGE

## 2024-06-14 RX ORDER — POLYETHYLENE GLYCOL 3350 17 G/17G
17 POWDER, FOR SOLUTION ORAL
Qty: 1020 | Refills: 0
Start: 2024-06-14 | End: 2024-07-13

## 2024-06-14 RX ORDER — TRAMADOL HYDROCHLORIDE 50 MG/1
1 TABLET ORAL
Refills: 0 | DISCHARGE

## 2024-06-14 RX ORDER — MAGNESIUM HYDROXIDE 400 MG/1
30 TABLET, CHEWABLE ORAL
Qty: 450 | Refills: 0
Start: 2024-06-14 | End: 2024-07-13

## 2024-06-14 RX ORDER — CYCLOBENZAPRINE HYDROCHLORIDE 10 MG/1
1 TABLET, FILM COATED ORAL
Refills: 0 | DISCHARGE

## 2024-06-14 RX ORDER — GABAPENTIN 400 MG/1
1 CAPSULE ORAL
Qty: 0 | Refills: 0 | DISCHARGE

## 2024-06-14 RX ORDER — ENOXAPARIN SODIUM 100 MG/ML
40 INJECTION SUBCUTANEOUS
Qty: 30 | Refills: 0
Start: 2024-06-14 | End: 2024-07-13

## 2024-06-14 RX ORDER — METOPROLOL TARTRATE 50 MG
0.5 TABLET ORAL
Qty: 30 | Refills: 0
Start: 2024-06-14 | End: 2024-07-13

## 2024-06-14 RX ORDER — MAGNESIUM HYDROXIDE 400 MG/1
30 TABLET, CHEWABLE ORAL
Refills: 0 | DISCHARGE

## 2024-06-14 RX ORDER — GABAPENTIN 400 MG/1
1 CAPSULE ORAL
Qty: 30 | Refills: 0
Start: 2024-06-14 | End: 2024-07-13

## 2024-06-14 RX ORDER — TRAMADOL HYDROCHLORIDE 50 MG/1
1 TABLET ORAL
Qty: 20 | Refills: 0
Start: 2024-06-14 | End: 2024-06-18

## 2024-06-14 RX ORDER — CYCLOBENZAPRINE HYDROCHLORIDE 10 MG/1
1 TABLET, FILM COATED ORAL
Qty: 30 | Refills: 0
Start: 2024-06-14 | End: 2024-06-23

## 2024-06-14 RX ORDER — AMLODIPINE BESYLATE 2.5 MG/1
1 TABLET ORAL
Qty: 30 | Refills: 0
Start: 2024-06-14 | End: 2024-07-13

## 2024-06-14 RX ORDER — TRAZODONE HCL 50 MG
0.5 TABLET ORAL
Qty: 25 | Refills: 0
Start: 2024-06-14 | End: 2024-07-13

## 2024-06-14 RX ORDER — ALLOPURINOL 300 MG
1 TABLET ORAL
Qty: 30 | Refills: 0
Start: 2024-06-14 | End: 2024-07-13

## 2024-06-14 RX ORDER — ROSUVASTATIN CALCIUM 5 MG/1
1 TABLET ORAL
Qty: 30 | Refills: 0
Start: 2024-06-14 | End: 2024-07-13

## 2024-06-14 RX ADMIN — Medication 1 APPLICATION(S): at 06:09

## 2024-06-14 RX ADMIN — Medication 1 DROP(S): at 11:25

## 2024-06-14 RX ADMIN — TRAMADOL HYDROCHLORIDE 25 MILLIGRAM(S): 50 TABLET ORAL at 06:12

## 2024-06-14 RX ADMIN — AMLODIPINE BESYLATE 5 MILLIGRAM(S): 2.5 TABLET ORAL at 05:07

## 2024-06-14 RX ADMIN — Medication 12.5 MILLIGRAM(S): at 05:07

## 2024-06-14 RX ADMIN — Medication 1 DROP(S): at 17:04

## 2024-06-14 RX ADMIN — GABAPENTIN 300 MILLIGRAM(S): 400 CAPSULE ORAL at 11:25

## 2024-06-14 RX ADMIN — Medication 1 DROP(S): at 05:07

## 2024-06-14 RX ADMIN — ESCITALOPRAM OXALATE 10 MILLIGRAM(S): 10 TABLET, FILM COATED ORAL at 11:26

## 2024-06-14 RX ADMIN — Medication 100 MILLIGRAM(S): at 11:25

## 2024-06-14 RX ADMIN — TRAMADOL HYDROCHLORIDE 25 MILLIGRAM(S): 50 TABLET ORAL at 05:12

## 2024-06-14 RX ADMIN — CHLORHEXIDINE GLUCONATE 1 APPLICATION(S): 213 SOLUTION TOPICAL at 05:07

## 2024-06-14 RX ADMIN — Medication 1 TABLET(S): at 11:26

## 2024-06-14 RX ADMIN — POLYETHYLENE GLYCOL 3350 17 GRAM(S): 17 POWDER, FOR SOLUTION ORAL at 05:08

## 2024-06-14 RX ADMIN — ENOXAPARIN SODIUM 40 MILLIGRAM(S): 100 INJECTION SUBCUTANEOUS at 17:04

## 2024-06-14 RX ADMIN — Medication 12.5 MILLIGRAM(S): at 17:04

## 2024-06-14 NOTE — DISCHARGE NOTE NURSING/CASE MANAGEMENT/SOCIAL WORK - PATIENT PORTAL LINK FT
You can access the FollowMyHealth Patient Portal offered by [facility] by registering at the following website: [email or web address]. By joining [facility]’s FollowMyHealth portal, you will also be able to view your health information using other applications (apps) compatible with our system.

## 2024-06-14 NOTE — DISCHARGE NOTE NURSING/CASE MANAGEMENT/SOCIAL WORK - NSDCPEFALRISK_GEN_ALL_CORE
For information on Fall & Injury Prevention, visit: [email or web address] OR  [email or web address] OR  https://www.cdc.gov/steadi/patient.html

## 2024-06-15 RX ORDER — TRAMADOL HYDROCHLORIDE 50 MG/1
0.5 TABLET ORAL
Qty: 15 | Refills: 0
Start: 2024-06-15 | End: 2024-06-24

## 2024-09-27 NOTE — SPEECH LANGUAGE PATHOLOGY EVALUATION - SLP GENERAL OBSERVATIONS
Called, no answer left message to call office back.        Per Dr. Pineda:Kidney function and electrolytes are now normal.    Encountered in bed, resting, recently consumed sedatives per nursing 2/2 significant restlessness, patient is able to follow commands with some verbal output per nursing and wife when more awake

## 2024-10-25 ENCOUNTER — APPOINTMENT (OUTPATIENT)
Dept: OPHTHALMOLOGY | Facility: CLINIC | Age: 59
End: 2024-10-25
Payer: MEDICAID

## 2024-10-25 ENCOUNTER — NON-APPOINTMENT (OUTPATIENT)
Age: 59
End: 2024-10-25

## 2024-10-25 PROCEDURE — 99204 OFFICE O/P NEW MOD 45 MIN: CPT | Mod: 25

## 2024-10-25 PROCEDURE — 92015 DETERMINE REFRACTIVE STATE: CPT | Mod: NC

## 2024-10-25 PROCEDURE — 92133 CPTRZD OPH DX IMG PST SGM ON: CPT

## 2024-10-25 PROCEDURE — 92083 EXTENDED VISUAL FIELD XM: CPT

## 2024-11-19 ENCOUNTER — APPOINTMENT (OUTPATIENT)
Dept: PHYSICAL MEDICINE AND REHAB | Facility: CLINIC | Age: 59
End: 2024-11-19
Payer: MEDICAID

## 2024-11-19 VITALS
DIASTOLIC BLOOD PRESSURE: 87 MMHG | HEART RATE: 84 BPM | TEMPERATURE: 97.4 F | OXYGEN SATURATION: 98 % | SYSTOLIC BLOOD PRESSURE: 116 MMHG

## 2024-11-19 DIAGNOSIS — I10 ESSENTIAL (PRIMARY) HYPERTENSION: ICD-10-CM

## 2024-11-19 DIAGNOSIS — R29.818 OTHER SYMPTOMS AND SIGNS INVOLVING THE NERVOUS SYSTEM: ICD-10-CM

## 2024-11-19 DIAGNOSIS — M10.9 GOUT, UNSPECIFIED: ICD-10-CM

## 2024-11-19 DIAGNOSIS — R26.1 PARALYTIC GAIT: ICD-10-CM

## 2024-11-19 DIAGNOSIS — G81.11 SPASTIC HEMIPLEGIA AFFECTING RIGHT DOMINANT SIDE: ICD-10-CM

## 2024-11-19 PROCEDURE — 99214 OFFICE O/P EST MOD 30 MIN: CPT

## 2024-11-19 RX ORDER — ALLOPURINOL 100 MG/1
100 TABLET ORAL
Refills: 0 | Status: ACTIVE | COMMUNITY

## 2024-11-19 RX ORDER — AMLODIPINE BESYLATE 5 MG/1
5 TABLET ORAL
Refills: 0 | Status: ACTIVE | COMMUNITY

## 2024-11-19 RX ORDER — CYCLOBENZAPRINE HCL 5 MG
TABLET ORAL
Refills: 0 | Status: ACTIVE | COMMUNITY

## 2024-11-19 RX ORDER — ROSUVASTATIN CALCIUM 5 MG/1
TABLET, FILM COATED ORAL
Refills: 0 | Status: ACTIVE | COMMUNITY

## 2024-11-19 RX ORDER — METOPROLOL TARTRATE 25 MG/1
25 TABLET ORAL
Refills: 0 | Status: ACTIVE | COMMUNITY

## 2024-11-26 ENCOUNTER — APPOINTMENT (OUTPATIENT)
Dept: NEUROLOGY | Facility: CLINIC | Age: 59
End: 2024-11-26
Payer: MEDICAID

## 2024-11-26 PROCEDURE — 93886 INTRACRANIAL COMPLETE STUDY: CPT

## 2024-11-26 PROCEDURE — 93892 TCD EMBOLI DETECT W/O INJ: CPT

## 2024-11-26 PROCEDURE — 93880 EXTRACRANIAL BILAT STUDY: CPT

## 2024-12-06 ENCOUNTER — NON-APPOINTMENT (OUTPATIENT)
Age: 59
End: 2024-12-06

## 2024-12-06 ENCOUNTER — EMERGENCY (EMERGENCY)
Facility: HOSPITAL | Age: 59
LOS: 1 days | Discharge: HOME | End: 2024-12-06
Attending: EMERGENCY MEDICINE
Payer: MEDICAID

## 2024-12-06 VITALS
RESPIRATION RATE: 20 BRPM | HEART RATE: 99 BPM | DIASTOLIC BLOOD PRESSURE: 85 MMHG | OXYGEN SATURATION: 96 % | SYSTOLIC BLOOD PRESSURE: 122 MMHG | TEMPERATURE: 98 F

## 2024-12-06 LAB
ALBUMIN SERPL ELPH-MCNC: 4.3 G/DL — SIGNIFICANT CHANGE UP (ref 3.3–5)
ALP SERPL-CCNC: 82 U/L — SIGNIFICANT CHANGE UP (ref 40–120)
ALT FLD-CCNC: 19 U/L — SIGNIFICANT CHANGE UP (ref 10–45)
ANION GAP SERPL CALC-SCNC: 15 MMOL/L — SIGNIFICANT CHANGE UP (ref 5–17)
APTT BLD: 32.5 SEC — SIGNIFICANT CHANGE UP (ref 24.5–35.6)
AST SERPL-CCNC: 30 U/L — SIGNIFICANT CHANGE UP (ref 10–40)
BASOPHILS # BLD AUTO: 0 K/UL — SIGNIFICANT CHANGE UP (ref 0–0.2)
BASOPHILS NFR BLD AUTO: 0 % — SIGNIFICANT CHANGE UP (ref 0–2)
BILIRUB SERPL-MCNC: 0.4 MG/DL — SIGNIFICANT CHANGE UP (ref 0.2–1.2)
BUN SERPL-MCNC: 22 MG/DL — SIGNIFICANT CHANGE UP (ref 7–23)
BURR CELLS BLD QL SMEAR: PRESENT — SIGNIFICANT CHANGE UP
CALCIUM SERPL-MCNC: 9.8 MG/DL — SIGNIFICANT CHANGE UP (ref 8.4–10.5)
CHLORIDE SERPL-SCNC: 106 MMOL/L — SIGNIFICANT CHANGE UP (ref 96–108)
CO2 SERPL-SCNC: 25 MMOL/L — SIGNIFICANT CHANGE UP (ref 22–31)
CREAT SERPL-MCNC: 0.9 MG/DL — SIGNIFICANT CHANGE UP (ref 0.5–1.3)
DACRYOCYTES BLD QL SMEAR: SLIGHT — SIGNIFICANT CHANGE UP
EGFR: 100 ML/MIN/1.73M2 — SIGNIFICANT CHANGE UP
EOSINOPHIL # BLD AUTO: 3.18 K/UL — HIGH (ref 0–0.5)
EOSINOPHIL NFR BLD AUTO: 35.1 % — HIGH (ref 0–6)
GLUCOSE SERPL-MCNC: 106 MG/DL — HIGH (ref 70–99)
HCT VFR BLD CALC: 41.3 % — SIGNIFICANT CHANGE UP (ref 39–50)
HGB BLD-MCNC: 12.9 G/DL — LOW (ref 13–17)
INR BLD: 1.05 RATIO — SIGNIFICANT CHANGE UP (ref 0.85–1.16)
LYMPHOCYTES # BLD AUTO: 2.05 K/UL — SIGNIFICANT CHANGE UP (ref 1–3.3)
LYMPHOCYTES # BLD AUTO: 22.7 % — SIGNIFICANT CHANGE UP (ref 13–44)
MANUAL SMEAR VERIFICATION: SIGNIFICANT CHANGE UP
MCHC RBC-ENTMCNC: 28.3 PG — SIGNIFICANT CHANGE UP (ref 27–34)
MCHC RBC-ENTMCNC: 31.2 G/DL — LOW (ref 32–36)
MCV RBC AUTO: 90.6 FL — SIGNIFICANT CHANGE UP (ref 80–100)
MONOCYTES # BLD AUTO: 0.37 K/UL — SIGNIFICANT CHANGE UP (ref 0–0.9)
MONOCYTES NFR BLD AUTO: 4.1 % — SIGNIFICANT CHANGE UP (ref 2–14)
NEUTROPHILS # BLD AUTO: 3.45 K/UL — SIGNIFICANT CHANGE UP (ref 1.8–7.4)
NEUTROPHILS NFR BLD AUTO: 38.1 % — LOW (ref 43–77)
OVALOCYTES BLD QL SMEAR: SLIGHT — SIGNIFICANT CHANGE UP
PLAT MORPH BLD: NORMAL — SIGNIFICANT CHANGE UP
PLATELET # BLD AUTO: 235 K/UL — SIGNIFICANT CHANGE UP (ref 150–400)
POIKILOCYTOSIS BLD QL AUTO: SLIGHT — SIGNIFICANT CHANGE UP
POTASSIUM SERPL-MCNC: 4 MMOL/L — SIGNIFICANT CHANGE UP (ref 3.5–5.3)
POTASSIUM SERPL-SCNC: 4 MMOL/L — SIGNIFICANT CHANGE UP (ref 3.5–5.3)
PROT SERPL-MCNC: 7 G/DL — SIGNIFICANT CHANGE UP (ref 6–8.3)
PROTHROM AB SERPL-ACNC: 12 SEC — SIGNIFICANT CHANGE UP (ref 9.9–13.4)
RBC # BLD: 4.56 M/UL — SIGNIFICANT CHANGE UP (ref 4.2–5.8)
RBC # FLD: 13.7 % — SIGNIFICANT CHANGE UP (ref 10.3–14.5)
RBC BLD AUTO: ABNORMAL
SODIUM SERPL-SCNC: 146 MMOL/L — HIGH (ref 135–145)
WBC # BLD: 9.05 K/UL — SIGNIFICANT CHANGE UP (ref 3.8–10.5)
WBC # FLD AUTO: 9.05 K/UL — SIGNIFICANT CHANGE UP (ref 3.8–10.5)

## 2024-12-06 PROCEDURE — 99285 EMERGENCY DEPT VISIT HI MDM: CPT

## 2024-12-06 PROCEDURE — 99282 EMERGENCY DEPT VISIT SF MDM: CPT

## 2024-12-06 PROCEDURE — 88360 TUMOR IMMUNOHISTOCHEM/MANUAL: CPT | Mod: 26

## 2024-12-06 PROCEDURE — 88189 FLOWCYTOMETRY/READ 16 & >: CPT

## 2024-12-06 NOTE — ED ADULT NURSE NOTE - OBJECTIVE STATEMENT
The pt is a 59 y M with a PMH of HTN, HLD, and CVA in april with R Side weakness. Pt is AOx4 breathing evenly on room air and able to speak in full sentences. Pt came in via EMS with wife from home for an abnormal CT scan that shows a possible clot in a a artery. Pt has r side weakness but has full sensory function intact at this time. Pt denies head ache, chest pain, sob, fevers cills n/v/d/c/ pt was placed on a cardiac bedside monitor with comfort and safety measures provided

## 2024-12-06 NOTE — ED PROVIDER NOTE - NSFOLLOWUPINSTRUCTIONS_ED_ALL_ED_FT
Your CT scan and ultrasound exam showed evidence of multiple lymph nodes it is very important that you follow up with your primary care physician and the hematologist to further evaluate  There was no evidence of any blood clots  Please return immediately if you have worsening pain to the neck, unable to swallow, notice that you start drooling or have difficulty moving your neck Your CT scan and ultrasound exam showed evidence of multiple lymph nodes it is very important that you follow up with your primary care physician and the hematologist to further evaluate  There was no evidence of any blood clots  Please return immediately if you have worsening pain to the neck, unable to swallow, notice that you start drooling or have difficulty moving your neck    You have elevated eosinophils, please follow up with your PCP in the next 1-2 weeks.

## 2024-12-06 NOTE — ED ADULT TRIAGE NOTE - CHIEF COMPLAINT QUOTE
sent for abnormal CT scan, hx stroke in april +right sided residual deficits, denies any new/worsening symptoms

## 2024-12-06 NOTE — ED PROVIDER NOTE - OBJECTIVE STATEMENT
59 year old man PMH hemorrhagic stroke a few months ago with residual right sided weakness presenting after outpatient ultrasound showed right internal jugular thrombosis. He stopp blood thinner a few months ago after a DVT cleared up. He denies any change in his symptoms, no change in vision, diplopia, numbness, tingling, weakness, speech, denies fevers, chills, chest pain, SOB, abd pain, N/V/D/C, dysuria.

## 2024-12-06 NOTE — ED PROVIDER NOTE - NSFOLLOWUPCLINICS_GEN_ALL_ED_FT
[facility]  Hematology/Oncology  [address] [id number]  Phone: [phone]  Fax:

## 2024-12-06 NOTE — ED PROVIDER NOTE - PROGRESS NOTE DETAILS
Sal DODD PGY1: Neuro consulted, recommending CTA and CTV Sal DODD PGY1: per neuro, need CT neck soft tissue with contrast to evaluate. Sal DODD PGY1: Surgery paged a second time to request vascular surgery consult. Sal DODD PGY1: Vascular surgery consulted, will see patient. Sal DODD PGY1: Per neuro resident, no contraindications to anticoagulation Sal DODD PGY1: CT neck canceled by radiology for unknown reason, unable to evaluate full internal jugular vein. Pending vascular surgery recommendations and additional testing. Attending MD Aldana: Vascular requests repeat imaging prior to final recs, will obtain US, will await, will sign out to AM team Leslie Chinchilla PGY3: Patient endorsed to me at signout.  Patient reassessed and resting comfortably.  Patient arrives to the ER for right IJ thrombus.  No CTs were unable to evaluate  the IJ so vascular recommended repeat ultrasound.  Patient pending a repeat ultrasound and final vascular recs. Attending Georgia Salas: Patient Dors to me.  Reviewed lab work patient with significant eosinophilia.  Patient denies any vomiting or diarrhea.  Eosinophils at 33.6%.  No known history of allergies the patient is aware.  Patient with significant lymphadenopathy on exam.  Reviewed CT scan imaging that showed multiple lymph nodes in the cervical area.  Patient denies any sore throat or difficulty swallowing.  Patient handling secretions.  Repeat ultrasound was ordered to further evaluate suspect unlikely DVT suspect likely lymphadenopathy.  Does report some mild weight loss.  Patient will likely benefit from a hematology evaluation and consider biopsying the lymph node to try to further delineate the cause.  Await ultrasound written.

## 2024-12-06 NOTE — ED PROVIDER NOTE - SHIFT CHANGE DETAILS
Attending MD Aldana: 59M w IJV thrombus, stroke recent, asymptomatic, pending CTAHN and CTV and CT w soft tissue IV, pending vascular and Neuro, dispo as per neuro/vascular

## 2024-12-06 NOTE — CONSULT NOTE ADULT - SUBJECTIVE AND OBJECTIVE BOX
**STROKE CODE CONSULT NOTE**    Last known well time/Time of onset of symptoms: No new neurological symptoms, 4/2/24    HPI: 59 yo LHM w/ prior L thalamic IPH (4/2/24) reports to the hospital after abnormal imaging findings. Neurology consulted for right jugular thrombosis. Patient had an L thalamic IPH on 4/2/24 with residual right sided sensorimotor deficits. Patient currently lives at home, with 24hr hour homecare with wife and kids, patient needs assistance and uses a wheelchair but able to talk and feed himself. Patient denies any new neurological symptoms at this time, just notes since the stroke he just has mild headache upon waking up and resolves within 10 mins, not positional, intermittent. Patient also notes he has intermittent burning sensation in his right cheek along with some drooling in his right cheek. Patient has yet to follow up with neurology outpatient since the stroke. Patient states he has not started any AC/AP since the stroke. Patient does not report any new medications.   Patient was asked to report to the ED due to findings of right internal jugular vein thrombosis as seen on US carotid outpatient.     PAST MEDICAL & SURGICAL HISTORY:  Eczema      Gout      Intraparenchymal hemorrhage of brain      No significant past surgical history          ROS:  Constitutional: No fever  Eyes: No visual disturbances  ENMT:  No vertigo  Neck: No pain or stiffness  Respiratory: +cough,   Cardiovascular: No chest pain, palpitations, shortness of breath  Gastrointestinal: No abdominal pain. No nausea, vomiting  Genitourinary: No dysuria, frequency  Neurological: As per HPI    MEDICATIONS  (STANDING):    MEDICATIONS  (PRN):      Allergies    No Known Allergies    Intolerances        Vital Signs Last 24 Hrs  T(C): 36.9 (06 Dec 2024 23:15), Max: 36.9 (06 Dec 2024 23:15)  T(F): 98.4 (06 Dec 2024 23:15), Max: 98.4 (06 Dec 2024 23:15)  HR: 90 (06 Dec 2024 23:15) (90 - 99)  BP: 132/72 (06 Dec 2024 23:15) (122/85 - 132/72)  BP(mean): --  RR: 18 (06 Dec 2024 23:15) (18 - 20)  SpO2: 99% (06 Dec 2024 23:15) (96% - 99%)    Parameters below as of 06 Dec 2024 23:15  Patient On (Oxygen Delivery Method): room air        PHYSICAL EXAM:  Constitutional: WDWN; NAD  Cardiovascular: RRR, no appreciable murmurs; no carotid bruits  Neurologic:  Mental status: Awake, alert and oriented to name, location, age, month.  Recent and remote memory intact.  Naming, repetition and comprehension intact.  Attention/concentration intact.  No dysarthria, fluent speech.  Fund of knowledge appropriate.    Cranial nerves: Pupils equally round and reactive to light, visual fields full, no nystagmus, extraocular muscles intact, V1 through V3 intact bilaterally and symmetric, mild right facial droop, palate elevation symmetric, tongue was midline, sternocleidomastoid/shoulder shrug strength bilaterally 5/5.    Motor:  Normal bulk and tone, strength 5/5 in LUE/LLE, RUE 1/5, RLE 1/5  Sensation:   Decreased sensation to LT in RUE/RLE, No neglect.   Coordination: No dysmetria on Left finger-to-nose and left heel-to-shin., unable to assess right FTN/HTS  Reflexes: 3+ in right upper and lower extremities, 2+ in left upper and lower extremities,  Gait: Patient wheelchair bound    NIHSS: 8 (1 - mild right facial palsy, 3- RUE drift, 3 - RLE drift, 1 - sensation)  mRS: 5    Fingerstick Blood Glucose: CAPILLARY BLOOD GLUCOSE           LABS:                        12.9   9.05  )-----------( 235      ( 06 Dec 2024 21:07 )             41.3     12-06    146[H]  |  106  |  22  ----------------------------<  106[H]  4.0   |  25  |  0.90    Ca    9.8      06 Dec 2024 21:18    TPro  7.0  /  Alb  4.3  /  TBili  0.4  /  DBili  x   /  AST  30  /  ALT  19  /  AlkPhos  82  12-06    PT/INR - ( 06 Dec 2024 21:07 )   PT: 12.0 sec;   INR: 1.05 ratio         PTT - ( 06 Dec 2024 21:07 )  PTT:32.5 sec      Urinalysis Basic - ( 06 Dec 2024 21:18 )    Color: x / Appearance: x / SG: x / pH: x  Gluc: 106 mg/dL / Ketone: x  / Bili: x / Urobili: x   Blood: x / Protein: x / Nitrite: x   Leuk Esterase: x / RBC: x / WBC x   Sq Epi: x / Non Sq Epi: x / Bacteria: x        RADIOLOGY & ADDITIONAL STUDIES:  US Carotid B/l 11/26  IMPRESSION: Mild <40% stenosis in the right bulb of the carotid artery.No significant stenosis in the left internal carotid artery.The left ICA is tortuous.No significant stenosis in the external carotid arteries bilaterally.Antegrade flow in both vertebral arteries with normal flow resistance.  Plaque Morphology: Heterogeneous plaque in the right bulb.  Note: Thrombus imaged in the right internal jugular vein.    MRI Brain w/wo 4/10  -Ongoing expected evolution of left thalamocapsular hemorrhage measuring up to 4.2 cm. No underlying mass lesion or vascular malformation is identified.  -Chronic microhemorrhages in the right thalamus and basal ganglia, compatible with background of chronic hypertensive microangiopathy.  -Small acute infarcts in the right caudate tail and periatrial white matter.    CTH 4/7  CTH: An acute left thalamic hemorrhage measures approximately 3.2 x 1.7 x 3.9 cm in the sagittal and coronal planes. No evidence of acute cerebral infarction.    CT ANGIOGRAPHY NECK 4/7  1. Cervical carotid systems and vertebral arteries are patent bilaterally   without stenosis.  No hemodynamically significant carotid stenosis using   NASCET criteria.  2.  Short segment dissection flap in the proximal right internal carotid   artery, of unknown chronicity.    CT ANGIOGRAPHY BRAIN 4/7  1.  No major vessel occlusion, stenosis or aneurysm is identified about   the [place].  Normal anatomic variants as discussed above.  2.  The dural venous sinuses are incompletely opacified due to   acquisition during early arterial phase.  3.  No evidence of an arteriovenous malformation.  4.  No evidence of active contrast extravasation into the patient's left   thalamic hematoma.    IV-tenecteplase(Y/N):       N                            Bolus time: N/a  Reason IV-tenecteplase not given: No new symptoms, outside time window,    **STROKE CODE CONSULT NOTE**    Last known well time/Time of onset of symptoms: No new neurological symptoms, 4/2/24    HPI: 57 yo LHM w/ prior L thalamic IPH (4/2/24) reports to the hospital after abnormal imaging findings. Neurology consulted for right jugular thrombosis. Patient had an L thalamic IPH on 4/2/24 with residual right sided sensorimotor deficits. Patient currently lives at home, with 24hr hour homecare with wife and kids, patient needs assistance and uses a wheelchair but able to talk and feed himself. Patient denies any new neurological symptoms at this time, just notes since the stroke he just has mild headache upon waking up and resolves within 10 mins, not positional, intermittent. Patient also notes he has intermittent burning sensation in his right cheek along with some drooling in his right cheek. Patient has yet to follow up with neurology outpatient since the stroke. Patient states he has not started any AC/AP since the stroke. Patient does not report any new medications.   Patient was asked to report to the ED due to findings of right internal jugular vein thrombosis as seen on US carotid outpatient.     PAST MEDICAL & SURGICAL HISTORY:  Eczema      Gout      Intraparenchymal hemorrhage of brain      No significant past surgical history          ROS:  Constitutional: No fever  Eyes: No visual disturbances  ENMT:  No vertigo  Neck: No pain or stiffness  Respiratory: +cough,   Cardiovascular: No chest pain, palpitations, shortness of breath  Gastrointestinal: No abdominal pain. No nausea, vomiting  Genitourinary: No dysuria, frequency  Neurological: As per HPI    MEDICATIONS  (STANDING):    MEDICATIONS  (PRN):      Allergies    No Known Allergies    Intolerances        Vital Signs Last 24 Hrs  T(C): 36.9 (06 Dec 2024 23:15), Max: 36.9 (06 Dec 2024 23:15)  T(F): 98.4 (06 Dec 2024 23:15), Max: 98.4 (06 Dec 2024 23:15)  HR: 90 (06 Dec 2024 23:15) (90 - 99)  BP: 132/72 (06 Dec 2024 23:15) (122/85 - 132/72)  BP(mean): --  RR: 18 (06 Dec 2024 23:15) (18 - 20)  SpO2: 99% (06 Dec 2024 23:15) (96% - 99%)    Parameters below as of 06 Dec 2024 23:15  Patient On (Oxygen Delivery Method): room air        PHYSICAL EXAM:  Constitutional: WDWN; NAD  Cardiovascular: RRR, no appreciable murmurs; no carotid bruits  Neurologic:  Mental status: Awake, alert and oriented to name, location, age, month.  Recent and remote memory intact.  Naming, repetition and comprehension intact.  Attention/concentration intact.  No dysarthria, fluent speech.  Fund of knowledge appropriate.    Cranial nerves: Pupils equally round and reactive to light, visual fields full, no nystagmus, extraocular muscles intact, V1 through V3 intact bilaterally and symmetric, mild right facial droop, palate elevation symmetric, tongue was midline, sternocleidomastoid/shoulder shrug strength bilaterally 5/5.    Motor:  Normal bulk and tone, strength 5/5 in LUE/LLE, RUE 1/5, RLE 1/5  Sensation:   Decreased sensation to LT in RUE/RLE, No neglect.   Coordination: No dysmetria on Left finger-to-nose and left heel-to-shin., unable to assess right FTN/HTS  Reflexes: 3+ in right upper and lower extremities, 2+ in left upper and lower extremities,  Gait: Patient wheelchair bound    NIHSS: 8 (1 - mild right facial palsy, 3- RUE drift, 3 - RLE drift, 1 - sensation)  mRS: 5    Fingerstick Blood Glucose: CAPILLARY BLOOD GLUCOSE           LABS:                        12.9   9.05  )-----------( 235      ( 06 Dec 2024 21:07 )             41.3     12-06    146[H]  |  106  |  22  ----------------------------<  106[H]  4.0   |  25  |  0.90    Ca    9.8      06 Dec 2024 21:18    TPro  7.0  /  Alb  4.3  /  TBili  0.4  /  DBili  x   /  AST  30  /  ALT  19  /  AlkPhos  82  12-06    PT/INR - ( 06 Dec 2024 21:07 )   PT: 12.0 sec;   INR: 1.05 ratio         PTT - ( 06 Dec 2024 21:07 )  PTT:32.5 sec      Urinalysis Basic - ( 06 Dec 2024 21:18 )    Color: x / Appearance: x / SG: x / pH: x  Gluc: 106 mg/dL / Ketone: x  / Bili: x / Urobili: x   Blood: x / Protein: x / Nitrite: x   Leuk Esterase: x / RBC: x / WBC x   Sq Epi: x / Non Sq Epi: x / Bacteria: x        RADIOLOGY & ADDITIONAL STUDIES:  CTH/CTA/CTV 12/7  IMPRESSION:  CT HEAD:  No acute intracranial pathology.  A small focus of encephalomalacia/gliosis involving the lateral aspect of the left thalamus and posterior limb of the left internal capsule is sequela of a hemorrhage that was acute on 04/02/2024. 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    CTA NECK:  The internal jugular veins are not evaluated on this examination. Venous duplex ultrasound may be obtained for further evaluation.  The cervical carotid systems or vertebral arteries are patent without significant stenosis.  A short segment dissection flap in the proximal right internal carotid artery is unchanged from 04/02/2024.  There is new/worsening cervical lymphadenopathy compared to 04/02/2024. Reference lymph nodes measure 1.6 x 1.2 cm at level Ia, 2.8 x 1.5 cm at right level IIa, and 2.3 x 1.3 cm a left level IV. Underlying malignancy should be excluded clinically.  There is heterogeneous appearance of the thyroid gland; no discrete nodule is visualized on the CT scan. Thyroid ultrasound may be obtained as clinically warranted.  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 Cervical spine MRI may be obtained as clinically warranted.    CTA HEAD:  No vessel occlusion, stenosis or aneurysm is identified about the [place].  There is no evidence of a cerebral arteriovenous malformation.  CT VENOGRAM:  No evidence of dural venous sinus thrombosis      US Carotid B/l 11/26  IMPRESSION: Mild <40% stenosis in the right bulb of the carotid artery.No significant stenosis in the left internal carotid artery.The left ICA is tortuous.No significant stenosis in the external carotid arteries bilaterally.Antegrade flow in both vertebral arteries with normal flow resistance.  Plaque Morphology: Heterogeneous plaque in the right bulb.  Note: Thrombus imaged in the right internal jugular vein.    MRI Brain w/wo 4/10  -Ongoing expected evolution of left thalamocapsular hemorrhage measuring up to 4.2 cm. No underlying mass lesion or vascular malformation is identified.  -Chronic microhemorrhages in the right thalamus and basal ganglia, compatible with background of chronic hypertensive microangiopathy.  -Small acute infarcts in the right caudate tail and periatrial white matter.    CTH 4/7  CTH: An acute left thalamic hemorrhage measures approximately 3.2 x 1.7 x 3.9 cm in the sagittal and coronal planes. No evidence of acute cerebral infarction.    CT ANGIOGRAPHY NECK 4/7  1. Cervical carotid systems and vertebral arteries are patent bilaterally   without stenosis.  No hemodynamically significant carotid stenosis using   NASCET criteria.  2.  Short segment dissection flap in the proximal right internal carotid   artery, of unknown chronicity.    CT ANGIOGRAPHY BRAIN 4/7  1.  No major vessel occlusion, stenosis or aneurysm is identified about   the [place].  Normal anatomic variants as discussed above.  2.  The dural venous sinuses are incompletely opacified due to   acquisition during early arterial phase.  3.  No evidence of an arteriovenous malformation.  4.  No evidence of active contrast extravasation into the patient's left   thalamic hematoma.    IV-tenecteplase(Y/N):       N                            Bolus time: N/a  Reason IV-tenecteplase not given: No new symptoms, outside time window,

## 2024-12-06 NOTE — CONSULT NOTE ADULT - NSCONSULTADDITIONALINFOA_GEN_ALL_CORE
stroke fellow addendum 12/7/2024  58 LHM with h/o L thalamic hemorrhage with baseline Right hemiplegia, baseline left eye ptosis presenting to the ED after he was found to have incidental  right IJ thrombosis  on elective ultrasound done outpatient.  Patient did not have any new neurological deficits or symptoms such as headache or dizziness.    CT imaging in the ED did not show the jugular thrombus. Vascular surgery was consulted  Who recommended repeating carotid ultrasound to visualize the jugular veins.   Defer diagnostic/management per vascular surgery.  Patient has no neurovascular contraindications for anticoagulation if needed.  Neurology will sign off.  Please call with additional questions or concerns.

## 2024-12-06 NOTE — ED ADULT NURSE NOTE - ED STAT RN HANDOFF DETAILS
1900 Report endorsed to oncoming RN. Pat ENGLISH  Safety checks completed this shift. Safety rounds completed hourly.  IV sites checked Q2+remains WDL. Medications administered as ordered with no signs/symptoms of adverse reactions. Fall & skin precautions in place. Any issues endorsed to oncoming RN for follow up.

## 2024-12-06 NOTE — ED PROVIDER NOTE - CLINICAL SUMMARY MEDICAL DECISION MAKING FREE TEXT BOX
59 year old man PMH hemorrhagic stroke a few months ago with residual right sided weakness presenting after outpatient ultrasound showed right internal jugular thrombosis, at neurological baseline. Will consult neuro, CT head, CTA head and neck, CTV ehad and neck, cbc, cmp, voags. 59 year old man PMH hemorrhagic stroke a few months ago with residual right sided weakness presenting after outpatient ultrasound showed right internal jugular thrombosis, at neurological baseline. Will consult neuro, CT head, CTA head and neck, CTV ehad and neck, cbc, cmp, voags. reassess ZR

## 2024-12-06 NOTE — ED ADULT NURSE NOTE - CAS DISCH CONDITION
Implemented All Universal Safety Interventions:  [place] to call system. Call bell, personal items and telephone within reach. Instruct patient to call for assistance. Room bathroom lighting operational. Non-slip footwear when patient is off stretcher. Physically safe environment: no spills, clutter or unnecessary equipment. Stretcher in lowest position, wheels locked, appropriate side rails in place.
Stable

## 2024-12-06 NOTE — CONSULT NOTE ADULT - ASSESSMENT
59 yo LHM w/ prior L thalamic IPH (4/2/24) reports to the hospital after abnormal imaging findings. Neurology consulted for right jugular thrombosis. Patient had an L thalamic IPH on 4/2/24 with residual right sided sensorimotor deficits. Patient currently lives at home, with 24hr hour homecare with wife and kids, patient needs assistance and uses a wheelchair but able to talk and feed himself. Patient denies any new neurological symptoms at this time, just notes since the stroke he just has mild headache upon waking up and resolves within 10 mins, not positional, intermittent. Patient also notes he has intermittent burning sensation in his right cheek along with some drooling in his right cheek. Patient has yet to follow up with neurology outpatient since the stroke. Patient states he has not started any AC/AP since the stroke. Patient does not report any new medications. Physical exam shows right sided sensorimotor deficits, patient denies any new neurological symptoms at this time.   Case discussed with attending, Richard Libman, who reports patient has not followed up with neurology since his stroke in April. Patient has not reported any neurological symptoms or changes at this time. Attending states vessel imaging was concerning and was asked to come to the ED for further evaluation. As patient does not have any new neurological symptoms at this time, would defer management to vascular surgery.     Impression: Incidental found right internal jugular vein thrombosis, no acute neurological symptoms  Impression: Right sided hemiparesis, residual from prior thalamic IPH, mechanisms likely HTN     recommendations  - Defer management to vascular surgery at this time   - No contraindications for anticoagulations from a neurovascular standpoint, will defer to vascular surgery  - No further inpatient neurological workup at this time    Case discussed with stroke fellow, Graham Michael, under supervision of attending, Luis Willams     57 yo LHM w/ prior L thalamic IPH (4/2/24) reports to the hospital after abnormal imaging findings. Neurology consulted for right jugular thrombosis. Patient had an L thalamic IPH on 4/2/24 with residual right sided sensorimotor deficits. Patient currently lives at home, with 24hr hour homecare with wife and kids, patient needs assistance and uses a wheelchair but able to talk and feed himself. Patient denies any new neurological symptoms at this time, just notes since the stroke he just has mild headache upon waking up and resolves within 10 mins, not positional, intermittent. Patient also notes he has intermittent burning sensation in his right cheek along with some drooling in his right cheek. Patient has yet to follow up with neurology outpatient since the stroke. Patient states he has not started any AC/AP since the stroke. Patient does not report any new medications. Physical exam shows right sided sensorimotor deficits, patient denies any new neurological symptoms at this time. Imaging shows no acute ischemic or hemorrhage pathology.   Case discussed with attending, Richard Libman, who reports patient has not followed up with neurology since his stroke in April. Patient has not reported any neurological symptoms or changes at this time. Attending states vessel imaging was concerning and was asked to come to the ED for further evaluation. As patient does not have any new neurological symptoms at this time, would defer management to vascular surgery.     Impression: Incidental found right internal jugular vein thrombosis, no acute neurological symptoms  Impression: Right sided hemiparesis, residual from prior thalamic IPH, mechanisms likely HTN     recommendations  - Defer management to vascular surgery at this time   - No contraindications for anticoagulations from a neurovascular standpoint, will defer to vascular surgery  - No further inpatient neurological workup at this time    Case discussed with stroke fellow, Graham Michael, under supervision of attending, Luis Willams

## 2024-12-06 NOTE — ED PROVIDER NOTE - PATIENT PORTAL LINK FT
You can access the FollowMyHealth Patient Portal offered by [facility] by registering at the following website: [email or web address]. By joining [employer]’s FollowMyHealth portal, you will also be able to view your health information using other applications (apps) compatible with our system.

## 2024-12-06 NOTE — ED ADULT NURSE NOTE - CCCP TRG CHIEF CMPLNT
ASSESSMENT  KEVIN GOMEZ is a 62yo Male with PMH *** presenting with ***.    PLAN  NEUROLOGIC:  Alteration of mental status present. Likely due to ____ (structural (bleed or stroke), encephalitis, meningitis, non convulsive status epilepticus, metabolic cause (endogenous vs exogenous)  Eligible for early mobilization and immediate physical therapy?    SKIN:  Lines:  Decubiti:    GI:  Diet:  GI stress prophylaxis     METABOLIC:  BMP showing evidence of   Liver functions tests show   Endocrine abnormalities include  CMP 09-25-20 @ 04:18    134<L>  |  99  |  7   ----------------------------<  90  3.7   |  21<L>  |  0.78    Ca    8.4      09-25-20 @ 04:18  Phos  2.9     09-25  Mg     1.8     09-25    TPro  5.8<L>  /  Alb  4.1  /  TBili  0.7  /  DBili  x   /  AST  45<H>  /  ALT  21  /  AlkPhos  47  09-23      Serum Cr trend: 0.78 <-- , 0.73 <-- , 0.70 <-- , 0.68 <-- , 0.60 <-- , 0.60 <-- , 0.59 <-- , 0.51 <-- , 0.52 <-- , 0.53 <--     VOLUME ASSESSMENT:  No peripheral edema  No evidence of disturbance of effective circulating volume    HEMATOLOGIC:  CBC results show  Coag panel shows  DVT prophylaxis with   CBC 09-25-20 @ 04:18                        14.2   11.63 )-----------( 123                   43.3       Hgb trend: 14.2 <-- , 15.0 <-- , 15.1 <--   WBC trend: 11.63 <-- , 11.87 <-- , 11.48 <--     PT/INR - ( 23 Sep 2020 12:24 )   PT: Test not performed SAMPLE IS GROSSLY LIPEMIC  TRY TO RUN ON ST4...REJECTED SEC;   INR: Test not performed         PTT - ( 23 Sep 2020 12:24 ):Test not performed NOTIFIED GARY TIRADO  09/23/20 1354:  APTT previously reported as: Test not performed  SEC SEC    INFECTIOUS DISEASE:  Pt without strong objective or clinical evidence of infection. 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      PLAN  PLAN  NEUROLOGIC:  -A&Ox3   -No active issues    SKIN:  Lines: 2 peripheral IVs  Decubiti: none    GI:  #Pancreatitis  -Pain control with oxycodone   -Advance diet as tolerated  -TGs downtrending 363<-- 524 <-- 594 <--1067 <-- 1843 <-- 2494 <-- 3826  -Insulin drip dc'ed  -Continue meds for hyperlipidemia  -Worsened pancreatitis per CTA 9/24    #Celiac artery dissection  -Vascular surgery consulted  -Continue with aspirin  -      METABOLIC:  BMP showing evidence of   Liver functions tests show   Endocrine abnormalities include  CMP 09-25-20 @ 04:18    134<L>  |  99  |  7   ----------------------------<  90  3.7   |  21<L>  |  0.78    Ca    8.4      09-25-20 @ 04:18  Phos  2.9     09-25  Mg     1.8     09-25    TPro  5.8<L>  /  Alb  4.1  /  TBili  0.7  /  DBili  x   /  AST  45<H>  /  ALT  21  /  AlkPhos  47  09-23      Serum Cr trend: 0.78 <-- , 0.73 <-- , 0.70 <-- , 0.68 <-- , 0.60 <-- , 0.60 <-- , 0.59 <-- , 0.51 <-- , 0.52 <-- , 0.53 <--     VOLUME ASSESSMENT:  No peripheral edema  No evidence of disturbance of effective circulating volume    HEMATOLOGIC:  CBC results show  Coag panel shows  DVT prophylaxis with   CBC 09-25-20 @ 04:18                        14.2   11.63 )-----------( 123                   43.3       Hgb trend: 14.2 <-- , 15.0 <-- , 15.1 <--   WBC trend: 11.63 <-- , 11.87 <-- , 11.48 <--     PT/INR - ( 23 Sep 2020 12:24 )   PT: Test not performed SAMPLE IS GROSSLY LIPEMIC  TRY TO RUN ON ST4...REJECTED SEC;   INR: Test not performed         PTT - ( 23 Sep 2020 12:24 ):Test not performed NOTIFIED GARY TIRADO  09/23/20 1354:  APTT previously reported as: Test not performed  SEC SEC    INFECTIOUS DISEASE:  Pt without strong objective or clinical evidence of infection. Will observe off antibiotics    HEMODYNAMICS:  Patient is on pressors due to ____ shock (cardiogenic due to muscle or valve failure, obstructive due to peff, PE, PTX, hypovolemic, vasopegic)     CARDIOVASCULAR:      RESPIRATORY:    ETHICS: abnormal CT scan

## 2024-12-06 NOTE — ED PROVIDER NOTE - PHYSICAL EXAMINATION
Physical Exam:  Gen: no acute distress, AOx3, nontoxic appearing  Head: NCAT  HEENT: EOMI, PEERLA, normal conjunctiva, tongue midline, oral mucosa moist  Lung: CTAB, no respiratory distress, no wheezes/rhonchi/rales B/L, speaking in full sentences  CV: RRR, no murmurs, rubs or gallops  Abd: soft, NT, ND, no guarding, no rigidity, no rebound tenderness, no CVA tenderness  MSK: no visible deformities, ROM normal in UE/LE, no neck / back pain, calf tenderness  Neuro: No focal sensory deficits, 0/5 strength in R arm and leg.   Skin:(Warm, well perfused, no rash, no leg swelling

## 2024-12-07 VITALS
OXYGEN SATURATION: 96 % | SYSTOLIC BLOOD PRESSURE: 141 MMHG | HEART RATE: 99 BPM | DIASTOLIC BLOOD PRESSURE: 89 MMHG | RESPIRATION RATE: 17 BRPM | TEMPERATURE: 98 F

## 2024-12-07 LAB
ADD ON TEST-SPECIMEN IN LAB: SIGNIFICANT CHANGE UP
BASOPHILS # BLD AUTO: 0.07 K/UL — SIGNIFICANT CHANGE UP (ref 0–0.2)
BASOPHILS NFR BLD AUTO: 0.8 % — SIGNIFICANT CHANGE UP (ref 0–2)
ELLIPTOCYTES BLD QL SMEAR: SLIGHT — SIGNIFICANT CHANGE UP
EOSINOPHIL # BLD AUTO: 2.84 K/UL — HIGH (ref 0–0.5)
EOSINOPHIL NFR BLD AUTO: 33.6 % — HIGH (ref 0–6)
HAPTOGLOB SERPL-MCNC: 207 MG/DL — HIGH (ref 34–200)
HCT VFR BLD CALC: 41.3 % — SIGNIFICANT CHANGE UP (ref 39–50)
HGB BLD-MCNC: 12.9 G/DL — LOW (ref 13–17)
LDH SERPL L TO P-CCNC: 646 U/L — HIGH (ref 50–242)
LYMPHOCYTES # BLD AUTO: 0.59 K/UL — LOW (ref 1–3.3)
LYMPHOCYTES # BLD AUTO: 7 % — LOW (ref 13–44)
MANUAL SMEAR VERIFICATION: SIGNIFICANT CHANGE UP
MCHC RBC-ENTMCNC: 27.9 PG — SIGNIFICANT CHANGE UP (ref 27–34)
MCHC RBC-ENTMCNC: 31.2 G/DL — LOW (ref 32–36)
MCV RBC AUTO: 89.4 FL — SIGNIFICANT CHANGE UP (ref 80–100)
MONOCYTES # BLD AUTO: 0.47 K/UL — SIGNIFICANT CHANGE UP (ref 0–0.9)
MONOCYTES NFR BLD AUTO: 5.5 % — SIGNIFICANT CHANGE UP (ref 2–14)
NEUTROPHILS # BLD AUTO: 4.42 K/UL — SIGNIFICANT CHANGE UP (ref 1.8–7.4)
NEUTROPHILS NFR BLD AUTO: 52.3 % — SIGNIFICANT CHANGE UP (ref 43–77)
PLAT MORPH BLD: NORMAL — SIGNIFICANT CHANGE UP
PLATELET # BLD AUTO: 204 K/UL — SIGNIFICANT CHANGE UP (ref 150–400)
POIKILOCYTOSIS BLD QL AUTO: SLIGHT — SIGNIFICANT CHANGE UP
RBC # BLD: 4.62 M/UL — SIGNIFICANT CHANGE UP (ref 4.2–5.8)
RBC # FLD: 13.8 % — SIGNIFICANT CHANGE UP (ref 10.3–14.5)
RBC BLD AUTO: ABNORMAL
T3 SERPL-MCNC: 80 NG/DL — SIGNIFICANT CHANGE UP (ref 80–200)
T4 AB SER-ACNC: 8.4 UG/DL — SIGNIFICANT CHANGE UP (ref 4.6–12)
T4 FREE SERPL-MCNC: 1.5 NG/DL — SIGNIFICANT CHANGE UP (ref 0.9–1.7)
TSH SERPL-MCNC: 1.22 UIU/ML — SIGNIFICANT CHANGE UP (ref 0.27–4.2)
URATE SERPL-MCNC: 6.1 MG/DL — SIGNIFICANT CHANGE UP (ref 3.4–8.8)
VARIANT LYMPHS # BLD: 0.8 % — SIGNIFICANT CHANGE UP (ref 0–6)
WBC # BLD: 8.46 K/UL — SIGNIFICANT CHANGE UP (ref 3.8–10.5)
WBC # FLD AUTO: 8.46 K/UL — SIGNIFICANT CHANGE UP (ref 3.8–10.5)

## 2024-12-07 PROCEDURE — 84480 ASSAY TRIIODOTHYRONINE (T3): CPT

## 2024-12-07 PROCEDURE — 85730 THROMBOPLASTIN TIME PARTIAL: CPT

## 2024-12-07 PROCEDURE — 93970 EXTREMITY STUDY: CPT | Mod: 26

## 2024-12-07 PROCEDURE — 84550 ASSAY OF BLOOD/URIC ACID: CPT

## 2024-12-07 PROCEDURE — 71045 X-RAY EXAM CHEST 1 VIEW: CPT | Mod: 26

## 2024-12-07 PROCEDURE — 86606 ASPERGILLUS ANTIBODY: CPT

## 2024-12-07 PROCEDURE — 70498 CT ANGIOGRAPHY NECK: CPT | Mod: 26,MC

## 2024-12-07 PROCEDURE — 70450 CT HEAD/BRAIN W/O DYE: CPT | Mod: MC

## 2024-12-07 PROCEDURE — 36000 PLACE NEEDLE IN VEIN: CPT | Mod: XU

## 2024-12-07 PROCEDURE — 70498 CT ANGIOGRAPHY NECK: CPT | Mod: MC

## 2024-12-07 PROCEDURE — 86682 HELMINTH ANTIBODY: CPT

## 2024-12-07 PROCEDURE — 99284 EMERGENCY DEPT VISIT MOD MDM: CPT | Mod: 25

## 2024-12-07 PROCEDURE — 85610 PROTHROMBIN TIME: CPT

## 2024-12-07 PROCEDURE — 83010 ASSAY OF HAPTOGLOBIN QUANT: CPT

## 2024-12-07 PROCEDURE — 84439 ASSAY OF FREE THYROXINE: CPT

## 2024-12-07 PROCEDURE — 71045 X-RAY EXAM CHEST 1 VIEW: CPT

## 2024-12-07 PROCEDURE — 87207 SMEAR SPECIAL STAIN: CPT

## 2024-12-07 PROCEDURE — 85025 COMPLETE CBC W/AUTO DIFF WBC: CPT

## 2024-12-07 PROCEDURE — 80053 COMPREHEN METABOLIC PANEL: CPT

## 2024-12-07 PROCEDURE — 76536 US EXAM OF HEAD AND NECK: CPT

## 2024-12-07 PROCEDURE — 84443 ASSAY THYROID STIM HORMONE: CPT

## 2024-12-07 PROCEDURE — 70496 CT ANGIOGRAPHY HEAD: CPT | Mod: 26,MC

## 2024-12-07 PROCEDURE — 82785 ASSAY OF IGE: CPT

## 2024-12-07 PROCEDURE — 70496 CT ANGIOGRAPHY HEAD: CPT | Mod: MC

## 2024-12-07 PROCEDURE — 86036 ANCA SCREEN EACH ANTIBODY: CPT

## 2024-12-07 PROCEDURE — 83615 LACTATE (LD) (LDH) ENZYME: CPT

## 2024-12-07 PROCEDURE — 76536 US EXAM OF HEAD AND NECK: CPT | Mod: 26

## 2024-12-07 PROCEDURE — 84436 ASSAY OF TOTAL THYROXINE: CPT

## 2024-12-07 PROCEDURE — 70450 CT HEAD/BRAIN W/O DYE: CPT | Mod: 26,MC,59

## 2024-12-07 PROCEDURE — 86140 C-REACTIVE PROTEIN: CPT

## 2024-12-07 PROCEDURE — 93970 EXTREMITY STUDY: CPT

## 2024-12-07 RX ORDER — DIPHENHYDRAMINE HCL 25 MG
25 CAPSULE ORAL ONCE
Refills: 0 | Status: COMPLETED | OUTPATIENT
Start: 2024-12-07 | End: 2024-12-07

## 2024-12-07 RX ADMIN — Medication 25 MILLIGRAM(S): at 09:51

## 2024-12-07 NOTE — CONSULT NOTE ADULT - ASSESSMENT
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 Patient at his neurovascular baseline s/p IPH in April without signs of phlegmasia of the RUE or SVC syndrome.    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 Patient at his neurovascular baseline s/p IPH in April without signs of phlegmasia of the RUE or SVC syndrome.    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 Patient at his neurovascular baseline s/p IPH in April without signs of phlegmasia of the RUE or SVC syndrome.    Plan:  - Unable to visualize R IJ thrombus on CTV of the head  - Please obtain venous duplex of the neck to re-assess R IJ thrombus  - Recommendations pending further work-up    Vascular Surgery  [id number]

## 2024-12-07 NOTE — ED ADULT NURSE REASSESSMENT NOTE - NS ED NURSE REASSESS COMMENT FT1
Pt DC via day RN lyndon GOSS, Pt waiting for nonemergent transport home.   Pt. currently resting comfortably in hallway. NAD at this time. Plan of care discussed with pt.   Safety and comfort maintained.  respirations are spontaneous and unlabored, skin warm dry and intact

## 2024-12-07 NOTE — CONSULT NOTE ADULT - SUBJECTIVE AND OBJECTIVE BOX
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 Patient reported underwent a carotid duplex last week which demonstrated a R IJ vein thrombus for which he was sent to the ED. Patient states he is currently in his baseline state of health s/p IPH and rehab. He is currently working with physical therapy and his motor function has marginally improved. In the ED patient hemodynamically stable. CTV brain and neck performed with final reads pending.    PAST MEDICAL & SURGICAL HISTORY:  Eczema    Gout    Intraparenchymal hemorrhage of brain    No significant past surgical history    Allergies  No Known Allergies    Vital Signs Last 24 Hrs  T(C): 36.9 (06 Dec 2024 23:15), Max: 36.9 (06 Dec 2024 23:15)  T(F): 98.4 (06 Dec 2024 23:15), Max: 98.4 (06 Dec 2024 23:15)  HR: 90 (06 Dec 2024 23:15) (90 - 99)  BP: 132/72 (06 Dec 2024 23:15) (122/85 - 132/72)  BP(mean): --  RR: 18 (06 Dec 2024 23:15) (18 - 20)  SpO2: 99% (06 Dec 2024 23:15) (96% - 99%)    Parameters below as of 06 Dec 2024 23:15  Patient On (Oxygen Delivery Method): room air    LABS:                        12.9   9.05  )-----------( 235      ( 06 Dec 2024 21:07 )             41.3     12-06    146[H]  |  106  |  22  ----------------------------<  106[H]  4.0   |  25  |  0.90    Ca    9.8      06 Dec 2024 21:18    TPro  7.0  /  Alb  4.3  /  TBili  0.4  /  DBili  x   /  AST  30  /  ALT  19  /  AlkPhos  82  12-06    LIVER FUNCTIONS - ( 06 Dec 2024 21:18 )  Alb: 4.3 g/dL / Pro: 7.0 g/dL / ALK PHOS: 82 U/L / ALT: 19 U/L / AST: 30 U/L / GGT: x           PT/INR - ( 06 Dec 2024 21:07 )   PT: 12.0 sec;   INR: 1.05 ratio         PTT - ( 06 Dec 2024 21:07 )  PTT:32.5 sec  Urinalysis Basic - ( 06 Dec 2024 21:18 )    Color: x / Appearance: x / SG: x / pH: x  Gluc: 106 mg/dL / Ketone: x  / Bili: x / Urobili: x   Blood: x / Protein: x / Nitrite: x   Leuk Esterase: x / RBC: x / WBC x   Sq Epi: x / Non Sq Epi: x / Bacteria: x    PHYSICAL EXAM:  General: NAD, resting comfortably  Pulmonary: Normal resp effort, on room air  HEENT: No distended neck veins or facial plethora  Cardiovascular: Normotensive, regular rate  Extremities: Warm, palpable radial and DP bilaterally, RUE without edema, no signs of phlegmasia  Neuro: A/O x 3, LUE/LL 5/5 RLE 3/5, RUE 1/5 strength, diminished sensation of the RUE/RLE    RADIOLOGY & ADDITIONAL STUDIES:

## 2024-12-09 ENCOUNTER — NON-APPOINTMENT (OUTPATIENT)
Age: 59
End: 2024-12-09

## 2024-12-10 LAB — STRONGYLOIDES AB SER-ACNC: NEGATIVE — SIGNIFICANT CHANGE UP

## 2024-12-11 LAB
AUTO DIFF PNL BLD: NEGATIVE — SIGNIFICANT CHANGE UP
C-ANCA SER-ACNC: NEGATIVE — SIGNIFICANT CHANGE UP
P-ANCA SER-ACNC: NEGATIVE — SIGNIFICANT CHANGE UP

## 2024-12-12 LAB
A FLAVUS AB FLD QL: NEGATIVE — SIGNIFICANT CHANGE UP
A NIGER AB FLD QL: NEGATIVE — SIGNIFICANT CHANGE UP
A NIGER AB FLD QL: NEGATIVE — SIGNIFICANT CHANGE UP

## 2024-12-17 PROBLEM — I82.C11 THROMBOSIS OF RIGHT INTERNAL JUGULAR VEIN: Status: RESOLVED | Noted: 2024-12-17 | Resolved: 2024-12-17

## 2024-12-17 PROBLEM — Z86.79 HISTORY OF CEREBRAL PARENCHYMAL HEMORRHAGE: Status: RESOLVED | Noted: 2024-12-17 | Resolved: 2024-12-17

## 2024-12-17 PROBLEM — R59.0 CERVICAL ADENOPATHY: Status: ACTIVE | Noted: 2024-12-17

## 2024-12-23 ENCOUNTER — APPOINTMENT (OUTPATIENT)
Dept: OTOLARYNGOLOGY | Facility: CLINIC | Age: 59
End: 2024-12-23
Payer: MEDICAID

## 2024-12-23 VITALS
SYSTOLIC BLOOD PRESSURE: 116 MMHG | WEIGHT: 175 LBS | HEART RATE: 84 BPM | BODY MASS INDEX: 27.47 KG/M2 | HEIGHT: 67 IN | DIASTOLIC BLOOD PRESSURE: 80 MMHG | OXYGEN SATURATION: 93 %

## 2024-12-23 DIAGNOSIS — I82.C11 ACUTE EMBOLISM AND THROMBOSIS OF RIGHT INTERNAL JUGULAR VEIN: ICD-10-CM

## 2024-12-23 DIAGNOSIS — R68.89 OTHER GENERAL SYMPTOMS AND SIGNS: ICD-10-CM

## 2024-12-23 DIAGNOSIS — R59.0 LOCALIZED ENLARGED LYMPH NODES: ICD-10-CM

## 2024-12-23 DIAGNOSIS — Z82.49 FAMILY HISTORY OF ISCHEMIC HEART DISEASE AND OTHER DISEASES OF THE CIRCULATORY SYSTEM: ICD-10-CM

## 2024-12-23 DIAGNOSIS — Z86.79 PERSONAL HISTORY OF OTHER DISEASES OF THE CIRCULATORY SYSTEM: ICD-10-CM

## 2024-12-23 PROCEDURE — 99204 OFFICE O/P NEW MOD 45 MIN: CPT | Mod: 25

## 2024-12-23 PROCEDURE — 31575 DIAGNOSTIC LARYNGOSCOPY: CPT

## 2024-12-23 RX ORDER — GLYCOPYRROLATE 1 MG/1
1 TABLET ORAL 3 TIMES DAILY
Qty: 42 | Refills: 0 | Status: ACTIVE | COMMUNITY
Start: 2024-12-23 | End: 1900-01-01

## 2024-12-24 ENCOUNTER — NON-APPOINTMENT (OUTPATIENT)
Age: 59
End: 2024-12-24

## 2024-12-30 ENCOUNTER — APPOINTMENT (OUTPATIENT)
Dept: CT IMAGING | Facility: CLINIC | Age: 59
End: 2024-12-30

## 2025-01-02 ENCOUNTER — APPOINTMENT (OUTPATIENT)
Dept: NEUROLOGY | Facility: CLINIC | Age: 60
End: 2025-01-02

## 2025-01-03 ENCOUNTER — APPOINTMENT (OUTPATIENT)
Dept: NUCLEAR MEDICINE | Facility: IMAGING CENTER | Age: 60
End: 2025-01-03

## 2025-01-03 ENCOUNTER — OUTPATIENT (OUTPATIENT)
Dept: OUTPATIENT SERVICES | Facility: HOSPITAL | Age: 60
LOS: 1 days | End: 2025-01-03
Payer: MEDICAID

## 2025-01-03 DIAGNOSIS — Z00.8 ENCOUNTER FOR OTHER GENERAL EXAMINATION: ICD-10-CM

## 2025-01-03 PROCEDURE — A9552: CPT

## 2025-01-03 PROCEDURE — 78815 PET IMAGE W/CT SKULL-THIGH: CPT | Mod: 26,PI

## 2025-01-03 PROCEDURE — 78815 PET IMAGE W/CT SKULL-THIGH: CPT

## 2025-01-07 ENCOUNTER — RESULT REVIEW (OUTPATIENT)
Age: 60
End: 2025-01-07

## 2025-01-07 DIAGNOSIS — R94.8 ABNORMAL RESULTS OF FUNCTION STUDIES OF OTHER ORGANS AND SYSTEMS: ICD-10-CM

## 2025-01-08 ENCOUNTER — OUTPATIENT (OUTPATIENT)
Dept: OUTPATIENT SERVICES | Facility: HOSPITAL | Age: 60
LOS: 1 days | End: 2025-01-08
Payer: MEDICAID

## 2025-01-08 ENCOUNTER — APPOINTMENT (OUTPATIENT)
Dept: CT IMAGING | Facility: IMAGING CENTER | Age: 60
End: 2025-01-08
Payer: MEDICAID

## 2025-01-08 ENCOUNTER — RESULT REVIEW (OUTPATIENT)
Age: 60
End: 2025-01-08

## 2025-01-08 ENCOUNTER — APPOINTMENT (OUTPATIENT)
Dept: ULTRASOUND IMAGING | Facility: IMAGING CENTER | Age: 60
End: 2025-01-08
Payer: MEDICAID

## 2025-01-08 DIAGNOSIS — Z00.8 ENCOUNTER FOR OTHER GENERAL EXAMINATION: ICD-10-CM

## 2025-01-08 DIAGNOSIS — R59.0 LOCALIZED ENLARGED LYMPH NODES: ICD-10-CM

## 2025-01-08 PROCEDURE — 10005 FNA BX W/US GDN 1ST LES: CPT | Mod: 59

## 2025-01-08 PROCEDURE — 81450 HL NEO GSAP 5-50DNA/DNA&RNA: CPT

## 2025-01-08 PROCEDURE — 88305 TISSUE EXAM BY PATHOLOGIST: CPT | Mod: 26

## 2025-01-08 PROCEDURE — 88305 TISSUE EXAM BY PATHOLOGIST: CPT

## 2025-01-08 PROCEDURE — 88341 IMHCHEM/IMCYTCHM EA ADD ANTB: CPT

## 2025-01-08 PROCEDURE — 88173 CYTOPATH EVAL FNA REPORT: CPT | Mod: 26

## 2025-01-08 PROCEDURE — 88342 IMHCHEM/IMCYTCHM 1ST ANTB: CPT | Mod: 26,59

## 2025-01-08 PROCEDURE — 88307 TISSUE EXAM BY PATHOLOGIST: CPT | Mod: 26

## 2025-01-08 PROCEDURE — 88341 IMHCHEM/IMCYTCHM EA ADD ANTB: CPT | Mod: 26,59

## 2025-01-08 PROCEDURE — 88374 M/PHMTRC ALYS ISHQUANT/SEMIQ: CPT | Mod: 26

## 2025-01-08 PROCEDURE — 88307 TISSUE EXAM BY PATHOLOGIST: CPT

## 2025-01-08 PROCEDURE — 88374 M/PHMTRC ALYS ISHQUANT/SEMIQ: CPT

## 2025-01-08 PROCEDURE — 20206 BIOPSY MUSCLE PERQ NEEDLE: CPT

## 2025-01-08 PROCEDURE — 10005 FNA BX W/US GDN 1ST LES: CPT

## 2025-01-08 PROCEDURE — 88342 IMHCHEM/IMCYTCHM 1ST ANTB: CPT

## 2025-01-08 PROCEDURE — 76942 ECHO GUIDE FOR BIOPSY: CPT

## 2025-01-08 PROCEDURE — 88360 TUMOR IMMUNOHISTOCHEM/MANUAL: CPT

## 2025-01-08 PROCEDURE — 88173 CYTOPATH EVAL FNA REPORT: CPT

## 2025-01-10 ENCOUNTER — NON-APPOINTMENT (OUTPATIENT)
Age: 60
End: 2025-01-10

## 2025-01-14 LAB — NON-GYNECOLOGICAL CYTOLOGY STUDY: SIGNIFICANT CHANGE UP

## 2025-01-17 ENCOUNTER — OUTPATIENT (OUTPATIENT)
Dept: OUTPATIENT SERVICES | Facility: HOSPITAL | Age: 60
LOS: 1 days | Discharge: HOME | End: 2025-01-17
Payer: MEDICAID

## 2025-01-17 DIAGNOSIS — C85.10 UNSPECIFIED B-CELL LYMPHOMA, UNSPECIFIED SITE: ICD-10-CM

## 2025-01-21 ENCOUNTER — RESULT REVIEW (OUTPATIENT)
Age: 60
End: 2025-01-21

## 2025-01-21 ENCOUNTER — RESULT CHARGE (OUTPATIENT)
Age: 60
End: 2025-01-21

## 2025-01-21 ENCOUNTER — NON-APPOINTMENT (OUTPATIENT)
Age: 60
End: 2025-01-21

## 2025-01-21 ENCOUNTER — APPOINTMENT (OUTPATIENT)
Dept: HEMATOLOGY ONCOLOGY | Facility: CLINIC | Age: 60
End: 2025-01-21
Payer: MEDICAID

## 2025-01-21 ENCOUNTER — APPOINTMENT (OUTPATIENT)
Dept: RADIATION ONCOLOGY | Facility: CLINIC | Age: 60
End: 2025-01-21

## 2025-01-21 VITALS
OXYGEN SATURATION: 99 % | TEMPERATURE: 97.5 F | RESPIRATION RATE: 16 BRPM | HEART RATE: 86 BPM | DIASTOLIC BLOOD PRESSURE: 71 MMHG | SYSTOLIC BLOOD PRESSURE: 108 MMHG | HEIGHT: 67.01 IN

## 2025-01-21 DIAGNOSIS — G81.11 SPASTIC HEMIPLEGIA AFFECTING RIGHT DOMINANT SIDE: ICD-10-CM

## 2025-01-21 DIAGNOSIS — R29.818 OTHER SYMPTOMS AND SIGNS INVOLVING THE NERVOUS SYSTEM: ICD-10-CM

## 2025-01-21 DIAGNOSIS — C82.80 OTHER TYPES OF FOLLICULAR LYMPHOMA, UNSPECIFIED SITE: ICD-10-CM

## 2025-01-21 DIAGNOSIS — Z86.73 PERSONAL HISTORY OF TRANSIENT ISCHEMIC ATTACK (TIA), AND CEREBRAL INFARCTION W/OUT RESIDUAL DEFICITS: ICD-10-CM

## 2025-01-21 DIAGNOSIS — I69.30 UNSPECIFIED SEQUELAE OF CEREBRAL INFARCTION: ICD-10-CM

## 2025-01-21 DIAGNOSIS — R68.89 OTHER GENERAL SYMPTOMS AND SIGNS: ICD-10-CM

## 2025-01-21 DIAGNOSIS — R94.8 ABNORMAL RESULTS OF FUNCTION STUDIES OF OTHER ORGANS AND SYSTEMS: ICD-10-CM

## 2025-01-21 DIAGNOSIS — I10 ESSENTIAL (PRIMARY) HYPERTENSION: ICD-10-CM

## 2025-01-21 DIAGNOSIS — R59.0 LOCALIZED ENLARGED LYMPH NODES: ICD-10-CM

## 2025-01-21 DIAGNOSIS — I82.4Z1 ACUTE EMBOLISM AND THROMBOSIS OF UNSPECIFIED DEEP VEINS OF RIGHT DISTAL LOWER EXTREMITY: ICD-10-CM

## 2025-01-21 LAB
BASOPHILS # BLD AUTO: 0.13 K/UL — SIGNIFICANT CHANGE UP (ref 0–0.2)
BASOPHILS NFR BLD AUTO: 1.4 % — SIGNIFICANT CHANGE UP (ref 0–2)
EOSINOPHIL # BLD AUTO: 3.9 K/UL — HIGH (ref 0–0.5)
EOSINOPHIL NFR BLD AUTO: 41.8 % — HIGH (ref 0–6)
HCT VFR BLD CALC: 39.7 % — SIGNIFICANT CHANGE UP (ref 39–50)
HGB BLD-MCNC: 12.8 G/DL — LOW (ref 13–17)
IMM GRANULOCYTES NFR BLD AUTO: 0.2 % — SIGNIFICANT CHANGE UP (ref 0–0.9)
LYMPHOCYTES # BLD AUTO: 1.25 K/UL — SIGNIFICANT CHANGE UP (ref 1–3.3)
LYMPHOCYTES # BLD AUTO: 13.4 % — SIGNIFICANT CHANGE UP (ref 13–44)
MCHC RBC-ENTMCNC: 28.6 PG — SIGNIFICANT CHANGE UP (ref 27–34)
MCHC RBC-ENTMCNC: 32.2 G/DL — SIGNIFICANT CHANGE UP (ref 32–36)
MCV RBC AUTO: 88.6 FL — SIGNIFICANT CHANGE UP (ref 80–100)
MONOCYTES # BLD AUTO: 0.72 K/UL — SIGNIFICANT CHANGE UP (ref 0–0.9)
MONOCYTES NFR BLD AUTO: 7.7 % — SIGNIFICANT CHANGE UP (ref 2–14)
NEUTROPHILS # BLD AUTO: 3.32 K/UL — SIGNIFICANT CHANGE UP (ref 1.8–7.4)
NEUTROPHILS NFR BLD AUTO: 35.5 % — LOW (ref 43–77)
NRBC # BLD: 0 /100 WBCS — SIGNIFICANT CHANGE UP (ref 0–0)
NRBC BLD-RTO: 0 /100 WBCS — SIGNIFICANT CHANGE UP (ref 0–0)
PLATELET # BLD AUTO: 197 K/UL — SIGNIFICANT CHANGE UP (ref 150–400)
RBC # BLD: 4.48 M/UL — SIGNIFICANT CHANGE UP (ref 4.2–5.8)
RBC # FLD: 13.6 % — SIGNIFICANT CHANGE UP (ref 10.3–14.5)
RETICS #: 52.4 K/UL — SIGNIFICANT CHANGE UP (ref 25–125)
RETICS/RBC NFR: 1.2 % — SIGNIFICANT CHANGE UP (ref 0.5–2.5)
WBC # BLD: 9.34 K/UL — SIGNIFICANT CHANGE UP (ref 3.8–10.5)
WBC # FLD AUTO: 9.34 K/UL — SIGNIFICANT CHANGE UP (ref 3.8–10.5)

## 2025-01-21 PROCEDURE — 93010 ELECTROCARDIOGRAM REPORT: CPT

## 2025-01-21 PROCEDURE — 99215 OFFICE O/P EST HI 40 MIN: CPT

## 2025-01-21 PROCEDURE — G2211 COMPLEX E/M VISIT ADD ON: CPT | Mod: NC

## 2025-01-22 LAB
25(OH)D3 SERPL-MCNC: 24 NG/ML
ALBUMIN SERPL ELPH-MCNC: 4.3 G/DL
ALP BLD-CCNC: 84 U/L
ALT SERPL-CCNC: 12 U/L
ANION GAP SERPL CALC-SCNC: 11 MMOL/L
APTT BLD: 31.8 SEC
AST SERPL-CCNC: 21 U/L
B2 MICROGLOB SERPL-MCNC: 4.7 MG/L
BILIRUB SERPL-MCNC: 0.4 MG/DL
BUN SERPL-MCNC: 28 MG/DL
CALCIUM SERPL-MCNC: 10 MG/DL
CHLORIDE SERPL-SCNC: 106 MMOL/L
CO2 SERPL-SCNC: 27 MMOL/L
CREAT SERPL-MCNC: 1.13 MG/DL
EGFR: 76 ML/MIN/1.73M2
GLUCOSE SERPL-MCNC: 102 MG/DL
HBV CORE IGG+IGM SER QL: NONREACTIVE
HBV SURFACE AB SER QL: NONREACTIVE
HBV SURFACE AG SER QL: NONREACTIVE
HCV AB SER QL: NONREACTIVE
HCV S/CO RATIO: 0.08 S/CO
HIV1+2 AB SPEC QL IA.RAPID: NONREACTIVE
INR PPP: 0.91 RATIO
LDH SERPL-CCNC: 660 U/L
PHOSPHATE SERPL-MCNC: 4.1 MG/DL
POTASSIUM SERPL-SCNC: 4.1 MMOL/L
PROT SERPL-MCNC: 6.5 G/DL
PT BLD: 10.7 SEC
SODIUM SERPL-SCNC: 144 MMOL/L
TSH SERPL-ACNC: 2.35 UIU/ML
URATE SERPL-MCNC: 5.8 MG/DL

## 2025-01-23 ENCOUNTER — NON-APPOINTMENT (OUTPATIENT)
Age: 60
End: 2025-01-23

## 2025-01-24 DIAGNOSIS — C85.90 NON-HODGKIN LYMPHOMA, UNSPECIFIED, UNSPECIFIED SITE: ICD-10-CM

## 2025-01-24 LAB
ALBUMIN MFR SERPL ELPH: 62.6 %
ALBUMIN SERPL-MCNC: 4.1 G/DL
ALBUMIN/GLOB SERPL: 1.7 RATIO
ALPHA1 GLOB MFR SERPL ELPH: 4.2 %
ALPHA1 GLOB SERPL ELPH-MCNC: 0.3 G/DL
ALPHA2 GLOB MFR SERPL ELPH: 14.8 %
ALPHA2 GLOB SERPL ELPH-MCNC: 1 G/DL
B-GLOBULIN MFR SERPL ELPH: 11.4 %
B-GLOBULIN SERPL ELPH-MCNC: 0.7 G/DL
DEPRECATED KAPPA LC FREE/LAMBDA SER: 1.65 RATIO
GAMMA GLOB FLD ELPH-MCNC: 0.5 G/DL
GAMMA GLOB MFR SERPL ELPH: 7 %
IGA SER QL IEP: 167 MG/DL
IGG SER QL IEP: 461 MG/DL
IGM SER QL IEP: 70 MG/DL
INTERPRETATION SERPL IEP-IMP: NORMAL
KAPPA LC CSF-MCNC: 2.55 MG/DL
KAPPA LC SERPL-MCNC: 4.22 MG/DL
M PROTEIN SPEC IFE-MCNC: NORMAL
PROT SERPL-MCNC: 6.5 G/DL
PROT SERPL-MCNC: 6.5 G/DL

## 2025-01-26 PROBLEM — C82.80: Status: ACTIVE | Noted: 2025-01-26

## 2025-01-26 PROBLEM — I82.4Z1 DEEP VEIN THROMBOSIS (DVT) OF DISTAL VEIN OF RIGHT LOWER EXTREMITY, UNSPECIFIED CHRONICITY: Status: ACTIVE | Noted: 2025-01-26

## 2025-01-26 PROBLEM — I69.30 HISTORY OF CVA WITH RESIDUAL DEFICIT: Status: ACTIVE | Noted: 2025-01-26

## 2025-01-26 PROBLEM — Z86.73 HISTORY OF CVA (CEREBROVASCULAR ACCIDENT): Status: ACTIVE | Noted: 2025-01-26

## 2025-01-27 ENCOUNTER — APPOINTMENT (OUTPATIENT)
Dept: OTOLARYNGOLOGY | Facility: CLINIC | Age: 60
End: 2025-01-27

## 2025-01-27 ENCOUNTER — NON-APPOINTMENT (OUTPATIENT)
Age: 60
End: 2025-01-27

## 2025-01-28 ENCOUNTER — APPOINTMENT (OUTPATIENT)
Dept: CARDIOLOGY | Facility: CLINIC | Age: 60
End: 2025-01-28
Payer: MEDICAID

## 2025-01-28 PROCEDURE — 93356 MYOCRD STRAIN IMG SPCKL TRCK: CPT

## 2025-01-28 PROCEDURE — 93306 TTE W/DOPPLER COMPLETE: CPT

## 2025-01-31 ENCOUNTER — RESULT REVIEW (OUTPATIENT)
Age: 60
End: 2025-01-31

## 2025-01-31 ENCOUNTER — APPOINTMENT (OUTPATIENT)
Dept: HEMATOLOGY ONCOLOGY | Facility: CLINIC | Age: 60
End: 2025-01-31

## 2025-01-31 ENCOUNTER — LABORATORY RESULT (OUTPATIENT)
Age: 60
End: 2025-01-31

## 2025-01-31 VITALS
SYSTOLIC BLOOD PRESSURE: 135 MMHG | TEMPERATURE: 97.5 F | RESPIRATION RATE: 15 BRPM | OXYGEN SATURATION: 96 % | HEART RATE: 74 BPM | DIASTOLIC BLOOD PRESSURE: 92 MMHG

## 2025-01-31 LAB
ALBUMIN SERPL ELPH-MCNC: 4.4 G/DL
ALP BLD-CCNC: 89 U/L
ALT SERPL-CCNC: 19 U/L
ANION GAP SERPL CALC-SCNC: 13 MMOL/L
AST SERPL-CCNC: 28 U/L
BASOPHILS # BLD AUTO: 0.13 K/UL — SIGNIFICANT CHANGE UP (ref 0–0.2)
BASOPHILS NFR BLD AUTO: 1.3 % — SIGNIFICANT CHANGE UP (ref 0–2)
BILIRUB SERPL-MCNC: 0.5 MG/DL
BUN SERPL-MCNC: 26 MG/DL
CALCIUM SERPL-MCNC: 10 MG/DL
CHLORIDE SERPL-SCNC: 103 MMOL/L
CO2 SERPL-SCNC: 27 MMOL/L
CREAT SERPL-MCNC: 1.31 MG/DL
EGFR: 63 ML/MIN/1.73M2
EOSINOPHIL # BLD AUTO: 3.84 K/UL — HIGH (ref 0–0.5)
EOSINOPHIL NFR BLD AUTO: 37.6 % — HIGH (ref 0–6)
GLUCOSE SERPL-MCNC: 99 MG/DL
HCT VFR BLD CALC: 37.9 % — LOW (ref 39–50)
HGB BLD-MCNC: 12.5 G/DL — LOW (ref 13–17)
IMM GRANULOCYTES NFR BLD AUTO: 0.3 % — SIGNIFICANT CHANGE UP (ref 0–0.9)
LDH SERPL-CCNC: 702 U/L
LYMPHOCYTES # BLD AUTO: 1.34 K/UL — SIGNIFICANT CHANGE UP (ref 1–3.3)
LYMPHOCYTES # BLD AUTO: 13.1 % — SIGNIFICANT CHANGE UP (ref 13–44)
MCHC RBC-ENTMCNC: 28.9 PG — SIGNIFICANT CHANGE UP (ref 27–34)
MCHC RBC-ENTMCNC: 33 G/DL — SIGNIFICANT CHANGE UP (ref 32–36)
MCV RBC AUTO: 87.7 FL — SIGNIFICANT CHANGE UP (ref 80–100)
MONOCYTES # BLD AUTO: 0.73 K/UL — SIGNIFICANT CHANGE UP (ref 0–0.9)
MONOCYTES NFR BLD AUTO: 7.1 % — SIGNIFICANT CHANGE UP (ref 2–14)
NEUTROPHILS # BLD AUTO: 4.14 K/UL — SIGNIFICANT CHANGE UP (ref 1.8–7.4)
NEUTROPHILS NFR BLD AUTO: 40.6 % — LOW (ref 43–77)
NRBC # BLD: 0 /100 WBCS — SIGNIFICANT CHANGE UP (ref 0–0)
NRBC BLD-RTO: 0 /100 WBCS — SIGNIFICANT CHANGE UP (ref 0–0)
PHOSPHATE SERPL-MCNC: 4.3 MG/DL
PLATELET # BLD AUTO: 177 K/UL — SIGNIFICANT CHANGE UP (ref 150–400)
POTASSIUM SERPL-SCNC: 4.4 MMOL/L
PROT SERPL-MCNC: 6.5 G/DL
RBC # BLD: 4.32 M/UL — SIGNIFICANT CHANGE UP (ref 4.2–5.8)
RBC # FLD: 13.2 % — SIGNIFICANT CHANGE UP (ref 10.3–14.5)
SODIUM SERPL-SCNC: 143 MMOL/L
URATE SERPL-MCNC: 6.5 MG/DL
WBC # BLD: 10.21 K/UL — SIGNIFICANT CHANGE UP (ref 3.8–10.5)
WBC # FLD AUTO: 10.21 K/UL — SIGNIFICANT CHANGE UP (ref 3.8–10.5)

## 2025-01-31 PROCEDURE — 38222 DX BONE MARROW BX & ASPIR: CPT | Mod: RT

## 2025-02-04 ENCOUNTER — NON-APPOINTMENT (OUTPATIENT)
Age: 60
End: 2025-02-04

## 2025-02-06 ENCOUNTER — OUTPATIENT (OUTPATIENT)
Dept: OUTPATIENT SERVICES | Facility: HOSPITAL | Age: 60
LOS: 1 days | End: 2025-02-06
Payer: MEDICAID

## 2025-02-06 ENCOUNTER — RESULT REVIEW (OUTPATIENT)
Age: 60
End: 2025-02-06

## 2025-02-06 VITALS
HEIGHT: 66 IN | OXYGEN SATURATION: 95 % | SYSTOLIC BLOOD PRESSURE: 132 MMHG | TEMPERATURE: 99 F | WEIGHT: 175.05 LBS | HEART RATE: 78 BPM | DIASTOLIC BLOOD PRESSURE: 82 MMHG | RESPIRATION RATE: 15 BRPM

## 2025-02-06 VITALS
SYSTOLIC BLOOD PRESSURE: 117 MMHG | OXYGEN SATURATION: 94 % | HEART RATE: 93 BPM | RESPIRATION RATE: 20 BRPM | DIASTOLIC BLOOD PRESSURE: 69 MMHG

## 2025-02-06 DIAGNOSIS — C85.90 NON-HODGKIN LYMPHOMA, UNSPECIFIED, UNSPECIFIED SITE: ICD-10-CM

## 2025-02-06 PROCEDURE — 36561 INSERT TUNNELED CV CATH: CPT | Mod: RT

## 2025-02-06 PROCEDURE — 76937 US GUIDE VASCULAR ACCESS: CPT | Mod: 26

## 2025-02-06 PROCEDURE — C1894: CPT

## 2025-02-06 PROCEDURE — 77001 FLUOROGUIDE FOR VEIN DEVICE: CPT

## 2025-02-06 PROCEDURE — C1769: CPT

## 2025-02-06 PROCEDURE — 36558 INSERT TUNNELED CV CATH: CPT

## 2025-02-06 PROCEDURE — 36561 INSERT TUNNELED CV CATH: CPT

## 2025-02-06 PROCEDURE — 76937 US GUIDE VASCULAR ACCESS: CPT

## 2025-02-06 PROCEDURE — 77001 FLUOROGUIDE FOR VEIN DEVICE: CPT | Mod: 26

## 2025-02-06 PROCEDURE — C1788: CPT

## 2025-02-06 NOTE — PROGRESS NOTE ADULT - SUBJECTIVE AND OBJECTIVE BOX
IR Post Procedure Note    Diagnosis: Lymphoma    Procedure: Right Chest Port    : Akbar Mack MD    Contrast: None    Anesthesia: 1% Lidocaine Subcutaneous, Sedation administered by Anesthesiology    Estimated Blood Loss: Less than 10cc    Specimens: None    Complications: No Immediate Complications    Anticoagulation: Resume in 24 Hours    Findings & Plan: Right IJ Chest port placed under US and fluoroscopic guidance. Incision closed w absorbable sutures and dermabond. Tip at cavoatrial junction, flushed w heparin, functions well and OK to use.

## 2025-02-06 NOTE — ASU PATIENT PROFILE, ADULT - FALL HARM RISK - RISK INTERVENTIONS

## 2025-02-07 NOTE — CHART NOTE - NSCHARTNOTEFT_GEN_A_CORE
Pt called reporting redness and irritation around chest port and neck.  Strongly advised pt to come in for evaluation and site check however reports that he is unable and would rather come in on Monday, 2/10/25.  Denies fevers, chills, nausea, vomiting, pain or any other s/s of infection.    Pt educated on warning signs (fevers, chills, nausea, vomiting, pain or any other s/s of infection) to present to ED immediately for further evaluation. He is amenable to plan.    Nicholas Banks PA-C  IR call back ext. [phone]  Also available on Teams    - Non-emergent consults: Place IR consult order in [employer]  - Emergent issues (pager): [facility] [phone]; [facility] [phone]; [phone]  - Scheduling questions: [facility] [phone]; [facility] [phone]  - Clinic/outpatient booking: [facility] [phone]; [facility] [phone]

## 2025-02-10 ENCOUNTER — APPOINTMENT (OUTPATIENT)
Dept: OTOLARYNGOLOGY | Facility: CLINIC | Age: 60
End: 2025-02-10

## 2025-02-13 ENCOUNTER — RESULT REVIEW (OUTPATIENT)
Age: 60
End: 2025-02-13

## 2025-02-13 ENCOUNTER — APPOINTMENT (OUTPATIENT)
Dept: HEMATOLOGY ONCOLOGY | Facility: CLINIC | Age: 60
End: 2025-02-13

## 2025-02-13 ENCOUNTER — NON-APPOINTMENT (OUTPATIENT)
Age: 60
End: 2025-02-13

## 2025-02-13 ENCOUNTER — APPOINTMENT (OUTPATIENT)
Dept: INFUSION THERAPY | Facility: HOSPITAL | Age: 60
End: 2025-02-13

## 2025-02-13 LAB
A1C WITH ESTIMATED AVERAGE GLUCOSE RESULT: 5.6 % — SIGNIFICANT CHANGE UP (ref 4–5.6)
ALBUMIN SERPL ELPH-MCNC: 4 G/DL — SIGNIFICANT CHANGE UP (ref 3.3–5)
ALBUMIN SERPL ELPH-MCNC: 4 G/DL — SIGNIFICANT CHANGE UP (ref 3.3–5)
ALP SERPL-CCNC: 79 U/L — SIGNIFICANT CHANGE UP (ref 40–120)
ALP SERPL-CCNC: 79 U/L — SIGNIFICANT CHANGE UP (ref 40–120)
ALT FLD-CCNC: 21 U/L — SIGNIFICANT CHANGE UP (ref 10–45)
ALT FLD-CCNC: 22 U/L — SIGNIFICANT CHANGE UP (ref 10–45)
ANION GAP SERPL CALC-SCNC: 13 MMOL/L — SIGNIFICANT CHANGE UP (ref 5–17)
AST SERPL-CCNC: 33 U/L — SIGNIFICANT CHANGE UP (ref 10–40)
AST SERPL-CCNC: 35 U/L — SIGNIFICANT CHANGE UP (ref 10–40)
BASOPHILS # BLD AUTO: 0.1 K/UL — SIGNIFICANT CHANGE UP (ref 0–0.2)
BASOPHILS NFR BLD AUTO: 1.1 % — SIGNIFICANT CHANGE UP (ref 0–2)
BILIRUB DIRECT SERPL-MCNC: 0.1 MG/DL — SIGNIFICANT CHANGE UP (ref 0–0.3)
BILIRUB INDIRECT FLD-MCNC: 0.2 MG/DL — SIGNIFICANT CHANGE UP (ref 0.2–1.2)
BILIRUB SERPL-MCNC: 0.3 MG/DL — SIGNIFICANT CHANGE UP (ref 0.2–1.2)
BILIRUB SERPL-MCNC: 0.4 MG/DL — SIGNIFICANT CHANGE UP (ref 0.2–1.2)
BUN SERPL-MCNC: 29 MG/DL — HIGH (ref 7–23)
CALCIUM SERPL-MCNC: 10.2 MG/DL — SIGNIFICANT CHANGE UP (ref 8.4–10.5)
CHLORIDE SERPL-SCNC: 107 MMOL/L — SIGNIFICANT CHANGE UP (ref 96–108)
CO2 SERPL-SCNC: 25 MMOL/L — SIGNIFICANT CHANGE UP (ref 22–31)
CREAT SERPL-MCNC: 1.33 MG/DL — HIGH (ref 0.5–1.3)
EGFR: 62 ML/MIN/1.73M2 — SIGNIFICANT CHANGE UP
EGFR: 62 ML/MIN/1.73M2 — SIGNIFICANT CHANGE UP
EOSINOPHIL # BLD AUTO: 3.84 K/UL — HIGH (ref 0–0.5)
EOSINOPHIL NFR BLD AUTO: 40.7 % — HIGH (ref 0–6)
ESTIMATED AVERAGE GLUCOSE: 114 MG/DL — SIGNIFICANT CHANGE UP (ref 68–114)
GLUCOSE SERPL-MCNC: 128 MG/DL — HIGH (ref 70–99)
HCT VFR BLD CALC: 38.3 % — LOW (ref 39–50)
HGB BLD-MCNC: 12.6 G/DL — LOW (ref 13–17)
IMM GRANULOCYTES NFR BLD AUTO: 0.3 % — SIGNIFICANT CHANGE UP (ref 0–0.9)
LDH SERPL L TO P-CCNC: 670 U/L — HIGH (ref 50–242)
LYMPHOCYTES # BLD AUTO: 0.99 K/UL — LOW (ref 1–3.3)
LYMPHOCYTES # BLD AUTO: 10.5 % — LOW (ref 13–44)
MCHC RBC-ENTMCNC: 29.6 PG — SIGNIFICANT CHANGE UP (ref 27–34)
MCHC RBC-ENTMCNC: 32.9 G/DL — SIGNIFICANT CHANGE UP (ref 32–36)
MCV RBC AUTO: 90.1 FL — SIGNIFICANT CHANGE UP (ref 80–100)
MONOCYTES # BLD AUTO: 0.75 K/UL — SIGNIFICANT CHANGE UP (ref 0–0.9)
MONOCYTES NFR BLD AUTO: 7.9 % — SIGNIFICANT CHANGE UP (ref 2–14)
NEUTROPHILS # BLD AUTO: 3.73 K/UL — SIGNIFICANT CHANGE UP (ref 1.8–7.4)
NEUTROPHILS NFR BLD AUTO: 39.5 % — LOW (ref 43–77)
NRBC BLD AUTO-RTO: 0 /100 WBCS — SIGNIFICANT CHANGE UP (ref 0–0)
PHOSPHATE SERPL-MCNC: 3.4 MG/DL — SIGNIFICANT CHANGE UP (ref 2.5–4.5)
PLATELET # BLD AUTO: 197 K/UL — SIGNIFICANT CHANGE UP (ref 150–400)
POTASSIUM SERPL-MCNC: 3.8 MMOL/L — SIGNIFICANT CHANGE UP (ref 3.5–5.3)
POTASSIUM SERPL-SCNC: 3.8 MMOL/L — SIGNIFICANT CHANGE UP (ref 3.5–5.3)
PROT SERPL-MCNC: 6.4 G/DL — SIGNIFICANT CHANGE UP (ref 6–8.3)
PROT SERPL-MCNC: 6.4 G/DL — SIGNIFICANT CHANGE UP (ref 6–8.3)
RBC # BLD: 4.25 M/UL — SIGNIFICANT CHANGE UP (ref 4.2–5.8)
RBC # FLD: 13.2 % — SIGNIFICANT CHANGE UP (ref 10.3–14.5)
SODIUM SERPL-SCNC: 144 MMOL/L — SIGNIFICANT CHANGE UP (ref 135–145)
URATE SERPL-MCNC: 7.1 MG/DL — SIGNIFICANT CHANGE UP (ref 3.4–8.8)
URATE SERPL-MCNC: 7.1 MG/DL — SIGNIFICANT CHANGE UP (ref 3.4–8.8)
WBC # BLD: 9.44 K/UL — SIGNIFICANT CHANGE UP (ref 3.8–10.5)
WBC # FLD AUTO: 9.44 K/UL — SIGNIFICANT CHANGE UP (ref 3.8–10.5)

## 2025-02-14 ENCOUNTER — NON-APPOINTMENT (OUTPATIENT)
Age: 60
End: 2025-02-14

## 2025-02-19 ENCOUNTER — NON-APPOINTMENT (OUTPATIENT)
Age: 60
End: 2025-02-19

## 2025-02-19 LAB — G6PD RBC-CCNC: 17.2 U/G HGB — SIGNIFICANT CHANGE UP (ref 7–20.5)

## 2025-02-19 RX ORDER — LEVOFLOXACIN 500 MG/1
500 TABLET, FILM COATED ORAL DAILY
Qty: 30 | Refills: 3 | Status: ACTIVE | COMMUNITY
Start: 2025-02-19 | End: 1900-01-01

## 2025-02-20 ENCOUNTER — APPOINTMENT (OUTPATIENT)
Dept: HEMATOLOGY ONCOLOGY | Facility: CLINIC | Age: 60
End: 2025-02-20
Payer: MEDICAID

## 2025-02-20 ENCOUNTER — RESULT REVIEW (OUTPATIENT)
Age: 60
End: 2025-02-20

## 2025-02-20 VITALS
OXYGEN SATURATION: 96 % | DIASTOLIC BLOOD PRESSURE: 72 MMHG | HEART RATE: 86 BPM | TEMPERATURE: 97.3 F | SYSTOLIC BLOOD PRESSURE: 113 MMHG | RESPIRATION RATE: 16 BRPM

## 2025-02-20 DIAGNOSIS — I69.30 UNSPECIFIED SEQUELAE OF CEREBRAL INFARCTION: ICD-10-CM

## 2025-02-20 DIAGNOSIS — R68.89 OTHER GENERAL SYMPTOMS AND SIGNS: ICD-10-CM

## 2025-02-20 DIAGNOSIS — Z51.11 ENCOUNTER FOR ANTINEOPLASTIC CHEMOTHERAPY: ICD-10-CM

## 2025-02-20 DIAGNOSIS — D70.9 NEUTROPENIA, UNSPECIFIED: ICD-10-CM

## 2025-02-20 DIAGNOSIS — D72.10 EOSINOPHILIA, UNSPECIFIED: ICD-10-CM

## 2025-02-20 DIAGNOSIS — G81.11 SPASTIC HEMIPLEGIA AFFECTING RIGHT DOMINANT SIDE: ICD-10-CM

## 2025-02-20 DIAGNOSIS — R29.818 OTHER SYMPTOMS AND SIGNS INVOLVING THE NERVOUS SYSTEM: ICD-10-CM

## 2025-02-20 DIAGNOSIS — C85.90 NON-HODGKIN LYMPHOMA, UNSPECIFIED, UNSPECIFIED SITE: ICD-10-CM

## 2025-02-20 DIAGNOSIS — I82.4Z1 ACUTE EMBOLISM AND THROMBOSIS OF UNSPECIFIED DEEP VEINS OF RIGHT DISTAL LOWER EXTREMITY: ICD-10-CM

## 2025-02-20 LAB
BASOPHILS # BLD AUTO: 0.02 K/UL — SIGNIFICANT CHANGE UP (ref 0–0.2)
BASOPHILS NFR BLD AUTO: 1 % — SIGNIFICANT CHANGE UP (ref 0–2)
EOSINOPHIL # BLD AUTO: 0.57 K/UL — HIGH (ref 0–0.5)
EOSINOPHIL NFR BLD AUTO: 28.5 % — HIGH (ref 0–6)
HCT VFR BLD CALC: 35.5 % — LOW (ref 39–50)
HGB BLD-MCNC: 11.5 G/DL — LOW (ref 13–17)
IMM GRANULOCYTES NFR BLD AUTO: 1.5 % — HIGH (ref 0–0.9)
LYMPHOCYTES # BLD AUTO: 0.29 K/UL — LOW (ref 1–3.3)
LYMPHOCYTES # BLD AUTO: 14.5 % — SIGNIFICANT CHANGE UP (ref 13–44)
MCHC RBC-ENTMCNC: 28.8 PG — SIGNIFICANT CHANGE UP (ref 27–34)
MCHC RBC-ENTMCNC: 32.4 G/DL — SIGNIFICANT CHANGE UP (ref 32–36)
MCV RBC AUTO: 88.8 FL — SIGNIFICANT CHANGE UP (ref 80–100)
MONOCYTES # BLD AUTO: 0.11 K/UL — SIGNIFICANT CHANGE UP (ref 0–0.9)
MONOCYTES NFR BLD AUTO: 5.5 % — SIGNIFICANT CHANGE UP (ref 2–14)
NEUTROPHILS # BLD AUTO: 0.98 K/UL — LOW (ref 1.8–7.4)
NEUTROPHILS NFR BLD AUTO: 49 % — SIGNIFICANT CHANGE UP (ref 43–77)
NRBC BLD AUTO-RTO: 0 /100 WBCS — SIGNIFICANT CHANGE UP (ref 0–0)
PLATELET # BLD AUTO: 121 K/UL — LOW (ref 150–400)
RBC # BLD: 4 M/UL — LOW (ref 4.2–5.8)
RBC # FLD: 13 % — SIGNIFICANT CHANGE UP (ref 10.3–14.5)
WBC # BLD: 1.99 K/UL — LOW (ref 3.8–10.5)
WBC # FLD AUTO: 1.99 K/UL — LOW (ref 3.8–10.5)

## 2025-02-20 PROCEDURE — G2211 COMPLEX E/M VISIT ADD ON: CPT | Mod: NC

## 2025-02-20 PROCEDURE — 99214 OFFICE O/P EST MOD 30 MIN: CPT

## 2025-02-23 PROBLEM — I82.4Z1 DEEP VEIN THROMBOSIS (DVT) OF DISTAL VEIN OF RIGHT LOWER EXTREMITY, UNSPECIFIED CHRONICITY: Status: RESOLVED | Noted: 2025-01-26 | Resolved: 2025-02-23

## 2025-02-23 PROBLEM — D70.9 NEUTROPENIA, UNSPECIFIED TYPE: Status: ACTIVE | Noted: 2025-02-23

## 2025-02-23 PROBLEM — Z51.11 ENCOUNTER FOR CHEMOTHERAPY MANAGEMENT: Status: ACTIVE | Noted: 2025-02-23

## 2025-02-23 PROBLEM — D72.10 EOSINOPHILIA, UNSPECIFIED TYPE: Status: ACTIVE | Noted: 2025-02-23

## 2025-02-23 LAB
ALBUMIN SERPL ELPH-MCNC: 3.9 G/DL
ALP BLD-CCNC: 86 U/L
ALT SERPL-CCNC: 10 U/L
ANION GAP SERPL CALC-SCNC: 12 MMOL/L
AST SERPL-CCNC: 12 U/L
BILIRUB SERPL-MCNC: 0.4 MG/DL
BUN SERPL-MCNC: 21 MG/DL
CALCIUM SERPL-MCNC: 8.7 MG/DL
CHLORIDE SERPL-SCNC: 100 MMOL/L
CO2 SERPL-SCNC: 26 MMOL/L
CREAT SERPL-MCNC: 0.86 MG/DL
EGFR: 101 ML/MIN/1.73M2
GLUCOSE SERPL-MCNC: 113 MG/DL
LDH SERPL-CCNC: 277 U/L
PHOSPHATE SERPL-MCNC: 2.9 MG/DL
POTASSIUM SERPL-SCNC: 3.8 MMOL/L
PROT SERPL-MCNC: 6.1 G/DL
SODIUM SERPL-SCNC: 138 MMOL/L
URATE SERPL-MCNC: 2.9 MG/DL

## 2025-02-24 ENCOUNTER — NON-APPOINTMENT (OUTPATIENT)
Age: 60
End: 2025-02-24

## 2025-03-06 ENCOUNTER — RESULT REVIEW (OUTPATIENT)
Age: 60
End: 2025-03-06

## 2025-03-06 ENCOUNTER — APPOINTMENT (OUTPATIENT)
Dept: INFUSION THERAPY | Facility: HOSPITAL | Age: 60
End: 2025-03-06

## 2025-03-06 ENCOUNTER — APPOINTMENT (OUTPATIENT)
Dept: HEMATOLOGY ONCOLOGY | Facility: CLINIC | Age: 60
End: 2025-03-06

## 2025-03-06 ENCOUNTER — NON-APPOINTMENT (OUTPATIENT)
Age: 60
End: 2025-03-06

## 2025-03-06 LAB
ALBUMIN SERPL ELPH-MCNC: 3.7 G/DL — SIGNIFICANT CHANGE UP (ref 3.3–5)
ALBUMIN SERPL ELPH-MCNC: 3.8 G/DL — SIGNIFICANT CHANGE UP (ref 3.3–5)
ALP SERPL-CCNC: 76 U/L — SIGNIFICANT CHANGE UP (ref 40–120)
ALP SERPL-CCNC: 83 U/L — SIGNIFICANT CHANGE UP (ref 40–120)
ALT FLD-CCNC: 15 U/L — SIGNIFICANT CHANGE UP (ref 10–45)
ALT FLD-CCNC: 17 U/L — SIGNIFICANT CHANGE UP (ref 10–45)
ANION GAP SERPL CALC-SCNC: 8 MMOL/L — SIGNIFICANT CHANGE UP (ref 5–17)
AST SERPL-CCNC: 20 U/L — SIGNIFICANT CHANGE UP (ref 10–40)
AST SERPL-CCNC: 23 U/L — SIGNIFICANT CHANGE UP (ref 10–40)
BASOPHILS # BLD AUTO: 0.1 K/UL — SIGNIFICANT CHANGE UP (ref 0–0.2)
BASOPHILS NFR BLD AUTO: 0.9 % — SIGNIFICANT CHANGE UP (ref 0–2)
BILIRUB DIRECT SERPL-MCNC: 0.1 MG/DL — SIGNIFICANT CHANGE UP (ref 0–0.3)
BILIRUB INDIRECT FLD-MCNC: 0.1 MG/DL — LOW (ref 0.2–1.2)
BILIRUB SERPL-MCNC: 0.2 MG/DL — SIGNIFICANT CHANGE UP (ref 0.2–1.2)
BILIRUB SERPL-MCNC: 0.2 MG/DL — SIGNIFICANT CHANGE UP (ref 0.2–1.2)
BUN SERPL-MCNC: 17 MG/DL — SIGNIFICANT CHANGE UP (ref 7–23)
CALCIUM SERPL-MCNC: 8.8 MG/DL — SIGNIFICANT CHANGE UP (ref 8.4–10.5)
CHLORIDE SERPL-SCNC: 108 MMOL/L — SIGNIFICANT CHANGE UP (ref 96–108)
CO2 SERPL-SCNC: 26 MMOL/L — SIGNIFICANT CHANGE UP (ref 22–31)
CREAT SERPL-MCNC: 0.82 MG/DL — SIGNIFICANT CHANGE UP (ref 0.5–1.3)
EGFR: 102 ML/MIN/1.73M2 — SIGNIFICANT CHANGE UP
EGFR: 102 ML/MIN/1.73M2 — SIGNIFICANT CHANGE UP
EOSINOPHIL # BLD AUTO: 0.11 K/UL — SIGNIFICANT CHANGE UP (ref 0–0.5)
EOSINOPHIL NFR BLD AUTO: 1 % — SIGNIFICANT CHANGE UP (ref 0–6)
GLUCOSE SERPL-MCNC: 119 MG/DL — HIGH (ref 70–99)
HCT VFR BLD CALC: 38 % — LOW (ref 39–50)
HGB BLD-MCNC: 12.4 G/DL — LOW (ref 13–17)
IMM GRANULOCYTES NFR BLD AUTO: 1.4 % — HIGH (ref 0–0.9)
LDH SERPL L TO P-CCNC: 259 U/L — HIGH (ref 50–242)
LYMPHOCYTES # BLD AUTO: 1 K/UL — SIGNIFICANT CHANGE UP (ref 1–3.3)
LYMPHOCYTES # BLD AUTO: 9.1 % — LOW (ref 13–44)
MCHC RBC-ENTMCNC: 29 PG — SIGNIFICANT CHANGE UP (ref 27–34)
MCHC RBC-ENTMCNC: 32.6 G/DL — SIGNIFICANT CHANGE UP (ref 32–36)
MCV RBC AUTO: 89 FL — SIGNIFICANT CHANGE UP (ref 80–100)
MONOCYTES # BLD AUTO: 1.09 K/UL — HIGH (ref 0–0.9)
MONOCYTES NFR BLD AUTO: 10 % — SIGNIFICANT CHANGE UP (ref 2–14)
NEUTROPHILS # BLD AUTO: 8.5 K/UL — HIGH (ref 1.8–7.4)
NEUTROPHILS NFR BLD AUTO: 77.6 % — HIGH (ref 43–77)
NRBC BLD AUTO-RTO: 0 /100 WBCS — SIGNIFICANT CHANGE UP (ref 0–0)
PHOSPHATE SERPL-MCNC: 2.6 MG/DL — SIGNIFICANT CHANGE UP (ref 2.5–4.5)
PLATELET # BLD AUTO: 342 K/UL — SIGNIFICANT CHANGE UP (ref 150–400)
POTASSIUM SERPL-MCNC: 4.6 MMOL/L — SIGNIFICANT CHANGE UP (ref 3.5–5.3)
POTASSIUM SERPL-SCNC: 4.6 MMOL/L — SIGNIFICANT CHANGE UP (ref 3.5–5.3)
PROT SERPL-MCNC: 5.9 G/DL — LOW (ref 6–8.3)
PROT SERPL-MCNC: 6.3 G/DL — SIGNIFICANT CHANGE UP (ref 6–8.3)
RBC # BLD: 4.27 M/UL — SIGNIFICANT CHANGE UP (ref 4.2–5.8)
RBC # FLD: 13.4 % — SIGNIFICANT CHANGE UP (ref 10.3–14.5)
SODIUM SERPL-SCNC: 142 MMOL/L — SIGNIFICANT CHANGE UP (ref 135–145)
URATE SERPL-MCNC: 4.1 MG/DL — SIGNIFICANT CHANGE UP (ref 3.4–8.8)
WBC # BLD: 10.95 K/UL — HIGH (ref 3.8–10.5)
WBC # FLD AUTO: 10.95 K/UL — HIGH (ref 3.8–10.5)

## 2025-03-07 DIAGNOSIS — Z51.89 ENCOUNTER FOR OTHER SPECIFIED AFTERCARE: ICD-10-CM

## 2025-03-07 DIAGNOSIS — C83.30 DIFFUSE LARGE B-CELL LYMPHOMA, UNSPECIFIED SITE: ICD-10-CM

## 2025-03-07 DIAGNOSIS — Z51.11 ENCOUNTER FOR ANTINEOPLASTIC CHEMOTHERAPY: ICD-10-CM

## 2025-03-07 DIAGNOSIS — E86.0 DEHYDRATION: ICD-10-CM

## 2025-03-07 DIAGNOSIS — R11.2 NAUSEA WITH VOMITING, UNSPECIFIED: ICD-10-CM

## 2025-03-13 ENCOUNTER — APPOINTMENT (OUTPATIENT)
Dept: HEMATOLOGY ONCOLOGY | Facility: CLINIC | Age: 60
End: 2025-03-13
Payer: MEDICAID

## 2025-03-13 ENCOUNTER — RESULT REVIEW (OUTPATIENT)
Age: 60
End: 2025-03-13

## 2025-03-13 VITALS
RESPIRATION RATE: 16 BRPM | OXYGEN SATURATION: 97 % | SYSTOLIC BLOOD PRESSURE: 115 MMHG | DIASTOLIC BLOOD PRESSURE: 82 MMHG | HEART RATE: 84 BPM | TEMPERATURE: 97.1 F | WEIGHT: 175 LBS | BODY MASS INDEX: 27.4 KG/M2

## 2025-03-13 DIAGNOSIS — C82.80 OTHER TYPES OF FOLLICULAR LYMPHOMA, UNSPECIFIED SITE: ICD-10-CM

## 2025-03-13 DIAGNOSIS — C85.90 NON-HODGKIN LYMPHOMA, UNSPECIFIED, UNSPECIFIED SITE: ICD-10-CM

## 2025-03-13 LAB
BASOPHILS # BLD AUTO: 0.09 K/UL — SIGNIFICANT CHANGE UP (ref 0–0.2)
BASOPHILS NFR BLD AUTO: 1.7 % — SIGNIFICANT CHANGE UP (ref 0–2)
EOSINOPHIL # BLD AUTO: 0.39 K/UL — SIGNIFICANT CHANGE UP (ref 0–0.5)
EOSINOPHIL NFR BLD AUTO: 7.4 % — HIGH (ref 0–6)
HCT VFR BLD CALC: 33.2 % — LOW (ref 39–50)
HGB BLD-MCNC: 11 G/DL — LOW (ref 13–17)
IMM GRANULOCYTES NFR BLD AUTO: 2.3 % — HIGH (ref 0–0.9)
LYMPHOCYTES # BLD AUTO: 0.72 K/UL — LOW (ref 1–3.3)
LYMPHOCYTES # BLD AUTO: 13.7 % — SIGNIFICANT CHANGE UP (ref 13–44)
MCHC RBC-ENTMCNC: 28.8 PG — SIGNIFICANT CHANGE UP (ref 27–34)
MCHC RBC-ENTMCNC: 33.1 G/DL — SIGNIFICANT CHANGE UP (ref 32–36)
MCV RBC AUTO: 86.9 FL — SIGNIFICANT CHANGE UP (ref 80–100)
MONOCYTES # BLD AUTO: 0.18 K/UL — SIGNIFICANT CHANGE UP (ref 0–0.9)
MONOCYTES NFR BLD AUTO: 3.4 % — SIGNIFICANT CHANGE UP (ref 2–14)
NEUTROPHILS # BLD AUTO: 3.77 K/UL — SIGNIFICANT CHANGE UP (ref 1.8–7.4)
NEUTROPHILS NFR BLD AUTO: 71.5 % — SIGNIFICANT CHANGE UP (ref 43–77)
NRBC BLD AUTO-RTO: 0 /100 WBCS — SIGNIFICANT CHANGE UP (ref 0–0)
PLATELET # BLD AUTO: 124 K/UL — LOW (ref 150–400)
RBC # BLD: 3.82 M/UL — LOW (ref 4.2–5.8)
RBC # FLD: 13.5 % — SIGNIFICANT CHANGE UP (ref 10.3–14.5)
WBC # BLD: 5.27 K/UL — SIGNIFICANT CHANGE UP (ref 3.8–10.5)
WBC # FLD AUTO: 5.27 K/UL — SIGNIFICANT CHANGE UP (ref 3.8–10.5)

## 2025-03-13 PROCEDURE — 99213 OFFICE O/P EST LOW 20 MIN: CPT

## 2025-03-17 ENCOUNTER — APPOINTMENT (OUTPATIENT)
Dept: NEUROLOGY | Facility: CLINIC | Age: 60
End: 2025-03-17

## 2025-03-17 VITALS
HEIGHT: 67 IN | HEART RATE: 71 BPM | SYSTOLIC BLOOD PRESSURE: 124 MMHG | WEIGHT: 175 LBS | DIASTOLIC BLOOD PRESSURE: 86 MMHG | BODY MASS INDEX: 27.47 KG/M2

## 2025-03-17 DIAGNOSIS — G81.11 SPASTIC HEMIPLEGIA AFFECTING RIGHT DOMINANT SIDE: ICD-10-CM

## 2025-03-17 DIAGNOSIS — R26.1 PARALYTIC GAIT: ICD-10-CM

## 2025-03-17 DIAGNOSIS — R29.818 OTHER SYMPTOMS AND SIGNS INVOLVING THE NERVOUS SYSTEM: ICD-10-CM

## 2025-03-17 PROCEDURE — 99215 OFFICE O/P EST HI 40 MIN: CPT

## 2025-03-26 ENCOUNTER — OUTPATIENT (OUTPATIENT)
Dept: OUTPATIENT SERVICES | Facility: HOSPITAL | Age: 60
LOS: 1 days | Discharge: HOME | End: 2025-03-26

## 2025-03-26 DIAGNOSIS — C85.10 UNSPECIFIED B-CELL LYMPHOMA, UNSPECIFIED SITE: ICD-10-CM

## 2025-03-27 ENCOUNTER — APPOINTMENT (OUTPATIENT)
Dept: HEMATOLOGY ONCOLOGY | Facility: CLINIC | Age: 60
End: 2025-03-27

## 2025-03-27 ENCOUNTER — RESULT REVIEW (OUTPATIENT)
Age: 60
End: 2025-03-27

## 2025-03-27 ENCOUNTER — APPOINTMENT (OUTPATIENT)
Dept: INFUSION THERAPY | Facility: HOSPITAL | Age: 60
End: 2025-03-27

## 2025-03-27 LAB
ALBUMIN SERPL ELPH-MCNC: 3.9 G/DL — SIGNIFICANT CHANGE UP (ref 3.3–5)
ALBUMIN SERPL ELPH-MCNC: 4.1 G/DL — SIGNIFICANT CHANGE UP (ref 3.3–5)
ALP SERPL-CCNC: 75 U/L — SIGNIFICANT CHANGE UP (ref 40–120)
ALP SERPL-CCNC: 81 U/L — SIGNIFICANT CHANGE UP (ref 40–120)
ALT FLD-CCNC: 19 U/L — SIGNIFICANT CHANGE UP (ref 10–45)
ALT FLD-CCNC: 24 U/L — SIGNIFICANT CHANGE UP (ref 10–45)
ANION GAP SERPL CALC-SCNC: 6 MMOL/L — SIGNIFICANT CHANGE UP (ref 5–17)
AST SERPL-CCNC: 20 U/L — SIGNIFICANT CHANGE UP (ref 10–40)
AST SERPL-CCNC: 24 U/L — SIGNIFICANT CHANGE UP (ref 10–40)
BASOPHILS # BLD AUTO: 0.05 K/UL — SIGNIFICANT CHANGE UP (ref 0–0.2)
BASOPHILS NFR BLD AUTO: 0.7 % — SIGNIFICANT CHANGE UP (ref 0–2)
BILIRUB DIRECT SERPL-MCNC: 0.1 MG/DL — SIGNIFICANT CHANGE UP (ref 0–0.3)
BILIRUB INDIRECT FLD-MCNC: 0.1 MG/DL — LOW (ref 0.2–1.2)
BILIRUB SERPL-MCNC: 0.2 MG/DL — SIGNIFICANT CHANGE UP (ref 0.2–1.2)
BILIRUB SERPL-MCNC: <0.2 MG/DL — SIGNIFICANT CHANGE UP (ref 0.2–1.2)
BUN SERPL-MCNC: 21 MG/DL — SIGNIFICANT CHANGE UP (ref 7–23)
CALCIUM SERPL-MCNC: 9.2 MG/DL — SIGNIFICANT CHANGE UP (ref 8.4–10.5)
CHLORIDE SERPL-SCNC: 108 MMOL/L — SIGNIFICANT CHANGE UP (ref 96–108)
CO2 SERPL-SCNC: 30 MMOL/L — SIGNIFICANT CHANGE UP (ref 22–31)
CREAT SERPL-MCNC: 0.82 MG/DL — SIGNIFICANT CHANGE UP (ref 0.5–1.3)
EGFR: 102 ML/MIN/1.73M2 — SIGNIFICANT CHANGE UP
EGFR: 102 ML/MIN/1.73M2 — SIGNIFICANT CHANGE UP
EOSINOPHIL # BLD AUTO: 0.12 K/UL — SIGNIFICANT CHANGE UP (ref 0–0.5)
EOSINOPHIL NFR BLD AUTO: 1.6 % — SIGNIFICANT CHANGE UP (ref 0–6)
GLUCOSE SERPL-MCNC: 122 MG/DL — HIGH (ref 70–99)
HCT VFR BLD CALC: 35.3 % — LOW (ref 39–50)
HGB BLD-MCNC: 11.6 G/DL — LOW (ref 13–17)
IMM GRANULOCYTES NFR BLD AUTO: 0.9 % — SIGNIFICANT CHANGE UP (ref 0–0.9)
LDH SERPL L TO P-CCNC: 194 U/L — SIGNIFICANT CHANGE UP (ref 50–242)
LYMPHOCYTES # BLD AUTO: 0.98 K/UL — LOW (ref 1–3.3)
LYMPHOCYTES # BLD AUTO: 12.9 % — LOW (ref 13–44)
MCHC RBC-ENTMCNC: 29.4 PG — SIGNIFICANT CHANGE UP (ref 27–34)
MCHC RBC-ENTMCNC: 32.9 G/DL — SIGNIFICANT CHANGE UP (ref 32–36)
MCV RBC AUTO: 89.6 FL — SIGNIFICANT CHANGE UP (ref 80–100)
MONOCYTES # BLD AUTO: 0.87 K/UL — SIGNIFICANT CHANGE UP (ref 0–0.9)
MONOCYTES NFR BLD AUTO: 11.5 % — SIGNIFICANT CHANGE UP (ref 2–14)
NEUTROPHILS # BLD AUTO: 5.5 K/UL — SIGNIFICANT CHANGE UP (ref 1.8–7.4)
NEUTROPHILS NFR BLD AUTO: 72.4 % — SIGNIFICANT CHANGE UP (ref 43–77)
NRBC BLD AUTO-RTO: 0 /100 WBCS — SIGNIFICANT CHANGE UP (ref 0–0)
PLATELET # BLD AUTO: 314 K/UL — SIGNIFICANT CHANGE UP (ref 150–400)
POTASSIUM SERPL-MCNC: 3.6 MMOL/L — SIGNIFICANT CHANGE UP (ref 3.5–5.3)
POTASSIUM SERPL-SCNC: 3.6 MMOL/L — SIGNIFICANT CHANGE UP (ref 3.5–5.3)
PROT SERPL-MCNC: 6 G/DL — SIGNIFICANT CHANGE UP (ref 6–8.3)
PROT SERPL-MCNC: 6.3 G/DL — SIGNIFICANT CHANGE UP (ref 6–8.3)
RBC # BLD: 3.94 M/UL — LOW (ref 4.2–5.8)
RBC # FLD: 14.9 % — HIGH (ref 10.3–14.5)
SODIUM SERPL-SCNC: 144 MMOL/L — SIGNIFICANT CHANGE UP (ref 135–145)
WBC # BLD: 7.59 K/UL — SIGNIFICANT CHANGE UP (ref 3.8–10.5)
WBC # FLD AUTO: 7.59 K/UL — SIGNIFICANT CHANGE UP (ref 3.8–10.5)

## 2025-03-28 DIAGNOSIS — R11.2 NAUSEA WITH VOMITING, UNSPECIFIED: ICD-10-CM

## 2025-03-28 DIAGNOSIS — C83.30 DIFFUSE LARGE B-CELL LYMPHOMA, UNSPECIFIED SITE: ICD-10-CM

## 2025-03-28 DIAGNOSIS — Z51.11 ENCOUNTER FOR ANTINEOPLASTIC CHEMOTHERAPY: ICD-10-CM

## 2025-03-28 DIAGNOSIS — E86.0 DEHYDRATION: ICD-10-CM

## 2025-03-28 DIAGNOSIS — Z51.89 ENCOUNTER FOR OTHER SPECIFIED AFTERCARE: ICD-10-CM

## 2025-04-03 ENCOUNTER — RESULT REVIEW (OUTPATIENT)
Age: 60
End: 2025-04-03

## 2025-04-03 ENCOUNTER — APPOINTMENT (OUTPATIENT)
Dept: HEMATOLOGY ONCOLOGY | Facility: CLINIC | Age: 60
End: 2025-04-03
Payer: MEDICAID

## 2025-04-03 VITALS
RESPIRATION RATE: 16 BRPM | SYSTOLIC BLOOD PRESSURE: 100 MMHG | OXYGEN SATURATION: 96 % | TEMPERATURE: 97.2 F | HEART RATE: 89 BPM | DIASTOLIC BLOOD PRESSURE: 70 MMHG

## 2025-04-03 DIAGNOSIS — C82.80 OTHER TYPES OF FOLLICULAR LYMPHOMA, UNSPECIFIED SITE: ICD-10-CM

## 2025-04-03 DIAGNOSIS — Z51.11 ENCOUNTER FOR ANTINEOPLASTIC CHEMOTHERAPY: ICD-10-CM

## 2025-04-03 DIAGNOSIS — I69.30 UNSPECIFIED SEQUELAE OF CEREBRAL INFARCTION: ICD-10-CM

## 2025-04-03 DIAGNOSIS — Z86.2 PERSONAL HISTORY OF DISEASES OF THE BLOOD AND BLOOD-FORMING ORGANS AND CERTAIN DISORDERS INVOLVING THE IMMUNE MECHANISM: ICD-10-CM

## 2025-04-03 DIAGNOSIS — R68.89 OTHER GENERAL SYMPTOMS AND SIGNS: ICD-10-CM

## 2025-04-03 LAB
BASOPHILS # BLD AUTO: 0.02 K/UL — SIGNIFICANT CHANGE UP (ref 0–0.2)
BASOPHILS NFR BLD AUTO: 0.2 % — SIGNIFICANT CHANGE UP (ref 0–2)
EOSINOPHIL # BLD AUTO: 0.19 K/UL — SIGNIFICANT CHANGE UP (ref 0–0.5)
EOSINOPHIL NFR BLD AUTO: 2.1 % — SIGNIFICANT CHANGE UP (ref 0–6)
HCT VFR BLD CALC: 32.8 % — LOW (ref 39–50)
HGB BLD-MCNC: 10.6 G/DL — LOW (ref 13–17)
IMM GRANULOCYTES NFR BLD AUTO: 2.2 % — HIGH (ref 0–0.9)
LYMPHOCYTES # BLD AUTO: 0.68 K/UL — LOW (ref 1–3.3)
LYMPHOCYTES # BLD AUTO: 7.6 % — LOW (ref 13–44)
MCHC RBC-ENTMCNC: 29.3 PG — SIGNIFICANT CHANGE UP (ref 27–34)
MCHC RBC-ENTMCNC: 32.3 G/DL — SIGNIFICANT CHANGE UP (ref 32–36)
MCV RBC AUTO: 90.6 FL — SIGNIFICANT CHANGE UP (ref 80–100)
MONOCYTES # BLD AUTO: 0.19 K/UL — SIGNIFICANT CHANGE UP (ref 0–0.9)
MONOCYTES NFR BLD AUTO: 2.1 % — SIGNIFICANT CHANGE UP (ref 2–14)
NEUTROPHILS # BLD AUTO: 7.62 K/UL — HIGH (ref 1.8–7.4)
NEUTROPHILS NFR BLD AUTO: 85.8 % — HIGH (ref 43–77)
NRBC BLD AUTO-RTO: 0 /100 WBCS — SIGNIFICANT CHANGE UP (ref 0–0)
PLATELET # BLD AUTO: 147 K/UL — LOW (ref 150–400)
RBC # BLD: 3.62 M/UL — LOW (ref 4.2–5.8)
RBC # FLD: 14.6 % — HIGH (ref 10.3–14.5)
WBC # BLD: 8.9 K/UL — SIGNIFICANT CHANGE UP (ref 3.8–10.5)
WBC # FLD AUTO: 8.9 K/UL — SIGNIFICANT CHANGE UP (ref 3.8–10.5)

## 2025-04-03 PROCEDURE — 99213 OFFICE O/P EST LOW 20 MIN: CPT

## 2025-04-03 PROCEDURE — G2211 COMPLEX E/M VISIT ADD ON: CPT | Mod: NC

## 2025-04-04 ENCOUNTER — APPOINTMENT (OUTPATIENT)
Dept: NEUROLOGY | Facility: CLINIC | Age: 60
End: 2025-04-04
Payer: MEDICAID

## 2025-04-04 VITALS
SYSTOLIC BLOOD PRESSURE: 100 MMHG | BODY MASS INDEX: 27.47 KG/M2 | HEIGHT: 67 IN | HEART RATE: 89 BPM | DIASTOLIC BLOOD PRESSURE: 66 MMHG | WEIGHT: 175 LBS

## 2025-04-04 DIAGNOSIS — I61.9 NONTRAUMATIC INTRACEREBRAL HEMORRHAGE, UNSPECIFIED: ICD-10-CM

## 2025-04-04 PROCEDURE — G2211 COMPLEX E/M VISIT ADD ON: CPT | Mod: NC

## 2025-04-04 PROCEDURE — 99214 OFFICE O/P EST MOD 30 MIN: CPT

## 2025-04-05 PROBLEM — Z86.2 HISTORY OF EOSINOPHILIA: Status: RESOLVED | Noted: 2025-02-23 | Resolved: 2025-04-05

## 2025-04-10 ENCOUNTER — OUTPATIENT (OUTPATIENT)
Dept: OUTPATIENT SERVICES | Facility: HOSPITAL | Age: 60
LOS: 1 days | End: 2025-04-10
Payer: MEDICAID

## 2025-04-10 ENCOUNTER — APPOINTMENT (OUTPATIENT)
Dept: MRI IMAGING | Facility: CLINIC | Age: 60
End: 2025-04-10
Payer: MEDICAID

## 2025-04-10 DIAGNOSIS — I61.9 NONTRAUMATIC INTRACEREBRAL HEMORRHAGE, UNSPECIFIED: ICD-10-CM

## 2025-04-10 PROCEDURE — 70553 MRI BRAIN STEM W/O & W/DYE: CPT | Mod: 26

## 2025-04-10 PROCEDURE — A9585: CPT

## 2025-04-10 PROCEDURE — 70553 MRI BRAIN STEM W/O & W/DYE: CPT

## 2025-04-14 ENCOUNTER — NON-APPOINTMENT (OUTPATIENT)
Age: 60
End: 2025-04-14

## 2025-04-15 NOTE — OCCUPATIONAL THERAPY INITIAL EVALUATION ADULT - RANGE OF MOTION EXAMINATION, LOWER EXTREMITY
Pt next scheduled appointment is on 05/27/25, 90 day supply pend    
Left LE Active ROM was WFL (within functional limits)/Right LE Passive ROM was WFL  (within functional limits)

## 2025-04-17 ENCOUNTER — APPOINTMENT (OUTPATIENT)
Dept: INFUSION THERAPY | Facility: HOSPITAL | Age: 60
End: 2025-04-17

## 2025-04-17 ENCOUNTER — RESULT REVIEW (OUTPATIENT)
Age: 60
End: 2025-04-17

## 2025-04-17 ENCOUNTER — APPOINTMENT (OUTPATIENT)
Dept: HEMATOLOGY ONCOLOGY | Facility: CLINIC | Age: 60
End: 2025-04-17

## 2025-04-17 LAB
ALBUMIN SERPL ELPH-MCNC: 3.8 G/DL — SIGNIFICANT CHANGE UP (ref 3.3–5)
ALBUMIN SERPL ELPH-MCNC: 3.9 G/DL — SIGNIFICANT CHANGE UP (ref 3.3–5)
ALP SERPL-CCNC: 65 U/L — SIGNIFICANT CHANGE UP (ref 40–120)
ALP SERPL-CCNC: 71 U/L — SIGNIFICANT CHANGE UP (ref 40–120)
ALT FLD-CCNC: 13 U/L — SIGNIFICANT CHANGE UP (ref 10–45)
ALT FLD-CCNC: 14 U/L — SIGNIFICANT CHANGE UP (ref 10–45)
ANION GAP SERPL CALC-SCNC: 11 MMOL/L — SIGNIFICANT CHANGE UP (ref 5–17)
AST SERPL-CCNC: 13 U/L — SIGNIFICANT CHANGE UP (ref 10–40)
AST SERPL-CCNC: 19 U/L — SIGNIFICANT CHANGE UP (ref 10–40)
BASOPHILS # BLD AUTO: 0.04 K/UL — SIGNIFICANT CHANGE UP (ref 0–0.2)
BASOPHILS NFR BLD AUTO: 0.6 % — SIGNIFICANT CHANGE UP (ref 0–2)
BILIRUB DIRECT SERPL-MCNC: 0.1 MG/DL — SIGNIFICANT CHANGE UP (ref 0–0.3)
BILIRUB INDIRECT FLD-MCNC: 0.2 MG/DL — SIGNIFICANT CHANGE UP (ref 0.2–1.2)
BILIRUB SERPL-MCNC: 0.2 MG/DL — SIGNIFICANT CHANGE UP (ref 0.2–1.2)
BILIRUB SERPL-MCNC: 0.2 MG/DL — SIGNIFICANT CHANGE UP (ref 0.2–1.2)
BUN SERPL-MCNC: 19 MG/DL — SIGNIFICANT CHANGE UP (ref 7–23)
CALCIUM SERPL-MCNC: 9 MG/DL — SIGNIFICANT CHANGE UP (ref 8.4–10.5)
CHLORIDE SERPL-SCNC: 108 MMOL/L — SIGNIFICANT CHANGE UP (ref 96–108)
CO2 SERPL-SCNC: 26 MMOL/L — SIGNIFICANT CHANGE UP (ref 22–31)
CREAT SERPL-MCNC: 0.8 MG/DL — SIGNIFICANT CHANGE UP (ref 0.5–1.3)
EGFR: 103 ML/MIN/1.73M2 — SIGNIFICANT CHANGE UP
EGFR: 103 ML/MIN/1.73M2 — SIGNIFICANT CHANGE UP
EOSINOPHIL # BLD AUTO: 0.09 K/UL — SIGNIFICANT CHANGE UP (ref 0–0.5)
EOSINOPHIL NFR BLD AUTO: 1.4 % — SIGNIFICANT CHANGE UP (ref 0–6)
GLUCOSE SERPL-MCNC: 103 MG/DL — HIGH (ref 70–99)
HCT VFR BLD CALC: 34.5 % — LOW (ref 39–50)
HGB BLD-MCNC: 11.1 G/DL — LOW (ref 13–17)
IMM GRANULOCYTES NFR BLD AUTO: 0.6 % — SIGNIFICANT CHANGE UP (ref 0–0.9)
LDH SERPL L TO P-CCNC: 147 U/L — SIGNIFICANT CHANGE UP (ref 50–242)
LYMPHOCYTES # BLD AUTO: 0.67 K/UL — LOW (ref 1–3.3)
LYMPHOCYTES # BLD AUTO: 10.3 % — LOW (ref 13–44)
MCHC RBC-ENTMCNC: 29.4 PG — SIGNIFICANT CHANGE UP (ref 27–34)
MCHC RBC-ENTMCNC: 32.2 G/DL — SIGNIFICANT CHANGE UP (ref 32–36)
MCV RBC AUTO: 91.5 FL — SIGNIFICANT CHANGE UP (ref 80–100)
MONOCYTES # BLD AUTO: 0.62 K/UL — SIGNIFICANT CHANGE UP (ref 0–0.9)
MONOCYTES NFR BLD AUTO: 9.5 % — SIGNIFICANT CHANGE UP (ref 2–14)
NEUTROPHILS # BLD AUTO: 5.04 K/UL — SIGNIFICANT CHANGE UP (ref 1.8–7.4)
NEUTROPHILS NFR BLD AUTO: 77.6 % — HIGH (ref 43–77)
NRBC BLD AUTO-RTO: 0 /100 WBCS — SIGNIFICANT CHANGE UP (ref 0–0)
PLATELET # BLD AUTO: 258 K/UL — SIGNIFICANT CHANGE UP (ref 150–400)
POTASSIUM SERPL-MCNC: 3.8 MMOL/L — SIGNIFICANT CHANGE UP (ref 3.5–5.3)
POTASSIUM SERPL-SCNC: 3.8 MMOL/L — SIGNIFICANT CHANGE UP (ref 3.5–5.3)
PROT SERPL-MCNC: 5.7 G/DL — LOW (ref 6–8.3)
PROT SERPL-MCNC: 6 G/DL — SIGNIFICANT CHANGE UP (ref 6–8.3)
RBC # BLD: 3.77 M/UL — LOW (ref 4.2–5.8)
RBC # FLD: 16.2 % — HIGH (ref 10.3–14.5)
SODIUM SERPL-SCNC: 144 MMOL/L — SIGNIFICANT CHANGE UP (ref 135–145)
WBC # BLD: 6.5 K/UL — SIGNIFICANT CHANGE UP (ref 3.8–10.5)
WBC # FLD AUTO: 6.5 K/UL — SIGNIFICANT CHANGE UP (ref 3.8–10.5)

## 2025-04-22 ENCOUNTER — APPOINTMENT (OUTPATIENT)
Dept: NUCLEAR MEDICINE | Facility: IMAGING CENTER | Age: 60
End: 2025-04-22
Payer: MEDICAID

## 2025-04-22 ENCOUNTER — OUTPATIENT (OUTPATIENT)
Dept: OUTPATIENT SERVICES | Facility: HOSPITAL | Age: 60
LOS: 1 days | End: 2025-04-22
Payer: MEDICAID

## 2025-04-22 DIAGNOSIS — C82.80 OTHER TYPES OF FOLLICULAR LYMPHOMA, UNSPECIFIED SITE: ICD-10-CM

## 2025-04-22 PROCEDURE — 78815 PET IMAGE W/CT SKULL-THIGH: CPT | Mod: 26,PS

## 2025-04-22 PROCEDURE — A9552: CPT

## 2025-04-22 PROCEDURE — 78815 PET IMAGE W/CT SKULL-THIGH: CPT

## 2025-04-23 DIAGNOSIS — R68.89 OTHER GENERAL SYMPTOMS AND SIGNS: ICD-10-CM

## 2025-04-23 DIAGNOSIS — I69.30 UNSPECIFIED SEQUELAE OF CEREBRAL INFARCTION: ICD-10-CM

## 2025-04-24 ENCOUNTER — RESULT REVIEW (OUTPATIENT)
Age: 60
End: 2025-04-24

## 2025-04-24 ENCOUNTER — APPOINTMENT (OUTPATIENT)
Dept: HEMATOLOGY ONCOLOGY | Facility: CLINIC | Age: 60
End: 2025-04-24
Payer: MEDICAID

## 2025-04-24 VITALS
SYSTOLIC BLOOD PRESSURE: 110 MMHG | WEIGHT: 158.73 LBS | DIASTOLIC BLOOD PRESSURE: 75 MMHG | OXYGEN SATURATION: 97 % | HEART RATE: 88 BPM | TEMPERATURE: 96.8 F | BODY MASS INDEX: 24.86 KG/M2 | RESPIRATION RATE: 16 BRPM

## 2025-04-24 VITALS — BODY MASS INDEX: 26.9 KG/M2 | WEIGHT: 171.74 LBS

## 2025-04-24 DIAGNOSIS — R26.1 PARALYTIC GAIT: ICD-10-CM

## 2025-04-24 DIAGNOSIS — Z51.11 ENCOUNTER FOR ANTINEOPLASTIC CHEMOTHERAPY: ICD-10-CM

## 2025-04-24 DIAGNOSIS — I10 ESSENTIAL (PRIMARY) HYPERTENSION: ICD-10-CM

## 2025-04-24 DIAGNOSIS — Z86.73 PERSONAL HISTORY OF TRANSIENT ISCHEMIC ATTACK (TIA), AND CEREBRAL INFARCTION W/OUT RESIDUAL DEFICITS: ICD-10-CM

## 2025-04-24 DIAGNOSIS — C82.80 OTHER TYPES OF FOLLICULAR LYMPHOMA, UNSPECIFIED SITE: ICD-10-CM

## 2025-04-24 LAB
ANISOCYTOSIS BLD QL: SLIGHT — SIGNIFICANT CHANGE UP
BASOPHILS # BLD AUTO: 0 K/UL — SIGNIFICANT CHANGE UP (ref 0–0.2)
BASOPHILS NFR BLD AUTO: 0 % — SIGNIFICANT CHANGE UP (ref 0–2)
DACRYOCYTES BLD QL SMEAR: SLIGHT — SIGNIFICANT CHANGE UP
ELLIPTOCYTES BLD QL SMEAR: SLIGHT — SIGNIFICANT CHANGE UP
EOSINOPHIL # BLD AUTO: 0.05 K/UL — SIGNIFICANT CHANGE UP (ref 0–0.5)
EOSINOPHIL NFR BLD AUTO: 1 % — SIGNIFICANT CHANGE UP (ref 0–6)
HCT VFR BLD CALC: 31.4 % — LOW (ref 39–50)
HGB BLD-MCNC: 10.5 G/DL — LOW (ref 13–17)
LYMPHOCYTES # BLD AUTO: 0.54 K/UL — LOW (ref 1–3.3)
LYMPHOCYTES # BLD AUTO: 11 % — LOW (ref 13–44)
MCHC RBC-ENTMCNC: 30.2 PG — SIGNIFICANT CHANGE UP (ref 27–34)
MCHC RBC-ENTMCNC: 33.4 G/DL — SIGNIFICANT CHANGE UP (ref 32–36)
MCV RBC AUTO: 90.2 FL — SIGNIFICANT CHANGE UP (ref 80–100)
MONOCYTES # BLD AUTO: 0.15 K/UL — SIGNIFICANT CHANGE UP (ref 0–0.9)
MONOCYTES NFR BLD AUTO: 3 % — SIGNIFICANT CHANGE UP (ref 2–14)
NEUTROPHILS # BLD AUTO: 4.18 K/UL — SIGNIFICANT CHANGE UP (ref 1.8–7.4)
NEUTROPHILS NFR BLD AUTO: 85 % — HIGH (ref 43–77)
NRBC # BLD: 0 /100 WBCS — SIGNIFICANT CHANGE UP (ref 0–0)
NRBC BLD AUTO-RTO: SIGNIFICANT CHANGE UP /100 WBCS (ref 0–0)
NRBC BLD-RTO: 0 /100 WBCS — SIGNIFICANT CHANGE UP (ref 0–0)
PLAT MORPH BLD: NORMAL — SIGNIFICANT CHANGE UP
PLATELET # BLD AUTO: 90 K/UL — LOW (ref 150–400)
POIKILOCYTOSIS BLD QL AUTO: SLIGHT — SIGNIFICANT CHANGE UP
RBC # BLD: 3.48 M/UL — LOW (ref 4.2–5.8)
RBC # FLD: 15.5 % — HIGH (ref 10.3–14.5)
RBC BLD AUTO: ABNORMAL
WBC # BLD: 4.92 K/UL — SIGNIFICANT CHANGE UP (ref 3.8–10.5)
WBC # FLD AUTO: 4.92 K/UL — SIGNIFICANT CHANGE UP (ref 3.8–10.5)

## 2025-04-24 PROCEDURE — 99214 OFFICE O/P EST MOD 30 MIN: CPT

## 2025-05-08 ENCOUNTER — RESULT REVIEW (OUTPATIENT)
Age: 60
End: 2025-05-08

## 2025-05-08 ENCOUNTER — APPOINTMENT (OUTPATIENT)
Dept: HEMATOLOGY ONCOLOGY | Facility: CLINIC | Age: 60
End: 2025-05-08

## 2025-05-08 ENCOUNTER — APPOINTMENT (OUTPATIENT)
Dept: INFUSION THERAPY | Facility: HOSPITAL | Age: 60
End: 2025-05-08

## 2025-05-08 ENCOUNTER — NON-APPOINTMENT (OUTPATIENT)
Age: 60
End: 2025-05-08

## 2025-05-08 LAB
ALBUMIN SERPL ELPH-MCNC: 4 G/DL — SIGNIFICANT CHANGE UP (ref 3.3–5)
ALP SERPL-CCNC: 79 U/L — SIGNIFICANT CHANGE UP (ref 40–120)
ALT FLD-CCNC: 14 U/L — SIGNIFICANT CHANGE UP (ref 10–45)
ANION GAP SERPL CALC-SCNC: 9 MMOL/L — SIGNIFICANT CHANGE UP (ref 5–17)
AST SERPL-CCNC: 17 U/L — SIGNIFICANT CHANGE UP (ref 10–40)
BASOPHILS # BLD AUTO: 0.04 K/UL — SIGNIFICANT CHANGE UP (ref 0–0.2)
BASOPHILS NFR BLD AUTO: 0.5 % — SIGNIFICANT CHANGE UP (ref 0–2)
BILIRUB SERPL-MCNC: 0.2 MG/DL — SIGNIFICANT CHANGE UP (ref 0.2–1.2)
BUN SERPL-MCNC: 21 MG/DL — SIGNIFICANT CHANGE UP (ref 7–23)
CALCIUM SERPL-MCNC: 9.2 MG/DL — SIGNIFICANT CHANGE UP (ref 8.4–10.5)
CHLORIDE SERPL-SCNC: 108 MMOL/L — SIGNIFICANT CHANGE UP (ref 96–108)
CO2 SERPL-SCNC: 26 MMOL/L — SIGNIFICANT CHANGE UP (ref 22–31)
CREAT SERPL-MCNC: 0.84 MG/DL — SIGNIFICANT CHANGE UP (ref 0.5–1.3)
EGFR: 101 ML/MIN/1.73M2 — SIGNIFICANT CHANGE UP
EGFR: 101 ML/MIN/1.73M2 — SIGNIFICANT CHANGE UP
EOSINOPHIL # BLD AUTO: 0.07 K/UL — SIGNIFICANT CHANGE UP (ref 0–0.5)
EOSINOPHIL NFR BLD AUTO: 0.8 % — SIGNIFICANT CHANGE UP (ref 0–6)
GLUCOSE SERPL-MCNC: 122 MG/DL — HIGH (ref 70–99)
HCT VFR BLD CALC: 35.1 % — LOW (ref 39–50)
HGB BLD-MCNC: 11.6 G/DL — LOW (ref 13–17)
IMM GRANULOCYTES NFR BLD AUTO: 1.4 % — HIGH (ref 0–0.9)
LDH SERPL L TO P-CCNC: 166 U/L — SIGNIFICANT CHANGE UP (ref 50–242)
LYMPHOCYTES # BLD AUTO: 0.64 K/UL — LOW (ref 1–3.3)
LYMPHOCYTES # BLD AUTO: 7.3 % — LOW (ref 13–44)
MCHC RBC-ENTMCNC: 30.5 PG — SIGNIFICANT CHANGE UP (ref 27–34)
MCHC RBC-ENTMCNC: 33 G/DL — SIGNIFICANT CHANGE UP (ref 32–36)
MCV RBC AUTO: 92.4 FL — SIGNIFICANT CHANGE UP (ref 80–100)
MONOCYTES # BLD AUTO: 0.92 K/UL — HIGH (ref 0–0.9)
MONOCYTES NFR BLD AUTO: 10.5 % — SIGNIFICANT CHANGE UP (ref 2–14)
NEUTROPHILS # BLD AUTO: 6.94 K/UL — SIGNIFICANT CHANGE UP (ref 1.8–7.4)
NEUTROPHILS NFR BLD AUTO: 79.5 % — HIGH (ref 43–77)
NRBC BLD AUTO-RTO: 0 /100 WBCS — SIGNIFICANT CHANGE UP (ref 0–0)
PLATELET # BLD AUTO: 296 K/UL — SIGNIFICANT CHANGE UP (ref 150–400)
POTASSIUM SERPL-MCNC: 4.1 MMOL/L — SIGNIFICANT CHANGE UP (ref 3.5–5.3)
POTASSIUM SERPL-SCNC: 4.1 MMOL/L — SIGNIFICANT CHANGE UP (ref 3.5–5.3)
PROT SERPL-MCNC: 6.2 G/DL — SIGNIFICANT CHANGE UP (ref 6–8.3)
RBC # BLD: 3.8 M/UL — LOW (ref 4.2–5.8)
RBC # FLD: 16.3 % — HIGH (ref 10.3–14.5)
SODIUM SERPL-SCNC: 142 MMOL/L — SIGNIFICANT CHANGE UP (ref 135–145)
WBC # BLD: 8.73 K/UL — SIGNIFICANT CHANGE UP (ref 3.8–10.5)
WBC # FLD AUTO: 8.73 K/UL — SIGNIFICANT CHANGE UP (ref 3.8–10.5)

## 2025-05-15 ENCOUNTER — RESULT REVIEW (OUTPATIENT)
Age: 60
End: 2025-05-15

## 2025-05-15 ENCOUNTER — APPOINTMENT (OUTPATIENT)
Dept: HEMATOLOGY ONCOLOGY | Facility: CLINIC | Age: 60
End: 2025-05-15
Payer: MEDICAID

## 2025-05-15 VITALS
TEMPERATURE: 97.7 F | WEIGHT: 175 LBS | DIASTOLIC BLOOD PRESSURE: 72 MMHG | BODY MASS INDEX: 27.41 KG/M2 | RESPIRATION RATE: 16 BRPM | OXYGEN SATURATION: 97 % | HEART RATE: 85 BPM | SYSTOLIC BLOOD PRESSURE: 98 MMHG

## 2025-05-15 DIAGNOSIS — C82.80 OTHER TYPES OF FOLLICULAR LYMPHOMA, UNSPECIFIED SITE: ICD-10-CM

## 2025-05-15 LAB
BASOPHILS # BLD AUTO: 0 K/UL — SIGNIFICANT CHANGE UP (ref 0–0.2)
BASOPHILS NFR BLD AUTO: 0 % — SIGNIFICANT CHANGE UP (ref 0–2)
EOSINOPHIL # BLD AUTO: 0 K/UL — SIGNIFICANT CHANGE UP (ref 0–0.5)
EOSINOPHIL NFR BLD AUTO: 0 % — SIGNIFICANT CHANGE UP (ref 0–6)
HCT VFR BLD CALC: 32.1 % — LOW (ref 39–50)
HGB BLD-MCNC: 10.7 G/DL — LOW (ref 13–17)
LYMPHOCYTES # BLD AUTO: 0.69 K/UL — LOW (ref 1–3.3)
LYMPHOCYTES # BLD AUTO: 5 % — LOW (ref 13–44)
MCHC RBC-ENTMCNC: 30.8 PG — SIGNIFICANT CHANGE UP (ref 27–34)
MCHC RBC-ENTMCNC: 33.3 G/DL — SIGNIFICANT CHANGE UP (ref 32–36)
MCV RBC AUTO: 92.5 FL — SIGNIFICANT CHANGE UP (ref 80–100)
MONOCYTES # BLD AUTO: 0.28 K/UL — SIGNIFICANT CHANGE UP (ref 0–0.9)
MONOCYTES NFR BLD AUTO: 2 % — SIGNIFICANT CHANGE UP (ref 2–14)
NEUTROPHILS # BLD AUTO: 12.91 K/UL — HIGH (ref 1.8–7.4)
NEUTROPHILS NFR BLD AUTO: 93 % — HIGH (ref 43–77)
NRBC # BLD: 0 /100 WBCS — SIGNIFICANT CHANGE UP (ref 0–0)
NRBC BLD AUTO-RTO: SIGNIFICANT CHANGE UP /100 WBCS (ref 0–0)
NRBC BLD-RTO: 0 /100 WBCS — SIGNIFICANT CHANGE UP (ref 0–0)
PLAT MORPH BLD: NORMAL — SIGNIFICANT CHANGE UP
PLATELET # BLD AUTO: 124 K/UL — LOW (ref 150–400)
RBC # BLD: 3.47 M/UL — LOW (ref 4.2–5.8)
RBC # FLD: 15 % — HIGH (ref 10.3–14.5)
RBC BLD AUTO: SIGNIFICANT CHANGE UP
WBC # BLD: 13.88 K/UL — HIGH (ref 3.8–10.5)
WBC # FLD AUTO: 13.88 K/UL — HIGH (ref 3.8–10.5)

## 2025-05-15 PROCEDURE — 99213 OFFICE O/P EST LOW 20 MIN: CPT

## 2025-05-15 RX ORDER — BACLOFEN 10 MG/1
10 TABLET ORAL
Qty: 20 | Refills: 0 | Status: ACTIVE | COMMUNITY
Start: 2025-05-15 | End: 1900-01-01

## 2025-05-16 ENCOUNTER — APPOINTMENT (OUTPATIENT)
Dept: NEUROLOGY | Facility: CLINIC | Age: 60
End: 2025-05-16

## 2025-05-23 ENCOUNTER — OUTPATIENT (OUTPATIENT)
Dept: OUTPATIENT SERVICES | Facility: HOSPITAL | Age: 60
LOS: 1 days | Discharge: HOME | End: 2025-05-23

## 2025-05-23 DIAGNOSIS — C85.10 UNSPECIFIED B-CELL LYMPHOMA, UNSPECIFIED SITE: ICD-10-CM

## 2025-05-29 ENCOUNTER — RESULT REVIEW (OUTPATIENT)
Age: 60
End: 2025-05-29

## 2025-05-29 ENCOUNTER — APPOINTMENT (OUTPATIENT)
Dept: HEMATOLOGY ONCOLOGY | Facility: CLINIC | Age: 60
End: 2025-05-29

## 2025-05-29 ENCOUNTER — APPOINTMENT (OUTPATIENT)
Dept: INFUSION THERAPY | Facility: HOSPITAL | Age: 60
End: 2025-05-29

## 2025-05-29 LAB
ALBUMIN SERPL ELPH-MCNC: 4 G/DL — SIGNIFICANT CHANGE UP (ref 3.3–5)
ALP SERPL-CCNC: 75 U/L — SIGNIFICANT CHANGE UP (ref 40–120)
ALT FLD-CCNC: 15 U/L — SIGNIFICANT CHANGE UP (ref 10–45)
ANION GAP SERPL CALC-SCNC: 11 MMOL/L — SIGNIFICANT CHANGE UP (ref 5–17)
AST SERPL-CCNC: 20 U/L — SIGNIFICANT CHANGE UP (ref 10–40)
BASOPHILS # BLD AUTO: 0.04 K/UL — SIGNIFICANT CHANGE UP (ref 0–0.2)
BASOPHILS NFR BLD AUTO: 0.4 % — SIGNIFICANT CHANGE UP (ref 0–2)
BILIRUB SERPL-MCNC: 0.2 MG/DL — SIGNIFICANT CHANGE UP (ref 0.2–1.2)
BUN SERPL-MCNC: 19 MG/DL — SIGNIFICANT CHANGE UP (ref 7–23)
CALCIUM SERPL-MCNC: 8.8 MG/DL — SIGNIFICANT CHANGE UP (ref 8.4–10.5)
CHLORIDE SERPL-SCNC: 102 MMOL/L — SIGNIFICANT CHANGE UP (ref 96–108)
CO2 SERPL-SCNC: 25 MMOL/L — SIGNIFICANT CHANGE UP (ref 22–31)
CREAT SERPL-MCNC: 1.01 MG/DL — SIGNIFICANT CHANGE UP (ref 0.5–1.3)
EGFR: 86 ML/MIN/1.73M2 — SIGNIFICANT CHANGE UP
EGFR: 86 ML/MIN/1.73M2 — SIGNIFICANT CHANGE UP
EOSINOPHIL # BLD AUTO: 0.06 K/UL — SIGNIFICANT CHANGE UP (ref 0–0.5)
EOSINOPHIL NFR BLD AUTO: 0.6 % — SIGNIFICANT CHANGE UP (ref 0–6)
GLUCOSE SERPL-MCNC: 143 MG/DL — HIGH (ref 70–99)
HCT VFR BLD CALC: 34.4 % — LOW (ref 39–50)
HGB BLD-MCNC: 11.2 G/DL — LOW (ref 13–17)
IMM GRANULOCYTES NFR BLD AUTO: 0.7 % — SIGNIFICANT CHANGE UP (ref 0–0.9)
LDH SERPL L TO P-CCNC: 186 U/L — SIGNIFICANT CHANGE UP (ref 50–242)
LYMPHOCYTES # BLD AUTO: 0.57 K/UL — LOW (ref 1–3.3)
LYMPHOCYTES # BLD AUTO: 6 % — LOW (ref 13–44)
MCHC RBC-ENTMCNC: 31.1 PG — SIGNIFICANT CHANGE UP (ref 27–34)
MCHC RBC-ENTMCNC: 32.6 G/DL — SIGNIFICANT CHANGE UP (ref 32–36)
MCV RBC AUTO: 95.6 FL — SIGNIFICANT CHANGE UP (ref 80–100)
MONOCYTES # BLD AUTO: 0.84 K/UL — SIGNIFICANT CHANGE UP (ref 0–0.9)
MONOCYTES NFR BLD AUTO: 8.9 % — SIGNIFICANT CHANGE UP (ref 2–14)
NEUTROPHILS # BLD AUTO: 7.9 K/UL — HIGH (ref 1.8–7.4)
NEUTROPHILS NFR BLD AUTO: 83.4 % — HIGH (ref 43–77)
NRBC BLD AUTO-RTO: 0 /100 WBCS — SIGNIFICANT CHANGE UP (ref 0–0)
PLATELET # BLD AUTO: 277 K/UL — SIGNIFICANT CHANGE UP (ref 150–400)
POTASSIUM SERPL-MCNC: 3.7 MMOL/L — SIGNIFICANT CHANGE UP (ref 3.5–5.3)
POTASSIUM SERPL-SCNC: 3.7 MMOL/L — SIGNIFICANT CHANGE UP (ref 3.5–5.3)
PROT SERPL-MCNC: 6.2 G/DL — SIGNIFICANT CHANGE UP (ref 6–8.3)
RBC # BLD: 3.6 M/UL — LOW (ref 4.2–5.8)
RBC # FLD: 15.4 % — HIGH (ref 10.3–14.5)
SODIUM SERPL-SCNC: 138 MMOL/L — SIGNIFICANT CHANGE UP (ref 135–145)
WBC # BLD: 9.48 K/UL — SIGNIFICANT CHANGE UP (ref 3.8–10.5)
WBC # FLD AUTO: 9.48 K/UL — SIGNIFICANT CHANGE UP (ref 3.8–10.5)

## 2025-05-30 DIAGNOSIS — Z51.89 ENCOUNTER FOR OTHER SPECIFIED AFTERCARE: ICD-10-CM

## 2025-05-30 DIAGNOSIS — R11.2 NAUSEA WITH VOMITING, UNSPECIFIED: ICD-10-CM

## 2025-05-30 DIAGNOSIS — Z51.11 ENCOUNTER FOR ANTINEOPLASTIC CHEMOTHERAPY: ICD-10-CM

## 2025-05-30 DIAGNOSIS — E86.0 DEHYDRATION: ICD-10-CM

## 2025-05-30 DIAGNOSIS — C83.30 DIFFUSE LARGE B-CELL LYMPHOMA, UNSPECIFIED SITE: ICD-10-CM

## 2025-06-05 ENCOUNTER — RESULT REVIEW (OUTPATIENT)
Age: 60
End: 2025-06-05

## 2025-06-05 ENCOUNTER — APPOINTMENT (OUTPATIENT)
Dept: HEMATOLOGY ONCOLOGY | Facility: CLINIC | Age: 60
End: 2025-06-05
Payer: MEDICAID

## 2025-06-05 VITALS
DIASTOLIC BLOOD PRESSURE: 80 MMHG | RESPIRATION RATE: 16 BRPM | WEIGHT: 175 LBS | HEART RATE: 93 BPM | OXYGEN SATURATION: 96 % | TEMPERATURE: 98.4 F | SYSTOLIC BLOOD PRESSURE: 121 MMHG | BODY MASS INDEX: 27.41 KG/M2

## 2025-06-05 LAB
BASOPHILS # BLD AUTO: 0.01 K/UL — SIGNIFICANT CHANGE UP (ref 0–0.2)
BASOPHILS NFR BLD AUTO: 0.1 % — SIGNIFICANT CHANGE UP (ref 0–2)
EOSINOPHIL # BLD AUTO: 0.12 K/UL — SIGNIFICANT CHANGE UP (ref 0–0.5)
EOSINOPHIL NFR BLD AUTO: 1.2 % — SIGNIFICANT CHANGE UP (ref 0–6)
HCT VFR BLD CALC: 31.2 % — LOW (ref 39–50)
HGB BLD-MCNC: 10.2 G/DL — LOW (ref 13–17)
IMM GRANULOCYTES NFR BLD AUTO: 1.9 % — HIGH (ref 0–0.9)
LYMPHOCYTES # BLD AUTO: 0.45 K/UL — LOW (ref 1–3.3)
LYMPHOCYTES # BLD AUTO: 4.6 % — LOW (ref 13–44)
MCHC RBC-ENTMCNC: 30.9 PG — SIGNIFICANT CHANGE UP (ref 27–34)
MCHC RBC-ENTMCNC: 32.7 G/DL — SIGNIFICANT CHANGE UP (ref 32–36)
MCV RBC AUTO: 94.5 FL — SIGNIFICANT CHANGE UP (ref 80–100)
MONOCYTES # BLD AUTO: 0.15 K/UL — SIGNIFICANT CHANGE UP (ref 0–0.9)
MONOCYTES NFR BLD AUTO: 1.5 % — LOW (ref 2–14)
NEUTROPHILS # BLD AUTO: 8.9 K/UL — HIGH (ref 1.8–7.4)
NEUTROPHILS NFR BLD AUTO: 90.7 % — HIGH (ref 43–77)
NRBC BLD AUTO-RTO: 0 /100 WBCS — SIGNIFICANT CHANGE UP (ref 0–0)
PLATELET # BLD AUTO: 113 K/UL — LOW (ref 150–400)
RBC # BLD: 3.3 M/UL — LOW (ref 4.2–5.8)
RBC # FLD: 14.3 % — SIGNIFICANT CHANGE UP (ref 10.3–14.5)
WBC # BLD: 9.82 K/UL — SIGNIFICANT CHANGE UP (ref 3.8–10.5)
WBC # FLD AUTO: 9.82 K/UL — SIGNIFICANT CHANGE UP (ref 3.8–10.5)

## 2025-06-05 PROCEDURE — 99214 OFFICE O/P EST MOD 30 MIN: CPT

## 2025-07-03 ENCOUNTER — APPOINTMENT (OUTPATIENT)
Dept: HEMATOLOGY ONCOLOGY | Facility: CLINIC | Age: 60
End: 2025-07-03

## 2025-07-10 ENCOUNTER — APPOINTMENT (OUTPATIENT)
Dept: PHYSICAL MEDICINE AND REHAB | Facility: CLINIC | Age: 60
End: 2025-07-10

## 2025-07-18 ENCOUNTER — OUTPATIENT (OUTPATIENT)
Dept: OUTPATIENT SERVICES | Facility: HOSPITAL | Age: 60
LOS: 1 days | Discharge: HOME | End: 2025-07-18

## 2025-07-18 DIAGNOSIS — C85.10 UNSPECIFIED B-CELL LYMPHOMA, UNSPECIFIED SITE: ICD-10-CM

## 2025-07-21 ENCOUNTER — APPOINTMENT (OUTPATIENT)
Dept: HEMATOLOGY ONCOLOGY | Facility: CLINIC | Age: 60
End: 2025-07-21

## 2025-07-21 ENCOUNTER — RESULT REVIEW (OUTPATIENT)
Age: 60
End: 2025-07-21

## 2025-07-21 ENCOUNTER — APPOINTMENT (OUTPATIENT)
Dept: HEMATOLOGY ONCOLOGY | Facility: CLINIC | Age: 60
End: 2025-07-21
Payer: COMMERCIAL

## 2025-07-21 VITALS
DIASTOLIC BLOOD PRESSURE: 78 MMHG | SYSTOLIC BLOOD PRESSURE: 119 MMHG | HEART RATE: 80 BPM | TEMPERATURE: 97 F | OXYGEN SATURATION: 97 % | RESPIRATION RATE: 16 BRPM

## 2025-07-21 DIAGNOSIS — Z86.73 PERSONAL HISTORY OF TRANSIENT ISCHEMIC ATTACK (TIA), AND CEREBRAL INFARCTION W/OUT RESIDUAL DEFICITS: ICD-10-CM

## 2025-07-21 DIAGNOSIS — I10 ESSENTIAL (PRIMARY) HYPERTENSION: ICD-10-CM

## 2025-07-21 DIAGNOSIS — I61.9 NONTRAUMATIC INTRACEREBRAL HEMORRHAGE, UNSPECIFIED: ICD-10-CM

## 2025-07-21 LAB
BASOPHILS # BLD AUTO: 0.03 K/UL — SIGNIFICANT CHANGE UP (ref 0–0.2)
BASOPHILS NFR BLD AUTO: 0.6 % — SIGNIFICANT CHANGE UP (ref 0–2)
EOSINOPHIL # BLD AUTO: 0.18 K/UL — SIGNIFICANT CHANGE UP (ref 0–0.5)
EOSINOPHIL NFR BLD AUTO: 3.4 % — SIGNIFICANT CHANGE UP (ref 0–6)
HCT VFR BLD CALC: 35.7 % — LOW (ref 39–50)
HGB BLD-MCNC: 11.6 G/DL — LOW (ref 13–17)
IMM GRANULOCYTES NFR BLD AUTO: 1.1 % — HIGH (ref 0–0.9)
LYMPHOCYTES # BLD AUTO: 0.49 K/UL — LOW (ref 1–3.3)
LYMPHOCYTES # BLD AUTO: 9.3 % — LOW (ref 13–44)
MCHC RBC-ENTMCNC: 30.5 PG — SIGNIFICANT CHANGE UP (ref 27–34)
MCHC RBC-ENTMCNC: 32.5 G/DL — SIGNIFICANT CHANGE UP (ref 32–36)
MCV RBC AUTO: 93.9 FL — SIGNIFICANT CHANGE UP (ref 80–100)
MONOCYTES # BLD AUTO: 0.57 K/UL — SIGNIFICANT CHANGE UP (ref 0–0.9)
MONOCYTES NFR BLD AUTO: 10.8 % — SIGNIFICANT CHANGE UP (ref 2–14)
NEUTROPHILS # BLD AUTO: 3.94 K/UL — SIGNIFICANT CHANGE UP (ref 1.8–7.4)
NEUTROPHILS NFR BLD AUTO: 74.8 % — SIGNIFICANT CHANGE UP (ref 43–77)
NRBC BLD AUTO-RTO: 0 /100 WBCS — SIGNIFICANT CHANGE UP (ref 0–0)
PLATELET # BLD AUTO: 148 K/UL — LOW (ref 150–400)
RBC # BLD: 3.8 M/UL — LOW (ref 4.2–5.8)
RBC # FLD: 12.5 % — SIGNIFICANT CHANGE UP (ref 10.3–14.5)
WBC # BLD: 5.27 K/UL — SIGNIFICANT CHANGE UP (ref 3.8–10.5)
WBC # FLD AUTO: 5.27 K/UL — SIGNIFICANT CHANGE UP (ref 3.8–10.5)

## 2025-07-21 PROCEDURE — 99214 OFFICE O/P EST MOD 30 MIN: CPT

## 2025-07-23 ENCOUNTER — APPOINTMENT (OUTPATIENT)
Dept: PHYSICAL MEDICINE AND REHAB | Facility: CLINIC | Age: 60
End: 2025-07-23

## 2025-07-23 DIAGNOSIS — I10 ESSENTIAL (PRIMARY) HYPERTENSION: ICD-10-CM

## 2025-07-23 DIAGNOSIS — I82.4Z1 ACUTE EMBOLISM AND THROMBOSIS OF UNSPECIFIED DEEP VEINS OF RIGHT DISTAL LOWER EXTREMITY: ICD-10-CM

## 2025-07-23 DIAGNOSIS — C82.80 OTHER TYPES OF FOLLICULAR LYMPHOMA, UNSPECIFIED SITE: ICD-10-CM

## 2025-07-23 DIAGNOSIS — R26.1 PARALYTIC GAIT: ICD-10-CM

## 2025-07-23 DIAGNOSIS — I69.30 UNSPECIFIED SEQUELAE OF CEREBRAL INFARCTION: ICD-10-CM

## 2025-08-11 ENCOUNTER — APPOINTMENT (OUTPATIENT)
Dept: PHYSICAL MEDICINE AND REHAB | Facility: CLINIC | Age: 60
End: 2025-08-11

## 2025-09-04 ENCOUNTER — APPOINTMENT (OUTPATIENT)
Dept: HEMATOLOGY ONCOLOGY | Facility: CLINIC | Age: 60
End: 2025-09-04

## 2025-09-09 ENCOUNTER — APPOINTMENT (OUTPATIENT)
Dept: MRI IMAGING | Facility: CLINIC | Age: 60
End: 2025-09-09